# Patient Record
Sex: FEMALE | Race: OTHER | Employment: OTHER | URBAN - METROPOLITAN AREA
[De-identification: names, ages, dates, MRNs, and addresses within clinical notes are randomized per-mention and may not be internally consistent; named-entity substitution may affect disease eponyms.]

---

## 2017-01-23 ENCOUNTER — TELEPHONE (OUTPATIENT)
Dept: INTERNAL MEDICINE | Age: 77
End: 2017-01-23

## 2017-01-23 RX ORDER — AZITHROMYCIN 250 MG/1
TABLET, FILM COATED ORAL
Qty: 1 PACKET | Refills: 0 | Status: SHIPPED | OUTPATIENT
Start: 2017-01-23 | End: 2017-02-02

## 2017-02-03 ENCOUNTER — APPOINTMENT (OUTPATIENT)
Dept: GENERAL RADIOLOGY | Age: 77
End: 2017-02-03
Payer: MEDICARE

## 2017-02-03 ENCOUNTER — OFFICE VISIT (OUTPATIENT)
Dept: INTERNAL MEDICINE | Age: 77
End: 2017-02-03

## 2017-02-03 ENCOUNTER — HOSPITAL ENCOUNTER (EMERGENCY)
Age: 77
Discharge: HOME | End: 2017-02-03
Attending: EMERGENCY MEDICINE
Payer: MEDICARE

## 2017-02-03 VITALS
OXYGEN SATURATION: 94 % | SYSTOLIC BLOOD PRESSURE: 133 MMHG | RESPIRATION RATE: 22 BRPM | HEART RATE: 76 BPM | TEMPERATURE: 98.5 F | DIASTOLIC BLOOD PRESSURE: 90 MMHG | HEIGHT: 62 IN | WEIGHT: 140 LBS | BODY MASS INDEX: 25.76 KG/M2

## 2017-02-03 DIAGNOSIS — R07.9 CHEST PAIN, UNSPECIFIED TYPE: Primary | ICD-10-CM

## 2017-02-03 DIAGNOSIS — J20.9 ACUTE BRONCHITIS, UNSPECIFIED ORGANISM: Primary | ICD-10-CM

## 2017-02-03 LAB
ANION GAP SERPL CALCULATED.3IONS-SCNC: 10 MEQ/L (ref 7–13)
BUN BLDV-MCNC: 26 MG/DL (ref 8–23)
CALCIUM SERPL-MCNC: 9.7 MG/DL (ref 8.6–10.2)
CHLORIDE BLD-SCNC: 100 MEQ/L (ref 98–107)
CO2: 28 MEQ/L (ref 22–29)
CREAT SERPL-MCNC: 0.76 MG/DL (ref 0.5–0.9)
GFR AFRICAN AMERICAN: >60
GFR NON-AFRICAN AMERICAN: >60
GLUCOSE BLD-MCNC: 133 MG/DL (ref 74–109)
HCT VFR BLD CALC: 40.4 % (ref 37–47)
HEMOGLOBIN: 13.4 G/DL (ref 12–16)
INR BLD: 1
MAGNESIUM: 1.9 MG/DL (ref 1.7–2.3)
MCH RBC QN AUTO: 29.8 PG (ref 27–31.3)
MCHC RBC AUTO-ENTMCNC: 33.1 % (ref 33–37)
MCV RBC AUTO: 90 FL (ref 82–100)
PDW BLD-RTO: 13.6 % (ref 11.5–14.5)
PLATELET # BLD: 151 K/UL (ref 130–400)
POTASSIUM SERPL-SCNC: 3.8 MEQ/L (ref 3.5–5.1)
PRO-BNP: 67 PG/ML
PROTHROMBIN TIME: 10.7 SEC (ref 8.1–13.7)
RBC # BLD: 4.49 M/UL (ref 4.2–5.4)
SODIUM BLD-SCNC: 138 MEQ/L (ref 132–144)
TROPONIN: <0.01 NG/ML (ref 0–0.01)
WBC # BLD: 6.3 K/UL (ref 4.8–10.8)

## 2017-02-03 PROCEDURE — G8400 PT W/DXA NO RESULTS DOC: HCPCS | Performed by: INTERNAL MEDICINE

## 2017-02-03 PROCEDURE — G8484 FLU IMMUNIZE NO ADMIN: HCPCS | Performed by: INTERNAL MEDICINE

## 2017-02-03 PROCEDURE — 93005 ELECTROCARDIOGRAM TRACING: CPT

## 2017-02-03 PROCEDURE — 1036F TOBACCO NON-USER: CPT | Performed by: INTERNAL MEDICINE

## 2017-02-03 PROCEDURE — 4040F PNEUMOC VAC/ADMIN/RCVD: CPT | Performed by: INTERNAL MEDICINE

## 2017-02-03 PROCEDURE — 99284 EMERGENCY DEPT VISIT MOD MDM: CPT

## 2017-02-03 PROCEDURE — 36415 COLL VENOUS BLD VENIPUNCTURE: CPT

## 2017-02-03 PROCEDURE — 85610 PROTHROMBIN TIME: CPT

## 2017-02-03 PROCEDURE — 1123F ACP DISCUSS/DSCN MKR DOCD: CPT | Performed by: INTERNAL MEDICINE

## 2017-02-03 PROCEDURE — 83880 ASSAY OF NATRIURETIC PEPTIDE: CPT

## 2017-02-03 PROCEDURE — 71020 XR CHEST STANDARD TWO VW: CPT

## 2017-02-03 PROCEDURE — 6370000000 HC RX 637 (ALT 250 FOR IP): Performed by: EMERGENCY MEDICINE

## 2017-02-03 PROCEDURE — 80048 BASIC METABOLIC PNL TOTAL CA: CPT

## 2017-02-03 PROCEDURE — 84484 ASSAY OF TROPONIN QUANT: CPT

## 2017-02-03 PROCEDURE — G8428 CUR MEDS NOT DOCUMENT: HCPCS | Performed by: INTERNAL MEDICINE

## 2017-02-03 PROCEDURE — 83735 ASSAY OF MAGNESIUM: CPT

## 2017-02-03 PROCEDURE — 85027 COMPLETE CBC AUTOMATED: CPT

## 2017-02-03 PROCEDURE — 99212 OFFICE O/P EST SF 10 MIN: CPT | Performed by: INTERNAL MEDICINE

## 2017-02-03 PROCEDURE — 6360000002 HC RX W HCPCS: Performed by: EMERGENCY MEDICINE

## 2017-02-03 PROCEDURE — 1090F PRES/ABSN URINE INCON ASSESS: CPT | Performed by: INTERNAL MEDICINE

## 2017-02-03 PROCEDURE — G8420 CALC BMI NORM PARAMETERS: HCPCS | Performed by: INTERNAL MEDICINE

## 2017-02-03 RX ORDER — BENZONATATE 100 MG/1
200 CAPSULE ORAL ONCE
Status: COMPLETED | OUTPATIENT
Start: 2017-02-03 | End: 2017-02-03

## 2017-02-03 RX ORDER — DOXYCYCLINE HYCLATE 100 MG/1
100 CAPSULE ORAL 2 TIMES DAILY
COMMUNITY
End: 2017-04-21

## 2017-02-03 RX ORDER — PREDNISONE 50 MG/1
50 TABLET ORAL DAILY
Qty: 4 TABLET | Refills: 0 | Status: SHIPPED | OUTPATIENT
Start: 2017-02-03 | End: 2017-02-07

## 2017-02-03 RX ORDER — GUAIFENESIN 100 MG/5ML
10 SOLUTION ORAL ONCE
Status: COMPLETED | OUTPATIENT
Start: 2017-02-03 | End: 2017-02-03

## 2017-02-03 RX ORDER — DEXAMETHASONE SODIUM PHOSPHATE 10 MG/ML
6 INJECTION, SOLUTION INTRAMUSCULAR; INTRAVENOUS ONCE
Status: COMPLETED | OUTPATIENT
Start: 2017-02-03 | End: 2017-02-03

## 2017-02-03 RX ORDER — BENZONATATE 100 MG/1
200 CAPSULE ORAL 3 TIMES DAILY PRN
Qty: 18 CAPSULE | Refills: 0 | Status: SHIPPED | OUTPATIENT
Start: 2017-02-03 | End: 2017-03-02 | Stop reason: ALTCHOICE

## 2017-02-03 RX ORDER — ALBUTEROL SULFATE 90 UG/1
2 AEROSOL, METERED RESPIRATORY (INHALATION) EVERY 6 HOURS PRN
COMMUNITY
End: 2018-01-30 | Stop reason: ALTCHOICE

## 2017-02-03 RX ADMIN — GUAIFENESIN 10 ML: 100 SOLUTION ORAL at 11:53

## 2017-02-03 RX ADMIN — DEXAMETHASONE SODIUM PHOSPHATE 6 MG: 10 INJECTION INTRAMUSCULAR; INTRAVENOUS at 11:54

## 2017-02-03 RX ADMIN — BENZONATATE 200 MG: 100 CAPSULE ORAL at 11:54

## 2017-02-03 ASSESSMENT — ENCOUNTER SYMPTOMS
NAUSEA: 1
SHORTNESS OF BREATH: 0
VOMITING: 0
ABDOMINAL PAIN: 0
WHEEZING: 0
CHEST TIGHTNESS: 1
COUGH: 1

## 2017-02-03 ASSESSMENT — PAIN SCALES - GENERAL
PAINLEVEL_OUTOF10: 3
PAINLEVEL_OUTOF10: 0

## 2017-02-03 ASSESSMENT — PAIN DESCRIPTION - LOCATION: LOCATION: CHEST

## 2017-02-06 ENCOUNTER — OFFICE VISIT (OUTPATIENT)
Dept: INTERNAL MEDICINE | Age: 77
End: 2017-02-06

## 2017-02-06 VITALS
TEMPERATURE: 97.5 F | BODY MASS INDEX: 25.2 KG/M2 | HEART RATE: 61 BPM | SYSTOLIC BLOOD PRESSURE: 118 MMHG | OXYGEN SATURATION: 98 % | DIASTOLIC BLOOD PRESSURE: 74 MMHG | WEIGHT: 137.8 LBS

## 2017-02-06 DIAGNOSIS — R05.9 COUGH: ICD-10-CM

## 2017-02-06 DIAGNOSIS — R09.81 SINUS CONGESTION: ICD-10-CM

## 2017-02-06 DIAGNOSIS — J90 PLEURAL EFFUSION: ICD-10-CM

## 2017-02-06 DIAGNOSIS — R09.89 CHEST CONGESTION: Primary | ICD-10-CM

## 2017-02-06 PROCEDURE — 1123F ACP DISCUSS/DSCN MKR DOCD: CPT | Performed by: INTERNAL MEDICINE

## 2017-02-06 PROCEDURE — 99214 OFFICE O/P EST MOD 30 MIN: CPT | Performed by: INTERNAL MEDICINE

## 2017-02-06 PROCEDURE — G8400 PT W/DXA NO RESULTS DOC: HCPCS | Performed by: INTERNAL MEDICINE

## 2017-02-06 PROCEDURE — G8484 FLU IMMUNIZE NO ADMIN: HCPCS | Performed by: INTERNAL MEDICINE

## 2017-02-06 PROCEDURE — 4040F PNEUMOC VAC/ADMIN/RCVD: CPT | Performed by: INTERNAL MEDICINE

## 2017-02-06 PROCEDURE — G8420 CALC BMI NORM PARAMETERS: HCPCS | Performed by: INTERNAL MEDICINE

## 2017-02-06 PROCEDURE — 1090F PRES/ABSN URINE INCON ASSESS: CPT | Performed by: INTERNAL MEDICINE

## 2017-02-06 PROCEDURE — G8427 DOCREV CUR MEDS BY ELIG CLIN: HCPCS | Performed by: INTERNAL MEDICINE

## 2017-02-06 PROCEDURE — 1036F TOBACCO NON-USER: CPT | Performed by: INTERNAL MEDICINE

## 2017-02-06 PROCEDURE — 96372 THER/PROPH/DIAG INJ SC/IM: CPT | Performed by: INTERNAL MEDICINE

## 2017-02-06 RX ORDER — DOXYCYCLINE HYCLATE 100 MG
TABLET ORAL
COMMUNITY
Start: 2017-01-31 | End: 2017-02-06 | Stop reason: SDUPTHER

## 2017-02-06 RX ORDER — METHYLPREDNISOLONE ACETATE 80 MG/ML
80 INJECTION, SUSPENSION INTRA-ARTICULAR; INTRALESIONAL; INTRAMUSCULAR; SOFT TISSUE ONCE
Status: COMPLETED | OUTPATIENT
Start: 2017-02-06 | End: 2017-02-06

## 2017-02-06 RX ORDER — AZITHROMYCIN 250 MG/1
TABLET, FILM COATED ORAL
Qty: 1 PACKET | Refills: 0 | Status: SHIPPED | OUTPATIENT
Start: 2017-02-06 | End: 2017-02-16

## 2017-02-06 RX ORDER — FUROSEMIDE 20 MG/1
20 TABLET ORAL DAILY
Qty: 60 TABLET | Refills: 3 | Status: SHIPPED | OUTPATIENT
Start: 2017-02-06 | End: 2018-01-30 | Stop reason: ALTCHOICE

## 2017-02-06 RX ADMIN — METHYLPREDNISOLONE ACETATE 80 MG: 80 INJECTION, SUSPENSION INTRA-ARTICULAR; INTRALESIONAL; INTRAMUSCULAR; SOFT TISSUE at 14:01

## 2017-02-06 ASSESSMENT — ENCOUNTER SYMPTOMS
SINUS PRESSURE: 1
DIARRHEA: 0
TROUBLE SWALLOWING: 0
ANAL BLEEDING: 0
CONSTIPATION: 0
BACK PAIN: 0
SHORTNESS OF BREATH: 1
VOMITING: 0
ABDOMINAL PAIN: 0
SORE THROAT: 0
COUGH: 1
NAUSEA: 0

## 2017-02-06 ASSESSMENT — PATIENT HEALTH QUESTIONNAIRE - PHQ9
2. FEELING DOWN, DEPRESSED OR HOPELESS: 0
SUM OF ALL RESPONSES TO PHQ9 QUESTIONS 1 & 2: 0
1. LITTLE INTEREST OR PLEASURE IN DOING THINGS: 0
SUM OF ALL RESPONSES TO PHQ QUESTIONS 1-9: 0

## 2017-02-10 ENCOUNTER — HOSPITAL ENCOUNTER (OUTPATIENT)
Dept: GENERAL RADIOLOGY | Age: 77
Discharge: HOME | End: 2017-02-10
Payer: MEDICARE

## 2017-02-10 DIAGNOSIS — R05.9 COUGH: ICD-10-CM

## 2017-02-10 DIAGNOSIS — R09.89 CHEST CONGESTION: ICD-10-CM

## 2017-02-10 PROCEDURE — 71020 XR CHEST STANDARD TWO VW: CPT

## 2017-02-10 PROCEDURE — 93005 ELECTROCARDIOGRAM TRACING: CPT

## 2017-02-13 ENCOUNTER — OFFICE VISIT (OUTPATIENT)
Dept: INTERNAL MEDICINE | Age: 77
End: 2017-02-13

## 2017-02-13 VITALS
OXYGEN SATURATION: 96 % | BODY MASS INDEX: 24.87 KG/M2 | TEMPERATURE: 97.7 F | WEIGHT: 136 LBS | SYSTOLIC BLOOD PRESSURE: 130 MMHG | DIASTOLIC BLOOD PRESSURE: 66 MMHG | HEART RATE: 63 BPM

## 2017-02-13 DIAGNOSIS — R05.9 COUGH: Primary | ICD-10-CM

## 2017-02-13 LAB
EKG ATRIAL RATE: 77 BPM
EKG P AXIS: 35 DEGREES
EKG P-R INTERVAL: 158 MS
EKG Q-T INTERVAL: 358 MS
EKG QRS DURATION: 78 MS
EKG QTC CALCULATION (BAZETT): 405 MS
EKG R AXIS: -9 DEGREES
EKG T AXIS: -13 DEGREES
EKG VENTRICULAR RATE: 77 BPM

## 2017-02-13 PROCEDURE — G8484 FLU IMMUNIZE NO ADMIN: HCPCS | Performed by: INTERNAL MEDICINE

## 2017-02-13 PROCEDURE — 1123F ACP DISCUSS/DSCN MKR DOCD: CPT | Performed by: INTERNAL MEDICINE

## 2017-02-13 PROCEDURE — 1036F TOBACCO NON-USER: CPT | Performed by: INTERNAL MEDICINE

## 2017-02-13 PROCEDURE — G8420 CALC BMI NORM PARAMETERS: HCPCS | Performed by: INTERNAL MEDICINE

## 2017-02-13 PROCEDURE — G8400 PT W/DXA NO RESULTS DOC: HCPCS | Performed by: INTERNAL MEDICINE

## 2017-02-13 PROCEDURE — 4040F PNEUMOC VAC/ADMIN/RCVD: CPT | Performed by: INTERNAL MEDICINE

## 2017-02-13 PROCEDURE — G8427 DOCREV CUR MEDS BY ELIG CLIN: HCPCS | Performed by: INTERNAL MEDICINE

## 2017-02-13 PROCEDURE — 99212 OFFICE O/P EST SF 10 MIN: CPT | Performed by: INTERNAL MEDICINE

## 2017-02-13 PROCEDURE — 1090F PRES/ABSN URINE INCON ASSESS: CPT | Performed by: INTERNAL MEDICINE

## 2017-02-18 DIAGNOSIS — K21.9 GASTROESOPHAGEAL REFLUX DISEASE WITHOUT ESOPHAGITIS: ICD-10-CM

## 2017-02-18 DIAGNOSIS — E78.5 HYPERLIPIDEMIA, UNSPECIFIED HYPERLIPIDEMIA TYPE: ICD-10-CM

## 2017-02-18 LAB
ALBUMIN SERPL-MCNC: 4 G/DL (ref 3.9–4.9)
ALP BLD-CCNC: 120 U/L (ref 40–130)
ALT SERPL-CCNC: 45 U/L (ref 0–33)
ANION GAP SERPL CALCULATED.3IONS-SCNC: 13 MEQ/L (ref 7–13)
AST SERPL-CCNC: 32 U/L (ref 0–35)
BILIRUB SERPL-MCNC: 0.4 MG/DL (ref 0–1.2)
BILIRUBIN DIRECT: 0 MG/DL (ref 0–0.3)
BILIRUBIN, INDIRECT: 0.4 MG/DL (ref 0–0.6)
BUN BLDV-MCNC: 19 MG/DL (ref 8–23)
CALCIUM SERPL-MCNC: 9.3 MG/DL (ref 8.6–10.2)
CHLORIDE BLD-SCNC: 102 MEQ/L (ref 98–107)
CHOLESTEROL, TOTAL: 217 MG/DL (ref 0–199)
CO2: 26 MEQ/L (ref 22–29)
CREAT SERPL-MCNC: 0.66 MG/DL (ref 0.5–0.9)
GFR AFRICAN AMERICAN: >60
GFR NON-AFRICAN AMERICAN: >60
GLOBULIN: 2.4 G/DL (ref 2.3–3.5)
GLUCOSE BLD-MCNC: 97 MG/DL (ref 74–109)
HCT VFR BLD CALC: 40.9 % (ref 37–47)
HDLC SERPL-MCNC: 61 MG/DL (ref 40–59)
HEMOGLOBIN: 13.5 G/DL (ref 12–16)
LDL CHOLESTEROL CALCULATED: 118 MG/DL (ref 0–129)
MCH RBC QN AUTO: 30.3 PG (ref 27–31.3)
MCHC RBC AUTO-ENTMCNC: 33 % (ref 33–37)
MCV RBC AUTO: 91.7 FL (ref 82–100)
PDW BLD-RTO: 14.1 % (ref 11.5–14.5)
PLATELET # BLD: 118 K/UL (ref 130–400)
POTASSIUM SERPL-SCNC: 4.1 MEQ/L (ref 3.5–5.1)
RBC # BLD: 4.46 M/UL (ref 4.2–5.4)
SODIUM BLD-SCNC: 141 MEQ/L (ref 132–144)
TOTAL PROTEIN: 6.4 G/DL (ref 6.4–8.1)
TRIGL SERPL-MCNC: 192 MG/DL (ref 0–200)
WBC # BLD: 6.9 K/UL (ref 4.8–10.8)

## 2017-03-02 ENCOUNTER — OFFICE VISIT (OUTPATIENT)
Dept: INTERNAL MEDICINE | Age: 77
End: 2017-03-02

## 2017-03-02 VITALS
WEIGHT: 139.6 LBS | OXYGEN SATURATION: 96 % | SYSTOLIC BLOOD PRESSURE: 140 MMHG | HEART RATE: 69 BPM | TEMPERATURE: 98.3 F | BODY MASS INDEX: 25.53 KG/M2 | DIASTOLIC BLOOD PRESSURE: 82 MMHG

## 2017-03-02 DIAGNOSIS — I10 ESSENTIAL HYPERTENSION: Primary | ICD-10-CM

## 2017-03-02 DIAGNOSIS — R09.81 SINUS CONGESTION: ICD-10-CM

## 2017-03-02 DIAGNOSIS — E78.5 HYPERLIPIDEMIA, UNSPECIFIED HYPERLIPIDEMIA TYPE: ICD-10-CM

## 2017-03-02 PROCEDURE — 99214 OFFICE O/P EST MOD 30 MIN: CPT | Performed by: INTERNAL MEDICINE

## 2017-03-02 PROCEDURE — 1090F PRES/ABSN URINE INCON ASSESS: CPT | Performed by: INTERNAL MEDICINE

## 2017-03-02 PROCEDURE — 1036F TOBACCO NON-USER: CPT | Performed by: INTERNAL MEDICINE

## 2017-03-02 PROCEDURE — G8484 FLU IMMUNIZE NO ADMIN: HCPCS | Performed by: INTERNAL MEDICINE

## 2017-03-02 PROCEDURE — G8420 CALC BMI NORM PARAMETERS: HCPCS | Performed by: INTERNAL MEDICINE

## 2017-03-02 PROCEDURE — G8400 PT W/DXA NO RESULTS DOC: HCPCS | Performed by: INTERNAL MEDICINE

## 2017-03-02 PROCEDURE — 1123F ACP DISCUSS/DSCN MKR DOCD: CPT | Performed by: INTERNAL MEDICINE

## 2017-03-02 PROCEDURE — G8427 DOCREV CUR MEDS BY ELIG CLIN: HCPCS | Performed by: INTERNAL MEDICINE

## 2017-03-02 PROCEDURE — 4040F PNEUMOC VAC/ADMIN/RCVD: CPT | Performed by: INTERNAL MEDICINE

## 2017-03-02 RX ORDER — LOVASTATIN 10 MG/1
10 TABLET ORAL NIGHTLY
Qty: 30 TABLET | Refills: 3 | Status: SHIPPED | OUTPATIENT
Start: 2017-03-02 | End: 2017-07-19 | Stop reason: SDUPTHER

## 2017-03-02 RX ORDER — METHYLPREDNISOLONE ACETATE 80 MG/ML
40 INJECTION, SUSPENSION INTRA-ARTICULAR; INTRALESIONAL; INTRAMUSCULAR; SOFT TISSUE ONCE
Status: DISCONTINUED | OUTPATIENT
Start: 2017-03-02 | End: 2018-04-10

## 2017-03-02 ASSESSMENT — ENCOUNTER SYMPTOMS
COUGH: 0
ABDOMINAL PAIN: 0
WHEEZING: 1
TROUBLE SWALLOWING: 0
COLOR CHANGE: 0
SHORTNESS OF BREATH: 1
BACK PAIN: 0
SINUS PRESSURE: 1
DIARRHEA: 0
ANAL BLEEDING: 0
CONSTIPATION: 0
VOMITING: 0
NAUSEA: 0
SORE THROAT: 0

## 2017-04-21 ENCOUNTER — OFFICE VISIT (OUTPATIENT)
Dept: FAMILY MEDICINE CLINIC | Age: 77
End: 2017-04-21

## 2017-04-21 VITALS
RESPIRATION RATE: 20 BRPM | HEIGHT: 59 IN | SYSTOLIC BLOOD PRESSURE: 126 MMHG | DIASTOLIC BLOOD PRESSURE: 72 MMHG | HEART RATE: 70 BPM | TEMPERATURE: 97.4 F | WEIGHT: 139 LBS | OXYGEN SATURATION: 97 % | BODY MASS INDEX: 28.02 KG/M2

## 2017-04-21 DIAGNOSIS — I10 ESSENTIAL HYPERTENSION: Primary | ICD-10-CM

## 2017-04-21 DIAGNOSIS — K21.9 GASTROESOPHAGEAL REFLUX DISEASE WITHOUT ESOPHAGITIS: ICD-10-CM

## 2017-04-21 DIAGNOSIS — J44.9 CHRONIC OBSTRUCTIVE PULMONARY DISEASE, UNSPECIFIED COPD TYPE (HCC): ICD-10-CM

## 2017-04-21 DIAGNOSIS — J90 PLEURAL EFFUSION: ICD-10-CM

## 2017-04-21 PROCEDURE — G8926 SPIRO NO PERF OR DOC: HCPCS | Performed by: FAMILY MEDICINE

## 2017-04-21 PROCEDURE — 99214 OFFICE O/P EST MOD 30 MIN: CPT | Performed by: FAMILY MEDICINE

## 2017-04-21 PROCEDURE — 3023F SPIROM DOC REV: CPT | Performed by: FAMILY MEDICINE

## 2017-04-21 PROCEDURE — G8400 PT W/DXA NO RESULTS DOC: HCPCS | Performed by: FAMILY MEDICINE

## 2017-04-21 PROCEDURE — 4040F PNEUMOC VAC/ADMIN/RCVD: CPT | Performed by: FAMILY MEDICINE

## 2017-04-21 PROCEDURE — G8427 DOCREV CUR MEDS BY ELIG CLIN: HCPCS | Performed by: FAMILY MEDICINE

## 2017-04-21 PROCEDURE — 1123F ACP DISCUSS/DSCN MKR DOCD: CPT | Performed by: FAMILY MEDICINE

## 2017-04-21 PROCEDURE — G8420 CALC BMI NORM PARAMETERS: HCPCS | Performed by: FAMILY MEDICINE

## 2017-04-21 PROCEDURE — 1036F TOBACCO NON-USER: CPT | Performed by: FAMILY MEDICINE

## 2017-04-21 PROCEDURE — 1090F PRES/ABSN URINE INCON ASSESS: CPT | Performed by: FAMILY MEDICINE

## 2017-04-21 ASSESSMENT — ENCOUNTER SYMPTOMS
GASTROINTESTINAL NEGATIVE: 1
RHINORRHEA: 0
COUGH: 0
RESPIRATORY NEGATIVE: 1
EYES NEGATIVE: 1
CHEST TIGHTNESS: 0

## 2017-05-25 ENCOUNTER — OFFICE VISIT (OUTPATIENT)
Dept: FAMILY MEDICINE CLINIC | Age: 77
End: 2017-05-25

## 2017-05-25 VITALS
OXYGEN SATURATION: 97 % | TEMPERATURE: 97.8 F | WEIGHT: 140 LBS | BODY MASS INDEX: 28.22 KG/M2 | SYSTOLIC BLOOD PRESSURE: 124 MMHG | HEIGHT: 59 IN | DIASTOLIC BLOOD PRESSURE: 78 MMHG | HEART RATE: 74 BPM | RESPIRATION RATE: 18 BRPM

## 2017-05-25 DIAGNOSIS — E78.5 HYPERLIPIDEMIA, UNSPECIFIED HYPERLIPIDEMIA TYPE: ICD-10-CM

## 2017-05-25 DIAGNOSIS — J44.9 CHRONIC OBSTRUCTIVE PULMONARY DISEASE, UNSPECIFIED COPD TYPE (HCC): ICD-10-CM

## 2017-05-25 DIAGNOSIS — I10 ESSENTIAL HYPERTENSION: Primary | ICD-10-CM

## 2017-05-25 PROCEDURE — 4040F PNEUMOC VAC/ADMIN/RCVD: CPT | Performed by: FAMILY MEDICINE

## 2017-05-25 PROCEDURE — G8420 CALC BMI NORM PARAMETERS: HCPCS | Performed by: FAMILY MEDICINE

## 2017-05-25 PROCEDURE — 3023F SPIROM DOC REV: CPT | Performed by: FAMILY MEDICINE

## 2017-05-25 PROCEDURE — G8427 DOCREV CUR MEDS BY ELIG CLIN: HCPCS | Performed by: FAMILY MEDICINE

## 2017-05-25 PROCEDURE — G8926 SPIRO NO PERF OR DOC: HCPCS | Performed by: FAMILY MEDICINE

## 2017-05-25 PROCEDURE — 99213 OFFICE O/P EST LOW 20 MIN: CPT | Performed by: FAMILY MEDICINE

## 2017-05-25 PROCEDURE — 1090F PRES/ABSN URINE INCON ASSESS: CPT | Performed by: FAMILY MEDICINE

## 2017-05-25 PROCEDURE — G8400 PT W/DXA NO RESULTS DOC: HCPCS | Performed by: FAMILY MEDICINE

## 2017-05-25 PROCEDURE — 1123F ACP DISCUSS/DSCN MKR DOCD: CPT | Performed by: FAMILY MEDICINE

## 2017-05-25 PROCEDURE — 1036F TOBACCO NON-USER: CPT | Performed by: FAMILY MEDICINE

## 2017-05-25 ASSESSMENT — ENCOUNTER SYMPTOMS
RHINORRHEA: 0
GASTROINTESTINAL NEGATIVE: 1
EYES NEGATIVE: 1
COUGH: 0
CHEST TIGHTNESS: 0
RESPIRATORY NEGATIVE: 1

## 2017-07-19 DIAGNOSIS — I10 ESSENTIAL HYPERTENSION: ICD-10-CM

## 2017-07-19 DIAGNOSIS — E78.5 HYPERLIPIDEMIA, UNSPECIFIED HYPERLIPIDEMIA TYPE: ICD-10-CM

## 2017-07-19 RX ORDER — LOVASTATIN 10 MG/1
10 TABLET ORAL NIGHTLY
Qty: 30 TABLET | Refills: 3 | Status: SHIPPED | OUTPATIENT
Start: 2017-07-19 | End: 2017-11-27 | Stop reason: SDUPTHER

## 2017-09-06 ENCOUNTER — CARE COORDINATION (OUTPATIENT)
Dept: CARE COORDINATION | Age: 77
End: 2017-09-06

## 2017-11-27 ENCOUNTER — OFFICE VISIT (OUTPATIENT)
Dept: FAMILY MEDICINE CLINIC | Age: 77
End: 2017-11-27

## 2017-11-27 VITALS
BODY MASS INDEX: 26.61 KG/M2 | DIASTOLIC BLOOD PRESSURE: 78 MMHG | WEIGHT: 132 LBS | OXYGEN SATURATION: 97 % | SYSTOLIC BLOOD PRESSURE: 132 MMHG | HEIGHT: 59 IN | TEMPERATURE: 97.6 F | HEART RATE: 74 BPM | RESPIRATION RATE: 18 BRPM

## 2017-11-27 DIAGNOSIS — Z23 NEED FOR INFLUENZA VACCINATION: ICD-10-CM

## 2017-11-27 DIAGNOSIS — I10 ESSENTIAL HYPERTENSION: Primary | ICD-10-CM

## 2017-11-27 DIAGNOSIS — E78.5 HYPERLIPIDEMIA, UNSPECIFIED HYPERLIPIDEMIA TYPE: ICD-10-CM

## 2017-11-27 DIAGNOSIS — K21.9 GASTROESOPHAGEAL REFLUX DISEASE WITHOUT ESOPHAGITIS: ICD-10-CM

## 2017-11-27 DIAGNOSIS — J44.9 CHRONIC OBSTRUCTIVE PULMONARY DISEASE, UNSPECIFIED COPD TYPE (HCC): ICD-10-CM

## 2017-11-27 PROCEDURE — 3023F SPIROM DOC REV: CPT | Performed by: FAMILY MEDICINE

## 2017-11-27 PROCEDURE — 1123F ACP DISCUSS/DSCN MKR DOCD: CPT | Performed by: FAMILY MEDICINE

## 2017-11-27 PROCEDURE — G8926 SPIRO NO PERF OR DOC: HCPCS | Performed by: FAMILY MEDICINE

## 2017-11-27 PROCEDURE — 99214 OFFICE O/P EST MOD 30 MIN: CPT | Performed by: FAMILY MEDICINE

## 2017-11-27 PROCEDURE — 1036F TOBACCO NON-USER: CPT | Performed by: FAMILY MEDICINE

## 2017-11-27 PROCEDURE — G8419 CALC BMI OUT NRM PARAM NOF/U: HCPCS | Performed by: FAMILY MEDICINE

## 2017-11-27 PROCEDURE — 4040F PNEUMOC VAC/ADMIN/RCVD: CPT | Performed by: FAMILY MEDICINE

## 2017-11-27 PROCEDURE — 90662 IIV NO PRSV INCREASED AG IM: CPT | Performed by: FAMILY MEDICINE

## 2017-11-27 PROCEDURE — 1090F PRES/ABSN URINE INCON ASSESS: CPT | Performed by: FAMILY MEDICINE

## 2017-11-27 PROCEDURE — G8400 PT W/DXA NO RESULTS DOC: HCPCS | Performed by: FAMILY MEDICINE

## 2017-11-27 PROCEDURE — G8427 DOCREV CUR MEDS BY ELIG CLIN: HCPCS | Performed by: FAMILY MEDICINE

## 2017-11-27 PROCEDURE — G8484 FLU IMMUNIZE NO ADMIN: HCPCS | Performed by: FAMILY MEDICINE

## 2017-11-27 PROCEDURE — G0008 ADMIN INFLUENZA VIRUS VAC: HCPCS | Performed by: FAMILY MEDICINE

## 2017-11-27 RX ORDER — OMEPRAZOLE 40 MG/1
40 CAPSULE, DELAYED RELEASE ORAL DAILY
Qty: 90 CAPSULE | Refills: 3 | Status: SHIPPED | OUTPATIENT
Start: 2017-11-27 | End: 2018-12-12 | Stop reason: ALTCHOICE

## 2017-11-27 RX ORDER — LOVASTATIN 10 MG/1
10 TABLET ORAL NIGHTLY
Qty: 90 TABLET | Refills: 3 | Status: SHIPPED | OUTPATIENT
Start: 2017-11-27 | End: 2018-01-30 | Stop reason: ALTCHOICE

## 2017-11-27 RX ORDER — METOPROLOL SUCCINATE 50 MG/1
50 TABLET, EXTENDED RELEASE ORAL DAILY
Qty: 90 TABLET | Refills: 3 | Status: SHIPPED | OUTPATIENT
Start: 2017-11-27 | End: 2018-01-30 | Stop reason: ALTCHOICE

## 2017-11-27 ASSESSMENT — ENCOUNTER SYMPTOMS
COUGH: 0
GASTROINTESTINAL NEGATIVE: 1
EYES NEGATIVE: 1
CHEST TIGHTNESS: 0
RESPIRATORY NEGATIVE: 1
RHINORRHEA: 0

## 2017-11-27 NOTE — PROGRESS NOTES
Smoking status: Never Smoker    Smokeless tobacco: Never Used    Alcohol use No    Drug use: No    Sexual activity: Not Asked     Other Topics Concern    None     Social History Narrative    None     Current Outpatient Prescriptions   Medication Sig Dispense Refill    omeprazole (PRILOSEC) 40 MG delayed release capsule Take 1 capsule by mouth daily 90 capsule 3    metoprolol succinate (TOPROL XL) 50 MG extended release tablet Take 1 tablet by mouth daily 90 tablet 3    lovastatin (MEVACOR) 10 MG tablet Take 1 tablet by mouth nightly 90 tablet 3    Naproxen Sodium (ALEVE PO) Take by mouth as needed      POLYETHYL GLYCOL-PROPYL GLYCOL OP Use  in both eyes.  furosemide (LASIX) 20 MG tablet Take 1 tablet by mouth daily (Patient taking differently: Take 20 mg by mouth every other day ) 60 tablet 3    albuterol sulfate  (90 BASE) MCG/ACT inhaler Inhale 2 puffs into the lungs every 6 hours as needed for Wheezing      sertraline (ZOLOFT) 100 MG tablet Take 1 tablet by mouth daily 90 tablet 3    aspirin 81 MG EC tablet Take 81 mg by mouth daily.  cilostazol (PLETAL) 100 MG tablet Take 100 mg by mouth daily. Current Facility-Administered Medications   Medication Dose Route Frequency Provider Last Rate Last Dose    methylPREDNISolone acetate (DEPO-MEDROL) injection 40 mg  40 mg Intramuscular Once Leslie Rivera MD         Current Outpatient Prescriptions on File Prior to Visit   Medication Sig Dispense Refill    Naproxen Sodium (ALEVE PO) Take by mouth as needed      POLYETHYL GLYCOL-PROPYL GLYCOL OP Use  in both eyes.       furosemide (LASIX) 20 MG tablet Take 1 tablet by mouth daily (Patient taking differently: Take 20 mg by mouth every other day ) 60 tablet 3    albuterol sulfate  (90 BASE) MCG/ACT inhaler Inhale 2 puffs into the lungs every 6 hours as needed for Wheezing      sertraline (ZOLOFT) 100 MG tablet Take 1 tablet by mouth daily 90 tablet 3    aspirin 81 MG EC tablet Take 81 mg by mouth daily.  cilostazol (PLETAL) 100 MG tablet Take 100 mg by mouth daily. Current Facility-Administered Medications on File Prior to Visit   Medication Dose Route Frequency Provider Last Rate Last Dose    methylPREDNISolone acetate (DEPO-MEDROL) injection 40 mg  40 mg Intramuscular Once Eze Vega MD         Allergies   Allergen Reactions    Codeine Shortness Of Breath     tired     Health Maintenance   Topic Date Due    DTaP/Tdap/Td vaccine (1 - Tdap) 02/05/1959    Zostavax vaccine  02/05/1999    DEXA (modify frequency per FRAX score)  02/05/2004    Pneumococcal low/med risk (1 of 2 - PCV13) 02/05/2004    Flu vaccine (1) 09/01/2017    Breast cancer screen  12/08/2018    Colon cancer screen colonoscopy  05/04/2019    Lipid screen  02/18/2022       Review of Systems    Review of Systems   Constitutional: Negative for activity change, appetite change, fatigue and fever. HENT: Negative for congestion and rhinorrhea. Eyes: Negative. Respiratory: Negative. Negative for cough and chest tightness. Cardiovascular: Negative. Gastrointestinal: Negative. Endocrine: Negative. Genitourinary: Negative. Musculoskeletal: Negative. Skin: Negative. Neurological: Negative for dizziness, light-headedness and numbness. Hematological: Negative. Psychiatric/Behavioral: Negative. Physical Exam  Vitals:    11/27/17 1010   BP: 132/78   Pulse: 74   Resp: 18   Temp: 97.6 °F (36.4 °C)   TempSrc: Tympanic   SpO2: 97%   Weight: 132 lb (59.9 kg)   Height: 4' 11\" (1.499 m)       Physical Exam   Constitutional: She appears well-developed and well-nourished. HENT:   Right Ear: External ear normal.   Left Ear: External ear normal.   Eyes: Conjunctivae are normal. Pupils are equal, round, and reactive to light. Neck: Normal range of motion. Neck supple. No thyromegaly present. Cardiovascular: Normal rate, regular rhythm and normal heart sounds.     No murmur heard. Pulmonary/Chest: Effort normal and breath sounds normal.   Abdominal: Soft. Bowel sounds are normal. She exhibits no distension. There is no tenderness. Musculoskeletal: Normal range of motion. She exhibits no edema or tenderness. Lymphadenopathy:     She has no cervical adenopathy. Neurological: She is alert. No cranial nerve deficit. Coordination normal.       Assessment  1. Essential hypertension  lovastatin (MEVACOR) 10 MG tablet   2. Need for influenza vaccination  INFLUENZA, HIGH DOSE, 65 YRS +, IM, PF, PREFILL SYR, 0.5ML (FLUZONE HD)   3. Hyperlipidemia, unspecified hyperlipidemia type  lovastatin (MEVACOR) 10 MG tablet   4. Gastroesophageal reflux disease without esophagitis     5.  Chronic obstructive pulmonary disease, unspecified COPD type ([facility]       Problem List     Hyperlipidemia    Relevant Medications    aspirin 81 MG EC tablet    furosemide (LASIX) 20 MG tablet    metoprolol succinate (TOPROL XL) 50 MG extended release tablet    lovastatin (MEVACOR) 10 MG tablet    Essential hypertension - Primary    Relevant Medications    aspirin 81 MG EC tablet    furosemide (LASIX) 20 MG tablet    metoprolol succinate (TOPROL XL) 50 MG extended release tablet    lovastatin (MEVACOR) 10 MG tablet    Gastroesophageal reflux disease without esophagitis    Relevant Medications    omeprazole (PRILOSEC) 40 MG delayed release capsule    Chronic obstructive pulmonary disease (HCC)    Relevant Medications    methylPREDNISolone acetate (DEPO-MEDROL) injection 40 mg (Completed)    methylPREDNISolone acetate (DEPO-MEDROL) injection 40 mg (Completed)    methylPREDNISolone acetate (DEPO-MEDROL) injection 40 mg (Completed)    methylPREDNISolone acetate (DEPO-MEDROL) injection 40 mg (Completed)    methylPREDNISolone acetate (DEPO-MEDROL) injection 40 mg (Completed)    methylPREDNISolone acetate (DEPO-MEDROL) injection 40 mg (Completed)    methylPREDNISolone acetate (DEPO-MEDROL) injection 40 mg (Completed) methylPREDNISolone acetate (DEPO-MEDROL) injection 80 mg (Completed)    benzonatate (TESSALON) capsule 200 mg (Completed)    guaiFENesin (ROBITUSSIN) 100 MG/5ML oral solution 10 mL (Completed)    dexamethasone (PF) (DECADRON) injection 6 mg (Completed)    albuterol sulfate  (90 BASE) MCG/ACT inhaler    methylPREDNISolone acetate (DEPO-MEDROL) injection 80 mg (Completed)    methylPREDNISolone acetate (DEPO-MEDROL) injection 40 mg          Plan  Orders Placed This Encounter   Procedures    INFLUENZA, HIGH DOSE, 65 YRS +, IM, PF, PREFILL SYR, 0.5ML (FLUZONE HD)     Orders Placed This Encounter   Medications    omeprazole (PRILOSEC) 40 MG delayed release capsule     Sig: Take 1 capsule by mouth daily     Dispense:  90 capsule     Refill:  3    metoprolol succinate (TOPROL XL) 50 MG extended release tablet     Sig: Take 1 tablet by mouth daily     Dispense:  90 tablet     Refill:  3    lovastatin (MEVACOR) 10 MG tablet     Sig: Take 1 tablet by mouth nightly     Dispense:  90 tablet     Refill:  3     Return in about 6 months (around 5/27/2018).   Annalisa North MD

## 2018-01-30 ENCOUNTER — OFFICE VISIT (OUTPATIENT)
Dept: FAMILY MEDICINE CLINIC | Age: 78
End: 2018-01-30
Payer: MEDICARE

## 2018-01-30 VITALS
SYSTOLIC BLOOD PRESSURE: 130 MMHG | HEIGHT: 59 IN | HEART RATE: 67 BPM | BODY MASS INDEX: 28.22 KG/M2 | WEIGHT: 140 LBS | OXYGEN SATURATION: 97 % | DIASTOLIC BLOOD PRESSURE: 76 MMHG | TEMPERATURE: 98.4 F | RESPIRATION RATE: 16 BRPM

## 2018-01-30 DIAGNOSIS — I10 ESSENTIAL HYPERTENSION: ICD-10-CM

## 2018-01-30 DIAGNOSIS — L57.0 MULTIPLE ACTINIC KERATOSES: Chronic | ICD-10-CM

## 2018-01-30 DIAGNOSIS — F41.8 DEPRESSION WITH ANXIETY: Chronic | ICD-10-CM

## 2018-01-30 DIAGNOSIS — Z12.39 SCREENING FOR BREAST CANCER: Chronic | ICD-10-CM

## 2018-01-30 DIAGNOSIS — R53.83 FATIGUE, UNSPECIFIED TYPE: ICD-10-CM

## 2018-01-30 DIAGNOSIS — K29.50 CHRONIC GASTRITIS, PRESENCE OF BLEEDING UNSPECIFIED, UNSPECIFIED GASTRITIS TYPE: Chronic | ICD-10-CM

## 2018-01-30 DIAGNOSIS — K21.9 GASTROESOPHAGEAL REFLUX DISEASE WITHOUT ESOPHAGITIS: Primary | ICD-10-CM

## 2018-01-30 DIAGNOSIS — M81.0 AGE-RELATED OSTEOPOROSIS WITHOUT CURRENT PATHOLOGICAL FRACTURE: Chronic | ICD-10-CM

## 2018-01-30 PROBLEM — K29.70 GASTRITIS: Chronic | Status: ACTIVE | Noted: 2018-01-30

## 2018-01-30 PROCEDURE — 1123F ACP DISCUSS/DSCN MKR DOCD: CPT | Performed by: FAMILY MEDICINE

## 2018-01-30 PROCEDURE — G8400 PT W/DXA NO RESULTS DOC: HCPCS | Performed by: FAMILY MEDICINE

## 2018-01-30 PROCEDURE — 99215 OFFICE O/P EST HI 40 MIN: CPT | Performed by: FAMILY MEDICINE

## 2018-01-30 PROCEDURE — 1090F PRES/ABSN URINE INCON ASSESS: CPT | Performed by: FAMILY MEDICINE

## 2018-01-30 PROCEDURE — G8427 DOCREV CUR MEDS BY ELIG CLIN: HCPCS | Performed by: FAMILY MEDICINE

## 2018-01-30 PROCEDURE — 4040F PNEUMOC VAC/ADMIN/RCVD: CPT | Performed by: FAMILY MEDICINE

## 2018-01-30 PROCEDURE — 1036F TOBACCO NON-USER: CPT | Performed by: FAMILY MEDICINE

## 2018-01-30 PROCEDURE — G8484 FLU IMMUNIZE NO ADMIN: HCPCS | Performed by: FAMILY MEDICINE

## 2018-01-30 PROCEDURE — G8419 CALC BMI OUT NRM PARAM NOF/U: HCPCS | Performed by: FAMILY MEDICINE

## 2018-01-30 RX ORDER — LISINOPRIL 10 MG/1
10 TABLET ORAL DAILY
Qty: 30 TABLET | Refills: 3 | Status: SHIPPED | OUTPATIENT
Start: 2018-01-30 | End: 2018-03-06 | Stop reason: DRUGHIGH

## 2018-01-30 RX ORDER — MIRTAZAPINE 15 MG/1
15 TABLET, FILM COATED ORAL NIGHTLY
Qty: 30 TABLET | Refills: 5 | Status: SHIPPED | OUTPATIENT
Start: 2018-01-30 | End: 2018-02-05

## 2018-01-30 RX ORDER — DIPHENHYDRAMINE HCL 25 MG
25 TABLET ORAL EVERY 6 HOURS PRN
COMMUNITY
End: 2018-01-30 | Stop reason: ALTCHOICE

## 2018-01-30 RX ORDER — LANOLIN ALCOHOL/MO/W.PET/CERES
3 CREAM (GRAM) TOPICAL NIGHTLY PRN
COMMUNITY
End: 2018-03-06 | Stop reason: ALTCHOICE

## 2018-01-30 RX ORDER — ACETAMINOPHEN 500 MG
500 TABLET ORAL EVERY 6 HOURS PRN
COMMUNITY
End: 2020-12-26 | Stop reason: ALTCHOICE

## 2018-01-31 NOTE — PROGRESS NOTES
Subjective  Vahe Buckner, 65 y.o. female presents today with:  Chief Complaint   Patient presents with   Keven Marquez Doctor     Former patient of Dr. Lou President. Patient states she needs a general check up. This is a new patient to me. I have reviewed the past medical and surgical history, social history and family history provided. I have reviewed  medication, previous testing and working diagnoses. I have reviewed the allergies and health maintenance information and correlated it into my decision making for this patient for care, diagnostics, consultations and treatment for today's visit. 1. CV/CP - unclear reason for lasix and betablocker given no h/o CAD/MI. No CP/SOB/palps/edema. CXR shows mild cardiomegaly. Patient considers herself to have good exercise tolerance. She was placed on albuterol apparently when she had PNA. She is a never-smoker and does not have asthma, wheezing or coughing. She does not use the inhaler. 2. States that she has constant epigastric burning with and without food and her long-term PPI use doesn't help. No bloody or tarry stools. Has colonoscopy within last few years. Believes it was normal. No unexpected weight loss. 3. Depression - Is on Zoloft and without it she says she is even more tearful than she is now. She is moderately tearful now and is very worried about issues within the family. No SI. No hallucinations. Has trouble initiating and maintaining sleep. No ETOH. No drugs. Is very tired all of the time during the day but doesn't sleep at night. 4. Skin issues - has dry, scaling patches all over her arms and on her upper back that are very itchy. They've been there a long time and steroid creams are no help.          No other questions and or concerns for today's visit      Review of Systems as above      Past Medical History:   Diagnosis Date    Arthritis     Chronic bronchitis ([address]     Degenerative disc disease, cervical     Depression     Esophagitis     Fibromyositis     GERD (gastroesophageal reflux disease)     Headache(784.0)     Hyperlipidemia     Hypertension     Migraines     Pleural effusion     Right-sided chest wall pain      Past Surgical History:   Procedure Laterality Date    CHOLECYSTECTOMY  1988    COLONOSCOPY  05/04/2009    HYSTERECTOMY  1978    OTHER SURGICAL HISTORY Left 04/27/15     [facility]YE VITRECTOMY W MACULAR EPIRETINAL MEMBRANE    UPPER GASTROINTESTINAL ENDOSCOPY  04/16/2013    UPPER GASTROINTESTINAL ENDOSCOPY  10/29/15    Dillan Sims MD     Social History     Social History    Marital status:      Spouse name: N/A    Number of children: N/A    Years of education: N/A     Occupational History    Not on file. Social History Main Topics    Smoking status: Never Smoker    Smokeless tobacco: Never Used    Alcohol use No    Drug use: No    Sexual activity: Not on file     Other Topics Concern    Not on file     Social History Narrative    No narrative on file     Family History   Problem Relation Age of Onset    Other Mother      Neurological Disease    Cancer Sister     Diabetes Brother      Allergies   Allergen Reactions    Codeine Shortness Of Breath     tired     Current Outpatient Prescriptions   Medication Sig Dispense Refill    Multiple Vitamins-Minerals (PRESERVISION AREDS 2+MULTI VIT PO) Take by mouth      melatonin 3 MG TABS tablet Take 3 mg by mouth nightly as needed      acetaminophen (TYLENOL) 500 MG tablet Take 500 mg by mouth every 6 hours as needed for Pain      lisinopril (PRINIVIL;ZESTRIL) 10 MG tablet Take 1 tablet by mouth daily 30 tablet 3    mirtazapine (REMERON) 15 MG tablet Take 1 tablet by mouth nightly 30 tablet 5    omeprazole (PRILOSEC) 40 MG delayed release capsule Take 1 capsule by mouth daily 90 capsule 3    sertraline (ZOLOFT) 100 MG tablet Take 1 tablet by mouth daily 90 tablet 3    aspirin 81 MG EC tablet Take 81 mg by mouth daily.        Current

## 2018-02-05 DIAGNOSIS — F41.8 DEPRESSION WITH ANXIETY: Primary | Chronic | ICD-10-CM

## 2018-02-05 RX ORDER — TRAZODONE HYDROCHLORIDE 50 MG/1
25 TABLET ORAL NIGHTLY
Qty: 15 TABLET | Refills: 2 | Status: SHIPPED | OUTPATIENT
Start: 2018-02-05 | End: 2018-06-18 | Stop reason: SDUPTHER

## 2018-02-10 ENCOUNTER — HOSPITAL ENCOUNTER (OUTPATIENT)
Dept: WOMENS IMAGING | Age: 78
Discharge: HOME | End: 2018-02-12
Payer: MEDICARE

## 2018-02-10 DIAGNOSIS — Z12.39 SCREENING FOR BREAST CANCER: Chronic | ICD-10-CM

## 2018-02-10 DIAGNOSIS — M81.0 AGE-RELATED OSTEOPOROSIS WITHOUT CURRENT PATHOLOGICAL FRACTURE: Chronic | ICD-10-CM

## 2018-02-10 PROCEDURE — 77067 SCR MAMMO BI INCL CAD: CPT

## 2018-02-10 PROCEDURE — 77080 DXA BONE DENSITY AXIAL: CPT

## 2018-02-21 ENCOUNTER — HOSPITAL ENCOUNTER (OUTPATIENT)
Dept: LAB | Age: 78
Discharge: HOME | End: 2018-02-21
Payer: MEDICARE

## 2018-02-21 LAB
ALBUMIN SERPL-MCNC: 3.9 G/DL (ref 3.9–4.9)
ALP BLD-CCNC: 149 U/L (ref 40–130)
ALT SERPL-CCNC: 39 U/L (ref 0–33)
ANION GAP SERPL CALCULATED.3IONS-SCNC: 15 MEQ/L (ref 7–13)
AST SERPL-CCNC: 33 U/L (ref 0–35)
ATYPICAL LYMPHOCYTE RELATIVE PERCENT: 2 %
BASOPHILS ABSOLUTE: 0 K/UL (ref 0–0.2)
BASOPHILS RELATIVE PERCENT: 0.6 %
BILIRUB SERPL-MCNC: 0.4 MG/DL (ref 0–1.2)
BUN BLDV-MCNC: 17 MG/DL (ref 8–23)
CALCIUM SERPL-MCNC: 9.4 MG/DL (ref 8.6–10.2)
CHLORIDE BLD-SCNC: 100 MEQ/L (ref 98–107)
CO2: 26 MEQ/L (ref 22–29)
CREAT SERPL-MCNC: 0.67 MG/DL (ref 0.5–0.9)
EOSINOPHILS ABSOLUTE: 0.1 K/UL (ref 0–0.7)
EOSINOPHILS RELATIVE PERCENT: 2 %
GFR AFRICAN AMERICAN: >60
GFR NON-AFRICAN AMERICAN: >60
GLOBULIN: 2.8 G/DL (ref 2.3–3.5)
GLUCOSE BLD-MCNC: 94 MG/DL (ref 74–109)
HCT VFR BLD CALC: 42.1 % (ref 37–47)
HEMOGLOBIN: 13.6 G/DL (ref 12–16)
LYMPHOCYTES ABSOLUTE: 1.5 K/UL (ref 1–4.8)
LYMPHOCYTES RELATIVE PERCENT: 38 %
MCH RBC QN AUTO: 30.2 PG (ref 27–31.3)
MCHC RBC AUTO-ENTMCNC: 32.2 % (ref 33–37)
MCV RBC AUTO: 93.7 FL (ref 82–100)
MONOCYTES ABSOLUTE: 0.3 K/UL (ref 0.2–0.8)
MONOCYTES RELATIVE PERCENT: 8.3 %
NEUTROPHILS ABSOLUTE: 1.9 K/UL (ref 1.4–6.5)
NEUTROPHILS RELATIVE PERCENT: 50 %
PDW BLD-RTO: 14 % (ref 11.5–14.5)
PLATELET # BLD: 121 K/UL (ref 130–400)
PLATELET SLIDE REVIEW: ABNORMAL
POTASSIUM SERPL-SCNC: 4.6 MEQ/L (ref 3.5–5.1)
RBC # BLD: 4.49 M/UL (ref 4.2–5.4)
RBC # BLD: NORMAL 10*6/UL
SMUDGE CELLS: 12.5
SODIUM BLD-SCNC: 141 MEQ/L (ref 132–144)
TOTAL PROTEIN: 6.7 G/DL (ref 6.4–8.1)
TSH SERPL DL<=0.05 MIU/L-ACNC: 1.87 UIU/ML (ref 0.27–4.2)
VACUOLATED NEUTROPHILS: PRESENT
WBC # BLD: 3.8 K/UL (ref 4.8–10.8)

## 2018-02-21 PROCEDURE — 85025 COMPLETE CBC W/AUTO DIFF WBC: CPT

## 2018-02-21 PROCEDURE — 80053 COMPREHEN METABOLIC PANEL: CPT

## 2018-02-21 PROCEDURE — 84443 ASSAY THYROID STIM HORMONE: CPT

## 2018-03-06 ENCOUNTER — OFFICE VISIT (OUTPATIENT)
Dept: FAMILY MEDICINE CLINIC | Age: 78
End: 2018-03-06
Payer: MEDICARE

## 2018-03-06 VITALS
SYSTOLIC BLOOD PRESSURE: 146 MMHG | HEIGHT: 59 IN | TEMPERATURE: 99.2 F | HEART RATE: 88 BPM | WEIGHT: 138 LBS | RESPIRATION RATE: 20 BRPM | DIASTOLIC BLOOD PRESSURE: 78 MMHG | OXYGEN SATURATION: 96 % | BODY MASS INDEX: 27.82 KG/M2

## 2018-03-06 DIAGNOSIS — I10 ESSENTIAL HYPERTENSION: Primary | ICD-10-CM

## 2018-03-06 DIAGNOSIS — Z23 NEED FOR PNEUMOCOCCAL VACCINATION: ICD-10-CM

## 2018-03-06 DIAGNOSIS — D69.6 THROMBOCYTOPENIA (HCC): Chronic | ICD-10-CM

## 2018-03-06 DIAGNOSIS — F41.8 DEPRESSION WITH ANXIETY: Chronic | ICD-10-CM

## 2018-03-06 DIAGNOSIS — R73.09 ABNORMAL GLUCOSE: Chronic | ICD-10-CM

## 2018-03-06 DIAGNOSIS — D72.810 LYMPHOCYTOPENIA: Chronic | ICD-10-CM

## 2018-03-06 PROCEDURE — 90670 PCV13 VACCINE IM: CPT | Performed by: FAMILY MEDICINE

## 2018-03-06 PROCEDURE — 1036F TOBACCO NON-USER: CPT | Performed by: FAMILY MEDICINE

## 2018-03-06 PROCEDURE — G8399 PT W/DXA RESULTS DOCUMENT: HCPCS | Performed by: FAMILY MEDICINE

## 2018-03-06 PROCEDURE — G0009 ADMIN PNEUMOCOCCAL VACCINE: HCPCS | Performed by: FAMILY MEDICINE

## 2018-03-06 PROCEDURE — 1123F ACP DISCUSS/DSCN MKR DOCD: CPT | Performed by: FAMILY MEDICINE

## 2018-03-06 PROCEDURE — G8427 DOCREV CUR MEDS BY ELIG CLIN: HCPCS | Performed by: FAMILY MEDICINE

## 2018-03-06 PROCEDURE — 1090F PRES/ABSN URINE INCON ASSESS: CPT | Performed by: FAMILY MEDICINE

## 2018-03-06 PROCEDURE — G8419 CALC BMI OUT NRM PARAM NOF/U: HCPCS | Performed by: FAMILY MEDICINE

## 2018-03-06 PROCEDURE — 99214 OFFICE O/P EST MOD 30 MIN: CPT | Performed by: FAMILY MEDICINE

## 2018-03-06 PROCEDURE — G8484 FLU IMMUNIZE NO ADMIN: HCPCS | Performed by: FAMILY MEDICINE

## 2018-03-06 PROCEDURE — 4040F PNEUMOC VAC/ADMIN/RCVD: CPT | Performed by: FAMILY MEDICINE

## 2018-03-06 RX ORDER — MIRTAZAPINE 15 MG/1
TABLET, FILM COATED ORAL
COMMUNITY
Start: 2018-01-30 | End: 2018-03-06

## 2018-03-06 RX ORDER — LISINOPRIL 20 MG/1
20 TABLET ORAL DAILY
Qty: 30 TABLET | Refills: 3 | Status: SHIPPED | OUTPATIENT
Start: 2018-03-06 | End: 2018-07-10 | Stop reason: SDUPTHER

## 2018-03-06 RX ORDER — BLOOD PRESSURE TEST KIT
KIT MISCELLANEOUS
Qty: 1 KIT | Refills: 0 | Status: SHIPPED | OUTPATIENT
Start: 2018-03-06 | End: 2018-04-10 | Stop reason: ALTCHOICE

## 2018-03-06 RX ORDER — METOPROLOL SUCCINATE 50 MG/1
TABLET, EXTENDED RELEASE ORAL
COMMUNITY
Start: 2017-11-27 | End: 2018-03-06 | Stop reason: ALTCHOICE

## 2018-03-06 RX ORDER — LOVASTATIN 10 MG/1
TABLET ORAL
COMMUNITY
Start: 2017-11-27 | End: 2018-03-06 | Stop reason: ALTCHOICE

## 2018-03-06 ASSESSMENT — PATIENT HEALTH QUESTIONNAIRE - PHQ9
SUM OF ALL RESPONSES TO PHQ QUESTIONS 1-9: 0
2. FEELING DOWN, DEPRESSED OR HOPELESS: 0
1. LITTLE INTEREST OR PLEASURE IN DOING THINGS: 0
SUM OF ALL RESPONSES TO PHQ9 QUESTIONS 1 & 2: 0

## 2018-03-07 DIAGNOSIS — K29.50 CHRONIC GASTRITIS, PRESENCE OF BLEEDING UNSPECIFIED, UNSPECIFIED GASTRITIS TYPE: Chronic | ICD-10-CM

## 2018-03-07 ASSESSMENT — ENCOUNTER SYMPTOMS
VOMITING: 0
ABDOMINAL PAIN: 0
CHEST TIGHTNESS: 0
CONSTIPATION: 0
DIARRHEA: 0
NAUSEA: 0

## 2018-03-07 NOTE — PROGRESS NOTES
Subjective  Chu Nagy, 77 y.o. female presents today with:  Chief Complaint   Patient presents with    Follow-up     Patient is here for her follow up with HTN, Gerd and depression. States that since starting her new medications her BP and sugars are elevated. Hypertension  Patient is here for follow-up of elevated blood pressure. She is not exercising and is adherent to a low-salt diet. Blood pressure is not well controlled at home. Cardiac symptoms: none. Patient denies chest pain, dyspnea, irregular heart beat and lower extremity edema. Cardiovascular risk factors: advanced age (older than 54 for men, 72 for women) and sedentary lifestyle. Use of agents associated with hypertension: none. History of target organ damage: none. Patient is being treated for depression and has been compliant with meds which do not cause side effects. Mood is improved. No suicidal ideation. Sleep is improved with trazodone. She states that he sugar 2 hours postprandial has been running 120-160. Her fasting glucose was WNL. Gets shakey if she hasn't had an adequate lunch. Her lipids off statin are WNL. Her platelets are 224, WBC 3.5 and alk phos and LFTs chronically mildly elevated. No rashes. No abnormal bleeding. No ETOH. No other questions and or concerns for today's visit      Review of Systems   Constitutional: Negative for chills, diaphoresis, fatigue and fever. Respiratory: Negative for chest tightness. Cardiovascular: Negative for chest pain, palpitations and leg swelling. Gastrointestinal: Negative for abdominal pain, constipation, diarrhea, nausea and vomiting.          Past Medical History:   Diagnosis Date    Arthritis     Chronic bronchitis (HCC)     Degenerative disc disease, cervical     Depression     Esophagitis     Fibromyositis     GERD (gastroesophageal reflux disease)     Headache(784.0)     Hyperlipidemia     Hypertension     Migraines     Pleural Intramuscular Once Isabel Ernst MD         PMH, Surgical Hx, Family Hx, and Social Hx reviewed and updated. Health Maintenance reviewed. Objective    Vitals:    03/06/18 1856 03/06/18 1901   BP: (!) 158/80 (!) 146/78   Site: Left Arm Left Arm   Position: Sitting Sitting   Cuff Size: Medium Adult Medium Adult   Pulse: 88    Resp: 20    Temp: 99.2 °F (37.3 °C)    TempSrc: Temporal    SpO2: 96%    Weight: 138 lb (62.6 kg)    Height: 4' 11\" (1.499 m)        Physical Exam   Constitutional: She is oriented to person, place, and time. She appears well-developed and well-nourished. No distress. HENT:   Head: Normocephalic and atraumatic. Eyes: Conjunctivae are normal.   Cardiovascular: Normal rate, regular rhythm and normal heart sounds. Pulmonary/Chest: No respiratory distress. She has no wheezes. She has no rales. Neurological: She is alert and oriented to person, place, and time. Skin: Skin is warm and dry. Psychiatric: She has a normal mood and affect. Assessment & Plan   1. Essential hypertension  Blood Pressure KIT    lisinopril (PRINIVIL;ZESTRIL) 20 MG tablet   2. Gastroesophageal reflux disease without esophagitis     3. Need for pneumococcal vaccination  PREVNAR 13 IM (Pneumococcal conjugate vaccine 13-valent)   4. Depression with anxiety     5. Abnormal glucose  Hemoglobin A1C   6. Thrombocytopenia ([address]  Hepatic Function Panel   7. Lymphocytopenia       1. Hypertension - stable, fair control; increase lisinopril to 20 mg; follow labs; reviewed healthy diet, exercise, importance of maintaining healthy weight and med compliance. 2. Depression with anxiety - sleep is better on trazodone; mood and anxiety improved; no change to meds. 3. Abnormal glucose - check A1C.    4. Abnl Labs - Recheck in one month. COnsider liver US.     Reviewed with the patient: current clinical status, medications, activities and diet.      Side effects, adverse effects of the medication prescribed today, as well as treatment plan/ rationale and result expectations have been discussed with the patient who expresses understanding and desires to proceed.     Close follow up to evaluate treatment results and for coordination of care. I have reviewed the patient's medical history in detail and updated the computerized patient record. Orders Placed This Encounter   Procedures    PREVNAR 13 IM (Pneumococcal conjugate vaccine 13-valent)    Hepatic Function Panel     Standing Status:   Future     Standing Expiration Date:   3/6/2019    Hemoglobin A1C     Standing Status:   Future     Standing Expiration Date:   3/6/2019     Orders Placed This Encounter   Medications    Blood Pressure KIT     Sig: Use as directed to check blood pressure daily. Dispense:  1 kit     Refill:  0    lisinopril (PRINIVIL;ZESTRIL) 20 MG tablet     Sig: Take 1 tablet by mouth daily     Dispense:  30 tablet     Refill:  3     Medications Discontinued During This Encounter   Medication Reason    lovastatin (MEVACOR) 10 MG tablet Therapy completed    melatonin 3 MG TABS tablet Therapy completed    Multiple Vitamins-Minerals (PRESERVISION AREDS 2+MULTI VIT PO) Therapy completed    metoprolol succinate (TOPROL XL) 50 MG extended release tablet Therapy completed    lisinopril (PRINIVIL;ZESTRIL) 10 MG tablet Dose adjustment    mirtazapine (REMERON) 15 MG tablet Patient Choice     Return in about 4 weeks (around 4/3/2018) for HTN, mood, WBC, Plt.   Quality & Risk Score Accuracy - MEDICARE ADVANTAGE    Last edited 03/07/18 07:58 EST by Jersey Palencia MD           Controlled Substances Monitoring:                                Jersey Palencia MD

## 2018-03-08 LAB — H PYLORI BREATH TEST: NEGATIVE

## 2018-03-26 RX ORDER — SERTRALINE HYDROCHLORIDE 100 MG/1
100 TABLET, FILM COATED ORAL DAILY
Qty: 90 TABLET | Refills: 1 | Status: SHIPPED | OUTPATIENT
Start: 2018-03-26 | End: 2018-07-30 | Stop reason: SDUPTHER

## 2018-03-29 ENCOUNTER — HOSPITAL ENCOUNTER (OUTPATIENT)
Dept: LAB | Age: 78
Discharge: HOME | End: 2018-03-29
Payer: MEDICARE

## 2018-03-29 LAB
ALBUMIN SERPL-MCNC: 3.9 G/DL (ref 3.9–4.9)
ALP BLD-CCNC: 131 U/L (ref 40–130)
ALT SERPL-CCNC: 34 U/L (ref 0–33)
AST SERPL-CCNC: 26 U/L (ref 0–35)
BILIRUB SERPL-MCNC: 0.4 MG/DL (ref 0–1.2)
BILIRUBIN DIRECT: 0.2 MG/DL (ref 0–0.3)
BILIRUBIN, INDIRECT: 0.2 MG/DL (ref 0–0.6)
HBA1C MFR BLD: 6.7 % (ref 4.8–5.9)
TOTAL PROTEIN: 6.3 G/DL (ref 6.4–8.1)

## 2018-03-29 PROCEDURE — 80076 HEPATIC FUNCTION PANEL: CPT

## 2018-03-29 PROCEDURE — 36415 COLL VENOUS BLD VENIPUNCTURE: CPT

## 2018-03-29 PROCEDURE — 83036 HEMOGLOBIN GLYCOSYLATED A1C: CPT

## 2018-04-10 ENCOUNTER — OFFICE VISIT (OUTPATIENT)
Dept: FAMILY MEDICINE CLINIC | Age: 78
End: 2018-04-10
Payer: MEDICARE

## 2018-04-10 VITALS
BODY MASS INDEX: 27.62 KG/M2 | DIASTOLIC BLOOD PRESSURE: 68 MMHG | TEMPERATURE: 96.4 F | SYSTOLIC BLOOD PRESSURE: 120 MMHG | HEART RATE: 84 BPM | HEIGHT: 59 IN | WEIGHT: 137 LBS

## 2018-04-10 DIAGNOSIS — E11.9 TYPE 2 DIABETES MELLITUS WITHOUT COMPLICATION, WITHOUT LONG-TERM CURRENT USE OF INSULIN (HCC): Chronic | ICD-10-CM

## 2018-04-10 DIAGNOSIS — E11.9 ENCOUNTER FOR DIABETIC FOOT EXAM (HCC): Chronic | ICD-10-CM

## 2018-04-10 DIAGNOSIS — I10 ESSENTIAL HYPERTENSION: ICD-10-CM

## 2018-04-10 DIAGNOSIS — D69.6 THROMBOCYTOPENIA (HCC): Chronic | ICD-10-CM

## 2018-04-10 DIAGNOSIS — M81.0 AGE-RELATED OSTEOPOROSIS WITHOUT CURRENT PATHOLOGICAL FRACTURE: Primary | Chronic | ICD-10-CM

## 2018-04-10 DIAGNOSIS — D72.810 LYMPHOCYTOPENIA: Chronic | ICD-10-CM

## 2018-04-10 PROBLEM — K29.70 GASTRITIS: Chronic | Status: RESOLVED | Noted: 2018-01-30 | Resolved: 2018-04-10

## 2018-04-10 PROCEDURE — 99214 OFFICE O/P EST MOD 30 MIN: CPT | Performed by: FAMILY MEDICINE

## 2018-04-10 PROCEDURE — 1123F ACP DISCUSS/DSCN MKR DOCD: CPT | Performed by: FAMILY MEDICINE

## 2018-04-10 PROCEDURE — G8419 CALC BMI OUT NRM PARAM NOF/U: HCPCS | Performed by: FAMILY MEDICINE

## 2018-04-10 PROCEDURE — 4040F PNEUMOC VAC/ADMIN/RCVD: CPT | Performed by: FAMILY MEDICINE

## 2018-04-10 PROCEDURE — 1090F PRES/ABSN URINE INCON ASSESS: CPT | Performed by: FAMILY MEDICINE

## 2018-04-10 PROCEDURE — 1036F TOBACCO NON-USER: CPT | Performed by: FAMILY MEDICINE

## 2018-04-10 PROCEDURE — G8399 PT W/DXA RESULTS DOCUMENT: HCPCS | Performed by: FAMILY MEDICINE

## 2018-04-10 PROCEDURE — G8427 DOCREV CUR MEDS BY ELIG CLIN: HCPCS | Performed by: FAMILY MEDICINE

## 2018-04-10 RX ORDER — ANTACID TABLETS 648 MG/1
1 TABLET, CHEWABLE ORAL 2 TIMES DAILY
Qty: 60 TABLET | Refills: 11 | Status: SHIPPED | OUTPATIENT
Start: 2018-04-10 | End: 2019-04-10

## 2018-04-10 RX ORDER — MULTIVIT-MIN/IRON/FOLIC ACID/K 18-600-40
1 CAPSULE ORAL DAILY
Qty: 30 CAPSULE | Refills: 11 | Status: SHIPPED | OUTPATIENT
Start: 2018-04-10 | End: 2020-09-11 | Stop reason: SDUPTHER

## 2018-04-10 RX ORDER — ALENDRONATE SODIUM 70 MG/1
70 TABLET ORAL
Qty: 4 TABLET | Refills: 5 | Status: SHIPPED | OUTPATIENT
Start: 2018-04-10 | End: 2018-09-27 | Stop reason: SDUPTHER

## 2018-04-11 PROBLEM — Z12.39 SCREENING FOR BREAST CANCER: Chronic | Status: RESOLVED | Noted: 2018-01-30 | Resolved: 2018-04-11

## 2018-04-11 ASSESSMENT — ENCOUNTER SYMPTOMS
TROUBLE SWALLOWING: 0
CHEST TIGHTNESS: 0
BACK PAIN: 1
COUGH: 0
SHORTNESS OF BREATH: 0

## 2018-05-09 ENCOUNTER — TELEPHONE (OUTPATIENT)
Dept: FAMILY MEDICINE CLINIC | Age: 78
End: 2018-05-09

## 2018-05-11 ENCOUNTER — OFFICE VISIT (OUTPATIENT)
Dept: FAMILY MEDICINE CLINIC | Age: 78
End: 2018-05-11
Payer: MEDICARE

## 2018-05-11 VITALS
HEIGHT: 59 IN | TEMPERATURE: 97.3 F | WEIGHT: 133 LBS | RESPIRATION RATE: 18 BRPM | DIASTOLIC BLOOD PRESSURE: 78 MMHG | HEART RATE: 82 BPM | SYSTOLIC BLOOD PRESSURE: 136 MMHG | OXYGEN SATURATION: 96 % | BODY MASS INDEX: 26.81 KG/M2

## 2018-05-11 DIAGNOSIS — N30.00 ACUTE CYSTITIS WITHOUT HEMATURIA: Primary | ICD-10-CM

## 2018-05-11 DIAGNOSIS — R30.0 DYSURIA: ICD-10-CM

## 2018-05-11 DIAGNOSIS — K59.04 CHRONIC IDIOPATHIC CONSTIPATION: ICD-10-CM

## 2018-05-11 DIAGNOSIS — N30.00 ACUTE CYSTITIS WITHOUT HEMATURIA: ICD-10-CM

## 2018-05-11 PROCEDURE — 1036F TOBACCO NON-USER: CPT | Performed by: PHYSICIAN ASSISTANT

## 2018-05-11 PROCEDURE — G8417 CALC BMI ABV UP PARAM F/U: HCPCS | Performed by: PHYSICIAN ASSISTANT

## 2018-05-11 PROCEDURE — 81002 URINALYSIS NONAUTO W/O SCOPE: CPT | Performed by: PHYSICIAN ASSISTANT

## 2018-05-11 PROCEDURE — 4040F PNEUMOC VAC/ADMIN/RCVD: CPT | Performed by: PHYSICIAN ASSISTANT

## 2018-05-11 PROCEDURE — 99213 OFFICE O/P EST LOW 20 MIN: CPT | Performed by: PHYSICIAN ASSISTANT

## 2018-05-11 PROCEDURE — 1090F PRES/ABSN URINE INCON ASSESS: CPT | Performed by: PHYSICIAN ASSISTANT

## 2018-05-11 PROCEDURE — G8427 DOCREV CUR MEDS BY ELIG CLIN: HCPCS | Performed by: PHYSICIAN ASSISTANT

## 2018-05-11 PROCEDURE — G8399 PT W/DXA RESULTS DOCUMENT: HCPCS | Performed by: PHYSICIAN ASSISTANT

## 2018-05-11 PROCEDURE — 1123F ACP DISCUSS/DSCN MKR DOCD: CPT | Performed by: PHYSICIAN ASSISTANT

## 2018-05-11 RX ORDER — NITROFURANTOIN 25; 75 MG/1; MG/1
100 CAPSULE ORAL 2 TIMES DAILY
Qty: 20 CAPSULE | Refills: 0 | Status: SHIPPED | OUTPATIENT
Start: 2018-05-11 | End: 2018-05-21

## 2018-05-11 ASSESSMENT — ENCOUNTER SYMPTOMS
ANAL BLEEDING: 0
CONSTIPATION: 1
NAUSEA: 0
RECTAL PAIN: 0
SHORTNESS OF BREATH: 0
BLOOD IN STOOL: 0
ABDOMINAL PAIN: 0
CHEST TIGHTNESS: 0
ABDOMINAL DISTENTION: 0
DIARRHEA: 0

## 2018-05-13 LAB — URINE CULTURE, ROUTINE: NORMAL

## 2018-06-18 DIAGNOSIS — F41.8 DEPRESSION WITH ANXIETY: Chronic | ICD-10-CM

## 2018-06-18 RX ORDER — TRAZODONE HYDROCHLORIDE 50 MG/1
25 TABLET ORAL NIGHTLY
Qty: 15 TABLET | Refills: 2 | Status: SHIPPED | OUTPATIENT
Start: 2018-06-18 | End: 2018-10-22 | Stop reason: SDUPTHER

## 2018-06-28 ENCOUNTER — HOSPITAL ENCOUNTER (OUTPATIENT)
Dept: DIABETES SERVICES | Age: 78
Setting detail: THERAPIES SERIES
Discharge: HOME | End: 2018-06-28
Payer: MEDICARE

## 2018-06-28 ENCOUNTER — HOSPITAL ENCOUNTER (OUTPATIENT)
Dept: NUTRITION | Age: 78
Discharge: HOME | End: 2018-06-28
Payer: MEDICARE

## 2018-06-28 VITALS — HEIGHT: 63 IN | WEIGHT: 130 LBS | BODY MASS INDEX: 23.04 KG/M2

## 2018-06-28 DIAGNOSIS — D72.810 LYMPHOCYTOPENIA: Chronic | ICD-10-CM

## 2018-06-28 DIAGNOSIS — E11.9 TYPE 2 DIABETES MELLITUS WITHOUT COMPLICATION, WITHOUT LONG-TERM CURRENT USE OF INSULIN (HCC): Primary | Chronic | ICD-10-CM

## 2018-06-28 LAB
BASOPHILS ABSOLUTE: 0 K/UL (ref 0–0.2)
BASOPHILS RELATIVE PERCENT: 0.9 %
EOSINOPHILS ABSOLUTE: 0.2 K/UL (ref 0–0.7)
EOSINOPHILS RELATIVE PERCENT: 3.6 %
HCT VFR BLD CALC: 40.9 % (ref 37–47)
HEMOGLOBIN: 13.8 G/DL (ref 12–16)
LYMPHOCYTES ABSOLUTE: 1.9 K/UL (ref 1–4.8)
LYMPHOCYTES RELATIVE PERCENT: 40.8 %
MCH RBC QN AUTO: 31.2 PG (ref 27–31.3)
MCHC RBC AUTO-ENTMCNC: 33.6 % (ref 33–37)
MCV RBC AUTO: 92.7 FL (ref 82–100)
MONOCYTES ABSOLUTE: 0.5 K/UL (ref 0.2–0.8)
MONOCYTES RELATIVE PERCENT: 10.2 %
NEUTROPHILS ABSOLUTE: 2 K/UL (ref 1.4–6.5)
NEUTROPHILS RELATIVE PERCENT: 44.5 %
PDW BLD-RTO: 14 % (ref 11.5–14.5)
PLATELET # BLD: 122 K/UL (ref 130–400)
RBC # BLD: 4.41 M/UL (ref 4.2–5.4)
WBC # BLD: 4.5 K/UL (ref 4.8–10.8)

## 2018-06-28 PROCEDURE — 97802 MEDICAL NUTRITION INDIV IN: CPT

## 2018-06-28 PROCEDURE — G0108 DIAB MANAGE TRN  PER INDIV: HCPCS

## 2018-06-28 RX ORDER — LANCETS 30 GAUGE
1 EACH MISCELLANEOUS DAILY
Qty: 100 EACH | Refills: 5 | Status: SHIPPED | OUTPATIENT
Start: 2018-06-28 | End: 2018-07-02 | Stop reason: SDUPTHER

## 2018-06-28 RX ORDER — BLOOD PRESSURE TEST KIT
KIT MISCELLANEOUS
Qty: 100 EACH | Refills: 5 | Status: SHIPPED | OUTPATIENT
Start: 2018-06-28

## 2018-06-28 RX ORDER — GLUCOSAMINE HCL/CHONDROITIN SU 500-400 MG
CAPSULE ORAL
Qty: 100 STRIP | Refills: 5 | Status: SHIPPED | OUTPATIENT
Start: 2018-06-28 | End: 2018-07-02 | Stop reason: SDUPTHER

## 2018-06-28 ASSESSMENT — PATIENT HEALTH QUESTIONNAIRE - PHQ9
SUM OF ALL RESPONSES TO PHQ QUESTIONS 1-9: 1
2. FEELING DOWN, DEPRESSED OR HOPELESS: 1
1. LITTLE INTEREST OR PLEASURE IN DOING THINGS: 0
SUM OF ALL RESPONSES TO PHQ9 QUESTIONS 1 & 2: 1

## 2018-07-02 ENCOUNTER — TELEPHONE (OUTPATIENT)
Dept: FAMILY MEDICINE CLINIC | Age: 78
End: 2018-07-02

## 2018-07-02 DIAGNOSIS — E11.9 TYPE 2 DIABETES MELLITUS WITHOUT COMPLICATION, WITHOUT LONG-TERM CURRENT USE OF INSULIN (HCC): Chronic | ICD-10-CM

## 2018-07-02 RX ORDER — GLUCOSAMINE HCL/CHONDROITIN SU 500-400 MG
CAPSULE ORAL
Qty: 100 STRIP | Refills: 5 | Status: SHIPPED | OUTPATIENT
Start: 2018-07-02 | End: 2019-01-13 | Stop reason: SDUPTHER

## 2018-07-02 RX ORDER — LANCETS 30 GAUGE
1 EACH MISCELLANEOUS DAILY
Qty: 100 EACH | Refills: 5 | Status: SHIPPED | OUTPATIENT
Start: 2018-07-02 | End: 2021-06-17 | Stop reason: SDUPTHER

## 2018-07-10 ENCOUNTER — OFFICE VISIT (OUTPATIENT)
Dept: FAMILY MEDICINE CLINIC | Age: 78
End: 2018-07-10
Payer: MEDICARE

## 2018-07-10 VITALS
DIASTOLIC BLOOD PRESSURE: 70 MMHG | SYSTOLIC BLOOD PRESSURE: 132 MMHG | HEIGHT: 59 IN | WEIGHT: 127 LBS | TEMPERATURE: 97.4 F | OXYGEN SATURATION: 97 % | HEART RATE: 74 BPM | RESPIRATION RATE: 16 BRPM | BODY MASS INDEX: 25.6 KG/M2

## 2018-07-10 DIAGNOSIS — K21.9 GASTROESOPHAGEAL REFLUX DISEASE WITHOUT ESOPHAGITIS: ICD-10-CM

## 2018-07-10 DIAGNOSIS — M25.511 CHRONIC RIGHT SHOULDER PAIN: Chronic | ICD-10-CM

## 2018-07-10 DIAGNOSIS — F41.8 DEPRESSION WITH ANXIETY: Chronic | ICD-10-CM

## 2018-07-10 DIAGNOSIS — E11.9 TYPE 2 DIABETES MELLITUS WITHOUT COMPLICATION, WITHOUT LONG-TERM CURRENT USE OF INSULIN (HCC): Primary | Chronic | ICD-10-CM

## 2018-07-10 DIAGNOSIS — M81.0 AGE-RELATED OSTEOPOROSIS WITHOUT CURRENT PATHOLOGICAL FRACTURE: Chronic | ICD-10-CM

## 2018-07-10 DIAGNOSIS — I10 ESSENTIAL HYPERTENSION: ICD-10-CM

## 2018-07-10 DIAGNOSIS — J02.9 SORE THROAT: ICD-10-CM

## 2018-07-10 DIAGNOSIS — G89.29 CHRONIC RIGHT SHOULDER PAIN: Chronic | ICD-10-CM

## 2018-07-10 PROCEDURE — G8417 CALC BMI ABV UP PARAM F/U: HCPCS | Performed by: FAMILY MEDICINE

## 2018-07-10 PROCEDURE — 1036F TOBACCO NON-USER: CPT | Performed by: FAMILY MEDICINE

## 2018-07-10 PROCEDURE — G8399 PT W/DXA RESULTS DOCUMENT: HCPCS | Performed by: FAMILY MEDICINE

## 2018-07-10 PROCEDURE — 99214 OFFICE O/P EST MOD 30 MIN: CPT | Performed by: FAMILY MEDICINE

## 2018-07-10 PROCEDURE — 4040F PNEUMOC VAC/ADMIN/RCVD: CPT | Performed by: FAMILY MEDICINE

## 2018-07-10 PROCEDURE — G8427 DOCREV CUR MEDS BY ELIG CLIN: HCPCS | Performed by: FAMILY MEDICINE

## 2018-07-10 PROCEDURE — 1123F ACP DISCUSS/DSCN MKR DOCD: CPT | Performed by: FAMILY MEDICINE

## 2018-07-10 PROCEDURE — 1090F PRES/ABSN URINE INCON ASSESS: CPT | Performed by: FAMILY MEDICINE

## 2018-07-10 RX ORDER — LISINOPRIL 20 MG/1
20 TABLET ORAL DAILY
Qty: 30 TABLET | Refills: 11 | Status: SHIPPED | OUTPATIENT
Start: 2018-07-10 | End: 2019-05-25 | Stop reason: SDUPTHER

## 2018-07-10 NOTE — PROGRESS NOTES
Patient is here for multiple chronic care diagnoses. Treatment Adherence:   Medication compliance:  compliant most of the time  Diet compliance:  yes  Weight trend: decreasing  Current exercise: no regular exercise    Diabetes Mellitus Type 2: Current symptoms/problems include none. Home blood sugar records:  patient does not test  Any episodes of hypoglycemia? no  Eye exam current (within one year): yes  Tobacco history: She  reports that she has never smoked. She has never used smokeless tobacco.   Daily Aspirin? Yes  Known diabetic complications: none    Hypertension:  Home blood pressure monitoring: No.  She is adherent to a low sodium diet. Patient denies chest pain, shortness of breath, lightheadedness, peripheral edema, palpitations and fatigue. Antihypertensive medication side effects: no medication side effects noted. Use of agents associated with hypertension: none. Mood disorder with insomnia: Pharmacist was concerned that she was on trazodone and Zoloft. Zoloft 100 mg by mouth daily controls her anxiety and depression. She has no suicidality or homicidality. No hallucinations. She has hope for the future. Her anxiety is well controlled. Insomnia is well controlled with trazodone 50 mg by mouth daily at bedtime. She has no side effects from either medicine. HReview of Systems no nausea, vomiting, diarrhea, constipation, blood in stools. Complains of chronic right shoulder pain. Injured her shoulder in a fall several years ago and participated in physical therapy. Now shoulder hurts at all times and with all movement and is diffusely tender. She does not take over-the-counter medication for it.     Past Medical History:   Diagnosis Date    Arthritis     Chronic bronchitis (HCC)     Degenerative disc disease, cervical     Depression     Esophagitis     Fibromyositis     GERD (gastroesophageal reflux disease)     Headache(784.0)     Hyperlipidemia     Hypertension     Migraines     Pleural effusion     Right-sided chest wall pain     Type 2 diabetes mellitus without complication ([facility] 7/36/3833       Past Surgical History:   Procedure Laterality Date    CHOLECYSTECTOMY  1988    COLONOSCOPY  05/04/2009    HYSTERECTOMY  1978    OTHER SURGICAL HISTORY Left 04/27/15     [facility] EYE VITRECTOMY W MACULAR EPIRETINAL MEMBRANE    UPPER GASTROINTESTINAL ENDOSCOPY  04/16/2013    UPPER GASTROINTESTINAL ENDOSCOPY  10/29/15    Adriane Poole MD       Family History   Problem Relation Age of Onset    Other Mother         Neurological Disease    Cancer Sister     Diabetes Brother     Cancer Brother         leukemia       Current Outpatient Prescriptions   Medication Sig Dispense Refill    lisinopril (PRINIVIL;ZESTRIL) 20 MG tablet Take 1 tablet by mouth daily 30 tablet 11    Lancets MISC 1 each by Does not apply route daily Type 2 diabetes mellitus without complication, without long-term current use of insulin ([facility]) (E11.9) 100 each 5    Blood Glucose Monitoring Suppl LONG Use as directed up to TID Type 2 diabetes mellitus without complication, without long-term current use of insulin ([facility]) (E11.9) 1 Device 0    Glucose Blood (BLOOD GLUCOSE TEST STRIPS) STRP Test up to TID     Type 2 diabetes mellitus without complication, without long-term current use of insulin ([facility]) (E11.9) 100 strip 5    Alcohol Swabs PADS Use as directed 100 each 5    traZODone (DESYREL) 50 MG tablet Take 0.5 tablets by mouth nightly 15 tablet 2    calcium carbonate 648 MG TABS Take 1 tablet by mouth 2 times daily 60 tablet 11    Cholecalciferol (VITAMIN D) 2000 units CAPS capsule Take 1 capsule by mouth daily 30 capsule 11    alendronate (FOSAMAX) 70 MG tablet Take 1 tablet by mouth every 7 days 4 tablet 5    sertraline (ZOLOFT) 100 MG tablet Take 1 tablet by mouth daily 90 tablet 1    fluocinonide (LIDEX) 0.05 % cream       acetaminophen (TYLENOL) 500 MG tablet Take 500 mg by mouth every 6 hours as without complication, without long-term current use of insulin ([facility]    -  Primary    Table, well controlled on no meds at this time. She did not have her A1c drawn. If A1C increased from previous Will start metformin. No need for CBG. Hemoglobin A1C [30398 Custom]   - Future Order         Essential hypertension        Stable well controlled on current meds. Follow clinically and with labs. lisinopril (PRINIVIL;ZESTRIL) 20 MG tablet [4580]           Gastroesophageal reflux disease without esophagitis        Stable well controlled on current meds follow clinically         Age-related osteoporosis without current pathological fracture        Tolerating Fosamax and taking correctly. Also taking vitamin D and calcium correctly         Depression with anxiety        Stable and Well-controlled on Zoloft and trazodone. Chronic right shoulder pain        Given vague complaints and diffuse pain with normal range of motion as well as diabetes and history of physical therapy will refer to orthopedic. External Referral - Norm Muniz MD - Joint Replacement, Arthroscopic Surgery, Sports Med [SRX507 Custom]           Sore throat        Complained of sore throat as I was leaving the room. Throat normal no lymphadenopathy ears normal as well. OTC Tylenol recommended               Reviewed with the patient: all disease processes, current clinical status, medications, activities and diet.      Side effects, adverse effects of the medication prescribed today, as well as treatment plan/ rationale and result expectations have been discussed with the patient who expresses understanding and desires to proceed.     Close follow up to evaluate treatment results and for coordination of care. I have reviewed the patient's medical history in detail and updated the computerized patient record. More than 50% of the appointment was spent in face-to-face counseling, education and care coordination.     Follow-up in

## 2018-07-11 ENCOUNTER — TELEPHONE (OUTPATIENT)
Dept: FAMILY MEDICINE CLINIC | Age: 78
End: 2018-07-11

## 2018-07-27 DIAGNOSIS — E11.9 TYPE 2 DIABETES MELLITUS WITHOUT COMPLICATION, WITHOUT LONG-TERM CURRENT USE OF INSULIN (HCC): Chronic | ICD-10-CM

## 2018-07-27 LAB — HBA1C MFR BLD: 6.1 % (ref 4.8–5.9)

## 2018-07-30 RX ORDER — SERTRALINE HYDROCHLORIDE 100 MG/1
100 TABLET, FILM COATED ORAL DAILY
Qty: 90 TABLET | Refills: 1 | Status: SHIPPED | OUTPATIENT
Start: 2018-07-30 | End: 2019-03-25 | Stop reason: SDUPTHER

## 2018-08-20 ENCOUNTER — OFFICE VISIT (OUTPATIENT)
Dept: FAMILY MEDICINE CLINIC | Age: 78
End: 2018-08-20
Payer: MEDICARE

## 2018-08-20 VITALS
WEIGHT: 122 LBS | DIASTOLIC BLOOD PRESSURE: 68 MMHG | BODY MASS INDEX: 21.62 KG/M2 | TEMPERATURE: 97.5 F | OXYGEN SATURATION: 98 % | HEIGHT: 63 IN | HEART RATE: 80 BPM | SYSTOLIC BLOOD PRESSURE: 122 MMHG | RESPIRATION RATE: 16 BRPM

## 2018-08-20 DIAGNOSIS — I88.9 LYMPHADENITIS: Chronic | ICD-10-CM

## 2018-08-20 LAB
BASOPHILS ABSOLUTE: 0 K/UL (ref 0–0.2)
BASOPHILS RELATIVE PERCENT: 0.5 %
EOSINOPHILS ABSOLUTE: 0.1 K/UL (ref 0–0.7)
EOSINOPHILS RELATIVE PERCENT: 2.1 %
HCT VFR BLD CALC: 41.5 % (ref 37–47)
HEMOGLOBIN: 13.8 G/DL (ref 12–16)
LYMPHOCYTES ABSOLUTE: 1.2 K/UL (ref 1–4.8)
LYMPHOCYTES RELATIVE PERCENT: 25.1 %
MCH RBC QN AUTO: 30.9 PG (ref 27–31.3)
MCHC RBC AUTO-ENTMCNC: 33.3 % (ref 33–37)
MCV RBC AUTO: 92.8 FL (ref 82–100)
MONOCYTES ABSOLUTE: 0.5 K/UL (ref 0.2–0.8)
MONOCYTES RELATIVE PERCENT: 10.8 %
NEUTROPHILS ABSOLUTE: 2.9 K/UL (ref 1.4–6.5)
NEUTROPHILS RELATIVE PERCENT: 61.5 %
PDW BLD-RTO: 13.3 % (ref 11.5–14.5)
PLATELET # BLD: 122 K/UL (ref 130–400)
RBC # BLD: 4.48 M/UL (ref 4.2–5.4)
WBC # BLD: 4.7 K/UL (ref 4.8–10.8)

## 2018-08-20 PROCEDURE — 1090F PRES/ABSN URINE INCON ASSESS: CPT | Performed by: FAMILY MEDICINE

## 2018-08-20 PROCEDURE — G8420 CALC BMI NORM PARAMETERS: HCPCS | Performed by: FAMILY MEDICINE

## 2018-08-20 PROCEDURE — 99213 OFFICE O/P EST LOW 20 MIN: CPT | Performed by: FAMILY MEDICINE

## 2018-08-20 PROCEDURE — G8399 PT W/DXA RESULTS DOCUMENT: HCPCS | Performed by: FAMILY MEDICINE

## 2018-08-20 PROCEDURE — 1101F PT FALLS ASSESS-DOCD LE1/YR: CPT | Performed by: FAMILY MEDICINE

## 2018-08-20 PROCEDURE — G8427 DOCREV CUR MEDS BY ELIG CLIN: HCPCS | Performed by: FAMILY MEDICINE

## 2018-08-20 PROCEDURE — 1036F TOBACCO NON-USER: CPT | Performed by: FAMILY MEDICINE

## 2018-08-20 PROCEDURE — 1123F ACP DISCUSS/DSCN MKR DOCD: CPT | Performed by: FAMILY MEDICINE

## 2018-08-20 PROCEDURE — 4040F PNEUMOC VAC/ADMIN/RCVD: CPT | Performed by: FAMILY MEDICINE

## 2018-08-20 RX ORDER — AMOXICILLIN AND CLAVULANATE POTASSIUM 875; 125 MG/1; MG/1
1 TABLET, FILM COATED ORAL 2 TIMES DAILY
Qty: 20 TABLET | Refills: 0 | Status: SHIPPED | OUTPATIENT
Start: 2018-08-20 | End: 2018-08-30

## 2018-08-20 RX ORDER — PENICILLIN V POTASSIUM 500 MG/1
TABLET ORAL
COMMUNITY
Start: 2018-08-18 | End: 2018-08-20 | Stop reason: ALTCHOICE

## 2018-08-20 RX ORDER — PREDNISONE 20 MG/1
40 TABLET ORAL DAILY
Qty: 10 TABLET | Refills: 0 | Status: SHIPPED | OUTPATIENT
Start: 2018-08-20 | End: 2018-08-25

## 2018-08-20 NOTE — PROGRESS NOTES
days 4 tablet 5    fluocinonide (LIDEX) 0.05 % cream       omeprazole (PRILOSEC) 40 MG delayed release capsule Take 1 capsule by mouth daily 90 capsule 3    aspirin 81 MG EC tablet Take 81 mg by mouth daily.  acetaminophen (TYLENOL) 500 MG tablet Take 500 mg by mouth every 6 hours as needed for Pain       No current facility-administered medications for this visit. PMH, Surgical Hx, Family Hx, and Social Hx reviewed and updated. Health Maintenance reviewed. Objective    Vitals:    08/20/18 1243   BP: 122/68   Site: Left Arm   Position: Sitting   Cuff Size: Medium Adult   Pulse: 80   Resp: 16   Temp: 97.5 °F (36.4 °C)   TempSrc: Temporal   SpO2: 98%   Weight: 122 lb (55.3 kg)   Height: 5' 3\" (1.6 m)       Physical Exam   Constitutional: She is oriented to person, place, and time. She appears well-developed and well-nourished. HENT:   Head: Normocephalic and atraumatic. Right Ear: Tympanic membrane, external ear and ear canal normal.   Left Ear: Tympanic membrane, external ear and ear canal normal.   Nose: Nose normal. Right sinus exhibits no maxillary sinus tenderness and no frontal sinus tenderness. Left sinus exhibits no maxillary sinus tenderness and no frontal sinus tenderness. Mouth/Throat: Uvula is midline, oropharynx is clear and moist and mucous membranes are normal. No oral lesions. No trismus in the jaw. No dental abscesses or uvula swelling. Eyes: Conjunctivae are normal. No scleral icterus. Neck: Normal range of motion. Neck supple. No thyromegaly present. Cardiovascular: Normal rate, regular rhythm and normal heart sounds. Pulmonary/Chest: Effort normal and breath sounds normal.   Lymphadenopathy:        Head (right side): No submental and no tonsillar adenopathy present. Head (left side): Submandibular and preauricular adenopathy present. No posterior auricular and no occipital adenopathy present. She has no cervical adenopathy.    Significant tenderness in the preauricular and most superior anterior cervical chain lymph nodes versus posterior submandibular nodes with mild, nonfluctuant enlargement. Nodes are matted no skin changes. Neurological: She is alert and oriented to person, place, and time. Skin: Skin is warm and dry. Psychiatric: She has a normal mood and affect. Lab Results   Component Value Date    LABA1C 6.1 (H) 07/27/2018    LABA1C 6.7 (H) 03/29/2018     Lab Results   Component Value Date    CREATININE 0.67 02/21/2018     Lab Results   Component Value Date    ALT 34 (H) 03/29/2018    AST 26 03/29/2018     Lab Results   Component Value Date    CHOL 217 (H) 02/18/2017    TRIG 192 02/18/2017    HDL 61 (H) 02/18/2017    LDLCALC 118 02/18/2017        Assessment & Plan   Visit Diagnoses and Associated Orders     Lymphadenitis        amoxicillin-clavulanate (AUGMENTIN) 875-125 MG per tablet [62008]      CBC With Auto Differential [81948 Custom]   - Future Order    predniSONE (DELTASONE) 20 MG tablet [[id number]]                 Change to Augmentin and add prednisone. Follow CBC. Reevaluate in one week. To ER if symptoms worsen. Patient understands plan of care and all questions were answered. Orders Placed This Encounter   Procedures    CBC With Auto Differential     Standing Status:   Future     Standing Expiration Date:   8/20/2019     Orders Placed This Encounter   Medications    amoxicillin-clavulanate (AUGMENTIN) 875-125 MG per tablet     Sig: Take 1 tablet by mouth 2 times daily for 10 days     Dispense:  20 tablet     Refill:  0    predniSONE (DELTASONE) 20 MG tablet     Sig: Take 2 tablets by mouth daily for 5 days     Dispense:  10 tablet     Refill:  0     Medications Discontinued During This Encounter   Medication Reason    penicillin v potassium (VEETID) 500 MG tablet Therapy completed     Return for has appt in Sept.        Controlled Substances Monitoring:     No flowsheet data found.     Zuleika Lundberg MD

## 2018-09-11 ENCOUNTER — OFFICE VISIT (OUTPATIENT)
Dept: FAMILY MEDICINE CLINIC | Age: 78
End: 2018-09-11
Payer: MEDICARE

## 2018-09-11 VITALS
BODY MASS INDEX: 21.58 KG/M2 | HEIGHT: 63 IN | TEMPERATURE: 98.7 F | RESPIRATION RATE: 16 BRPM | WEIGHT: 121.8 LBS | SYSTOLIC BLOOD PRESSURE: 138 MMHG | HEART RATE: 69 BPM | DIASTOLIC BLOOD PRESSURE: 68 MMHG | OXYGEN SATURATION: 96 %

## 2018-09-11 DIAGNOSIS — I10 ESSENTIAL HYPERTENSION: Primary | ICD-10-CM

## 2018-09-11 DIAGNOSIS — Z23 NEED FOR INFLUENZA VACCINATION: ICD-10-CM

## 2018-09-11 DIAGNOSIS — I88.9 LYMPHADENITIS: Chronic | ICD-10-CM

## 2018-09-11 DIAGNOSIS — F41.8 DEPRESSION WITH ANXIETY: Chronic | ICD-10-CM

## 2018-09-11 DIAGNOSIS — K21.9 GASTROESOPHAGEAL REFLUX DISEASE WITHOUT ESOPHAGITIS: ICD-10-CM

## 2018-09-11 PROCEDURE — G8420 CALC BMI NORM PARAMETERS: HCPCS | Performed by: FAMILY MEDICINE

## 2018-09-11 PROCEDURE — 1101F PT FALLS ASSESS-DOCD LE1/YR: CPT | Performed by: FAMILY MEDICINE

## 2018-09-11 PROCEDURE — 1123F ACP DISCUSS/DSCN MKR DOCD: CPT | Performed by: FAMILY MEDICINE

## 2018-09-11 PROCEDURE — G0008 ADMIN INFLUENZA VIRUS VAC: HCPCS | Performed by: FAMILY MEDICINE

## 2018-09-11 PROCEDURE — 90662 IIV NO PRSV INCREASED AG IM: CPT | Performed by: FAMILY MEDICINE

## 2018-09-11 PROCEDURE — G8427 DOCREV CUR MEDS BY ELIG CLIN: HCPCS | Performed by: FAMILY MEDICINE

## 2018-09-11 PROCEDURE — 99214 OFFICE O/P EST MOD 30 MIN: CPT | Performed by: FAMILY MEDICINE

## 2018-09-11 PROCEDURE — 1036F TOBACCO NON-USER: CPT | Performed by: FAMILY MEDICINE

## 2018-09-11 PROCEDURE — 1090F PRES/ABSN URINE INCON ASSESS: CPT | Performed by: FAMILY MEDICINE

## 2018-09-11 PROCEDURE — G8399 PT W/DXA RESULTS DOCUMENT: HCPCS | Performed by: FAMILY MEDICINE

## 2018-09-11 PROCEDURE — 4040F PNEUMOC VAC/ADMIN/RCVD: CPT | Performed by: FAMILY MEDICINE

## 2018-09-11 RX ORDER — PREDNISONE 20 MG/1
TABLET ORAL
COMMUNITY
Start: 2018-08-20 | End: 2018-09-11 | Stop reason: ALTCHOICE

## 2018-09-11 NOTE — PROGRESS NOTES
Hypertension     Migraines     Pleural effusion     Right-sided chest wall pain     Type 2 diabetes mellitus without complication ([facility] [address] 0/05/0780     Past Surgical History:   Procedure Laterality Date    CHOLECYSTECTOMY  1988    COLONOSCOPY  05/04/2009    HYSTERECTOMY  1978    OTHER SURGICAL HISTORY Left 04/27/15     [facility]YE VITRECTOMY W MACULAR EPIRETINAL MEMBRANE    UPPER GASTROINTESTINAL ENDOSCOPY  04/16/2013    UPPER GASTROINTESTINAL ENDOSCOPY  10/29/15    Ciaran Gomes MD     Social History     Social History    Marital status:      Spouse name: N/A    Number of children: N/A    Years of education: N/A     Occupational History    Not on file.      Social History Main Topics    Smoking status: Never Smoker    Smokeless tobacco: Never Used    Alcohol use No    Drug use: No    Sexual activity: Not on file     Other Topics Concern    Not on file     Social History Narrative    No narrative on file     Family History   Problem Relation Age of Onset    Other Mother         Neurological Disease    Cancer Sister     Diabetes Brother     Cancer Brother         leukemia     Allergies   Allergen Reactions    Codeine Shortness Of Breath     tired     Current Outpatient Prescriptions   Medication Sig Dispense Refill    sertraline (ZOLOFT) 100 MG tablet Take 1 tablet by mouth daily 90 tablet 1    lisinopril (PRINIVIL;ZESTRIL) 20 MG tablet Take 1 tablet by mouth daily 30 tablet 11    Lancets MISC 1 each by Does not apply route daily Type 2 diabetes mellitus without complication, without long-term current use of insulin (HCC) (E11.9) 100 each 5    Blood Glucose Monitoring Suppl LONG Use as directed up to TID Type 2 diabetes mellitus without complication, without long-term current use of insulin (HCC) (E11.9) 1 Device 0    Glucose Blood (BLOOD GLUCOSE TEST STRIPS) STRP Test up to TID     Type 2 diabetes mellitus without complication, without long-term current use of insulin (HCC) (E11.9) Results   Component Value Date    CREATININE 0.67 02/21/2018     Lab Results   Component Value Date    ALT 34 (H) 03/29/2018    AST 26 03/29/2018     Lab Results   Component Value Date    CHOL 217 (H) 02/18/2017    TRIG 192 02/18/2017    HDL 61 (H) 02/18/2017    LDLCALC 118 02/18/2017        Assessment & Plan   Visit Diagnoses and Associated Orders     Essential hypertension    -  Primary    Stable, well controlled on current meds. Follow clinically. Gastroesophageal reflux disease without esophagitis        Stable, well controlled on current meds. Follow clinically. Depression with anxiety        Stable, well controlled on current meds. Follow clinically. Lymphadenitis        Resolved         Need for influenza vaccination        INFLUENZA, HIGH DOSE, 65 YRS +, IM, PF, PREFILL SYR, 0.5ML (FLUZONE HD) [BEH393 Custom]                 Reviewed with the patient: all disease processes, current clinical status, medications, activities and diet.      Side effects, adverse effects of the medication prescribed today, as well as treatment plan/ rationale and result expectations have been discussed with the patient who expresses understanding and desires to proceed.     Close follow up to evaluate treatment results and for coordination of care. I have reviewed the patient's medical history in detail and updated the computerized patient record. More than 50% of the appointment was spent in face-to-face counseling, education and care coordination. Please note this report has been partially produced using speech recognition software and may contain mistakes related to that system including errors in grammar, punctuation and spelling as well as words and phrases that may seem inappropriate. If there are questions or concerns, please feel free to contact me to clarify.     Orders Placed This Encounter   Procedures    INFLUENZA, HIGH DOSE, 65 YRS +, IM, PF, PREFILL SYR, 0.5ML (FLUZONE HD)     No

## 2018-09-20 ENCOUNTER — HOSPITAL ENCOUNTER (OUTPATIENT)
Dept: ORTHOPEDIC SURGERY | Age: 78
Discharge: HOME | End: 2018-09-22
Payer: MEDICARE

## 2018-09-20 DIAGNOSIS — M25.511 PAIN IN JOINT OF RIGHT SHOULDER: ICD-10-CM

## 2018-09-20 PROCEDURE — 73030 X-RAY EXAM OF SHOULDER: CPT

## 2018-09-27 DIAGNOSIS — M81.0 AGE-RELATED OSTEOPOROSIS WITHOUT CURRENT PATHOLOGICAL FRACTURE: Chronic | ICD-10-CM

## 2018-09-27 RX ORDER — ALENDRONATE SODIUM 70 MG/1
70 TABLET ORAL
Qty: 4 TABLET | Refills: 5 | Status: SHIPPED | OUTPATIENT
Start: 2018-09-27 | End: 2019-03-13 | Stop reason: SDUPTHER

## 2018-09-27 NOTE — TELEPHONE ENCOUNTER
Rx request   Requested Prescriptions     Pending Prescriptions Disp Refills    alendronate (FOSAMAX) 70 MG tablet 4 tablet 5     Sig: Take 1 tablet by mouth every 7 days     LOV 9/11/2018  Next Visit Date:  Future Appointments  Date Time Provider Marleny Lan   12/12/2018 11:15 AM Kaitlin Carrasquillo MD [address] [place] [address]

## 2018-10-22 DIAGNOSIS — F41.8 DEPRESSION WITH ANXIETY: Chronic | ICD-10-CM

## 2018-10-22 RX ORDER — TRAZODONE HYDROCHLORIDE 50 MG/1
25 TABLET ORAL NIGHTLY
Qty: 15 TABLET | Refills: 2 | Status: SHIPPED | OUTPATIENT
Start: 2018-10-22 | End: 2018-12-12 | Stop reason: SINTOL

## 2018-11-27 ENCOUNTER — CARE COORDINATION (OUTPATIENT)
Dept: CARE COORDINATION | Age: 78
End: 2018-11-27

## 2018-12-06 ENCOUNTER — CARE COORDINATION (OUTPATIENT)
Dept: CARE COORDINATION | Age: 78
End: 2018-12-06

## 2018-12-12 ENCOUNTER — CARE COORDINATION (OUTPATIENT)
Dept: CARE COORDINATION | Age: 78
End: 2018-12-12

## 2018-12-12 ENCOUNTER — OFFICE VISIT (OUTPATIENT)
Dept: FAMILY MEDICINE CLINIC | Age: 78
End: 2018-12-12
Payer: MEDICARE

## 2018-12-12 VITALS
HEIGHT: 59 IN | OXYGEN SATURATION: 99 % | DIASTOLIC BLOOD PRESSURE: 70 MMHG | SYSTOLIC BLOOD PRESSURE: 120 MMHG | TEMPERATURE: 97.6 F | BODY MASS INDEX: 23.18 KG/M2 | RESPIRATION RATE: 16 BRPM | WEIGHT: 115 LBS | HEART RATE: 75 BPM

## 2018-12-12 DIAGNOSIS — R74.8 ALKALINE PHOSPHATASE ELEVATION: ICD-10-CM

## 2018-12-12 DIAGNOSIS — D69.6 THROMBOCYTOPENIA (HCC): Chronic | ICD-10-CM

## 2018-12-12 DIAGNOSIS — R41.0 CONFUSION: Chronic | ICD-10-CM

## 2018-12-12 DIAGNOSIS — I10 ESSENTIAL HYPERTENSION: ICD-10-CM

## 2018-12-12 DIAGNOSIS — R63.4 WEIGHT LOSS: Chronic | ICD-10-CM

## 2018-12-12 DIAGNOSIS — I10 ESSENTIAL HYPERTENSION: Primary | ICD-10-CM

## 2018-12-12 DIAGNOSIS — D72.810 LYMPHOCYTOPENIA: Chronic | ICD-10-CM

## 2018-12-12 DIAGNOSIS — R74.01 ELEVATED ALT MEASUREMENT: ICD-10-CM

## 2018-12-12 DIAGNOSIS — F41.8 DEPRESSION WITH ANXIETY: Chronic | ICD-10-CM

## 2018-12-12 DIAGNOSIS — R41.3 MEMORY LOSS: Chronic | ICD-10-CM

## 2018-12-12 DIAGNOSIS — E11.9 TYPE 2 DIABETES MELLITUS WITHOUT COMPLICATION, WITHOUT LONG-TERM CURRENT USE OF INSULIN (HCC): Chronic | ICD-10-CM

## 2018-12-12 PROBLEM — I88.9 LYMPHADENITIS: Chronic | Status: RESOLVED | Noted: 2018-08-20 | Resolved: 2018-12-12

## 2018-12-12 LAB
ALBUMIN SERPL-MCNC: 4.2 G/DL (ref 3.9–4.9)
ALP BLD-CCNC: 72 U/L (ref 40–130)
ALT SERPL-CCNC: 19 U/L (ref 0–33)
ANION GAP SERPL CALCULATED.3IONS-SCNC: 9 MEQ/L (ref 7–13)
AST SERPL-CCNC: 18 U/L (ref 0–35)
BASOPHILS ABSOLUTE: 0 K/UL (ref 0–0.2)
BASOPHILS RELATIVE PERCENT: 0.7 %
BILIRUB SERPL-MCNC: 0.4 MG/DL (ref 0–1.2)
BUN BLDV-MCNC: 18 MG/DL (ref 8–23)
CALCIUM SERPL-MCNC: 9.9 MG/DL (ref 8.6–10.2)
CHLORIDE BLD-SCNC: 102 MEQ/L (ref 98–107)
CO2: 28 MEQ/L (ref 22–29)
CREAT SERPL-MCNC: 0.66 MG/DL (ref 0.5–0.9)
CREATININE URINE: 213.3 MG/DL
EOSINOPHILS ABSOLUTE: 0.1 K/UL (ref 0–0.7)
EOSINOPHILS RELATIVE PERCENT: 1.8 %
GFR AFRICAN AMERICAN: >60
GFR NON-AFRICAN AMERICAN: >60
GLOBULIN: 3.1 G/DL (ref 2.3–3.5)
GLUCOSE BLD-MCNC: 98 MG/DL (ref 74–109)
HBA1C MFR BLD: 5.8 % (ref 4.8–5.9)
HCT VFR BLD CALC: 41.8 % (ref 37–47)
HEMOGLOBIN: 14 G/DL (ref 12–16)
LYMPHOCYTES ABSOLUTE: 1.4 K/UL (ref 1–4.8)
LYMPHOCYTES RELATIVE PERCENT: 31.4 %
MCH RBC QN AUTO: 31 PG (ref 27–31.3)
MCHC RBC AUTO-ENTMCNC: 33.6 % (ref 33–37)
MCV RBC AUTO: 92.3 FL (ref 82–100)
MICROALBUMIN UR-MCNC: <1.2 MG/DL
MICROALBUMIN/CREAT UR-RTO: NORMAL MG/G (ref 0–30)
MONOCYTES ABSOLUTE: 0.4 K/UL (ref 0.2–0.8)
MONOCYTES RELATIVE PERCENT: 8.8 %
NEUTROPHILS ABSOLUTE: 2.6 K/UL (ref 1.4–6.5)
NEUTROPHILS RELATIVE PERCENT: 57.3 %
PDW BLD-RTO: 13.5 % (ref 11.5–14.5)
PLATELET # BLD: 116 K/UL (ref 130–400)
POTASSIUM SERPL-SCNC: 4.2 MEQ/L (ref 3.5–5.1)
PREALBUMIN: 20.7 MG/DL (ref 20–40)
RBC # BLD: 4.53 M/UL (ref 4.2–5.4)
SODIUM BLD-SCNC: 139 MEQ/L (ref 132–144)
TOTAL PROTEIN: 7.3 G/DL (ref 6.4–8.1)
TSH SERPL DL<=0.05 MIU/L-ACNC: 1.1 UIU/ML (ref 0.27–4.2)
WBC # BLD: 4.4 K/UL (ref 4.8–10.8)

## 2018-12-12 PROCEDURE — 1036F TOBACCO NON-USER: CPT | Performed by: FAMILY MEDICINE

## 2018-12-12 PROCEDURE — 1123F ACP DISCUSS/DSCN MKR DOCD: CPT | Performed by: FAMILY MEDICINE

## 2018-12-12 PROCEDURE — G8399 PT W/DXA RESULTS DOCUMENT: HCPCS | Performed by: FAMILY MEDICINE

## 2018-12-12 PROCEDURE — 1101F PT FALLS ASSESS-DOCD LE1/YR: CPT | Performed by: FAMILY MEDICINE

## 2018-12-12 PROCEDURE — 99215 OFFICE O/P EST HI 40 MIN: CPT | Performed by: FAMILY MEDICINE

## 2018-12-12 PROCEDURE — G8427 DOCREV CUR MEDS BY ELIG CLIN: HCPCS | Performed by: FAMILY MEDICINE

## 2018-12-12 PROCEDURE — 1090F PRES/ABSN URINE INCON ASSESS: CPT | Performed by: FAMILY MEDICINE

## 2018-12-12 PROCEDURE — G8482 FLU IMMUNIZE ORDER/ADMIN: HCPCS | Performed by: FAMILY MEDICINE

## 2018-12-12 PROCEDURE — 4040F PNEUMOC VAC/ADMIN/RCVD: CPT | Performed by: FAMILY MEDICINE

## 2018-12-12 PROCEDURE — G8420 CALC BMI NORM PARAMETERS: HCPCS | Performed by: FAMILY MEDICINE

## 2018-12-12 RX ORDER — TRAZODONE HYDROCHLORIDE 50 MG/1
50 TABLET ORAL
COMMUNITY
End: 2018-12-12

## 2018-12-12 RX ORDER — VIT A/VIT C/VIT E/ZINC/COPPER 4296-226
CAPSULE ORAL
COMMUNITY

## 2018-12-12 RX ORDER — LISINOPRIL 20 MG/1
20 TABLET ORAL
COMMUNITY
End: 2018-12-12 | Stop reason: SDUPTHER

## 2018-12-12 RX ORDER — ALENDRONATE SODIUM 70 MG/1
70 TABLET ORAL
COMMUNITY
Start: 2018-09-27 | End: 2018-12-12 | Stop reason: SDUPTHER

## 2018-12-12 NOTE — CARE COORDINATION
Telephone call to Bhargavi/daughter. Left voicemail message explaining referral for [employer] Program and to anticipate a call from [employer] [facility]. Provided contact information to return phone call.

## 2018-12-12 NOTE — CARE COORDINATION
medication adherence:  None  Are you able to afford your medications?:  Yes  How often do you have trouble taking your medications the way you have been told to take them?:  I always take them as prescribed. Do you have Home O2 Therapy?:  No      Ability to seek help/take action for Emergent Urgent situations i.e. fire, crime, inclement weather or health crisis.:  Needs Assistance  Ability to ambulate to restroom:  Independent  Ability handle personal hygeine needs (bathing/dressing/grooming):  Needs Assistance  Ability to manage Medications: Independent  Ability to prepare Food Preparation:  Needs Assistance  Ability to maintain home (clean home, laundry): Independent  Ability to drive and/or has transportation:  Dependent  Ability to do shopping:  Needs Assistance  Ability to manage finances:  Needs Assistance  Is patient able to live independently?:  Yes     Current Housing:  Apartment                 Thinking about your patient's physical health needs, are there any symptoms or problems (risk indicators) you are unsure about that require further investigation?:  No identified areas of uncertainly or problems already being investigated   Are the patients physical health problems impacting on their mental well-being?:  No identified areas of concern   Are there any problems with your patients lifestyle behaviors (alcohol, drugs, diet, exercise) that are impacting on physical or mental well-being?:  No identified areas of concern   Do you have any other concerns about your patients mental well-being?  How would you rate their severity and impact on the patient?:  Mild problems - don't interfere with function   How would you rate their home environment in terms of safety and stability (including domestic violence, insecure housing, neighbor harassment)?:  Safe, stable, but with some inconsistency   How would you rate their social network (family, work, friends)?:  Adequate participation with social networks

## 2018-12-14 LAB — RPR: NORMAL

## 2018-12-19 ENCOUNTER — CARE COORDINATION (OUTPATIENT)
Dept: CARE COORDINATION | Age: 78
End: 2018-12-19

## 2018-12-19 ENCOUNTER — HOSPITAL ENCOUNTER (OUTPATIENT)
Dept: MRI IMAGING | Age: 78
Discharge: HOME | End: 2018-12-21
Payer: MEDICARE

## 2018-12-19 DIAGNOSIS — R41.3 MEMORY LOSS: Chronic | ICD-10-CM

## 2018-12-19 DIAGNOSIS — R63.4 WEIGHT LOSS: Chronic | ICD-10-CM

## 2018-12-19 DIAGNOSIS — R41.0 CONFUSION: Chronic | ICD-10-CM

## 2018-12-19 PROCEDURE — 70551 MRI BRAIN STEM W/O DYE: CPT

## 2019-01-09 ENCOUNTER — CARE COORDINATION (OUTPATIENT)
Dept: CARE COORDINATION | Age: 79
End: 2019-01-09

## 2019-01-11 ENCOUNTER — OFFICE VISIT (OUTPATIENT)
Dept: FAMILY MEDICINE CLINIC | Age: 79
End: 2019-01-11
Payer: MEDICARE

## 2019-01-11 ENCOUNTER — CARE COORDINATION (OUTPATIENT)
Dept: CARE COORDINATION | Age: 79
End: 2019-01-11

## 2019-01-11 VITALS
DIASTOLIC BLOOD PRESSURE: 70 MMHG | HEART RATE: 76 BPM | SYSTOLIC BLOOD PRESSURE: 140 MMHG | HEIGHT: 63 IN | TEMPERATURE: 97.9 F | RESPIRATION RATE: 16 BRPM | OXYGEN SATURATION: 98 % | BODY MASS INDEX: 20.91 KG/M2 | WEIGHT: 118 LBS

## 2019-01-11 DIAGNOSIS — R74.8 ALKALINE PHOSPHATASE ELEVATION: ICD-10-CM

## 2019-01-11 DIAGNOSIS — R63.4 WEIGHT LOSS: Chronic | ICD-10-CM

## 2019-01-11 DIAGNOSIS — R74.01 ELEVATED ALT MEASUREMENT: ICD-10-CM

## 2019-01-11 DIAGNOSIS — E11.9 TYPE 2 DIABETES MELLITUS WITHOUT COMPLICATION, WITHOUT LONG-TERM CURRENT USE OF INSULIN (HCC): Chronic | ICD-10-CM

## 2019-01-11 DIAGNOSIS — R41.0 CONFUSION: Primary | Chronic | ICD-10-CM

## 2019-01-11 DIAGNOSIS — D69.6 THROMBOCYTOPENIA (HCC): Chronic | ICD-10-CM

## 2019-01-11 DIAGNOSIS — I10 ESSENTIAL HYPERTENSION: ICD-10-CM

## 2019-01-11 PROCEDURE — G8482 FLU IMMUNIZE ORDER/ADMIN: HCPCS | Performed by: FAMILY MEDICINE

## 2019-01-11 PROCEDURE — 1036F TOBACCO NON-USER: CPT | Performed by: FAMILY MEDICINE

## 2019-01-11 PROCEDURE — G8420 CALC BMI NORM PARAMETERS: HCPCS | Performed by: FAMILY MEDICINE

## 2019-01-11 PROCEDURE — 1101F PT FALLS ASSESS-DOCD LE1/YR: CPT | Performed by: FAMILY MEDICINE

## 2019-01-11 PROCEDURE — 4040F PNEUMOC VAC/ADMIN/RCVD: CPT | Performed by: FAMILY MEDICINE

## 2019-01-11 PROCEDURE — 1090F PRES/ABSN URINE INCON ASSESS: CPT | Performed by: FAMILY MEDICINE

## 2019-01-11 PROCEDURE — 1123F ACP DISCUSS/DSCN MKR DOCD: CPT | Performed by: FAMILY MEDICINE

## 2019-01-11 PROCEDURE — G8427 DOCREV CUR MEDS BY ELIG CLIN: HCPCS | Performed by: FAMILY MEDICINE

## 2019-01-11 PROCEDURE — G8399 PT W/DXA RESULTS DOCUMENT: HCPCS | Performed by: FAMILY MEDICINE

## 2019-01-11 PROCEDURE — 99214 OFFICE O/P EST MOD 30 MIN: CPT | Performed by: FAMILY MEDICINE

## 2019-01-13 DIAGNOSIS — E11.9 TYPE 2 DIABETES MELLITUS WITHOUT COMPLICATION, WITHOUT LONG-TERM CURRENT USE OF INSULIN (HCC): Chronic | ICD-10-CM

## 2019-01-14 RX ORDER — GLUCOSAMINE HCL/CHONDROITIN SU 500-400 MG
CAPSULE ORAL
Qty: 100 STRIP | Refills: 5 | Status: SHIPPED | OUTPATIENT
Start: 2019-01-14 | End: 2019-08-31 | Stop reason: SDUPTHER

## 2019-02-22 ENCOUNTER — CARE COORDINATION (OUTPATIENT)
Dept: CARE COORDINATION | Age: 79
End: 2019-02-22

## 2019-03-13 DIAGNOSIS — M81.0 AGE-RELATED OSTEOPOROSIS WITHOUT CURRENT PATHOLOGICAL FRACTURE: Chronic | ICD-10-CM

## 2019-03-13 RX ORDER — ALENDRONATE SODIUM 70 MG/1
70 TABLET ORAL
Qty: 4 TABLET | Refills: 5 | Status: SHIPPED | OUTPATIENT
Start: 2019-03-13 | End: 2019-09-10 | Stop reason: SDUPTHER

## 2019-03-22 ENCOUNTER — CLINICAL DOCUMENTATION (OUTPATIENT)
Dept: CARE COORDINATION | Age: 79
End: 2019-03-22

## 2019-03-22 ENCOUNTER — CARE COORDINATION (OUTPATIENT)
Dept: CARE COORDINATION | Age: 79
End: 2019-03-22

## 2019-03-25 RX ORDER — SERTRALINE HYDROCHLORIDE 100 MG/1
100 TABLET, FILM COATED ORAL DAILY
Qty: 90 TABLET | Refills: 1 | Status: SHIPPED | OUTPATIENT
Start: 2019-03-25 | End: 2019-09-10 | Stop reason: SDUPTHER

## 2019-04-05 ENCOUNTER — CARE COORDINATION (OUTPATIENT)
Dept: CARE COORDINATION | Age: 79
End: 2019-04-05

## 2019-04-05 NOTE — CARE COORDINATION
CHW called patient and left message. Request a return phone call. Called to follow up and assess if patient needed any assistance.

## 2019-04-24 ENCOUNTER — CARE COORDINATION (OUTPATIENT)
Dept: CARE COORDINATION | Age: 79
End: 2019-04-24

## 2019-04-29 ENCOUNTER — CARE COORDINATION (OUTPATIENT)
Dept: CARE COORDINATION | Age: 79
End: 2019-04-29

## 2019-05-07 ENCOUNTER — HOSPITAL ENCOUNTER (OUTPATIENT)
Dept: LAB | Age: 79
Discharge: HOME | End: 2019-05-07
Payer: MEDICARE

## 2019-05-07 LAB
ALBUMIN SERPL-MCNC: 4.2 G/DL (ref 3.5–4.6)
ALP BLD-CCNC: 81 U/L (ref 40–130)
ALT SERPL-CCNC: 16 U/L (ref 0–33)
ANION GAP SERPL CALCULATED.3IONS-SCNC: 15 MEQ/L (ref 9–15)
AST SERPL-CCNC: 19 U/L (ref 0–35)
BILIRUB SERPL-MCNC: 0.4 MG/DL (ref 0.2–0.7)
BUN BLDV-MCNC: 20 MG/DL (ref 8–23)
CALCIUM SERPL-MCNC: 9.3 MG/DL (ref 8.5–9.9)
CHLORIDE BLD-SCNC: 102 MEQ/L (ref 95–107)
CHOLESTEROL, TOTAL: 240 MG/DL (ref 0–199)
CO2: 25 MEQ/L (ref 20–31)
CREAT SERPL-MCNC: 0.62 MG/DL (ref 0.5–0.9)
GFR AFRICAN AMERICAN: >60
GFR NON-AFRICAN AMERICAN: >60
GLOBULIN: 2.8 G/DL (ref 2.3–3.5)
GLUCOSE BLD-MCNC: 59 MG/DL (ref 70–99)
HBA1C MFR BLD: 5.8 % (ref 4.8–5.9)
HDLC SERPL-MCNC: 58 MG/DL (ref 40–59)
LDL CHOLESTEROL CALCULATED: 149 MG/DL (ref 0–129)
POTASSIUM SERPL-SCNC: 4.4 MEQ/L (ref 3.4–4.9)
SODIUM BLD-SCNC: 142 MEQ/L (ref 135–144)
TOTAL PROTEIN: 7 G/DL (ref 6.3–8)
TRIGL SERPL-MCNC: 163 MG/DL (ref 0–150)

## 2019-05-07 PROCEDURE — 80061 LIPID PANEL: CPT

## 2019-05-07 PROCEDURE — 80053 COMPREHEN METABOLIC PANEL: CPT

## 2019-05-07 PROCEDURE — 83036 HEMOGLOBIN GLYCOSYLATED A1C: CPT

## 2019-05-25 ENCOUNTER — OFFICE VISIT (OUTPATIENT)
Dept: FAMILY MEDICINE CLINIC | Age: 79
End: 2019-05-25
Payer: MEDICARE

## 2019-05-25 VITALS
TEMPERATURE: 98 F | BODY MASS INDEX: 24.6 KG/M2 | SYSTOLIC BLOOD PRESSURE: 130 MMHG | HEIGHT: 59 IN | OXYGEN SATURATION: 98 % | HEART RATE: 68 BPM | WEIGHT: 122 LBS | RESPIRATION RATE: 16 BRPM | DIASTOLIC BLOOD PRESSURE: 70 MMHG

## 2019-05-25 DIAGNOSIS — G89.29 CHRONIC RIGHT SHOULDER PAIN: Chronic | ICD-10-CM

## 2019-05-25 DIAGNOSIS — M25.511 CHRONIC RIGHT SHOULDER PAIN: Chronic | ICD-10-CM

## 2019-05-25 DIAGNOSIS — F41.8 DEPRESSION WITH ANXIETY: Chronic | ICD-10-CM

## 2019-05-25 DIAGNOSIS — I10 ESSENTIAL HYPERTENSION: Primary | ICD-10-CM

## 2019-05-25 DIAGNOSIS — E11.9 TYPE 2 DIABETES MELLITUS WITHOUT COMPLICATION, WITHOUT LONG-TERM CURRENT USE OF INSULIN (HCC): Chronic | ICD-10-CM

## 2019-05-25 DIAGNOSIS — Z23 NEED FOR PNEUMOCOCCAL VACCINATION: ICD-10-CM

## 2019-05-25 DIAGNOSIS — R09.82 POSTNASAL DRIP: Chronic | ICD-10-CM

## 2019-05-25 PROBLEM — R53.83 FATIGUE: Status: RESOLVED | Noted: 2018-01-30 | Resolved: 2019-05-25

## 2019-05-25 PROBLEM — R41.0 CONFUSION: Chronic | Status: RESOLVED | Noted: 2018-12-12 | Resolved: 2019-05-25

## 2019-05-25 PROBLEM — R63.4 WEIGHT LOSS: Chronic | Status: RESOLVED | Noted: 2018-12-12 | Resolved: 2019-05-25

## 2019-05-25 PROBLEM — D69.6 THROMBOCYTOPENIA (HCC): Chronic | Status: RESOLVED | Noted: 2018-03-06 | Resolved: 2019-05-25

## 2019-05-25 PROBLEM — R41.3 MEMORY LOSS: Chronic | Status: RESOLVED | Noted: 2018-12-12 | Resolved: 2019-05-25

## 2019-05-25 PROCEDURE — 1090F PRES/ABSN URINE INCON ASSESS: CPT | Performed by: FAMILY MEDICINE

## 2019-05-25 PROCEDURE — G8427 DOCREV CUR MEDS BY ELIG CLIN: HCPCS | Performed by: FAMILY MEDICINE

## 2019-05-25 PROCEDURE — 4040F PNEUMOC VAC/ADMIN/RCVD: CPT | Performed by: FAMILY MEDICINE

## 2019-05-25 PROCEDURE — 1036F TOBACCO NON-USER: CPT | Performed by: FAMILY MEDICINE

## 2019-05-25 PROCEDURE — G0009 ADMIN PNEUMOCOCCAL VACCINE: HCPCS | Performed by: FAMILY MEDICINE

## 2019-05-25 PROCEDURE — G8510 SCR DEP NEG, NO PLAN REQD: HCPCS | Performed by: FAMILY MEDICINE

## 2019-05-25 PROCEDURE — G8420 CALC BMI NORM PARAMETERS: HCPCS | Performed by: FAMILY MEDICINE

## 2019-05-25 PROCEDURE — 3288F FALL RISK ASSESSMENT DOCD: CPT | Performed by: FAMILY MEDICINE

## 2019-05-25 PROCEDURE — 99214 OFFICE O/P EST MOD 30 MIN: CPT | Performed by: FAMILY MEDICINE

## 2019-05-25 PROCEDURE — 1123F ACP DISCUSS/DSCN MKR DOCD: CPT | Performed by: FAMILY MEDICINE

## 2019-05-25 PROCEDURE — 90670 PCV13 VACCINE IM: CPT | Performed by: FAMILY MEDICINE

## 2019-05-25 PROCEDURE — G8399 PT W/DXA RESULTS DOCUMENT: HCPCS | Performed by: FAMILY MEDICINE

## 2019-05-25 RX ORDER — GUAIFENESIN 600 MG/1
600 TABLET, EXTENDED RELEASE ORAL 2 TIMES DAILY
Qty: 60 TABLET | Refills: 11 | Status: SHIPPED | OUTPATIENT
Start: 2019-05-25 | End: 2019-10-01 | Stop reason: ALTCHOICE

## 2019-05-25 RX ORDER — LISINOPRIL 20 MG/1
20 TABLET ORAL DAILY
Qty: 90 TABLET | Refills: 3 | Status: SHIPPED | OUTPATIENT
Start: 2019-05-25 | End: 2020-06-08

## 2019-05-25 ASSESSMENT — PATIENT HEALTH QUESTIONNAIRE - PHQ9
SUM OF ALL RESPONSES TO PHQ QUESTIONS 1-9: 0
1. LITTLE INTEREST OR PLEASURE IN DOING THINGS: 0
SUM OF ALL RESPONSES TO PHQ9 QUESTIONS 1 & 2: 0
2. FEELING DOWN, DEPRESSED OR HOPELESS: 0
SUM OF ALL RESPONSES TO PHQ QUESTIONS 1-9: 0

## 2019-05-28 ENCOUNTER — TELEPHONE (OUTPATIENT)
Dept: FAMILY MEDICINE CLINIC | Age: 79
End: 2019-05-28

## 2019-05-28 NOTE — TELEPHONE ENCOUNTER
It is not a typical reaction. I would advise stopping the Mucinex. If excessive sleep persists, needs to be seen.

## 2019-05-31 NOTE — TELEPHONE ENCOUNTER
Patient called to ask the same question for the Mucinex and I explained to her the instructions given by Dr. Teo Marinelli. She verbalized understanding. When I offered her an appointment she then told me that she feels better and she will and scheduled appointment if needed in the future.

## 2019-08-31 DIAGNOSIS — E11.9 TYPE 2 DIABETES MELLITUS WITHOUT COMPLICATION, WITHOUT LONG-TERM CURRENT USE OF INSULIN (HCC): Chronic | ICD-10-CM

## 2019-08-31 RX ORDER — LANCETS 30 GAUGE
1 EACH MISCELLANEOUS DAILY
Qty: 100 EACH | Refills: 5 | Status: CANCELLED | OUTPATIENT
Start: 2019-08-31

## 2019-09-03 RX ORDER — GLUCOSAMINE HCL/CHONDROITIN SU 500-400 MG
CAPSULE ORAL
Qty: 100 STRIP | Refills: 5 | Status: SHIPPED | OUTPATIENT
Start: 2019-09-03 | End: 2019-11-10 | Stop reason: SDUPTHER

## 2019-09-03 RX ORDER — LANCETS 33 GAUGE
EACH MISCELLANEOUS
Qty: 100 EACH | Refills: 5 | Status: SHIPPED | OUTPATIENT
Start: 2019-09-03 | End: 2020-08-10

## 2019-09-09 DIAGNOSIS — M81.0 AGE-RELATED OSTEOPOROSIS WITHOUT CURRENT PATHOLOGICAL FRACTURE: Chronic | ICD-10-CM

## 2019-09-10 RX ORDER — ALENDRONATE SODIUM 70 MG/1
70 TABLET ORAL
Qty: 15 TABLET | Refills: 3 | Status: SHIPPED | OUTPATIENT
Start: 2019-09-10 | End: 2020-03-23 | Stop reason: SDUPTHER

## 2019-09-10 RX ORDER — SERTRALINE HYDROCHLORIDE 100 MG/1
100 TABLET, FILM COATED ORAL DAILY
Qty: 90 TABLET | Refills: 1 | Status: SHIPPED | OUTPATIENT
Start: 2019-09-10 | End: 2020-03-16

## 2019-09-10 RX ORDER — ALENDRONATE SODIUM 70 MG/1
70 TABLET ORAL
Qty: 4 TABLET | Refills: 5 | Status: SHIPPED | OUTPATIENT
Start: 2019-09-10 | End: 2019-09-10 | Stop reason: SDUPTHER

## 2019-10-01 ENCOUNTER — OFFICE VISIT (OUTPATIENT)
Dept: FAMILY MEDICINE CLINIC | Age: 79
End: 2019-10-01
Payer: MEDICARE

## 2019-10-01 VITALS
OXYGEN SATURATION: 98 % | WEIGHT: 121.8 LBS | HEIGHT: 59 IN | BODY MASS INDEX: 24.56 KG/M2 | HEART RATE: 80 BPM | RESPIRATION RATE: 16 BRPM | DIASTOLIC BLOOD PRESSURE: 62 MMHG | SYSTOLIC BLOOD PRESSURE: 124 MMHG | TEMPERATURE: 98.3 F

## 2019-10-01 DIAGNOSIS — I10 ESSENTIAL HYPERTENSION: Primary | ICD-10-CM

## 2019-10-01 DIAGNOSIS — E11.9 TYPE 2 DIABETES MELLITUS WITHOUT COMPLICATION, WITHOUT LONG-TERM CURRENT USE OF INSULIN (HCC): Chronic | ICD-10-CM

## 2019-10-01 DIAGNOSIS — F41.8 DEPRESSION WITH ANXIETY: Chronic | ICD-10-CM

## 2019-10-01 DIAGNOSIS — D72.810 LYMPHOCYTOPENIA: Chronic | ICD-10-CM

## 2019-10-01 DIAGNOSIS — Z12.11 SCREEN FOR COLON CANCER: ICD-10-CM

## 2019-10-01 DIAGNOSIS — Z23 NEED FOR VACCINATION: ICD-10-CM

## 2019-10-01 DIAGNOSIS — K21.9 GASTROESOPHAGEAL REFLUX DISEASE WITHOUT ESOPHAGITIS: ICD-10-CM

## 2019-10-01 DIAGNOSIS — L98.9 SKIN LESION: ICD-10-CM

## 2019-10-01 PROCEDURE — G8482 FLU IMMUNIZE ORDER/ADMIN: HCPCS | Performed by: FAMILY MEDICINE

## 2019-10-01 PROCEDURE — 1036F TOBACCO NON-USER: CPT | Performed by: FAMILY MEDICINE

## 2019-10-01 PROCEDURE — G8427 DOCREV CUR MEDS BY ELIG CLIN: HCPCS | Performed by: FAMILY MEDICINE

## 2019-10-01 PROCEDURE — 90653 IIV ADJUVANT VACCINE IM: CPT | Performed by: FAMILY MEDICINE

## 2019-10-01 PROCEDURE — 1090F PRES/ABSN URINE INCON ASSESS: CPT | Performed by: FAMILY MEDICINE

## 2019-10-01 PROCEDURE — 99215 OFFICE O/P EST HI 40 MIN: CPT | Performed by: FAMILY MEDICINE

## 2019-10-01 PROCEDURE — G0008 ADMIN INFLUENZA VIRUS VAC: HCPCS | Performed by: FAMILY MEDICINE

## 2019-10-01 PROCEDURE — 1123F ACP DISCUSS/DSCN MKR DOCD: CPT | Performed by: FAMILY MEDICINE

## 2019-10-01 PROCEDURE — G8399 PT W/DXA RESULTS DOCUMENT: HCPCS | Performed by: FAMILY MEDICINE

## 2019-10-01 PROCEDURE — G8420 CALC BMI NORM PARAMETERS: HCPCS | Performed by: FAMILY MEDICINE

## 2019-10-01 PROCEDURE — 4040F PNEUMOC VAC/ADMIN/RCVD: CPT | Performed by: FAMILY MEDICINE

## 2019-10-01 RX ORDER — PANTOPRAZOLE SODIUM 40 MG/1
40 TABLET, DELAYED RELEASE ORAL DAILY
Qty: 180 TABLET | Refills: 4 | Status: SHIPPED | OUTPATIENT
Start: 2019-10-01 | End: 2020-09-11 | Stop reason: SDUPTHER

## 2019-10-15 ENCOUNTER — TELEPHONE (OUTPATIENT)
Dept: ENDOSCOPY | Age: 79
End: 2019-10-15

## 2019-10-24 ENCOUNTER — ANESTHESIA (OUTPATIENT)
Dept: ENDOSCOPY | Age: 79
End: 2019-10-24
Payer: MEDICARE

## 2019-10-24 ENCOUNTER — HOSPITAL ENCOUNTER (OUTPATIENT)
Age: 79
Setting detail: OUTPATIENT SURGERY
Discharge: HOME | End: 2019-10-24
Attending: SPECIALIST | Admitting: SPECIALIST
Payer: MEDICARE

## 2019-10-24 ENCOUNTER — ANESTHESIA EVENT (OUTPATIENT)
Dept: ENDOSCOPY | Age: 79
End: 2019-10-24
Payer: MEDICARE

## 2019-10-24 VITALS
BODY MASS INDEX: 24.39 KG/M2 | RESPIRATION RATE: 22 BRPM | SYSTOLIC BLOOD PRESSURE: 163 MMHG | DIASTOLIC BLOOD PRESSURE: 92 MMHG | OXYGEN SATURATION: 99 % | TEMPERATURE: 98.3 F | HEART RATE: 67 BPM | HEIGHT: 59 IN | WEIGHT: 121 LBS

## 2019-10-24 VITALS
DIASTOLIC BLOOD PRESSURE: 69 MMHG | OXYGEN SATURATION: 100 % | RESPIRATION RATE: 11 BRPM | SYSTOLIC BLOOD PRESSURE: 147 MMHG

## 2019-10-24 DIAGNOSIS — E11.9 TYPE 2 DIABETES MELLITUS WITHOUT COMPLICATION, WITHOUT LONG-TERM CURRENT USE OF INSULIN (HCC): Chronic | ICD-10-CM

## 2019-10-24 DIAGNOSIS — D72.810 LYMPHOCYTOPENIA: Chronic | ICD-10-CM

## 2019-10-24 LAB
BASOPHILS ABSOLUTE: 0 K/UL (ref 0–0.2)
BASOPHILS RELATIVE PERCENT: 0.6 %
EOSINOPHILS ABSOLUTE: 0.1 K/UL (ref 0–0.7)
EOSINOPHILS RELATIVE PERCENT: 1.6 %
HBA1C MFR BLD: 6 % (ref 4.8–5.9)
HCT VFR BLD CALC: 39.3 % (ref 37–47)
HEMOGLOBIN: 12.9 G/DL (ref 12–16)
LYMPHOCYTES ABSOLUTE: 1.2 K/UL (ref 1–4.8)
LYMPHOCYTES RELATIVE PERCENT: 27.9 %
MCH RBC QN AUTO: 30.2 PG (ref 27–31.3)
MCHC RBC AUTO-ENTMCNC: 32.8 % (ref 33–37)
MCV RBC AUTO: 92.3 FL (ref 82–100)
MONOCYTES ABSOLUTE: 0.4 K/UL (ref 0.2–0.8)
MONOCYTES RELATIVE PERCENT: 10.2 %
NEUTROPHILS ABSOLUTE: 2.5 K/UL (ref 1.4–6.5)
NEUTROPHILS RELATIVE PERCENT: 59.7 %
PDW BLD-RTO: 13.6 % (ref 11.5–14.5)
PLATELET # BLD: 121 K/UL (ref 130–400)
RBC # BLD: 4.26 M/UL (ref 4.2–5.4)
WBC # BLD: 4.2 K/UL (ref 4.8–10.8)

## 2019-10-24 PROCEDURE — 45380 COLONOSCOPY AND BIOPSY: CPT | Performed by: SPECIALIST

## 2019-10-24 PROCEDURE — 3700000001 HC ADD 15 MINUTES (ANESTHESIA): Performed by: SPECIALIST

## 2019-10-24 PROCEDURE — 6360000002 HC RX W HCPCS: Performed by: NURSE ANESTHETIST, CERTIFIED REGISTERED

## 2019-10-24 PROCEDURE — 2500000003 HC RX 250 WO HCPCS: Performed by: NURSE ANESTHETIST, CERTIFIED REGISTERED

## 2019-10-24 PROCEDURE — 3609027000 HC COLONOSCOPY: Performed by: SPECIALIST

## 2019-10-24 PROCEDURE — 7100000011 HC PHASE II RECOVERY - ADDTL 15 MIN: Performed by: SPECIALIST

## 2019-10-24 PROCEDURE — 7100000010 HC PHASE II RECOVERY - FIRST 15 MIN: Performed by: SPECIALIST

## 2019-10-24 PROCEDURE — 88305 TISSUE EXAM BY PATHOLOGIST: CPT

## 2019-10-24 PROCEDURE — 2580000003 HC RX 258: Performed by: SPECIALIST

## 2019-10-24 PROCEDURE — 3700000000 HC ANESTHESIA ATTENDED CARE: Performed by: SPECIALIST

## 2019-10-24 RX ORDER — SODIUM CHLORIDE 9 MG/ML
INJECTION, SOLUTION INTRAVENOUS CONTINUOUS
Status: DISCONTINUED | OUTPATIENT
Start: 2019-10-24 | End: 2019-10-24 | Stop reason: HOSPADM

## 2019-10-24 RX ORDER — SODIUM CHLORIDE 0.9 % (FLUSH) 0.9 %
10 SYRINGE (ML) INJECTION EVERY 12 HOURS SCHEDULED
Status: DISCONTINUED | OUTPATIENT
Start: 2019-10-24 | End: 2019-10-24 | Stop reason: HOSPADM

## 2019-10-24 RX ORDER — ASPIRIN 81 MG/1
81 TABLET, CHEWABLE ORAL DAILY
COMMUNITY
End: 2021-12-08 | Stop reason: ALTCHOICE

## 2019-10-24 RX ORDER — LIDOCAINE HYDROCHLORIDE 20 MG/ML
INJECTION, SOLUTION INFILTRATION; PERINEURAL PRN
Status: DISCONTINUED | OUTPATIENT
Start: 2019-10-24 | End: 2019-10-24 | Stop reason: SDUPTHER

## 2019-10-24 RX ORDER — ONDANSETRON 2 MG/ML
4 INJECTION INTRAMUSCULAR; INTRAVENOUS
Status: DISCONTINUED | OUTPATIENT
Start: 2019-10-24 | End: 2019-10-24 | Stop reason: HOSPADM

## 2019-10-24 RX ORDER — SODIUM CHLORIDE 0.9 % (FLUSH) 0.9 %
10 SYRINGE (ML) INJECTION PRN
Status: DISCONTINUED | OUTPATIENT
Start: 2019-10-24 | End: 2019-10-24 | Stop reason: HOSPADM

## 2019-10-24 RX ORDER — LIDOCAINE HYDROCHLORIDE 10 MG/ML
1 INJECTION, SOLUTION EPIDURAL; INFILTRATION; INTRACAUDAL; PERINEURAL
Status: DISCONTINUED | OUTPATIENT
Start: 2019-10-24 | End: 2019-10-24 | Stop reason: HOSPADM

## 2019-10-24 RX ORDER — PROPOFOL 10 MG/ML
INJECTION, EMULSION INTRAVENOUS CONTINUOUS PRN
Status: DISCONTINUED | OUTPATIENT
Start: 2019-10-24 | End: 2019-10-24 | Stop reason: SDUPTHER

## 2019-10-24 RX ADMIN — LIDOCAINE HYDROCHLORIDE 40 MG: 20 INJECTION, SOLUTION INFILTRATION; PERINEURAL at 08:41

## 2019-10-24 RX ADMIN — PROPOFOL 100 MCG/KG/MIN: 10 INJECTION, EMULSION INTRAVENOUS at 08:41

## 2019-10-24 RX ADMIN — SODIUM CHLORIDE: 9 INJECTION, SOLUTION INTRAVENOUS at 07:47

## 2019-10-24 ASSESSMENT — PAIN SCALES - GENERAL
PAINLEVEL_OUTOF10: 3
PAINLEVEL_OUTOF10: 0
PAINLEVEL_OUTOF10: 0

## 2019-10-24 ASSESSMENT — PAIN - FUNCTIONAL ASSESSMENT: PAIN_FUNCTIONAL_ASSESSMENT: 0-10

## 2019-11-09 DIAGNOSIS — E11.9 TYPE 2 DIABETES MELLITUS WITHOUT COMPLICATION, WITHOUT LONG-TERM CURRENT USE OF INSULIN (HCC): Chronic | ICD-10-CM

## 2019-11-10 RX ORDER — GLUCOSAMINE HCL/CHONDROITIN SU 500-400 MG
CAPSULE ORAL
Qty: 100 STRIP | Refills: 5 | Status: SHIPPED | OUTPATIENT
Start: 2019-11-10 | End: 2020-09-11 | Stop reason: SDUPTHER

## 2019-12-04 ENCOUNTER — APPOINTMENT (OUTPATIENT)
Dept: GENERAL RADIOLOGY | Age: 79
End: 2019-12-04
Payer: MEDICARE

## 2019-12-04 ENCOUNTER — OFFICE VISIT (OUTPATIENT)
Dept: FAMILY MEDICINE CLINIC | Age: 79
End: 2019-12-04
Payer: MEDICARE

## 2019-12-04 ENCOUNTER — HOSPITAL ENCOUNTER (EMERGENCY)
Age: 79
Discharge: HOME | End: 2019-12-04
Payer: MEDICARE

## 2019-12-04 VITALS
RESPIRATION RATE: 14 BRPM | OXYGEN SATURATION: 99 % | SYSTOLIC BLOOD PRESSURE: 112 MMHG | BODY MASS INDEX: 24.5 KG/M2 | DIASTOLIC BLOOD PRESSURE: 64 MMHG | HEIGHT: 60 IN | WEIGHT: 124.8 LBS | TEMPERATURE: 99.3 F | HEART RATE: 90 BPM

## 2019-12-04 VITALS
SYSTOLIC BLOOD PRESSURE: 117 MMHG | HEIGHT: 61 IN | WEIGHT: 121 LBS | BODY MASS INDEX: 22.84 KG/M2 | OXYGEN SATURATION: 97 % | DIASTOLIC BLOOD PRESSURE: 73 MMHG | TEMPERATURE: 99.2 F | HEART RATE: 79 BPM | RESPIRATION RATE: 16 BRPM

## 2019-12-04 DIAGNOSIS — R07.89 CHEST WALL PAIN: ICD-10-CM

## 2019-12-04 DIAGNOSIS — J06.9 ACUTE UPPER RESPIRATORY INFECTION: Primary | ICD-10-CM

## 2019-12-04 DIAGNOSIS — J02.9 ACUTE PHARYNGITIS, UNSPECIFIED ETIOLOGY: ICD-10-CM

## 2019-12-04 DIAGNOSIS — B34.9 ACUTE VIRAL SYNDROME: Primary | ICD-10-CM

## 2019-12-04 DIAGNOSIS — E11.9 TYPE 2 DIABETES MELLITUS WITHOUT COMPLICATION, WITHOUT LONG-TERM CURRENT USE OF INSULIN (HCC): Chronic | ICD-10-CM

## 2019-12-04 LAB
ALBUMIN SERPL-MCNC: 4 G/DL (ref 3.5–4.6)
ALP BLD-CCNC: 81 U/L (ref 40–130)
ALT SERPL-CCNC: 16 U/L (ref 0–33)
ANION GAP SERPL CALCULATED.3IONS-SCNC: 10 MEQ/L (ref 9–15)
AST SERPL-CCNC: 16 U/L (ref 0–35)
BASOPHILS ABSOLUTE: 0 K/UL (ref 0–0.2)
BASOPHILS RELATIVE PERCENT: 0.4 %
BILIRUB SERPL-MCNC: 0.3 MG/DL (ref 0.2–0.7)
BUN BLDV-MCNC: 19 MG/DL (ref 8–23)
CALCIUM SERPL-MCNC: 9.4 MG/DL (ref 8.5–9.9)
CHLORIDE BLD-SCNC: 103 MEQ/L (ref 95–107)
CO2: 25 MEQ/L (ref 20–31)
CREAT SERPL-MCNC: 0.72 MG/DL (ref 0.5–0.9)
EKG ATRIAL RATE: 68 BPM
EKG P AXIS: 33 DEGREES
EKG P-R INTERVAL: 148 MS
EKG Q-T INTERVAL: 372 MS
EKG QRS DURATION: 78 MS
EKG QTC CALCULATION (BAZETT): 395 MS
EKG R AXIS: -8 DEGREES
EKG T AXIS: 7 DEGREES
EKG VENTRICULAR RATE: 68 BPM
EOSINOPHILS ABSOLUTE: 0.1 K/UL (ref 0–0.7)
EOSINOPHILS RELATIVE PERCENT: 1.9 %
GFR AFRICAN AMERICAN: >60
GFR NON-AFRICAN AMERICAN: >60
GLOBULIN: 2.8 G/DL (ref 2.3–3.5)
GLUCOSE BLD-MCNC: 90 MG/DL (ref 70–99)
HCT VFR BLD CALC: 37.5 % (ref 37–47)
HEMOGLOBIN: 12.7 G/DL (ref 12–16)
INFLUENZA A ANTIBODY: NEGATIVE
INFLUENZA B ANTIBODY: NEGATIVE
LYMPHOCYTES ABSOLUTE: 1.3 K/UL (ref 1–4.8)
LYMPHOCYTES RELATIVE PERCENT: 16.6 %
MCH RBC QN AUTO: 30.6 PG (ref 27–31.3)
MCHC RBC AUTO-ENTMCNC: 33.9 % (ref 33–37)
MCV RBC AUTO: 90.2 FL (ref 82–100)
MONOCYTES ABSOLUTE: 0.7 K/UL (ref 0.2–0.8)
MONOCYTES RELATIVE PERCENT: 9.4 %
NEUTROPHILS ABSOLUTE: 5.6 K/UL (ref 1.4–6.5)
NEUTROPHILS RELATIVE PERCENT: 71.7 %
PDW BLD-RTO: 13.7 % (ref 11.5–14.5)
PLATELET # BLD: 109 K/UL (ref 130–400)
POTASSIUM SERPL-SCNC: 4.5 MEQ/L (ref 3.4–4.9)
RBC # BLD: 4.16 M/UL (ref 4.2–5.4)
S PYO AG THROAT QL: NORMAL
SODIUM BLD-SCNC: 138 MEQ/L (ref 135–144)
TOTAL PROTEIN: 6.8 G/DL (ref 6.3–8)
TROPONIN: <0.01 NG/ML (ref 0–0.01)
WBC # BLD: 7.8 K/UL (ref 4.8–10.8)

## 2019-12-04 PROCEDURE — 80053 COMPREHEN METABOLIC PANEL: CPT

## 2019-12-04 PROCEDURE — G8482 FLU IMMUNIZE ORDER/ADMIN: HCPCS | Performed by: FAMILY MEDICINE

## 2019-12-04 PROCEDURE — 1036F TOBACCO NON-USER: CPT | Performed by: FAMILY MEDICINE

## 2019-12-04 PROCEDURE — 84484 ASSAY OF TROPONIN QUANT: CPT

## 2019-12-04 PROCEDURE — 99214 OFFICE O/P EST MOD 30 MIN: CPT | Performed by: FAMILY MEDICINE

## 2019-12-04 PROCEDURE — 1123F ACP DISCUSS/DSCN MKR DOCD: CPT | Performed by: FAMILY MEDICINE

## 2019-12-04 PROCEDURE — 87804 INFLUENZA ASSAY W/OPTIC: CPT | Performed by: FAMILY MEDICINE

## 2019-12-04 PROCEDURE — G8399 PT W/DXA RESULTS DOCUMENT: HCPCS | Performed by: FAMILY MEDICINE

## 2019-12-04 PROCEDURE — 6360000002 HC RX W HCPCS: Performed by: PHYSICIAN ASSISTANT

## 2019-12-04 PROCEDURE — G8427 DOCREV CUR MEDS BY ELIG CLIN: HCPCS | Performed by: FAMILY MEDICINE

## 2019-12-04 PROCEDURE — 36415 COLL VENOUS BLD VENIPUNCTURE: CPT

## 2019-12-04 PROCEDURE — 1090F PRES/ABSN URINE INCON ASSESS: CPT | Performed by: FAMILY MEDICINE

## 2019-12-04 PROCEDURE — 4040F PNEUMOC VAC/ADMIN/RCVD: CPT | Performed by: FAMILY MEDICINE

## 2019-12-04 PROCEDURE — 93005 ELECTROCARDIOGRAM TRACING: CPT | Performed by: PHYSICIAN ASSISTANT

## 2019-12-04 PROCEDURE — 85025 COMPLETE CBC W/AUTO DIFF WBC: CPT

## 2019-12-04 PROCEDURE — 99284 EMERGENCY DEPT VISIT MOD MDM: CPT

## 2019-12-04 PROCEDURE — 96374 THER/PROPH/DIAG INJ IV PUSH: CPT

## 2019-12-04 PROCEDURE — 71046 X-RAY EXAM CHEST 2 VIEWS: CPT

## 2019-12-04 PROCEDURE — 87880 STREP A ASSAY W/OPTIC: CPT | Performed by: FAMILY MEDICINE

## 2019-12-04 PROCEDURE — G8420 CALC BMI NORM PARAMETERS: HCPCS | Performed by: FAMILY MEDICINE

## 2019-12-04 RX ORDER — MELOXICAM 7.5 MG/1
7.5 TABLET ORAL DAILY
Qty: 7 TABLET | Refills: 0 | Status: SHIPPED | OUTPATIENT
Start: 2019-12-04 | End: 2022-03-18

## 2019-12-04 RX ORDER — KETOROLAC TROMETHAMINE 15 MG/ML
15 INJECTION, SOLUTION INTRAMUSCULAR; INTRAVENOUS ONCE
Status: COMPLETED | OUTPATIENT
Start: 2019-12-04 | End: 2019-12-04

## 2019-12-04 RX ADMIN — KETOROLAC TROMETHAMINE 15 MG: 15 INJECTION, SOLUTION INTRAMUSCULAR; INTRAVENOUS at 15:27

## 2019-12-04 ASSESSMENT — ENCOUNTER SYMPTOMS
ANAL BLEEDING: 0
NAUSEA: 0
VOMITING: 0
ABDOMINAL DISTENTION: 0
PHOTOPHOBIA: 0
EYE PAIN: 0
VOICE CHANGE: 0
WHEEZING: 1
COLOR CHANGE: 0
EYE DISCHARGE: 0
ABDOMINAL PAIN: 0
SORE THROAT: 1
APNEA: 0
COUGH: 1

## 2019-12-04 ASSESSMENT — PAIN DESCRIPTION - FREQUENCY: FREQUENCY: CONTINUOUS

## 2019-12-04 ASSESSMENT — PAIN SCALES - GENERAL
PAINLEVEL_OUTOF10: 5
PAINLEVEL_OUTOF10: 5

## 2019-12-04 ASSESSMENT — PAIN DESCRIPTION - PAIN TYPE: TYPE: ACUTE PAIN

## 2019-12-04 ASSESSMENT — PAIN DESCRIPTION - LOCATION: LOCATION: THROAT

## 2019-12-05 PROCEDURE — 93010 ELECTROCARDIOGRAM REPORT: CPT | Performed by: INTERNAL MEDICINE

## 2019-12-09 ENCOUNTER — OFFICE VISIT (OUTPATIENT)
Dept: FAMILY MEDICINE CLINIC | Age: 79
End: 2019-12-09
Payer: MEDICARE

## 2019-12-09 VITALS
TEMPERATURE: 99.3 F | RESPIRATION RATE: 26 BRPM | DIASTOLIC BLOOD PRESSURE: 70 MMHG | OXYGEN SATURATION: 98 % | SYSTOLIC BLOOD PRESSURE: 114 MMHG | HEART RATE: 77 BPM

## 2019-12-09 DIAGNOSIS — J06.9 VIRAL URI: Primary | ICD-10-CM

## 2019-12-09 DIAGNOSIS — J02.9 ACUTE PHARYNGITIS, UNSPECIFIED ETIOLOGY: ICD-10-CM

## 2019-12-09 PROCEDURE — 99214 OFFICE O/P EST MOD 30 MIN: CPT | Performed by: FAMILY MEDICINE

## 2019-12-09 PROCEDURE — 96372 THER/PROPH/DIAG INJ SC/IM: CPT | Performed by: FAMILY MEDICINE

## 2019-12-09 PROCEDURE — G8399 PT W/DXA RESULTS DOCUMENT: HCPCS | Performed by: FAMILY MEDICINE

## 2019-12-09 PROCEDURE — G8428 CUR MEDS NOT DOCUMENT: HCPCS | Performed by: FAMILY MEDICINE

## 2019-12-09 PROCEDURE — 1090F PRES/ABSN URINE INCON ASSESS: CPT | Performed by: FAMILY MEDICINE

## 2019-12-09 PROCEDURE — 1123F ACP DISCUSS/DSCN MKR DOCD: CPT | Performed by: FAMILY MEDICINE

## 2019-12-09 PROCEDURE — 4040F PNEUMOC VAC/ADMIN/RCVD: CPT | Performed by: FAMILY MEDICINE

## 2019-12-09 PROCEDURE — G8482 FLU IMMUNIZE ORDER/ADMIN: HCPCS | Performed by: FAMILY MEDICINE

## 2019-12-09 PROCEDURE — 1036F TOBACCO NON-USER: CPT | Performed by: FAMILY MEDICINE

## 2019-12-09 PROCEDURE — G8420 CALC BMI NORM PARAMETERS: HCPCS | Performed by: FAMILY MEDICINE

## 2019-12-09 RX ORDER — KETOROLAC TROMETHAMINE 30 MG/ML
30 INJECTION, SOLUTION INTRAMUSCULAR; INTRAVENOUS ONCE
Status: COMPLETED | OUTPATIENT
Start: 2019-12-09 | End: 2019-12-09

## 2019-12-09 RX ORDER — AMOXICILLIN AND CLAVULANATE POTASSIUM 875; 125 MG/1; MG/1
1 TABLET, FILM COATED ORAL 2 TIMES DAILY
Qty: 20 TABLET | Refills: 0 | Status: SHIPPED | OUTPATIENT
Start: 2019-12-09 | End: 2019-12-19

## 2019-12-09 RX ORDER — GREEN TEA/HOODIA GORDONII 315-12.5MG
1 CAPSULE ORAL DAILY
Qty: 30 TABLET | Refills: 0 | Status: SHIPPED | OUTPATIENT
Start: 2019-12-09 | End: 2020-01-08

## 2019-12-09 RX ORDER — BENZONATATE 100 MG/1
100 CAPSULE ORAL 3 TIMES DAILY PRN
Qty: 30 CAPSULE | Refills: 0 | Status: SHIPPED | OUTPATIENT
Start: 2019-12-09 | End: 2019-12-16

## 2019-12-09 RX ADMIN — KETOROLAC TROMETHAMINE 30 MG: 30 INJECTION, SOLUTION INTRAMUSCULAR; INTRAVENOUS at 14:44

## 2019-12-12 ENCOUNTER — OFFICE VISIT (OUTPATIENT)
Dept: FAMILY MEDICINE CLINIC | Age: 79
End: 2019-12-12
Payer: MEDICARE

## 2019-12-12 VITALS
RESPIRATION RATE: 16 BRPM | HEIGHT: 61 IN | TEMPERATURE: 97.5 F | SYSTOLIC BLOOD PRESSURE: 112 MMHG | BODY MASS INDEX: 23.79 KG/M2 | WEIGHT: 126 LBS | OXYGEN SATURATION: 98 % | DIASTOLIC BLOOD PRESSURE: 64 MMHG | HEART RATE: 71 BPM

## 2019-12-12 DIAGNOSIS — J00 ACUTE NASOPHARYNGITIS: Primary | ICD-10-CM

## 2019-12-12 PROCEDURE — 1036F TOBACCO NON-USER: CPT | Performed by: FAMILY MEDICINE

## 2019-12-12 PROCEDURE — 4040F PNEUMOC VAC/ADMIN/RCVD: CPT | Performed by: FAMILY MEDICINE

## 2019-12-12 PROCEDURE — G8420 CALC BMI NORM PARAMETERS: HCPCS | Performed by: FAMILY MEDICINE

## 2019-12-12 PROCEDURE — 99213 OFFICE O/P EST LOW 20 MIN: CPT | Performed by: FAMILY MEDICINE

## 2019-12-12 PROCEDURE — 1090F PRES/ABSN URINE INCON ASSESS: CPT | Performed by: FAMILY MEDICINE

## 2019-12-12 PROCEDURE — G8427 DOCREV CUR MEDS BY ELIG CLIN: HCPCS | Performed by: FAMILY MEDICINE

## 2019-12-12 PROCEDURE — 1123F ACP DISCUSS/DSCN MKR DOCD: CPT | Performed by: FAMILY MEDICINE

## 2019-12-12 PROCEDURE — G8399 PT W/DXA RESULTS DOCUMENT: HCPCS | Performed by: FAMILY MEDICINE

## 2019-12-12 PROCEDURE — G8482 FLU IMMUNIZE ORDER/ADMIN: HCPCS | Performed by: FAMILY MEDICINE

## 2019-12-31 ENCOUNTER — CARE COORDINATION (OUTPATIENT)
Dept: CARE COORDINATION | Age: 79
End: 2019-12-31

## 2020-01-01 ENCOUNTER — HOSPITAL ENCOUNTER (EMERGENCY)
Age: 80
Discharge: HOME | End: 2020-01-01
Payer: MEDICARE

## 2020-01-01 ENCOUNTER — APPOINTMENT (OUTPATIENT)
Dept: CT IMAGING | Age: 80
End: 2020-01-01
Payer: MEDICARE

## 2020-01-01 VITALS
WEIGHT: 125 LBS | SYSTOLIC BLOOD PRESSURE: 142 MMHG | TEMPERATURE: 98.6 F | OXYGEN SATURATION: 98 % | BODY MASS INDEX: 23.6 KG/M2 | HEART RATE: 79 BPM | RESPIRATION RATE: 18 BRPM | HEIGHT: 61 IN | DIASTOLIC BLOOD PRESSURE: 68 MMHG

## 2020-01-01 LAB
ALBUMIN SERPL-MCNC: 3.8 G/DL (ref 3.5–4.6)
ALP BLD-CCNC: 62 U/L (ref 40–130)
ALT SERPL-CCNC: 20 U/L (ref 0–33)
ANION GAP SERPL CALCULATED.3IONS-SCNC: 12 MEQ/L (ref 9–15)
AST SERPL-CCNC: 32 U/L (ref 0–35)
BACTERIA: NEGATIVE /HPF
BASOPHILS ABSOLUTE: 0.1 K/UL (ref 0–0.2)
BASOPHILS RELATIVE PERCENT: 1.6 %
BILIRUB SERPL-MCNC: 0.5 MG/DL (ref 0.2–0.7)
BILIRUBIN URINE: NEGATIVE
BLOOD, URINE: NEGATIVE
BUN BLDV-MCNC: 19 MG/DL (ref 8–23)
CALCIUM SERPL-MCNC: 8.8 MG/DL (ref 8.5–9.9)
CASTS: ABNORMAL /LPF
CHLORIDE BLD-SCNC: 102 MEQ/L (ref 95–107)
CLARITY: CLEAR
CO2: 23 MEQ/L (ref 20–31)
COLOR: ABNORMAL
CREAT SERPL-MCNC: 0.68 MG/DL (ref 0.5–0.9)
EOSINOPHILS ABSOLUTE: 0.1 K/UL (ref 0–0.7)
EOSINOPHILS RELATIVE PERCENT: 1.9 %
EPITHELIAL CELLS, UA: ABNORMAL /HPF (ref 0–5)
GFR AFRICAN AMERICAN: >60
GFR NON-AFRICAN AMERICAN: >60
GLOBULIN: 2.7 G/DL (ref 2.3–3.5)
GLUCOSE BLD-MCNC: 167 MG/DL (ref 70–99)
GLUCOSE URINE: NEGATIVE MG/DL
HCT VFR BLD CALC: 38.9 % (ref 37–47)
HEMOGLOBIN: 12.8 G/DL (ref 12–16)
INFLUENZA A BY PCR: NEGATIVE
INFLUENZA B BY PCR: NEGATIVE
KETONES, URINE: ABNORMAL MG/DL
LACTIC ACID: 1.2 MMOL/L (ref 0.5–2.2)
LEUKOCYTE ESTERASE, URINE: ABNORMAL
LIPASE: 43 U/L (ref 12–95)
LYMPHOCYTES ABSOLUTE: 0.6 K/UL (ref 1–4.8)
LYMPHOCYTES RELATIVE PERCENT: 10.5 %
MCH RBC QN AUTO: 29.9 PG (ref 27–31.3)
MCHC RBC AUTO-ENTMCNC: 33 % (ref 33–37)
MCV RBC AUTO: 90.7 FL (ref 82–100)
MONOCYTES ABSOLUTE: 0.7 K/UL (ref 0.2–0.8)
MONOCYTES RELATIVE PERCENT: 11.3 %
NEUTROPHILS ABSOLUTE: 4.6 K/UL (ref 1.4–6.5)
NEUTROPHILS RELATIVE PERCENT: 74.7 %
NITRITE, URINE: NEGATIVE
PDW BLD-RTO: 13.9 % (ref 11.5–14.5)
PH UA: 5 (ref 5–9)
PLATELET # BLD: 110 K/UL (ref 130–400)
POTASSIUM SERPL-SCNC: 3.6 MEQ/L (ref 3.4–4.9)
PROTEIN UA: NEGATIVE MG/DL
RBC # BLD: 4.28 M/UL (ref 4.2–5.4)
RBC UA: ABNORMAL /HPF (ref 0–5)
RENAL EPITHELIAL, UA: ABNORMAL /HPF
SODIUM BLD-SCNC: 137 MEQ/L (ref 135–144)
SPECIFIC GRAVITY UA: 1.03 (ref 1–1.03)
TOTAL PROTEIN: 6.5 G/DL (ref 6.3–8)
URINE REFLEX TO CULTURE: YES
UROBILINOGEN, URINE: 1 E.U./DL
WBC # BLD: 6.2 K/UL (ref 4.8–10.8)
WBC UA: ABNORMAL /HPF (ref 0–5)

## 2020-01-01 PROCEDURE — 83605 ASSAY OF LACTIC ACID: CPT

## 2020-01-01 PROCEDURE — 87502 INFLUENZA DNA AMP PROBE: CPT

## 2020-01-01 PROCEDURE — 36415 COLL VENOUS BLD VENIPUNCTURE: CPT

## 2020-01-01 PROCEDURE — 6370000000 HC RX 637 (ALT 250 FOR IP): Performed by: PHYSICIAN ASSISTANT

## 2020-01-01 PROCEDURE — 85025 COMPLETE CBC W/AUTO DIFF WBC: CPT

## 2020-01-01 PROCEDURE — 87086 URINE CULTURE/COLONY COUNT: CPT

## 2020-01-01 PROCEDURE — 83690 ASSAY OF LIPASE: CPT

## 2020-01-01 PROCEDURE — 2580000003 HC RX 258: Performed by: PHYSICIAN ASSISTANT

## 2020-01-01 PROCEDURE — 81003 URINALYSIS AUTO W/O SCOPE: CPT

## 2020-01-01 PROCEDURE — 96374 THER/PROPH/DIAG INJ IV PUSH: CPT

## 2020-01-01 PROCEDURE — 99284 EMERGENCY DEPT VISIT MOD MDM: CPT

## 2020-01-01 PROCEDURE — 6360000002 HC RX W HCPCS: Performed by: PHYSICIAN ASSISTANT

## 2020-01-01 PROCEDURE — 80053 COMPREHEN METABOLIC PANEL: CPT

## 2020-01-01 PROCEDURE — 6360000004 HC RX CONTRAST MEDICATION: Performed by: PHYSICIAN ASSISTANT

## 2020-01-01 PROCEDURE — 74177 CT ABD & PELVIS W/CONTRAST: CPT

## 2020-01-01 PROCEDURE — 96372 THER/PROPH/DIAG INJ SC/IM: CPT

## 2020-01-01 RX ORDER — CIPROFLOXACIN 500 MG/1
500 TABLET, FILM COATED ORAL ONCE
Status: COMPLETED | OUTPATIENT
Start: 2020-01-01 | End: 2020-01-01

## 2020-01-01 RX ORDER — 0.9 % SODIUM CHLORIDE 0.9 %
1000 INTRAVENOUS SOLUTION INTRAVENOUS ONCE
Status: COMPLETED | OUTPATIENT
Start: 2020-01-01 | End: 2020-01-01

## 2020-01-01 RX ORDER — DICYCLOMINE HYDROCHLORIDE 10 MG/ML
20 INJECTION INTRAMUSCULAR ONCE
Status: COMPLETED | OUTPATIENT
Start: 2020-01-01 | End: 2020-01-01

## 2020-01-01 RX ORDER — DICYCLOMINE HYDROCHLORIDE 10 MG/1
10 CAPSULE ORAL EVERY 6 HOURS PRN
Qty: 20 CAPSULE | Refills: 0 | Status: SHIPPED | OUTPATIENT
Start: 2020-01-01 | End: 2020-03-11

## 2020-01-01 RX ORDER — ONDANSETRON 4 MG/1
4 TABLET, ORALLY DISINTEGRATING ORAL EVERY 8 HOURS PRN
Qty: 20 TABLET | Refills: 0 | Status: SHIPPED | OUTPATIENT
Start: 2020-01-01 | End: 2020-03-11 | Stop reason: ALTCHOICE

## 2020-01-01 RX ORDER — CIPROFLOXACIN 500 MG/1
500 TABLET, FILM COATED ORAL 2 TIMES DAILY
Qty: 20 TABLET | Refills: 0 | Status: SHIPPED | OUTPATIENT
Start: 2020-01-01 | End: 2020-01-11

## 2020-01-01 RX ORDER — ONDANSETRON 2 MG/ML
4 INJECTION INTRAMUSCULAR; INTRAVENOUS ONCE
Status: COMPLETED | OUTPATIENT
Start: 2020-01-01 | End: 2020-01-01

## 2020-01-01 RX ADMIN — CIPROFLOXACIN 500 MG: 500 TABLET, FILM COATED ORAL at 21:02

## 2020-01-01 RX ADMIN — IOPAMIDOL 100 ML: 755 INJECTION, SOLUTION INTRAVENOUS at 19:36

## 2020-01-01 RX ADMIN — DICYCLOMINE HYDROCHLORIDE 20 MG: 20 INJECTION INTRAMUSCULAR at 18:15

## 2020-01-01 RX ADMIN — SODIUM CHLORIDE 1000 ML: 9 INJECTION, SOLUTION INTRAVENOUS at 18:05

## 2020-01-01 RX ADMIN — ONDANSETRON 4 MG: 2 INJECTION INTRAMUSCULAR; INTRAVENOUS at 18:05

## 2020-01-01 ASSESSMENT — ENCOUNTER SYMPTOMS
APNEA: 0
ALLERGIC/IMMUNOLOGIC NEGATIVE: 1
NAUSEA: 1
DIARRHEA: 1
VOMITING: 1
TROUBLE SWALLOWING: 0
SHORTNESS OF BREATH: 0
EYE PAIN: 0
ABDOMINAL PAIN: 1
COLOR CHANGE: 0
COUGH: 1

## 2020-01-01 ASSESSMENT — PAIN DESCRIPTION - LOCATION: LOCATION: ABDOMEN

## 2020-01-01 ASSESSMENT — PAIN DESCRIPTION - ORIENTATION: ORIENTATION: LEFT;UPPER

## 2020-01-01 ASSESSMENT — PAIN SCALES - GENERAL: PAINLEVEL_OUTOF10: 6

## 2020-01-01 NOTE — ED PROVIDER NOTES
[facility] ED  eMERGENCY dEPARTMENTeNCOUnter      Pt Name: Shahzad Pinto  MRN: [id number]  [place] 1940  Date ofevaluation: 1/1/2020  Provider: Emil De Leon PA-C    CHIEF COMPLAINT       Chief Complaint   Patient presents with    Emesis     x2 days    Diarrhea     x2 days    Abdominal Pain     LUQ         HISTORY OF PRESENT ILLNESS   (Location/Symptom, Timing/Onset,Context/Setting, Quality, Duration, Modifying Factors, Severity)  Note limiting factors. Shahzad Pinto is a [de-identified] y.o. female who presents to the emergency department with a 2-day history of left upper quadrant abdominal pain, nausea, vomiting and diarrhea. Patient states that all of her symptoms are \"severe\" in nature and she has been unable to tolerate p.o. due to the nausea and vomiting. Patient denies any known fevers but does complain of chills and myalgias. Patient has had a dry, nonproductive cough but denies any shortness of breath. Patient states that she has a history of influenza in the past with the same symptoms. HPI    NursingNotes were reviewed. REVIEW OF SYSTEMS    (2-9 systems for level 4, 10 or more for level 5)     Review of Systems   Constitutional: Positive for chills and fatigue. Negative for diaphoresis and fever. HENT: Negative for hearing loss and trouble swallowing. Eyes: Negative for pain. Respiratory: Positive for cough. Negative for apnea and shortness of breath. Cardiovascular: Negative for chest pain. Gastrointestinal: Positive for abdominal pain, diarrhea, nausea and vomiting. Endocrine: Negative. Genitourinary: Negative for hematuria. Musculoskeletal: Positive for myalgias. Negative for neck pain and neck stiffness. Skin: Negative for color change. Allergic/Immunologic: Negative. Neurological: Negative for dizziness and numbness. Hematological: Negative. Psychiatric/Behavioral: Negative. All other systems reviewed and are negative.       Except as noted above the remainder of the review of systems was reviewed and negative.        PAST MEDICAL HISTORY     Past Medical History:   Diagnosis Date    Arthritis     Chronic bronchitis ([address]     Degenerative disc disease, cervical     Depression     Depression with anxiety     Esophagitis     Fibromyositis     GERD (gastroesophageal reflux disease)     Headache(784.0)     Hyperlipidemia     Hypertension     Migraines     Pleural effusion     Right-sided chest wall pain     Type 2 diabetes mellitus without complication ([address] 3/85/9670    no medication per patient         SURGICALHISTORY       Past Surgical History:   Procedure Laterality Date    CHOLECYSTECTOMY  1988    COLONOSCOPY  05/04/2009    COLONOSCOPY N/A 10/24/2019    COLORECTAL CANCER SCREENING, NOT HIGH RISK performed by Julio César Stark MD at [address] Left 04/27/15     [facility] EYE VITRECTOMY W MACULAR EPIRETINAL MEMBRANE    UPPER GASTROINTESTINAL ENDOSCOPY  04/16/2013    UPPER GASTROINTESTINAL ENDOSCOPY  10/29/15    Verna Diaz MD         CURRENT MEDICATIONS       Previous Medications    ACETAMINOPHEN (TYLENOL) 500 MG TABLET    Take 500 mg by mouth every 6 hours as needed for Pain    ALCOHOL SWABS PADS    Use as directed    ALENDRONATE (FOSAMAX) 70 MG TABLET    Take 1 tablet by mouth every 7 days    ASPIRIN 81 MG CHEWABLE TABLET    Take 81 mg by mouth daily    BLOOD GLUCOSE MONITOR STRIPS    Test up to TID     Type 2 diabetes mellitus without complication, without long-term current use of insulin (HCC) (E11.9)    BLOOD GLUCOSE MONITORING SUPPL LONG    Use as directed up to TID Type 2 diabetes mellitus without complication, without long-term current use of insulin (HCC) (E11.9)    CHOLECALCIFEROL (VITAMIN D) 2000 UNITS CAPS CAPSULE    Take 1 capsule by mouth daily    LACTOBACILLUS (PROBIOTIC ACIDOPHILUS) TABS    Take 1 tablet by mouth daily    LANCETS MISC    1 each by Does not apply route daily activity: None   Other Topics Concern    None   Social History Narrative    None       SCREENINGS              PHYSICAL EXAM    (up to 7 for level 4, 8 or more for level 5)     ED Triage Vitals [01/01/20 1725]   BP Temp Temp Source Pulse Resp SpO2 Height Weight   130/78 98.6 °F (37 °C) Oral 88 18 98 % 5' 1\" (1.549 m) 125 lb (56.7 kg)       Physical Exam  Vitals signs and nursing note reviewed. Constitutional:       General: She is not in acute distress. Appearance: She is well-developed. She is not diaphoretic. HENT:      Head: Normocephalic and atraumatic. Mouth/Throat:      Pharynx: No oropharyngeal exudate. Eyes:      General: No scleral icterus. Conjunctiva/sclera: Conjunctivae normal.      Pupils: Pupils are equal, round, and reactive to light. Neck:      Musculoskeletal: Normal range of motion and neck supple. Trachea: No tracheal deviation. Cardiovascular:      Rate and Rhythm: Normal rate. Heart sounds: Normal heart sounds. Pulmonary:      Effort: Pulmonary effort is normal. No respiratory distress. Breath sounds: Normal breath sounds. Abdominal:      General: Bowel sounds are normal. There is no distension. Palpations: Abdomen is soft. Tenderness: There is tenderness in the epigastric area and left upper quadrant. Musculoskeletal: Normal range of motion. Skin:     General: Skin is warm and dry. Findings: No erythema or rash. Neurological:      Mental Status: She is alert and oriented to person, place, and time. Cranial Nerves: No cranial nerve deficit. Motor: No abnormal muscle tone. Psychiatric:         Behavior: Behavior normal.         Thought Content:  Thought content normal.         Judgment: Judgment normal.         DIAGNOSTIC RESULTS     EKG: All EKG's are interpreted by the Emergency Department Physician who either signs or Co-signsthis chart in the absence of a cardiologist.    RADIOLOGY:   Non-plain filmimages such as CT, Ultrasound and MRI are read by the radiologist. Plain radiographic images are visualized and preliminarily interpreted by the emergency physician with the below findings:    Enteritis    Interpretation per the Radiologist below, if available at the time ofthis note:    CT ABDOMEN PELVIS W IV CONTRAST Additional Contrast? None    (Results Pending)         ED BEDSIDE ULTRASOUND:   Performed by ED Physician - none    LABS:  Labs Reviewed   COMPREHENSIVE METABOLIC PANEL - Abnormal; Notable for the following components:       Result Value    Glucose 167 (*)     All other components within normal limits   CBC WITH AUTO DIFFERENTIAL - Abnormal; Notable for the following components:    Platelets 109 (*)     Lymphocytes Absolute 0.6 (*)     All other components within normal limits   URINE RT REFLEX TO CULTURE - Abnormal; Notable for the following components:    Color, UA DARK YELLOW (*)     Ketones, Urine TRACE (*)     Leukocyte Esterase, Urine SMALL (*)     All other components within normal limits   MICROSCOPIC URINALYSIS - Abnormal; Notable for the following components:    Renal Epithelial, Urine 0-2 (*)     WBC, UA 20-50 (*)     RBC, UA 3-5 (*)     All other components within normal limits   RAPID INFLUENZA A/B ANTIGENS   URINE CULTURE   LACTIC ACID, PLASMA   LIPASE       All other labs were within normal range or not returned as of this dictation. EMERGENCY DEPARTMENT COURSE and DIFFERENTIAL DIAGNOSIS/MDM:   Vitals:    Vitals:    01/01/20 1725 01/01/20 1945   BP: 130/78 (!) 142/68   Pulse: 88 79   Resp: 18 18   Temp: 98.6 °F (37 °C)    TempSrc: Oral    SpO2: 98% 98%   Weight: 125 lb (56.7 kg)    Height: 5' 1\" (1.549 m)          MDM      REASSESSMENT      Patient presented the emergency department with abdominal pain, nausea, vomiting and diarrhea, ongoing for the past 2 days. Patient had resolution of symptoms after receiving IV meds and fluids.   Patient will be discharged home with supportive therapy, antibiotics and PCP follow-up    CONSULTS:  None    PROCEDURES:  Unless otherwise noted below, none     Procedures    FINAL IMPRESSION      1. Acute cystitis without hematuria    2. Nausea vomiting and diarrhea          DISPOSITION/PLAN   DISPOSITION Decision To Discharge 01/01/2020 08:58:06 PM      PATIENT REFERREDTO:  Ina Willett MD  [address] [facility], [address]  [phone]    In 2 days        DISCHARGEMEDICATIONS:  New Prescriptions    CIPROFLOXACIN (CIPRO) 500 MG TABLET    Take 1 tablet by mouth 2 times daily for 10 days    DICYCLOMINE (BENTYL) 10 MG CAPSULE    Take 1 capsule by mouth every 6 hours as needed (cramps)    ONDANSETRON (ZOFRAN ODT) 4 MG DISINTEGRATING TABLET    Take 1 tablet by mouth every 8 hours as needed for Nausea     Controlled Substances Monitoring:     No flowsheet data found.     (Please note that portions of this note were completed with a voice recognition program.  Efforts were made to edit the dictations but occasionally words are mis-transcribed.)    Thelma Stephens PA-C (electronically signed)  Attending Emergency Physician          Thelma Stephens PA-C  01/01/20 0939

## 2020-01-03 LAB — URINE CULTURE, ROUTINE: NORMAL

## 2020-02-12 ENCOUNTER — CARE COORDINATION (OUTPATIENT)
Dept: CARE COORDINATION | Age: 80
End: 2020-02-12

## 2020-02-13 NOTE — CARE COORDINATION
Call to emilia to discuss care coordination. Left message to return call.  Will send follow up letter

## 2020-02-20 ENCOUNTER — CARE COORDINATION (OUTPATIENT)
Dept: CARE COORDINATION | Age: 80
End: 2020-02-20

## 2020-02-21 NOTE — CARE COORDINATION
Call to emilia to discuss care coordination. Left message to return call.  This was third outreach, will no longer attempt to contact

## 2020-03-11 ENCOUNTER — OFFICE VISIT (OUTPATIENT)
Dept: FAMILY MEDICINE CLINIC | Age: 80
End: 2020-03-11
Payer: MEDICARE

## 2020-03-11 VITALS
TEMPERATURE: 97.1 F | OXYGEN SATURATION: 98 % | RESPIRATION RATE: 16 BRPM | DIASTOLIC BLOOD PRESSURE: 62 MMHG | SYSTOLIC BLOOD PRESSURE: 110 MMHG | WEIGHT: 122 LBS | BODY MASS INDEX: 23.03 KG/M2 | HEART RATE: 74 BPM | HEIGHT: 61 IN

## 2020-03-11 PROCEDURE — 1090F PRES/ABSN URINE INCON ASSESS: CPT | Performed by: FAMILY MEDICINE

## 2020-03-11 PROCEDURE — G8399 PT W/DXA RESULTS DOCUMENT: HCPCS | Performed by: FAMILY MEDICINE

## 2020-03-11 PROCEDURE — G8482 FLU IMMUNIZE ORDER/ADMIN: HCPCS | Performed by: FAMILY MEDICINE

## 2020-03-11 PROCEDURE — 1036F TOBACCO NON-USER: CPT | Performed by: FAMILY MEDICINE

## 2020-03-11 PROCEDURE — 99214 OFFICE O/P EST MOD 30 MIN: CPT | Performed by: FAMILY MEDICINE

## 2020-03-11 PROCEDURE — G8427 DOCREV CUR MEDS BY ELIG CLIN: HCPCS | Performed by: FAMILY MEDICINE

## 2020-03-11 PROCEDURE — 4040F PNEUMOC VAC/ADMIN/RCVD: CPT | Performed by: FAMILY MEDICINE

## 2020-03-11 PROCEDURE — 1123F ACP DISCUSS/DSCN MKR DOCD: CPT | Performed by: FAMILY MEDICINE

## 2020-03-11 PROCEDURE — G8420 CALC BMI NORM PARAMETERS: HCPCS | Performed by: FAMILY MEDICINE

## 2020-03-11 NOTE — PROGRESS NOTES
Sister     Diabetes Brother     Cancer Brother         leukemia     Allergies   Allergen Reactions    Codeine Shortness Of Breath     tired     Current Outpatient Medications   Medication Sig Dispense Refill    ondansetron (ZOFRAN ODT) 4 MG disintegrating tablet Take 1 tablet by mouth every 8 hours as needed for Nausea 20 tablet 0    dicyclomine (BENTYL) 10 MG capsule Take 1 capsule by mouth every 6 hours as needed (cramps) 20 capsule 0    meloxicam (MOBIC) 7.5 MG tablet Take 1 tablet by mouth daily 7 tablet 0    blood glucose monitor strips Test up to TID     Type 2 diabetes mellitus without complication, without long-term current use of insulin (HCC) (E11.9) 100 strip 5    aspirin 81 MG chewable tablet Take 81 mg by mouth daily      Naphazoline-Polyethyl Glycol (EYE DROPS) 0.012-0.2 % SOLN Use  in both eyes.       pantoprazole (PROTONIX) 40 MG tablet Take 1 tablet by mouth daily 180 tablet 4    sertraline (ZOLOFT) 100 MG tablet Take 1 tablet by mouth daily 90 tablet 1    alendronate (FOSAMAX) 70 MG tablet Take 1 tablet by mouth every 7 days 15 tablet 3    ONETOUCH DELICA LANCETS 14M MISC Use as directed up to three times a day 100 each 5    lisinopril (PRINIVIL;ZESTRIL) 20 MG tablet Take 1 tablet by mouth daily 90 tablet 3    Multiple Vitamins-Minerals (PRESERVISION AREDS) CAPS Take by mouth      Lancets MISC 1 each by Does not apply route daily Type 2 diabetes mellitus without complication, without long-term current use of insulin (HCC) (E11.9) 100 each 5    Blood Glucose Monitoring Suppl LONG Use as directed up to TID Type 2 diabetes mellitus without complication, without long-term current use of insulin (HCC) (E11.9) 1 Device 0    Alcohol Swabs PADS Use as directed 100 each 5    Cholecalciferol (VITAMIN D) 2000 units CAPS capsule Take 1 capsule by mouth daily 30 capsule 11    acetaminophen (TYLENOL) 500 MG tablet Take 500 mg by mouth every 6 hours as needed for Pain       No current clinical status, medications, activities and diet.      Side effects, adverse effects of the medication prescribed today, as well as treatment plan/ rationale and result expectations have been discussed with the patient who expresses understanding and desires to proceed.     Close follow up to evaluate treatment results and for coordination of care. I have reviewed the patient's medical history in detail and updated the computerized patient record. More than 50% of the appointment was spent in face-to-face counseling, education and care coordination. Please note this report has been partially produced using speech recognition software and may contain mistakes related to that system including errors in grammar, punctuation and spelling as well as words and phrases that may seem inappropriate. If there are questions or concerns, please feel free to contact me to clarify. No orders of the defined types were placed in this encounter. No orders of the defined types were placed in this encounter. There are no discontinued medications. No follow-ups on file. Controlled Substance Monitoring:    Acute and Chronic Pain Monitoring:   No flowsheet data found.         Otilia Velásquez MD

## 2020-03-16 RX ORDER — SERTRALINE HYDROCHLORIDE 100 MG/1
TABLET, FILM COATED ORAL
Qty: 90 TABLET | Refills: 1 | Status: SHIPPED | OUTPATIENT
Start: 2020-03-16 | End: 2020-09-11 | Stop reason: SDUPTHER

## 2020-03-16 NOTE — TELEPHONE ENCOUNTER
Pharmacy requesting medication refill.  Please approve or deny this request.    Rx requested:  Requested Prescriptions     Pending Prescriptions Disp Refills    sertraline (ZOLOFT) 100 MG tablet [Pharmacy Med Name: SERTRALINE HCL 100MG TABS] 90 tablet 1     Sig: TAKE ONE TABLET BY MOUTH EVERY DAY         Last Office Visit:   3/11/2020      Next Visit Date:  Future Appointments   Date Time Provider Marleny Lan   9/11/2020  1:30 PM Agapito Jean MD [address] [place] [address]

## 2020-03-23 RX ORDER — ALENDRONATE SODIUM 70 MG/1
70 TABLET ORAL
Qty: 15 TABLET | Refills: 3 | Status: SHIPPED | OUTPATIENT
Start: 2020-03-23 | End: 2020-09-11 | Stop reason: SDUPTHER

## 2020-03-23 NOTE — TELEPHONE ENCOUNTER
Rx request   Requested Prescriptions     Pending Prescriptions Disp Refills    alendronate (FOSAMAX) 70 MG tablet 15 tablet 3     Sig: Take 1 tablet by mouth every 7 days     LOV 3/11/2020  Next Visit Date:  Future Appointments   Date Time Provider Marleny Zendejasi   9/11/2020  1:30 PM Anselmo Tristan MD [address] [place] [address]

## 2020-06-08 RX ORDER — LISINOPRIL 20 MG/1
TABLET ORAL
Qty: 90 TABLET | Refills: 3 | Status: SHIPPED | OUTPATIENT
Start: 2020-06-08 | End: 2020-09-11 | Stop reason: SDUPTHER

## 2020-06-08 NOTE — TELEPHONE ENCOUNTER
Pharmacy requesting medication refill.  Please approve or deny this request.    Rx requested:  Requested Prescriptions     Pending Prescriptions Disp Refills    lisinopril (PRINIVIL;ZESTRIL) 20 MG tablet [Pharmacy Med Name: LISINOPRIL 20MG TABS] 90 tablet 3     Sig: TAKE ONE TABLET BY MOUTH EVERY DAY         Last Office Visit:   3/11/2020      Next Visit Date:  Future Appointments   Date Time Provider Marleny Lan   9/11/2020  1:30 PM Saadia Gandhi MD [address] [place] [address]

## 2020-09-11 ENCOUNTER — VIRTUAL VISIT (OUTPATIENT)
Dept: FAMILY MEDICINE CLINIC | Age: 80
End: 2020-09-11
Payer: MEDICARE

## 2020-09-11 PROCEDURE — G8399 PT W/DXA RESULTS DOCUMENT: HCPCS | Performed by: FAMILY MEDICINE

## 2020-09-11 PROCEDURE — 1123F ACP DISCUSS/DSCN MKR DOCD: CPT | Performed by: FAMILY MEDICINE

## 2020-09-11 PROCEDURE — 99214 OFFICE O/P EST MOD 30 MIN: CPT | Performed by: FAMILY MEDICINE

## 2020-09-11 PROCEDURE — 1090F PRES/ABSN URINE INCON ASSESS: CPT | Performed by: FAMILY MEDICINE

## 2020-09-11 PROCEDURE — 4040F PNEUMOC VAC/ADMIN/RCVD: CPT | Performed by: FAMILY MEDICINE

## 2020-09-11 PROCEDURE — G8427 DOCREV CUR MEDS BY ELIG CLIN: HCPCS | Performed by: FAMILY MEDICINE

## 2020-09-11 RX ORDER — GLUCOSAMINE HCL/CHONDROITIN SU 500-400 MG
CAPSULE ORAL
Qty: 100 STRIP | Refills: 5 | Status: SHIPPED | OUTPATIENT
Start: 2020-09-11 | End: 2021-06-17 | Stop reason: SDUPTHER

## 2020-09-11 RX ORDER — SERTRALINE HYDROCHLORIDE 100 MG/1
TABLET, FILM COATED ORAL
Qty: 90 TABLET | Refills: 1 | Status: SHIPPED | OUTPATIENT
Start: 2020-09-11 | End: 2021-03-22

## 2020-09-11 RX ORDER — ALENDRONATE SODIUM 70 MG/1
70 TABLET ORAL
Qty: 15 TABLET | Refills: 4 | Status: SHIPPED | OUTPATIENT
Start: 2020-09-11 | End: 2021-12-09 | Stop reason: SDUPTHER

## 2020-09-11 RX ORDER — MULTIVIT-MIN/IRON/FOLIC ACID/K 18-600-40
1 CAPSULE ORAL DAILY
Qty: 90 CAPSULE | Refills: 4 | Status: SHIPPED | OUTPATIENT
Start: 2020-09-11 | End: 2021-07-02 | Stop reason: SDUPTHER

## 2020-09-11 RX ORDER — LISINOPRIL 20 MG/1
TABLET ORAL
Qty: 90 TABLET | Refills: 4 | Status: SHIPPED | OUTPATIENT
Start: 2020-09-11 | End: 2021-09-14

## 2020-09-11 RX ORDER — PANTOPRAZOLE SODIUM 40 MG/1
40 TABLET, DELAYED RELEASE ORAL DAILY
Qty: 180 TABLET | Refills: 4 | Status: SHIPPED | OUTPATIENT
Start: 2020-09-11 | End: 2021-09-14

## 2020-09-11 ASSESSMENT — PATIENT HEALTH QUESTIONNAIRE - PHQ9
2. FEELING DOWN, DEPRESSED OR HOPELESS: 0
SUM OF ALL RESPONSES TO PHQ QUESTIONS 1-9: 0
SUM OF ALL RESPONSES TO PHQ9 QUESTIONS 1 & 2: 0
1. LITTLE INTEREST OR PLEASURE IN DOING THINGS: 0
SUM OF ALL RESPONSES TO PHQ QUESTIONS 1-9: 0

## 2020-09-11 NOTE — PROGRESS NOTES
Baljit Roa is a 79 y.o. female evaluated via telephone on 2020. Consent:  She and/or health care decision maker is aware that that she may receive a bill for this telephone service, depending on her insurance coverage, and has provided verbal consent to proceed: Yes      Documentation:  I communicated with the patient and/or health care decision maker about see below. Details of this discussion including any medical advice provided: see below      I affirm this is a Patient Initiated Episode with an Established Patient who has not had a related appointment within my department in the past 7 days or scheduled within the next 24 hours. Total Time: minutes: 21-30 minutes    Note: not billable if this call serves to triage the patient into an appointment for the relevant concern      Binu POLANCOPEEWEE     2020    TELEHEALTH EVALUATION -- Audio (During [id number]-08 public health emergency)    HPI:    Baljit Roa (:  1940) has requested an audio evaluation for the following concern(s):    Hypertension (6 month follow up for HTN and DM)    Patient is here for DM f/u. Sugars have been moderately well-controlled and patient has not been experiencing hyper- or hypoglycemic symptoms. Compliance with meds is good and there are no side effects. Patient exercises occasionally and tries to eat healthy. Is up to date on foot and eye exams and immunizations. Patient is here for f/u HTN. Is compliant with meds and has no side effects from them. Avoids added salt. Tries to eat healthy. Exercises occasionally. Has no chest pain, shortness of breath, palpitations or edema. Review of Systems    Prior to Visit Medications    Medication Sig Taking?  Authorizing Provider   blood glucose monitor strips Test up to daily    Type 2 diabetes mellitus without complication, without long-term current use of insulin (HCC) (E11.9) Yes Maliha Sumner MD   lisinopril (PRINIVIL;ZESTRIL) 20 MG tablet TAKE ONE TABLET BY MOUTH EVERY DAY Yes Meredith Eugene MD   sertraline (ZOLOFT) 100 MG tablet TAKE ONE TABLET BY MOUTH EVERY DAY Yes Meredith Eugene MD   Cholecalciferol (VITAMIN D) 50 MCG (2000 UT) CAPS capsule Take 1 capsule by mouth daily Yes Meredith Eugene MD   pantoprazole (PROTONIX) 40 MG tablet Take 1 tablet by mouth daily Yes Meredith Eugene MD   alendronate (FOSAMAX) 70 MG tablet Take 1 tablet by mouth every 7 days Yes Meredith Eugene MD   Lancets ([address]) MISC TEST BLOOD SUGAR UP TO THREE TIMES A DAY (NIDDM E11.9) Yes Meredith Eugene MD   meloxicam (MOBIC) 7.5 MG tablet Take 1 tablet by mouth daily Yes Corie Salinas PA-C   aspirin 81 MG chewable tablet Take 81 mg by mouth daily Yes Historical Provider, MD   Multiple Vitamins-Minerals (PRESERVISION AREDS) CAPS Take by mouth Yes Historical Provider, MD   Lancets MISC 1 each by Does not apply route daily Type 2 diabetes mellitus without complication, without long-term current use of insulin (HCC) (E11.9) Yes Meredith Eugene MD   Blood Glucose Monitoring Suppl LONG Use as directed up to TID Type 2 diabetes mellitus without complication, without long-term current use of insulin (HCC) (E11.9) Yes Meredith Eugene MD   Alcohol Swabs PADS Use as directed Yes Meredith Eugene MD   acetaminophen (TYLENOL) 500 MG tablet Take 500 mg by mouth every 6 hours as needed for Pain Yes Historical Provider, MD       Social History     Tobacco Use    Smoking status: Never Smoker    Smokeless tobacco: Never Used   Substance Use Topics    Alcohol use: No    Drug use: No        Allergies   Allergen Reactions    Codeine Shortness Of Breath     tired   ,   Past Medical History:   Diagnosis Date    Arthritis     Chronic bronchitis ([facility] [address]     Degenerative disc disease, cervical     Depression     Depression with anxiety     Esophagitis     Fibromyositis     GERD (gastroesophageal reflux disease)     Headache(784.0)     Hyperlipidemia     Hypertension     Migraines     Pleural effusion     Right-sided chest wall pain     Type 2 diabetes mellitus without complication ([facility] [address] 7/43/1139    no medication per patient   ,   Past Surgical History:   Procedure Laterality Date    CHOLECYSTECTOMY  1988    COLONOSCOPY  05/04/2009    COLONOSCOPY N/A 10/24/2019    COLORECTAL CANCER SCREENING, NOT HIGH RISK performed by Kay Rojo MD at [address] Left 04/27/15     [facility] VITRECTOM[facility] W MACULAR EPIRETINAL MEMBRANE    UPPER GASTROINTESTINAL ENDOSCOPY  04/16/2013    UPPER GASTROINTESTINAL ENDOSCOPY  10/29/15    Momo Dodge MD   ,   Social History     Tobacco Use    Smoking status: Never Smoker    Smokeless tobacco: Never Used   Substance Use Topics    Alcohol use: No    Drug use: No   ,   Family History   Problem Relation Age of Onset    Other Mother         Neurological Disease    Cancer Sister     Diabetes Brother     Cancer Brother         leukemia       PHYSICAL EXAMINATION:  [ INSTRUCTIONS:  \"[x]\" Indicates a positive item  \"[]\" Indicates a negative item  -- DELETE ALL ITEMS NOT EXAMINED]  Vital Signs: unavailable    Patient appears to be alert and oriented to person, place, time, situation and is in no acute distress. Respiratory effort appears normal. Mood appears stable and speech and thought are grossly normal.    ASSESSMENT/PLAN:  Deonna Manzanares was seen today for hypertension. Diagnoses and all orders for this visit:    Other screening mammogram    Type 2 diabetes mellitus without complication, without long-term current use of insulin ([facility] [address]  Comments:  Stable and well-controlled on current meds. Orders:  -     blood glucose monitor strips;  Test up to daily    Type 2 diabetes mellitus without complication, without long-term current use of insulin ([facility]) (E11.9)  - Hemoglobin A1C; Future  -     Comprehensive Metabolic Panel; Future    Essential hypertension  Comments:  Stable well controlled on current meds. Follow clinically and with labs. Orders:  -     lisinopril (PRINIVIL;ZESTRIL) 20 MG tablet; TAKE ONE TABLET BY MOUTH EVERY DAY  -     Comprehensive Metabolic Panel; Future    Age-related osteoporosis without current pathological fracture  Comments:  new dx, fair to poor control; added Fosamax and Calcium + Vitamin D; repeat DEXA in 2 years  Orders:  -     Cholecalciferol (VITAMIN D) 50 MCG (2000 UT) CAPS capsule; Take 1 capsule by mouth daily  -     alendronate (FOSAMAX) 70 MG tablet; Take 1 tablet by mouth every 7 days    Gastroesophageal reflux disease without esophagitis  Comments: Add Protonix. Advised to stop flaxseed and increase dietary, soluble fiber and water intake  Orders:  -     pantoprazole (PROTONIX) 40 MG tablet; Take 1 tablet by mouth daily    Screen for colon cancer  -     Cologuard (For External Results Only); Future    Depression with anxiety  -     sertraline (ZOLOFT) 100 MG tablet; TAKE ONE TABLET BY MOUTH EVERY DAY          Return in about 3 months (around 12/11/2020) for DM, HTN, Mood Disorder - vvAnnalisa Gaspar is a 79 y.o. female being evaluated by a Virtual Visit (telephonic visit) encounter to address concerns as mentioned above. A caregiver was present when appropriate. Due to this being a TeleHealth encounter (During NSJSY-46 public health emergency), evaluation of the following organ systems was limited: Vitals/Constitutional/EENT/Resp/CV/GI//MS/Neuro/Skin/Heme-Lymph-Imm.   Pursuant to the emergency declaration under the [facility], [address] [place] [address] authority and the [employer] Act, this Virtual Visit was conducted with patient's (and/or legal guardian's) consent, to reduce the patient's risk of exposure to COVID-19 and provide necessary medical care.  The patient (and/or legal guardian) has also been advised to contact this office for worsening conditions or problems, and seek emergency medical treatment and/or call 911 if deemed necessary. Services were provided through a telephonic synchronous discussion virtually to substitute for in-person clinic visit. Patient and provider were located at their individual homes. --Gadiel Mendoza MD on 9/11/2020 at 2:10 PM    An electronic signature was used to authenticate this note.

## 2020-09-22 ENCOUNTER — HOSPITAL ENCOUNTER (OUTPATIENT)
Dept: LAB | Age: 80
Discharge: HOME | End: 2020-09-22
Payer: MEDICARE

## 2020-09-22 LAB
ALBUMIN SERPL-MCNC: 4.3 G/DL (ref 3.5–4.6)
ALP BLD-CCNC: 91 U/L (ref 40–130)
ALT SERPL-CCNC: 15 U/L (ref 0–33)
ANION GAP SERPL CALCULATED.3IONS-SCNC: 11 MEQ/L (ref 9–15)
AST SERPL-CCNC: 16 U/L (ref 0–35)
BILIRUB SERPL-MCNC: 0.3 MG/DL (ref 0.2–0.7)
BUN BLDV-MCNC: 18 MG/DL (ref 8–23)
CALCIUM SERPL-MCNC: 9.9 MG/DL (ref 8.5–9.9)
CHLORIDE BLD-SCNC: 104 MEQ/L (ref 95–107)
CO2: 29 MEQ/L (ref 20–31)
CREAT SERPL-MCNC: 0.66 MG/DL (ref 0.5–0.9)
GFR AFRICAN AMERICAN: >60
GFR NON-AFRICAN AMERICAN: >60
GLOBULIN: 3 G/DL (ref 2.3–3.5)
GLUCOSE BLD-MCNC: 68 MG/DL (ref 70–99)
HBA1C MFR BLD: 6.2 % (ref 4.8–5.9)
POTASSIUM SERPL-SCNC: 4.4 MEQ/L (ref 3.4–4.9)
SODIUM BLD-SCNC: 144 MEQ/L (ref 135–144)
TOTAL PROTEIN: 7.3 G/DL (ref 6.3–8)

## 2020-09-22 PROCEDURE — 83036 HEMOGLOBIN GLYCOSYLATED A1C: CPT

## 2020-09-22 PROCEDURE — 80053 COMPREHEN METABOLIC PANEL: CPT

## 2020-12-11 ENCOUNTER — VIRTUAL VISIT (OUTPATIENT)
Dept: FAMILY MEDICINE CLINIC | Age: 80
End: 2020-12-11
Payer: MEDICARE

## 2020-12-11 PROCEDURE — 99214 OFFICE O/P EST MOD 30 MIN: CPT | Performed by: FAMILY MEDICINE

## 2020-12-11 PROCEDURE — G8427 DOCREV CUR MEDS BY ELIG CLIN: HCPCS | Performed by: FAMILY MEDICINE

## 2020-12-11 PROCEDURE — 1090F PRES/ABSN URINE INCON ASSESS: CPT | Performed by: FAMILY MEDICINE

## 2020-12-11 PROCEDURE — 4040F PNEUMOC VAC/ADMIN/RCVD: CPT | Performed by: FAMILY MEDICINE

## 2020-12-11 PROCEDURE — G8399 PT W/DXA RESULTS DOCUMENT: HCPCS | Performed by: FAMILY MEDICINE

## 2020-12-11 PROCEDURE — 1123F ACP DISCUSS/DSCN MKR DOCD: CPT | Performed by: FAMILY MEDICINE

## 2020-12-11 RX ORDER — DOCUSATE SODIUM 100 MG/1
100 CAPSULE, LIQUID FILLED ORAL DAILY PRN
Qty: 90 CAPSULE | Refills: 4 | Status: SHIPPED | OUTPATIENT
Start: 2020-12-11

## 2020-12-11 NOTE — PROGRESS NOTES
Demario Grossman is a 79 y.o. female evaluated via telephone on 2020. Consent:  She and/or health care decision maker is aware that that she may receive a bill for this telephone service, depending on her insurance coverage, and has provided verbal consent to proceed: Yes      Documentation:  I communicated with the patient and/or health care decision maker about see below. Details of this discussion including any medical advice provided: see below      I affirm this is a Patient Initiated Episode with an Established Patient who has not had a related appointment within my department in the past 7 days or scheduled within the next 24 hours. Total Time: minutes: 11-20 minutes    Note: not billable if this call serves to triage the patient into an appointment for the relevant concern      Mario Alberto GRIMM     2020    TELEHEALTH EVALUATION -- Audio (During [id number] public health emergency)    HPI:    Demario Grossman (:  1940) has requested an audio evaluation for the following concern(s):    3 Month Follow-Up (DM,HTN,mood disorder); Health Maintenance (pt will stop into office for pneumo. Mammogram pending); and Constipation (x 5 months.)  . Patient is here for f/u HTN. Is compliant with meds and has no side effects from them. Avoids added salt. Tries to eat healthy. Exercises occasionally. Has no chest pain, shortness of breath, palpitations or edema. Patient is here for DM f/u. Sugars have been moderately well-controlled and patient has not been experiencing hyper- or hypoglycemic symptoms. Compliance with meds is good and there are no side effects. Patient exercises occasionally and tries to eat healthy. Is up to date on foot and eye exams and immunizations. GERD. Well-controlled with PPI, caffeine restriction, diet restriction. No weight loss. No abdominal pain. No bloody or black tarry stools. Patient is being treated for depression and has been compliant with meds which do not cause side effects. Mood is improved. No suicidal ideation. Sleep is normal.    Fatigue and nasal congestion. Having more bone pain. Has generalized OA, and it is not clear this is new pain. No fever. No nausea, diarrhea or vomiting. Did have what appeared to be food poisoning abut 2 months ago after eating soup a friend brought her. Complains of constipation x 6 months. Eats lots of vegetables. Drinks lots of water. Stools are small and hard and are Q2-3 days. No stool  Softener or psyllium. Review of Systems No fevers, chills, sweats. No unintended weight loss. No abdominal pain, nausea, vomiting, diarrhea, constipation, bloody stools, black tarry stools. No rashes. No swollen glands. Prior to Visit Medications    Medication Sig Taking?  Authorizing Provider   blood glucose monitor strips Test up to daily    Type 2 diabetes mellitus without complication, without long-term current use of insulin (HCC) (E11.9) Yes Oliverio Mcnulty MD   lisinopril (PRINIVIL;ZESTRIL) 20 MG tablet TAKE ONE TABLET BY MOUTH EVERY DAY Yes Oliverio Mcnulty MD   sertraline (ZOLOFT) 100 MG tablet TAKE ONE TABLET BY MOUTH EVERY DAY Yes Oliverio Mcnulty MD   Cholecalciferol (VITAMIN D) 50 MCG (2000 UT) CAPS capsule Take 1 capsule by mouth daily Yes Oliverio Mcnulty MD   pantoprazole (PROTONIX) 40 MG tablet Take 1 tablet by mouth daily Yes Oliverio Mcnulty MD   alendronate (FOSAMAX) 70 MG tablet Take 1 tablet by mouth every 7 days Yes Oliverio Mcnulty MD   Lancets (ONETOUCH DELICA PLUS [id number]) [facility] TEST BLOOD SUGAR UP TO THREE TIMES A DAY (NIDDM E11.9) Yes Oliverio Mcnulty MD   meloxicam (MOBIC) 7.5 MG tablet Take 1 tablet by mouth daily Yes Yoly Ferrer PA-C   aspirin 81 MG chewable tablet Take 81 mg by mouth daily Yes Historical Provider, MD Multiple Vitamins-Minerals (PRESERVISION AREDS) CAPS Take by mouth Yes Historical Provider, MD   Lancets MISC 1 each by Does not apply route daily Type 2 diabetes mellitus without complication, without long-term current use of insulin (HCC) (E11.9) Yes Brandon Santiago MD   Blood Glucose Monitoring Suppl LONG Use as directed up to TID Type 2 diabetes mellitus without complication, without long-term current use of insulin (HCC) (E11.9) Yes Brandon Santiago MD   Alcohol Swabs PADS Use as directed Yes Brandon Santiago MD   acetaminophen (TYLENOL) 500 MG tablet Take 500 mg by mouth every 6 hours as needed for Pain Yes Historical Provider, MD       Social History     Tobacco Use    Smoking status: Never Smoker    Smokeless tobacco: Never Used   Substance Use Topics    Alcohol use: No    Drug use: No        Allergies   Allergen Reactions    Codeine Shortness Of Breath     tired   ,   Past Medical History:   Diagnosis Date    Arthritis     Chronic bronchitis ([address]     Degenerative disc disease, cervical     Depression     Depression with anxiety     Esophagitis     Fibromyositis     GERD (gastroesophageal reflux disease)     Headache(784.0)     Hyperlipidemia     Hypertension     Migraines     Pleural effusion     Right-sided chest wall pain     Type 2 diabetes mellitus without complication ([address] 2/19/4442    no medication per patient   ,   Past Surgical History:   Procedure Laterality Date    CHOLECYSTECTOMY  1988    COLONOSCOPY  05/04/2009    COLONOSCOPY N/A 10/24/2019    COLORECTAL CANCER SCREENING, NOT HIGH RISK performed by Christiano Jimenez MD at [address] Left 04/27/15     [facility] EYE VITRECTOMY W MACULAR EPIRETINAL MEMBRANE    UPPER GASTROINTESTINAL ENDOSCOPY  04/16/2013    UPPER GASTROINTESTINAL ENDOSCOPY  10/29/15    Gonzales Fuller MD   ,   Social History     Tobacco Use --Jhonny Frias MD on 12/11/2020 at 1:54 PM    An electronic signature was used to authenticate this note.

## 2020-12-14 ENCOUNTER — NURSE ONLY (OUTPATIENT)
Dept: FAMILY MEDICINE CLINIC | Age: 80
End: 2020-12-14
Payer: MEDICARE

## 2020-12-14 PROCEDURE — 90732 PPSV23 VACC 2 YRS+ SUBQ/IM: CPT | Performed by: FAMILY MEDICINE

## 2020-12-14 PROCEDURE — G0009 ADMIN PNEUMOCOCCAL VACCINE: HCPCS | Performed by: FAMILY MEDICINE

## 2020-12-26 ENCOUNTER — APPOINTMENT (OUTPATIENT)
Dept: GENERAL RADIOLOGY | Age: 80
End: 2020-12-26
Payer: MEDICARE

## 2020-12-26 ENCOUNTER — APPOINTMENT (OUTPATIENT)
Dept: CT IMAGING | Age: 80
End: 2020-12-26
Payer: MEDICARE

## 2020-12-26 ENCOUNTER — HOSPITAL ENCOUNTER (EMERGENCY)
Age: 80
Discharge: HOME | End: 2020-12-26
Attending: STUDENT IN AN ORGANIZED HEALTH CARE EDUCATION/TRAINING PROGRAM
Payer: MEDICARE

## 2020-12-26 VITALS
RESPIRATION RATE: 15 BRPM | WEIGHT: 125 LBS | TEMPERATURE: 98.8 F | OXYGEN SATURATION: 98 % | HEART RATE: 63 BPM | SYSTOLIC BLOOD PRESSURE: 158 MMHG | DIASTOLIC BLOOD PRESSURE: 75 MMHG | BODY MASS INDEX: 23.62 KG/M2

## 2020-12-26 LAB
ALBUMIN SERPL-MCNC: 4 G/DL (ref 3.5–4.6)
ALP BLD-CCNC: 101 U/L (ref 40–130)
ALT SERPL-CCNC: 18 U/L (ref 0–33)
ANION GAP SERPL CALCULATED.3IONS-SCNC: 9 MEQ/L (ref 9–15)
APTT: 32.4 SEC (ref 24.4–36.8)
AST SERPL-CCNC: 20 U/L (ref 0–35)
BASOPHILS ABSOLUTE: 0 K/UL (ref 0–0.2)
BASOPHILS RELATIVE PERCENT: 0.4 %
BILIRUB SERPL-MCNC: <0.2 MG/DL (ref 0.2–0.7)
BILIRUBIN URINE: NEGATIVE
BLOOD, URINE: NEGATIVE
BUN BLDV-MCNC: 17 MG/DL (ref 8–23)
C-REACTIVE PROTEIN: 2.4 MG/L (ref 0–5)
CALCIUM SERPL-MCNC: 9.2 MG/DL (ref 8.5–9.9)
CHLORIDE BLD-SCNC: 103 MEQ/L (ref 95–107)
CLARITY: CLEAR
CO2: 28 MEQ/L (ref 20–31)
COLOR: YELLOW
CREAT SERPL-MCNC: 0.87 MG/DL (ref 0.5–0.9)
D DIMER: 0.84 MG/L FEU (ref 0–0.5)
EKG ATRIAL RATE: 72 BPM
EKG P AXIS: 21 DEGREES
EKG P-R INTERVAL: 138 MS
EKG Q-T INTERVAL: 366 MS
EKG QRS DURATION: 84 MS
EKG QTC CALCULATION (BAZETT): 400 MS
EKG R AXIS: -11 DEGREES
EKG T AXIS: -5 DEGREES
EKG VENTRICULAR RATE: 72 BPM
EOSINOPHILS ABSOLUTE: 0.1 K/UL (ref 0–0.7)
EOSINOPHILS RELATIVE PERCENT: 2.1 %
GFR AFRICAN AMERICAN: >60
GFR AFRICAN AMERICAN: >60
GFR NON-AFRICAN AMERICAN: 60
GFR NON-AFRICAN AMERICAN: >60
GLOBULIN: 2.9 G/DL (ref 2.3–3.5)
GLUCOSE BLD-MCNC: 122 MG/DL (ref 70–99)
GLUCOSE URINE: NEGATIVE MG/DL
HCT VFR BLD CALC: 40.3 % (ref 37–47)
HEMOGLOBIN: 13.4 G/DL (ref 12–16)
INFLUENZA A BY PCR: NEGATIVE
INFLUENZA B BY PCR: NEGATIVE
INR BLD: 0.9
KETONES, URINE: NEGATIVE MG/DL
LACTIC ACID: 1.2 MMOL/L (ref 0.5–2.2)
LEUKOCYTE ESTERASE, URINE: NEGATIVE
LYMPHOCYTES ABSOLUTE: 1.1 K/UL (ref 1–4.8)
LYMPHOCYTES RELATIVE PERCENT: 34.5 %
MAGNESIUM: 1.8 MG/DL (ref 1.7–2.4)
MCH RBC QN AUTO: 29.9 PG (ref 27–31.3)
MCHC RBC AUTO-ENTMCNC: 33.3 % (ref 33–37)
MCV RBC AUTO: 89.8 FL (ref 82–100)
MONO TEST: NEGATIVE
MONOCYTES ABSOLUTE: 0.4 K/UL (ref 0.2–0.8)
MONOCYTES RELATIVE PERCENT: 11.9 %
NEUTROPHILS ABSOLUTE: 1.6 K/UL (ref 1.4–6.5)
NEUTROPHILS RELATIVE PERCENT: 51.1 %
NITRITE, URINE: NEGATIVE
PDW BLD-RTO: 13.5 % (ref 11.5–14.5)
PERFORMED ON: ABNORMAL
PH UA: 6 (ref 5–9)
PLATELET # BLD: 82 K/UL (ref 130–400)
POC CREATININE: 0.9 MG/DL (ref 0.6–1.2)
POC SAMPLE TYPE: ABNORMAL
POTASSIUM SERPL-SCNC: 4.2 MEQ/L (ref 3.4–4.9)
PRO-BNP: 190 PG/ML
PROCALCITONIN: 0.08 NG/ML (ref 0–0.15)
PROTEIN UA: NEGATIVE MG/DL
PROTHROMBIN TIME: 12.4 SEC (ref 12.3–14.9)
RBC # BLD: 4.49 M/UL (ref 4.2–5.4)
SARS-COV-2, PCR: DETECTED
SODIUM BLD-SCNC: 140 MEQ/L (ref 135–144)
SPECIFIC GRAVITY UA: 1.02 (ref 1–1.03)
STREP GRP A PCR: NEGATIVE
TOTAL CK: 61 U/L (ref 0–170)
TOTAL PROTEIN: 6.9 G/DL (ref 6.3–8)
TROPONIN: <0.01 NG/ML (ref 0–0.01)
TSH SERPL DL<=0.05 MIU/L-ACNC: 2.42 UIU/ML (ref 0.44–3.86)
URINE REFLEX TO CULTURE: NORMAL
UROBILINOGEN, URINE: 0.2 E.U./DL
WBC # BLD: 3.1 K/UL (ref 4.8–10.8)

## 2020-12-26 PROCEDURE — 87651 STREP A DNA AMP PROBE: CPT

## 2020-12-26 PROCEDURE — 36415 COLL VENOUS BLD VENIPUNCTURE: CPT

## 2020-12-26 PROCEDURE — 85610 PROTHROMBIN TIME: CPT

## 2020-12-26 PROCEDURE — U0002 COVID-19 LAB TEST NON-CDC: HCPCS

## 2020-12-26 PROCEDURE — 80053 COMPREHEN METABOLIC PANEL: CPT

## 2020-12-26 PROCEDURE — 93005 ELECTROCARDIOGRAM TRACING: CPT | Performed by: STUDENT IN AN ORGANIZED HEALTH CARE EDUCATION/TRAINING PROGRAM

## 2020-12-26 PROCEDURE — 71275 CT ANGIOGRAPHY CHEST: CPT

## 2020-12-26 PROCEDURE — 2500000003 HC RX 250 WO HCPCS: Performed by: STUDENT IN AN ORGANIZED HEALTH CARE EDUCATION/TRAINING PROGRAM

## 2020-12-26 PROCEDURE — 84145 PROCALCITONIN (PCT): CPT

## 2020-12-26 PROCEDURE — 71045 X-RAY EXAM CHEST 1 VIEW: CPT

## 2020-12-26 PROCEDURE — 85379 FIBRIN DEGRADATION QUANT: CPT

## 2020-12-26 PROCEDURE — 96374 THER/PROPH/DIAG INJ IV PUSH: CPT

## 2020-12-26 PROCEDURE — 99285 EMERGENCY DEPT VISIT HI MDM: CPT

## 2020-12-26 PROCEDURE — 87040 BLOOD CULTURE FOR BACTERIA: CPT

## 2020-12-26 PROCEDURE — 83880 ASSAY OF NATRIURETIC PEPTIDE: CPT

## 2020-12-26 PROCEDURE — 85025 COMPLETE CBC W/AUTO DIFF WBC: CPT

## 2020-12-26 PROCEDURE — 84443 ASSAY THYROID STIM HORMONE: CPT

## 2020-12-26 PROCEDURE — 83605 ASSAY OF LACTIC ACID: CPT

## 2020-12-26 PROCEDURE — 86308 HETEROPHILE ANTIBODY SCREEN: CPT

## 2020-12-26 PROCEDURE — 87502 INFLUENZA DNA AMP PROBE: CPT

## 2020-12-26 PROCEDURE — 84484 ASSAY OF TROPONIN QUANT: CPT

## 2020-12-26 PROCEDURE — 6360000004 HC RX CONTRAST MEDICATION: Performed by: STUDENT IN AN ORGANIZED HEALTH CARE EDUCATION/TRAINING PROGRAM

## 2020-12-26 PROCEDURE — 81003 URINALYSIS AUTO W/O SCOPE: CPT

## 2020-12-26 PROCEDURE — 86140 C-REACTIVE PROTEIN: CPT

## 2020-12-26 PROCEDURE — 83735 ASSAY OF MAGNESIUM: CPT

## 2020-12-26 PROCEDURE — 85730 THROMBOPLASTIN TIME PARTIAL: CPT

## 2020-12-26 PROCEDURE — 82550 ASSAY OF CK (CPK): CPT

## 2020-12-26 RX ORDER — ECHINACEA PURPUREA EXTRACT 125 MG
2 TABLET ORAL
Qty: 1 BOTTLE | Refills: 0 | Status: SHIPPED | OUTPATIENT
Start: 2020-12-26

## 2020-12-26 RX ORDER — AZITHROMYCIN 250 MG/1
TABLET, FILM COATED ORAL
Qty: 1 PACKET | Refills: 0 | Status: SHIPPED | OUTPATIENT
Start: 2020-12-26 | End: 2021-01-05

## 2020-12-26 RX ORDER — ENALAPRILAT 2.5 MG/2ML
1.25 INJECTION INTRAVENOUS ONCE
Status: COMPLETED | OUTPATIENT
Start: 2020-12-26 | End: 2020-12-26

## 2020-12-26 RX ORDER — ACETAMINOPHEN 500 MG
1000 TABLET ORAL EVERY 6 HOURS PRN
Qty: 60 TABLET | Refills: 0 | Status: SHIPPED | OUTPATIENT
Start: 2020-12-26

## 2020-12-26 RX ADMIN — ENALAPRILAT 1.25 MG: 1.25 INJECTION INTRAVENOUS at 10:46

## 2020-12-26 RX ADMIN — IOPAMIDOL 100 ML: 612 INJECTION, SOLUTION INTRAVENOUS at 11:54

## 2020-12-26 ASSESSMENT — ENCOUNTER SYMPTOMS
RHINORRHEA: 1
SINUS PRESSURE: 0
CHEST TIGHTNESS: 0
SHORTNESS OF BREATH: 0
VOMITING: 1
ABDOMINAL PAIN: 0
COUGH: 1
DIARRHEA: 1
SORE THROAT: 1
BACK PAIN: 0
NAUSEA: 1
TROUBLE SWALLOWING: 0

## 2020-12-26 ASSESSMENT — PAIN SCALES - GENERAL: PAINLEVEL_OUTOF10: 5

## 2020-12-26 ASSESSMENT — PAIN DESCRIPTION - LOCATION: LOCATION: GENERALIZED

## 2020-12-26 ASSESSMENT — PAIN DESCRIPTION - DESCRIPTORS: DESCRIPTORS: ACHING

## 2020-12-26 ASSESSMENT — PAIN DESCRIPTION - PAIN TYPE: TYPE: ACUTE PAIN

## 2020-12-26 NOTE — ED NOTES
This RN at bedside to assess pt. Upon assessment, pt is A/Ox4, skin appropriate for ethnicity/w/d, resp even and unlabored, msp's intact.       Mary Subramanian RN  12/26/20 [id number]

## 2020-12-26 NOTE — ED PROVIDER NOTES
[address] [facility] ED  eMERGENCY dEPARTMENT eNCOUnter      Pt Name: Fawad Bradford  MRN: [id number]  [place] 1940  Date of evaluation: 12/26/2020  Provider: Laurie Saez, [address] [place] [address]       Chief Complaint   Patient presents with    Concern For COVID-19     Sore throat, body pain and cough         HISTORY OF PRESENT ILLNESS   (Location/Symptom, Timing/Onset,Context/Setting, Quality, Duration, Modifying Factors, Severity)  Note limiting factors. Fawad Bradford is a 79 y.o. female who presents to the emergency department with c/o sore throat, body aches, cough, loss of smell, loss of taste and she adds last week she had vomiting and diarrhea but not this week. Patient states overall she feels weak and she is concerned that she could have Covid and pneumonia. Patient states that she gets pneumonia every year. Patient was hypertensive but did not take her home medications today. Patient denied any chest pain. Patient denies having a fever. Patient does complain of frequent nasal congestion that has been present for 5 to 10 days. Patient has had sick contacts who have tested positive for COVID-19. Patient states that she is fearful that she is going to die of COVID-19 pneumonia. Patient complains that her body aches are achy in quality. Nothing has made them any better or worse. Symptoms of started 7 days ago. The history is provided by the patient. NursingNotes were reviewed. REVIEW OF SYSTEMS    (2-9 systems for level 4, 10 or more for level 5)     Review of Systems   Constitutional: Positive for activity change, appetite change and fatigue. Negative for chills, fever and unexpected weight change. HENT: Positive for congestion, postnasal drip, rhinorrhea and sore throat. Negative for drooling, ear pain, nosebleeds, sinus pressure and trouble swallowing. Respiratory: Positive for cough. Negative for chest tightness and shortness of breath. Cardiovascular: Negative for chest pain and leg swelling. Gastrointestinal: Positive for diarrhea ( Last week), nausea ( Last week) and vomiting ( Last week). Negative for abdominal pain. Endocrine: Negative for polydipsia and polyphagia. Genitourinary: Negative for dysuria, flank pain and frequency. Musculoskeletal: Positive for arthralgias and myalgias. Negative for back pain. Skin: Negative for pallor and rash. Neurological: Negative for syncope, weakness and headaches. Hematological: Does not bruise/bleed easily. All other systems reviewed and are negative. Except as noted above the remainder of the review of systems was reviewed and negative.        PAST MEDICAL HISTORY     Past Medical History:   Diagnosis Date    Arthritis     Chronic bronchitis ([address]     Degenerative disc disease, cervical     Depression     Depression with anxiety     Esophagitis     Fibromyositis     GERD (gastroesophageal reflux disease)     Headache(784.0)     Hyperlipidemia     Hypertension     Migraines     Pleural effusion     Right-sided chest wall pain     Type 2 diabetes mellitus without complication ([address] 0/85/6794    no medication per patient         SURGICALHISTORY       Past Surgical History:   Procedure Laterality Date    CHOLECYSTECTOMY  1988    COLONOSCOPY  05/04/2009    COLONOSCOPY N/A 10/24/2019    COLORECTAL CANCER SCREENING, NOT HIGH RISK performed by Jose Alejandro Martini MD at [address] Left 04/27/15     [facility] EYE VITRECTOMY W MACULAR EPIRETINAL MEMBRANE    UPPER GASTROINTESTINAL ENDOSCOPY  04/16/2013    UPPER GASTROINTESTINAL ENDOSCOPY  10/29/15    Genoveva Tobin MD         CURRENT MEDICATIONS       Previous Medications    ALCOHOL SWABS PADS    Use as directed    ALENDRONATE (FOSAMAX) 70 MG TABLET    Take 1 tablet by mouth every 7 days    ASPIRIN 81 MG CHEWABLE TABLET    Take 81 mg by mouth daily BLOOD GLUCOSE MONITOR STRIPS    Test up to daily    Type 2 diabetes mellitus without complication, without long-term current use of insulin (HCC) (E11.9)    BLOOD GLUCOSE MONITORING SUPPL LONG    Use as directed up to TID Type 2 diabetes mellitus without complication, without long-term current use of insulin (HCC) (E11.9)    CHOLECALCIFEROL (VITAMIN D) 50 MCG (2000 UT) CAPS CAPSULE    Take 1 capsule by mouth daily    DOCUSATE SODIUM (COLACE) 100 MG CAPSULE    Take 1 capsule by mouth daily as needed for Constipation    LANCETS (ONETOUCH DELICA PLUS [id number]) MISC    TEST BLOOD SUGAR UP TO THREE TIMES A DAY (NIDDM E11.9)    LANCETS MISC    1 each by Does not apply route daily Type 2 diabetes mellitus without complication, without long-term current use of insulin (HCC) (E11.9)    LISINOPRIL (PRINIVIL;ZESTRIL) 20 MG TABLET    TAKE ONE TABLET BY MOUTH EVERY DAY    MELOXICAM (MOBIC) 7.5 MG TABLET    Take 1 tablet by mouth daily    MULTIPLE VITAMINS-MINERALS (PRESERVISION AREDS) CAPS    Take by mouth    PANTOPRAZOLE (PROTONIX) 40 MG TABLET    Take 1 tablet by mouth daily    PSYLLIUM (METAMUCIL SMOOTH TEXTURE) 28 % PACKET    Take 1 packet by mouth 2 times daily Take with full glass of h20 or juice    SERTRALINE (ZOLOFT) 100 MG TABLET    TAKE ONE TABLET BY MOUTH EVERY DAY       ALLERGIES     Codeine    FAMILY HISTORY       Family History   Problem Relation Age of Onset    Other Mother         Neurological Disease    Cancer Sister     Diabetes Brother     Cancer Brother         leukemia          SOCIAL HISTORY       Social History     Socioeconomic History    Marital status:       Spouse name: None    Number of children: None    Years of education: None    Highest education level: None   Occupational History    None   Social Needs    Financial resource strain: None    Food insecurity     Worry: None     Inability: None    Transportation needs     Medical: None     Non-medical: None   Tobacco Use  Smoking status: Never Smoker    Smokeless tobacco: Never Used   Substance and Sexual Activity    Alcohol use: No    Drug use: No    Sexual activity: None   Lifestyle    Physical activity     Days per week: None     Minutes per session: None    Stress: None   Relationships    Social connections     Talks on phone: None     Gets together: None     Attends Religion service: None     Active member of club or organization: None     Attends meetings of clubs or organizations: None     Relationship status: None    Intimate partner violence     Fear of current or ex partner: None     Emotionally abused: None     Physically abused: None     Forced sexual activity: None   Other Topics Concern    None   Social History Narrative    None       SCREENINGS    [place] Coma Scale  Eye Opening: Spontaneous  Best Verbal Response: Oriented  Best Motor Response: Obeys commands  [place] Coma Scale Score: 15 @FLOW([id number])@      PHYSICAL EXAM    (up to 7 for level 4, 8 or more for level 5)     ED Triage Vitals [12/26/20 1000]   BP Temp Temp Source Pulse Resp SpO2 Height Weight   (!) 185/114 98.8 °F (37.1 °C) Oral 91 18 100 % -- 125 lb (56.7 kg)       Physical Exam  Vitals signs and nursing note reviewed. Constitutional:       General: She is awake. She is in acute distress. Appearance: Normal appearance. She is well-developed and normal weight. She is not ill-appearing, toxic-appearing or diaphoretic. Comments: No photophobia. No phonophobia. HENT:      Head: Normocephalic and atraumatic. No Briseno's sign. Right Ear: External ear normal.      Left Ear: External ear normal.      Nose: Rhinorrhea present. No congestion. Mouth/Throat:      Mouth: Mucous membranes are moist.      Pharynx: Oropharynx is clear. No oropharyngeal exudate or posterior oropharyngeal erythema. Eyes:      General: No scleral icterus. Right eye: No foreign body or discharge. Left eye: No discharge. Extraocular Movements: Extraocular movements intact. Conjunctiva/sclera: Conjunctivae normal.      Left eye: No exudate. Pupils: Pupils are equal, round, and reactive to light. Neck:      Musculoskeletal: Normal range of motion and neck supple. No neck rigidity. Vascular: No JVD. Trachea: No tracheal deviation. Comments: No meningismus. Cardiovascular:      Rate and Rhythm: Normal rate and regular rhythm. Pulses: Normal pulses. Heart sounds: Normal heart sounds. Heart sounds not distant. No murmur. No friction rub. No gallop. Pulmonary:      Effort: Pulmonary effort is normal. No tachypnea, accessory muscle usage, prolonged expiration or respiratory distress. Breath sounds: No stridor. Examination of the right-lower field reveals decreased breath sounds. Examination of the left-lower field reveals decreased breath sounds. Decreased breath sounds present. No wheezing, rhonchi or rales. Chest:      Chest wall: No tenderness. Abdominal:      General: Abdomen is flat. Bowel sounds are normal. There is no distension. Palpations: Abdomen is soft. There is no mass. Tenderness: There is no abdominal tenderness. There is no right CVA tenderness, left CVA tenderness, guarding or rebound. Hernia: No hernia is present. Musculoskeletal: Normal range of motion. General: No swelling, tenderness, deformity or signs of injury. Lymphadenopathy:      Head:      Right side of head: No submental adenopathy. Left side of head: No submental adenopathy. Skin:     General: Skin is warm and dry. Capillary Refill: Capillary refill takes less than 2 seconds. Coloration: Skin is not jaundiced or pale. Findings: No bruising, erythema, lesion or rash. Neurological:      General: No focal deficit present. Mental Status: She is alert and oriented to person, place, and time. Mental status is at baseline. Nodules <6 mm do not require routine follow-up, but certain patients at high risk with suspicious nodule morphology, upper lobe location, or both may warrant 12-month follow-up (recommendation 1A). High risk**                   <6 mm     Optional CT at 12 months                  6-8 mm     CT at 6-12 months, then CT at 18-24 months                    >8 mm     Consider CT at 3 months, PET/CT, or tissue sampling   Nodules <6 mm do not require routine follow-up, but certain patients at high risk with suspicious nodule morphology, upper lobe location, or both may warrant 12-month follow-up (recommendation 1A). Multiple                              Low risk**                   <6 mm     No routine follow-up                  6-8 mm     CT at 3-6 months, then consider CT at 18-24 months                   >8 mm     CT at 3-6 months, then  consider CT at 18-24 months   Use most suspicious nodule as guide to management. Follow-up intervals may vary according to size and risk (recommendation 2A). High risk**                   <6 mm     Optional CT at 12 months                  6-8 mm     CT at 3-6 months, then at 18-24 months                  >8 mm     CT at 3-6 months, then at 18-24 months   Use most suspicious nodule as guide to management. Follow-up intervals may vary according to size and risk (recommendation 2A). B: Subsolid Nodules*           Single                        Ground glass                   <6 mm     No routine follow-up                 >=6 mm     CT at 6-12 months to confirm persistence, then CT  every 2 years until 5 years   In certain suspicious nodules <6 mm, consider follow-up at 2 and 4 years. If solid component(s) or growth develops, consider resection. (Recommendations 3A and 4A).                 Part solid                   <6 mm     No routine follow-up >=6 mm     CT at 3-6 months to confirm persistence. If unchanged and solid component remains <6 mm, annual CT  should be performed for 5 years. In practice, part-solid nodules cannot be defined as such until >=6 mm, and nodules <6 mm do not usually require follow-up. Persistent part-solid nodules with solid components >=6 mm should be considered highly suspicious (recommendations 4A-4C)           Multiple                   <6 mm     CT at 3-6 months. If stable, consider CT at 2 and 4 years. >=6 mm     CT at 3-6 months. Subsequent management based  on the most suspicious nodule(s). Multiple <6 mm pure ground-glass nodules are usually benign, but consider follow-up in selected patients at high risk at 2 and 4 years (recommendation 5A). Note. --These recommendations do not apply to lung cancer screening, patients with immunosuppression, or patients with known primary cancer. * Dimensions are average of long and short axes, rounded to the nearest millimeter. ** Consider all relevant risk factors (see Risk Factors). ____________________________________________________________. All CT scans at this facility use dose modulation, iterative reconstruction, and/or weight based dosing when appropriate to reduce radiation dose to as low as reasonably achievable. XR CHEST PORTABLE   Final Result   RIGHT LOWER LUNG SUBSEGMENTAL ATELECTASIS/PNEUMONIA VERSUS EDEMA.             ED BEDSIDE ULTRASOUND:   Performed by ED Physician - none    LABS:  Labs Reviewed   COMPREHENSIVE METABOLIC PANEL - Abnormal; Notable for the following components:       Result Value    Glucose 122 (*)     All other components within normal limits   CBC WITH AUTO DIFFERENTIAL - Abnormal; Notable for the following components:    WBC 3.1 (*)     Platelets 82 (*)     All other components within normal limits   D-DIMER, QUANTITATIVE - Abnormal; Notable for the following components: D-Dimer, Quant 0.84 (*)     All other components within normal limits    Narrative:     Annika Austin  [facility] tel. [phone],  Dimer results called to and read back by Dr Dion Klein, 12/26/2020 11:08, by Litzy Maddox   RAPID INFLUENZA A/B ANTIGENS   RAPID STREP SCREEN   CULTURE, BLOOD 2   CULTURE, BLOOD 1   MONONUCLEOSIS SCREEN   CK   BRAIN NATRIURETIC PEPTIDE   APTT    Narrative:     CALL  [place]  [facility] tel. [phone],  Dimer results called to and read back by Dr Dion Klein, 12/26/2020 11:08, by [address] ACID, PLASMA   MAGNESIUM   PROTIME-INR    Narrative:     [phone],  Dimer results called to and read back by Dr Dion Klein, 12/26/2020 11:08, by Litzy Maddox   TROPONIN   TSH WITHOUT REFLEX   URINE RT REFLEX TO CULTURE   PROCALCITONIN   C-REACTIVE PROTEIN   COVID-19       All other labs were within normal range or not returned as of this dictation. EMERGENCY DEPARTMENT COURSE and DIFFERENTIAL DIAGNOSIS/MDM:   Vitals:    Vitals:    12/26/20 1000 12/26/20 1042 12/26/20 1117 12/26/20 1201   BP: (!) 185/114 (!) 162/76 (!) 173/77 (!) 158/75   Pulse: 91 64 66 67   Resp: 18 16 21 22   Temp: 98.8 °F (37.1 °C)      TempSrc: Oral      SpO2: 100% 97% 97% 99%   Weight: 125 lb (56.7 kg)              MDM  Patient is hypertensive she had forgotten to take her medicines this morning. Patient given IV Vasotec. Patient's breath sounds are diminished at the bases. There is no wheezing. Patient is not tachycardic. She has nasal congestion. Patient has a constellation of symptoms that are concerning for COVID-19. Initially the patient's symptoms were described as having occurred around [place] however she states last week she had diarrhea, nausea and vomiting and then when that got better she lost the sense of smell and taste. EKG shows LVH voltage but no acute injury pattern. Mono, flu and strep test are negative. COVID-19 testing will be done later today it is done in a batch. This was stated to the patient also in her discharge instructions. There is no urinary tract infection. The patient was hypertensive however she was given IV Vasotec because she forgot to take her blood pressure medicine today. The patient is nontoxic. The findings were discussed with the patient. A D-dimer was positive a CAT scan of the chest shows a small pulmonary nodule however there is no pneumonia. I suspect the patient have COVID-19 and he should provide isolation instructions. She is also written a prescription for Zithromax since has been shown to help clear the virus. The patient verbalized understanding of the care and has no further questions. CONSULTS:  None    PROCEDURES:  Unless otherwise noted below, none     Procedures    FINAL IMPRESSION      1. Suspected COVID-19 virus infection    2. Chronic hypertension    3. Lung nodule < 6cm on CT          DISPOSITION/PLAN   DISPOSITION Decision To Discharge 12/26/2020 12:42:58 PM      PATIENT REFERRED TO:  Kaya Mahoney MD  [address] [facility], [facility], [address]  [address] [facility]  [phone]    Call in 2 days        DISCHARGE MEDICATIONS:  New Prescriptions    ACETAMINOPHEN (APAP EXTRA STRENGTH) 500 MG TABLET    Take 2 tablets by mouth every 6 hours as needed for Pain or Fever    AZITHROMYCIN (ZITHROMAX) 250 MG TABLET    Take 2 tablets (500 mg) on Day 1, followed by 1 tablet (250 mg) once daily on Days 2 through 5.     SODIUM CHLORIDE (OCEAN) 0.65 % NASAL SPRAY    2 sprays by Nasal route every 3 hours          (Please note that portions of this note were completed with a voice recognition program.  Efforts were made to edit the dictations but occasionally words are mis-transcribed.)    Alan Cervantes DO (electronically signed)  Attending Emergency Physician         Alan Cervantes DO  12/26/20 1407

## 2020-12-26 NOTE — ED NOTES
Bloods collected and sent to lab w/pt labels. Lab called to draw second set of blood cultures.       Tricia Aschoff, RN  12/26/20 1018

## 2020-12-26 NOTE — ED NOTES
Pt resting quietly in ED cot w/no s/s of distress noted. Resp even and unlabored. Will continue to monitor pt.       Colt Torre RN  12/26/20 1673

## 2020-12-26 NOTE — ED NOTES
Pt resting quietly in ED cot w/no s/s of distress noted. Resp even and unlabored. Will continue to monitor pt.       Wade Mcconnell RN  12/26/20 3725

## 2020-12-26 NOTE — ED NOTES
Pt assisted back to ED cot at this time. Clear, yellow urine collected and sent to lab w/pt labels. Dr. Faina Edge at bedside to assess pt.       Wade Mcconnell RN  12/26/20 8082

## 2020-12-28 ENCOUNTER — CARE COORDINATION (OUTPATIENT)
Dept: CARE COORDINATION | Age: 80
End: 2020-12-28

## 2020-12-28 PROCEDURE — 93010 ELECTROCARDIOGRAM REPORT: CPT | Performed by: INTERNAL MEDICINE

## 2020-12-28 NOTE — CARE COORDINATION
Patient contacted regarding DPTBK-17 diagnosis\". Discussed COVID-19 related testing which was available at this time. Test results were positive. Patient informed of results, if available? Yes    Care Transition Nurse/ Ambulatory Care Manager contacted the patient by telephone to perform post discharge assessment. Call within 2 business days of discharge: Yes. Verified name and  with patient as identifiers. Provided introduction to self, and explanation of the CTN/ACM role, and reason for call due to risk factors for infection and/or exposure to COVID-19. Symptoms reviewed with patient who verbalized the following symptoms: fatigue, no new symptoms and no worsening symptoms. Due to no new or worsening symptoms encounter was not routed to provider for escalation. Discussed follow-up appointments. If no appointment was previously scheduled, appointment scheduling offered: Yes SENT OFFICE MESSAGE TO CALL PATIENT FOR [facility] follow up appointment(s):   Future Appointments   Date Time Provider Marleny Lan   2021 11:30 AM Hermelinda Chandler MD [place] [address] [facility]     [id number] Tabitha Boo follow up appointment(s):     Non-face-to-face services provided:  Obtained and reviewed discharge summary and/or continuity of care documents     Advance Care Planning:   Does patient have an Advance Directive:  reviewed and current. Patient has following risk factors of: diabetes. CTN/ACM reviewed discharge instructions, medical action plan and red flags such as increased shortness of breath, increasing fever and signs of decompensation with patient who verbalized understanding. Discussed exposure protocols and quarantine with CDC Guidelines What to do if you are sick with coronavirus disease .  Patient was given an opportunity for questions and concerns.  The patient agrees to contact the [facility] exposure line [phone], [facility] department [place] Department of [facility]: ([phone]) and PCP office for questions related to their healthcare. CTN/ACM provided contact information for future needs. Reviewed and educated patient on any new and changed medications related to discharge diagnosis     Patient/family/caregiver given information for [facility] Loop and agrees to enroll no  Patient's preferred e-mail:    Patient's preferred phone number:   Based on Loop alert triggers, patient will be contacted by nurse care manager for worsening symptoms. Plan for follow-up call in 3-5 days based on severity of symptoms and risk factors. ACM CALLED PATIENT WITH LANGUAGE SERVICES   ACM REVIEWED ED VISIT AND AVS  OBTAINED MEDS  FEELING A LITTLE BETTER  SEE S/S LISTED ABOVE   Steps to help prevent the spread of COVID-19 if you are sick  SOURCE - https://[employer] [place]info/. html     Stay home except to get medical care   ; Stay home: People who are mildly ill with COVID-19 are able to isolate at home during their illness. You should restrict activities outside your home, except for getting medical care.   ; Avoid public areas: Do not go to work, school, or public areas.   ; Avoid public transportation: Avoid using public transportation, ride-sharing, or taxis.  ; Separate yourself from other people and animals in your home   ; Stay away from others: As much as possible, you should stay in a specific room and away from other people in your home. Also, you should use a separate bathroom, if available.   ; Limit contact with pets & animals: You should restrict contact with pets and other animals while you are sick with COVID-19, just like you would around other people. Although there have not been reports of pets or other animals becoming sick with COVID-19, it is still recommended that people sick with COVID-19 limit contact with animals until more information is known about the virus.    ; When possible, have another member of your household care for your animals while you are wash your hands with soap and water for at least 20 seconds or, if soap and water are not available, clean your hands with an alcohol-based hand  that contains at least 60% alcohol. Clean your hands often   ; Wash hands: Wash your hands often with soap and water for at least 20 seconds, especially after blowing your nose, coughing, or sneezing; going to the bathroom; and before eating or preparing food.   ; Hand : If soap and water are not readily available, use an alcohol-based hand  with at least 60% alcohol, covering all surfaces of your hands and rubbing them together until they feel dry.   ; Soap and water: Soap and water are the best option if hands are visibly dirty.   ; Avoid touching: Avoid touching your eyes, nose, and mouth with unwashed hands. Handwashing Tips   ; Wet your hands with clean, running water (warm or cold), turn off the tap, and apply soap.  ; Lather your hands by rubbing them together with the soap. Lather the backs of your hands, between your fingers, and under your nails. ; Scrub your hands for at least 20 seconds. Need a timer? Hum the [place] from beginning to end twice.  ; Rinse your hands well under clean, running water.  ; Dry your hands using a clean towel or air dry them. Avoid sharing personal household items   ; Do not share: You should not share dishes, drinking glasses, cups, eating utensils, towels, or bedding with other people or pets in your home.   ; Wash thoroughly after use: After using these items, they should be washed thoroughly with soap and water. Clean all high-touch surfaces everyday   ; Clean and disinfect: Practice routine cleaning of high touch surfaces.  ; High touch surfaces include counters, tabletops, doorknobs, bathroom fixtures, toilets, phones, keyboards, tablets, and bedside tables.  ; Disinfect areas with bodily fluids: Also, clean any surfaces that may have blood, stool, or body fluids on them. ; Household : Use a household cleaning spray or wipe, according to the label instructions. Labels contain instructions for safe and effective use of the cleaning product including precautions you should take when applying the product, such as wearing gloves and making sure you have good ventilation during use of the product. Monitor your symptoms   Seek medical attention: Seek prompt medical attention if your illness is worsening     (e.g., difficulty breathing).   ; Call your doctor: Before seeking care, call your healthcare provider and tell them that you have, or are being evaluated for, COVID-19.   ; Wear a facemask when sick: Put on a facemask before you enter the facility. These steps will help the healthcare provider's office to keep other people in the office or waiting room from getting infected or exposed. ; Alert health department: Ask your healthcare provider to call the local or state health department. Persons who are placed under active monitoring or facilitated self-monitoring should follow instructions provided by their local health department or occupational health professionals, as appropriate.  ; Call 911 if you have a medical emergency: If you have a medical emergency and need to call 911, notify the dispatch personnel that you have, or are being evaluated for COVID-19. If possible, put on a facemask before emergency medical services arrive.

## 2020-12-31 LAB
BLOOD CULTURE, ROUTINE: NORMAL
CULTURE, BLOOD 2: NORMAL

## 2021-01-04 ENCOUNTER — CARE COORDINATION (OUTPATIENT)
Dept: CARE COORDINATION | Age: 81
End: 2021-01-04

## 2021-01-04 ENCOUNTER — VIRTUAL VISIT (OUTPATIENT)
Dept: FAMILY MEDICINE CLINIC | Age: 81
End: 2021-01-04
Payer: MEDICARE

## 2021-01-04 DIAGNOSIS — U07.1 COVID-19 VIRUS INFECTION: Chronic | ICD-10-CM

## 2021-01-04 PROCEDURE — 1090F PRES/ABSN URINE INCON ASSESS: CPT | Performed by: FAMILY MEDICINE

## 2021-01-04 PROCEDURE — G8399 PT W/DXA RESULTS DOCUMENT: HCPCS | Performed by: FAMILY MEDICINE

## 2021-01-04 PROCEDURE — 99215 OFFICE O/P EST HI 40 MIN: CPT | Performed by: FAMILY MEDICINE

## 2021-01-04 PROCEDURE — G8427 DOCREV CUR MEDS BY ELIG CLIN: HCPCS | Performed by: FAMILY MEDICINE

## 2021-01-04 PROCEDURE — 4040F PNEUMOC VAC/ADMIN/RCVD: CPT | Performed by: FAMILY MEDICINE

## 2021-01-04 PROCEDURE — 1123F ACP DISCUSS/DSCN MKR DOCD: CPT | Performed by: FAMILY MEDICINE

## 2021-01-04 RX ORDER — BENZONATATE 100 MG/1
100 CAPSULE ORAL 3 TIMES DAILY PRN
Qty: 30 CAPSULE | Refills: 0 | Status: SHIPPED | OUTPATIENT
Start: 2021-01-04 | End: 2021-01-11

## 2021-01-04 NOTE — CARE COORDINATION
SERVICES. PATIENT STATES FEELING SOMEWHAT BETTER BUT NOW HAS BAD SORE THROAT. ACM CALLED OFFICE TO SET UP VV AND ADVISE OF PATIENTS NEED FOR SOMETHING FOR HER THROAT. ACM ADVISED PATIENT OF OTC SORE THROAT MEDS /LOZENGERS, WARM FLUIDS.  APPT TODAY  PM  PATIENT AWARE THIS IS TELEPHONE CALL ENCOUNTER

## 2021-01-04 NOTE — PROGRESS NOTES
Saint March is a 79 y.o. female evaluated via telephone on 2021. Consent:  She and/or health care decision maker is aware that that she may receive a bill for this telephone service, depending on her insurance coverage, and has provided verbal consent to proceed: Yes      Documentation:  I communicated with the patient and/or health care decision maker about see below. Details of this discussion including any medical advice provided: see below      I affirm this is a Patient Initiated Episode with an Established Patient who has not had a related appointment within my department in the past 7 days or scheduled within the next 24 hours. Total Time: 45 minutes    Note: not billable if this call serves to triage the patient into an appointment for the relevant concern      Benjy GRIMM     2021    TELEHEALTH EVALUATION -- Audio (During HXOFL-17 public health emergency)    HPI:    Saint March (:  1940) has requested an audio evaluation for the following concern(s):    Pharyngitis (lots phlegm with cough. COVID positive on 20) and Health Maintenance (Pt aware AWV do)       Presented  to the emergency department on 2020 with c/o sore throat, body aches, cough, loss of smell, loss of taste and she added last week she had vomiting and diarrhea. She was prescribed Zithromax from the emergency department. Is coughing still especially at night and her throat keeps hurting. She also states that she has mild shortness of breath with exertion. She is not on any antiplatelet or anticoagulant therapy at this time. Prior to Visit Medications    Medication Sig Taking?  Authorizing Provider   benzonatate (TESSALON) 100 MG capsule Take 1 capsule by mouth 3 times daily as needed for Cough Yes Sosa Cruz MD   sodium chloride (OCEAN) 0.65 % nasal spray 2 sprays by Nasal route every 3 hours Yes Akash Tim DO acetaminophen (APAP EXTRA STRENGTH) 500 MG tablet Take 2 tablets by mouth every 6 hours as needed for Pain or Fever Yes William Tim,    psyllium (METAMUCIL SMOOTH TEXTURE) 28 % packet Take 1 packet by mouth 2 times daily Take with full glass of h20 or juice Yes Corinne Alexanders, MD   docusate sodium (COLACE) 100 MG capsule Take 1 capsule by mouth daily as needed for Constipation Yes Corinne Alexanders, MD   blood glucose monitor strips Test up to daily    Type 2 diabetes mellitus without complication, without long-term current use of insulin (HCC) (E11.9) Yes Corinne Alexanders, MD   lisinopril (PRINIVIL;ZESTRIL) 20 MG tablet TAKE ONE TABLET BY MOUTH EVERY DAY Yes Corinne Alexanders, MD   sertraline (ZOLOFT) 100 MG tablet TAKE ONE TABLET BY MOUTH EVERY DAY Yes Corinne Alexanders, MD   Cholecalciferol (VITAMIN D) 50 MCG (2000 UT) CAPS capsule Take 1 capsule by mouth daily Yes Corinne Alexanders, MD   pantoprazole (PROTONIX) 40 MG tablet Take 1 tablet by mouth daily Yes Corinne Alexanders, MD   alendronate (FOSAMAX) 70 MG tablet Take 1 tablet by mouth every 7 days Yes Corinne Alexanders, MD   Lancets (ONETOUCH DELICA PLUS [id number]) MISC TEST BLOOD SUGAR UP TO THREE TIMES A DAY (NIDDM E11.9) Yes Corinne Alexanders, MD   meloxicam (MOBIC) 7.5 MG tablet Take 1 tablet by mouth daily Yes Chrissy Lamb PA-C   aspirin 81 MG chewable tablet Take 81 mg by mouth daily Yes Historical Provider, MD   Multiple Vitamins-Minerals (PRESERVISION AREDS) CAPS Take by mouth Yes Historical Provider, MD   Lancets MISC 1 each by Does not apply route daily Type 2 diabetes mellitus without complication, without long-term current use of insulin (HCC) (E11.9) Yes Corinne Alexanders, MD Blood Glucose Monitoring Suppl LONG Use as directed up to TID Type 2 diabetes mellitus without complication, without long-term current use of insulin (HCC) (E11.9) Yes Ella Guevara MD   Alcohol Swabs PADS Use as directed Yes Ella Guevara MD   azithromycin (ZITHROMAX) 250 MG tablet Take 2 tablets (500 mg) on Day 1, followed by 1 tablet (250 mg) once daily on Days 2 through 5. Patient not taking: Reported on 1/4/2021  Britney Tim,        Social History     Tobacco Use    Smoking status: Never Smoker    Smokeless tobacco: Never Used   Substance Use Topics    Alcohol use: No    Drug use: No            PHYSICAL EXAMINATION:  [ INSTRUCTIONS:  \"[x]\" Indicates a positive item  \"[]\" Indicates a negative item  -- DELETE ALL ITEMS NOT EXAMINED]  Vital Signs: unavailable    Patient appears to be alert and oriented to person, place, time, situation and is in no acute distress. Respiratory effort appears normal. Mood appears stable and speech and thought are grossly normal.    ASSESSMENT/PLAN:  Cordell Morelos was seen today for pharyngitis and health maintenance. Diagnoses and all orders for this visit:    COVID-19 virus infection  -     benzonatate (TESSALON) 100 MG capsule; Take 1 capsule by mouth 3 times daily as needed for Cough      Given age, history of hypertension and diabetes patient qualifies for and warrants treatment with Regeneron immunoglobulin therapy. Reviewed case with pharmacy which agrees to order the infusion. Have contacted scheduling. She is scheduled for tomorrow at 2:00. She understands instructions and if she needs to change the time of the infusion she will call the scheduling department. Return in about 8 days (around 1/12/2021) for COVID f/u - VV. Ingrid Cruz is a 79 y.o. female being evaluated by a Virtual Visit (telephonic visit) encounter to address concerns as mentioned above. A caregiver was present when appropriate. Due to this being a TeleHealth encounter (During JRFTB-46 public health emergency), evaluation of the following organ systems was limited: Vitals/Constitutional/EENT/Resp/CV/GI//MS/Neuro/Skin/Heme-Lymph-Imm. Pursuant to the emergency declaration under the [address] [place] [address] and the [employer] and [employer] Act, this Virtual Visit was conducted with patient's (and/or legal guardian's) consent, to reduce the patient's risk of exposure to COVID-19 and provide necessary medical care. The patient (and/or legal guardian) has also been advised to contact this office for worsening conditions or problems, and seek emergency medical treatment and/or call 911 if deemed necessary. Services were provided through a telephonic synchronous discussion virtually to substitute for in-person clinic visit. Patient and provider were located at their individual homes. --Dyan Spears MD on 1/4/2021 at 4:54 PM    An electronic signature was used to authenticate this note.

## 2021-01-05 ENCOUNTER — TELEPHONE (OUTPATIENT)
Dept: FAMILY MEDICINE CLINIC | Age: 81
End: 2021-01-05

## 2021-01-05 NOTE — TELEPHONE ENCOUNTER
I called the patient's daughter Fer Bond and explained to her the importance of her mother Mrs. Marcela Kim getting the infusion tomorrow. They don't have transportation and also the patient is in quarantine because she is positive for Covid since 12/26/2020.

## 2021-01-06 NOTE — TELEPHONE ENCOUNTER
Silvio Austin called back from the infusion clinic & said that pt can come tomorrow for the infusion. The pharmacy said that since it is day 12 of onsite symptoms it is to late unless you give the pharmacy and order to ok it.  Pt. Uses our in house pharmacy and the phone number is [phone]     Thank you

## 2021-01-07 ENCOUNTER — HOSPITAL ENCOUNTER (OUTPATIENT)
Dept: INFUSION THERAPY | Age: 81
Setting detail: INFUSION SERIES
Discharge: HOME | End: 2021-01-07
Payer: MEDICARE

## 2021-01-07 VITALS
RESPIRATION RATE: 18 BRPM | SYSTOLIC BLOOD PRESSURE: 152 MMHG | HEART RATE: 73 BPM | DIASTOLIC BLOOD PRESSURE: 75 MMHG | TEMPERATURE: 98.7 F

## 2021-01-07 PROCEDURE — 96413 CHEMO IV INFUSION 1 HR: CPT

## 2021-01-07 PROCEDURE — M0243 CASIRIVI AND IMDEVI INFUSION: HCPCS

## 2021-01-07 PROCEDURE — 2500000003 HC RX 250 WO HCPCS: Performed by: FAMILY MEDICINE

## 2021-01-07 PROCEDURE — 6360000002 HC RX W HCPCS: Performed by: FAMILY MEDICINE

## 2021-01-07 PROCEDURE — 2580000003 HC RX 258: Performed by: FAMILY MEDICINE

## 2021-01-07 PROCEDURE — 2580000003 HC RX 258

## 2021-01-07 RX ORDER — SODIUM CHLORIDE 9 MG/ML
INJECTION, SOLUTION INTRAVENOUS
Status: COMPLETED
Start: 2021-01-07 | End: 2021-01-07

## 2021-01-07 RX ADMIN — SODIUM CHLORIDE 250 ML: 9 INJECTION, SOLUTION INTRAVENOUS at 15:34

## 2021-01-07 RX ADMIN — IMDEVIMAB: 300 INJECTION, SOLUTION, CONCENTRATE INTRAVENOUS at 15:48

## 2021-01-07 NOTE — FLOWSHEET NOTE
Spoke to Dr. Onesimo Nunes and she does want this patient to have the Regeneron infusion. Notified her daughter Raúl Montesinos with the process and notified pharmacy she will be here.

## 2021-01-07 NOTE — FLOWSHEET NOTE
Infusion process and medication information reviewed with patient via phone trans later provided by JAIDEN MUSA. Patient acknowledged understanding and agreement to infusion.

## 2021-01-07 NOTE — FLOWSHEET NOTE
Observation complete, patient tolerated well. Left unit via wheelchair with staff assistance. Patient and daughter provided with medication education handout in [address] and [address] All equipment cleaned after discharge. housekeeping made aware of the isolation status of room.

## 2021-01-07 NOTE — FLOWSHEET NOTE
Patient to the floor via wheelchair for her Regeneron infusion after all other patients discharged from the unit. Placed in negative airflow room with door closed and droplet precautions in use. Vital signs taken. Denies any discomfort. Call light within reach.

## 2021-01-11 ENCOUNTER — CARE COORDINATION (OUTPATIENT)
Dept: CARE COORDINATION | Age: 81
End: 2021-01-11

## 2021-01-11 NOTE — CARE COORDINATION
You Patient resolved from the Care Transitions episode on 1/11/2021  Discussed COVID-19 related testing which was available at this time. Test results were positive. Patient informed of results, if available? Yes    Patient/family has been provided the following resources and education related to COVID-19:                         Signs, symptoms and red flags related to COVID-19            CDC exposure and quarantine guidelines            [facility] exposure contact - [phone]            Contact for their local Department of Health                 Patient currently reports that the following symptoms have improved:  no new/worsening symptoms     No further outreach scheduled with this CTN/ACM. Episode of Care resolved. Patient has this CTN/ACM contact information if future needs arise. PER PATIENT FEELING BETTER. HAD REGENERON INFUSION .  SHE STATES FEELS TIRED BUT BETTER PATIENT REMINDED OF UP COMING FU WITH PCP

## 2021-01-12 NOTE — TELEPHONE ENCOUNTER
Spoke with pt that tested positive COVID. Pt more concerned with positive  that also has pneumo COVID.

## 2021-01-15 ENCOUNTER — VIRTUAL VISIT (OUTPATIENT)
Dept: FAMILY MEDICINE CLINIC | Age: 81
End: 2021-01-15
Payer: MEDICARE

## 2021-01-15 DIAGNOSIS — U07.1 COVID-19 VIRUS INFECTION: Primary | Chronic | ICD-10-CM

## 2021-01-15 DIAGNOSIS — L29.9 CHRONIC PRURITUS: Chronic | ICD-10-CM

## 2021-01-15 DIAGNOSIS — I10 ESSENTIAL HYPERTENSION: ICD-10-CM

## 2021-01-15 DIAGNOSIS — Z12.31 ENCOUNTER FOR SCREENING MAMMOGRAM FOR BREAST CANCER: ICD-10-CM

## 2021-01-15 DIAGNOSIS — E11.9 TYPE 2 DIABETES MELLITUS WITHOUT COMPLICATION, WITHOUT LONG-TERM CURRENT USE OF INSULIN (HCC): Chronic | ICD-10-CM

## 2021-01-15 PROCEDURE — G8399 PT W/DXA RESULTS DOCUMENT: HCPCS | Performed by: FAMILY MEDICINE

## 2021-01-15 PROCEDURE — 1090F PRES/ABSN URINE INCON ASSESS: CPT | Performed by: FAMILY MEDICINE

## 2021-01-15 PROCEDURE — 99214 OFFICE O/P EST MOD 30 MIN: CPT | Performed by: FAMILY MEDICINE

## 2021-01-15 PROCEDURE — 4040F PNEUMOC VAC/ADMIN/RCVD: CPT | Performed by: FAMILY MEDICINE

## 2021-01-15 PROCEDURE — 1123F ACP DISCUSS/DSCN MKR DOCD: CPT | Performed by: FAMILY MEDICINE

## 2021-01-15 PROCEDURE — G8427 DOCREV CUR MEDS BY ELIG CLIN: HCPCS | Performed by: FAMILY MEDICINE

## 2021-01-15 NOTE — PROGRESS NOTES
Valencia Muñoz is a 79 y.o. female evaluated via telephone on 1/15/2021. Consent:  She and/or health care decision maker is aware that that she may receive a bill for this telephone service, depending on her insurance coverage, and has provided verbal consent to proceed: Yes      Documentation:  I communicated with the patient and/or health care decision maker about see below. Details of this discussion including any medical advice provided: see below      I affirm this is a Patient Initiated Episode with an Established Patient who has not had a related appointment within my department in the past 7 days or scheduled within the next 24 hours. Total Time:     Note: not billable if this call serves to triage the patient into an appointment for the relevant concern      Neha GRIMM     1/15/2021    TELEHEALTH EVALUATION -- Audio (During XHAXI-05 public health emergency)    HPI:    Valencia Muñoz (:  1940) has requested an audio evaluation for the following concern(s):    Positive For Covid-19 (8 days f/u feels much better)    Feels much better. No SEs from Regeneron Immunoglobulin infusion. No concerning sx thought feels tired at times throughout the day. Patient is here for DM f/u. Sugars have been moderately well-controlled and patient has not been experiencing hyper- or hypoglycemic symptoms. Compliance with meds is good and there are no side effects. Patient exercises occasionally and tries to eat healthy. Is up to date on foot and eye exams and immunizations. 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 USes Benadryl prn with good results. Patient is here for f/u HTN. Is compliant with meds and has no side effects from them. Avoids added salt. Tries to eat healthy. BP has been NL at home. Review of Systems    Prior to Visit Medications    Medication Sig Taking?  Authorizing Provider sodium chloride (OCEAN) 0.65 % nasal spray 2 sprays by Nasal route every 3 hours Yes William Tim, DO   acetaminophen (APAP EXTRA STRENGTH) 500 MG tablet Take 2 tablets by mouth every 6 hours as needed for Pain or Fever Yes William Tim, DO   psyllium (METAMUCIL SMOOTH TEXTURE) 28 % packet Take 1 packet by mouth 2 times daily Take with full glass of h20 or juice Yes Elda Grimes MD   docusate sodium (COLACE) 100 MG capsule Take 1 capsule by mouth daily as needed for Constipation Yes Elda Grimes MD   blood glucose monitor strips Test up to daily    Type 2 diabetes mellitus without complication, without long-term current use of insulin (HCC) (E11.9) Yes Elda Grimes MD   lisinopril (PRINIVIL;ZESTRIL) 20 MG tablet TAKE ONE TABLET BY MOUTH EVERY DAY Yes Elda Grimes MD   sertraline (ZOLOFT) 100 MG tablet TAKE ONE TABLET BY MOUTH EVERY DAY Yes Elda Grimes MD   Cholecalciferol (VITAMIN D) 50 MCG (2000 UT) CAPS capsule Take 1 capsule by mouth daily Yes Elda Grimes MD   pantoprazole (PROTONIX) 40 MG tablet Take 1 tablet by mouth daily Yes Elda Grimes MD   alendronate (FOSAMAX) 70 MG tablet Take 1 tablet by mouth every 7 days Yes Elda Grimes MD   Lancets (ONETOUCH DELICA PLUS [id number]) MISC TEST BLOOD SUGAR UP TO THREE TIMES A DAY (NIDDM E11.9) Yes Elda Grimes MD   meloxicam (MOBIC) 7.5 MG tablet Take 1 tablet by mouth daily Yes Charlean Fleischer, PA-C   aspirin 81 MG chewable tablet Take 81 mg by mouth daily Yes Historical Provider, MD   Multiple Vitamins-Minerals (PRESERVISION AREDS) CAPS Take by mouth Yes Historical Provider, MD   Lancets MISC 1 each by Does not apply route daily Type 2 diabetes mellitus without complication, without long-term current use of insulin (HCC) (E11.9) Yes Elda Grimes MD Alma Hickman is a 79 y.o. female being evaluated by a Virtual Visit (telephonic visit) encounter to address concerns as mentioned above. A caregiver was present when appropriate. Due to this being a TeleHealth encounter (During ZQPBX-02 public health emergency), evaluation of the following organ systems was limited: Vitals/Constitutional/EENT/Resp/CV/GI//MS/Neuro/Skin/Heme-Lymph-Imm. Pursuant to the emergency declaration under the [address] [place] [address] and the [employer] and [employer] Act, this Virtual Visit was conducted with patient's (and/or legal guardian's) consent, to reduce the patient's risk of exposure to COVID-19 and provide necessary medical care. The patient (and/or legal guardian) has also been advised to contact this office for worsening conditions or problems, and seek emergency medical treatment and/or call 911 if deemed necessary. Services were provided through a telephonic synchronous discussion virtually to substitute for in-person clinic visit. Patient and provider were located at their individual homes. --Adelina Sy MD on 1/15/2021 at 3:08 PM    An electronic signature was used to authenticate this note.

## 2021-04-05 ENCOUNTER — HOSPITAL ENCOUNTER (OUTPATIENT)
Dept: WOMENS IMAGING | Age: 81
Discharge: HOME | End: 2021-04-07
Payer: MEDICARE

## 2021-04-05 VITALS — HEIGHT: 63 IN | BODY MASS INDEX: 22.14 KG/M2

## 2021-04-05 DIAGNOSIS — Z12.31 ENCOUNTER FOR SCREENING MAMMOGRAM FOR BREAST CANCER: ICD-10-CM

## 2021-04-05 PROCEDURE — 77067 SCR MAMMO BI INCL CAD: CPT

## 2021-06-17 ENCOUNTER — OFFICE VISIT (OUTPATIENT)
Dept: FAMILY MEDICINE CLINIC | Age: 81
End: 2021-06-17
Payer: MEDICARE

## 2021-06-17 VITALS
SYSTOLIC BLOOD PRESSURE: 122 MMHG | DIASTOLIC BLOOD PRESSURE: 78 MMHG | WEIGHT: 114 LBS | HEIGHT: 63 IN | BODY MASS INDEX: 20.2 KG/M2 | HEART RATE: 70 BPM | OXYGEN SATURATION: 97 % | TEMPERATURE: 97.1 F

## 2021-06-17 VITALS
WEIGHT: 114 LBS | TEMPERATURE: 97.1 F | SYSTOLIC BLOOD PRESSURE: 122 MMHG | BODY MASS INDEX: 20.2 KG/M2 | OXYGEN SATURATION: 97 % | DIASTOLIC BLOOD PRESSURE: 78 MMHG | HEART RATE: 70 BPM | HEIGHT: 63 IN

## 2021-06-17 DIAGNOSIS — E11.9 TYPE 2 DIABETES MELLITUS WITHOUT COMPLICATION, WITHOUT LONG-TERM CURRENT USE OF INSULIN (HCC): Chronic | ICD-10-CM

## 2021-06-17 DIAGNOSIS — D72.810 LYMPHOCYTOPENIA: Chronic | ICD-10-CM

## 2021-06-17 DIAGNOSIS — K92.1 BLACK STOOLS: Chronic | ICD-10-CM

## 2021-06-17 DIAGNOSIS — R53.83 FATIGUE, UNSPECIFIED TYPE: ICD-10-CM

## 2021-06-17 DIAGNOSIS — G89.29 CHRONIC RIGHT SHOULDER PAIN: ICD-10-CM

## 2021-06-17 DIAGNOSIS — R63.4 UNINTENDED WEIGHT LOSS: ICD-10-CM

## 2021-06-17 DIAGNOSIS — R29.6 FREQUENT FALLS: ICD-10-CM

## 2021-06-17 DIAGNOSIS — R30.0 DYSURIA: ICD-10-CM

## 2021-06-17 DIAGNOSIS — I10 ESSENTIAL HYPERTENSION: ICD-10-CM

## 2021-06-17 DIAGNOSIS — Z00.00 ROUTINE GENERAL MEDICAL EXAMINATION AT A HEALTH CARE FACILITY: ICD-10-CM

## 2021-06-17 DIAGNOSIS — M25.511 CHRONIC RIGHT SHOULDER PAIN: Chronic | ICD-10-CM

## 2021-06-17 DIAGNOSIS — I10 ESSENTIAL HYPERTENSION: Primary | ICD-10-CM

## 2021-06-17 DIAGNOSIS — G89.29 CHRONIC RIGHT SHOULDER PAIN: Chronic | ICD-10-CM

## 2021-06-17 DIAGNOSIS — R26.89 BALANCE PROBLEM: ICD-10-CM

## 2021-06-17 DIAGNOSIS — K59.04 CHRONIC IDIOPATHIC CONSTIPATION: ICD-10-CM

## 2021-06-17 DIAGNOSIS — M25.511 CHRONIC RIGHT SHOULDER PAIN: ICD-10-CM

## 2021-06-17 DIAGNOSIS — F41.8 DEPRESSION WITH ANXIETY: ICD-10-CM

## 2021-06-17 DIAGNOSIS — E11.9 TYPE 2 DIABETES MELLITUS WITHOUT COMPLICATION, WITHOUT LONG-TERM CURRENT USE OF INSULIN (HCC): ICD-10-CM

## 2021-06-17 DIAGNOSIS — F41.8 DEPRESSION WITH ANXIETY: Chronic | ICD-10-CM

## 2021-06-17 DIAGNOSIS — Z71.89 COUNSELING REGARDING ADVANCE CARE PLANNING AND GOALS OF CARE: ICD-10-CM

## 2021-06-17 PROBLEM — R23.3 EASY BRUISING: Chronic | Status: ACTIVE | Noted: 2021-06-17

## 2021-06-17 PROBLEM — U07.1 COVID-19 VIRUS INFECTION: Chronic | Status: RESOLVED | Noted: 2021-01-04 | Resolved: 2021-06-17

## 2021-06-17 LAB
ALBUMIN SERPL-MCNC: 4.3 G/DL (ref 3.5–4.6)
ALP BLD-CCNC: 95 U/L (ref 40–130)
ALT SERPL-CCNC: 16 U/L (ref 0–33)
ANION GAP SERPL CALCULATED.3IONS-SCNC: 9 MEQ/L (ref 9–15)
AST SERPL-CCNC: 17 U/L (ref 0–35)
BASOPHILS ABSOLUTE: 0 K/UL (ref 0–0.2)
BASOPHILS RELATIVE PERCENT: 0.3 %
BILIRUB SERPL-MCNC: <0.2 MG/DL (ref 0.2–0.7)
BILIRUBIN, POC: NORMAL
BLOOD URINE, POC: NORMAL
BUN BLDV-MCNC: 17 MG/DL (ref 8–23)
CALCIUM SERPL-MCNC: 10.1 MG/DL (ref 8.5–9.9)
CHLORIDE BLD-SCNC: 104 MEQ/L (ref 95–107)
CLARITY, POC: CLEAR
CO2: 27 MEQ/L (ref 20–31)
COLOR, POC: YELLOW
CREAT SERPL-MCNC: 0.67 MG/DL (ref 0.5–0.9)
EOSINOPHILS ABSOLUTE: 0.1 K/UL (ref 0–0.7)
EOSINOPHILS RELATIVE PERCENT: 1.4 %
GFR AFRICAN AMERICAN: >60
GFR NON-AFRICAN AMERICAN: >60
GLOBULIN: 3.1 G/DL (ref 2.3–3.5)
GLUCOSE BLD-MCNC: 82 MG/DL (ref 70–99)
GLUCOSE URINE, POC: NORMAL
HBA1C MFR BLD: 6.1 % (ref 4.8–5.9)
HCT VFR BLD CALC: 39.5 % (ref 37–47)
HEMOGLOBIN: 13.1 G/DL (ref 12–16)
KETONES, POC: NORMAL
LEUKOCYTE EST, POC: NORMAL
LYMPHOCYTES ABSOLUTE: 1.6 K/UL (ref 1–4.8)
LYMPHOCYTES RELATIVE PERCENT: 32.9 %
MCH RBC QN AUTO: 30.4 PG (ref 27–31.3)
MCHC RBC AUTO-ENTMCNC: 33.3 % (ref 33–37)
MCV RBC AUTO: 91.5 FL (ref 82–100)
MONOCYTES ABSOLUTE: 0.4 K/UL (ref 0.2–0.8)
MONOCYTES RELATIVE PERCENT: 8.7 %
NEUTROPHILS ABSOLUTE: 2.7 K/UL (ref 1.4–6.5)
NEUTROPHILS RELATIVE PERCENT: 56.7 %
NITRITE, POC: NORMAL
PDW BLD-RTO: 14 % (ref 11.5–14.5)
PH, POC: 6
PLATELET # BLD: 134 K/UL (ref 130–400)
POTASSIUM SERPL-SCNC: 4.8 MEQ/L (ref 3.4–4.9)
PROTEIN, POC: NORMAL
RBC # BLD: 4.32 M/UL (ref 4.2–5.4)
SODIUM BLD-SCNC: 140 MEQ/L (ref 135–144)
SPECIFIC GRAVITY, POC: 1.03
TOTAL PROTEIN: 7.4 G/DL (ref 6.3–8)
TSH SERPL DL<=0.05 MIU/L-ACNC: 1.35 UIU/ML (ref 0.44–3.86)
UROBILINOGEN, POC: NORMAL
WBC # BLD: 4.9 K/UL (ref 4.8–10.8)

## 2021-06-17 PROCEDURE — G8427 DOCREV CUR MEDS BY ELIG CLIN: HCPCS | Performed by: FAMILY MEDICINE

## 2021-06-17 PROCEDURE — 1090F PRES/ABSN URINE INCON ASSESS: CPT | Performed by: FAMILY MEDICINE

## 2021-06-17 PROCEDURE — 1123F ACP DISCUSS/DSCN MKR DOCD: CPT | Performed by: FAMILY MEDICINE

## 2021-06-17 PROCEDURE — 99214 OFFICE O/P EST MOD 30 MIN: CPT | Performed by: FAMILY MEDICINE

## 2021-06-17 PROCEDURE — 4040F PNEUMOC VAC/ADMIN/RCVD: CPT | Performed by: FAMILY MEDICINE

## 2021-06-17 PROCEDURE — G0438 PPPS, INITIAL VISIT: HCPCS | Performed by: FAMILY MEDICINE

## 2021-06-17 PROCEDURE — G8420 CALC BMI NORM PARAMETERS: HCPCS | Performed by: FAMILY MEDICINE

## 2021-06-17 PROCEDURE — 81002 URINALYSIS NONAUTO W/O SCOPE: CPT | Performed by: FAMILY MEDICINE

## 2021-06-17 PROCEDURE — 1036F TOBACCO NON-USER: CPT | Performed by: FAMILY MEDICINE

## 2021-06-17 PROCEDURE — G8399 PT W/DXA RESULTS DOCUMENT: HCPCS | Performed by: FAMILY MEDICINE

## 2021-06-17 RX ORDER — LANCETS 30 GAUGE
1 EACH MISCELLANEOUS DAILY
Qty: 100 EACH | Refills: 5 | Status: SHIPPED | OUTPATIENT
Start: 2021-06-17

## 2021-06-17 RX ORDER — GLUCOSAMINE HCL/CHONDROITIN SU 500-400 MG
CAPSULE ORAL
Qty: 100 STRIP | Refills: 12 | Status: SHIPPED | OUTPATIENT
Start: 2021-06-17 | End: 2022-01-19 | Stop reason: SDUPTHER

## 2021-06-17 RX ORDER — LANCETS 33 GAUGE
EACH MISCELLANEOUS
Qty: 100 EACH | Refills: 12 | Status: SHIPPED | OUTPATIENT
Start: 2021-06-17

## 2021-06-17 SDOH — ECONOMIC STABILITY: FOOD INSECURITY: WITHIN THE PAST 12 MONTHS, YOU WORRIED THAT YOUR FOOD WOULD RUN OUT BEFORE YOU GOT MONEY TO BUY MORE.: NEVER TRUE

## 2021-06-17 SDOH — ECONOMIC STABILITY: FOOD INSECURITY: WITHIN THE PAST 12 MONTHS, THE FOOD YOU BOUGHT JUST DIDN'T LAST AND YOU DIDN'T HAVE MONEY TO GET MORE.: NEVER TRUE

## 2021-06-17 ASSESSMENT — PATIENT HEALTH QUESTIONNAIRE - PHQ9
1. LITTLE INTEREST OR PLEASURE IN DOING THINGS: 1
SUM OF ALL RESPONSES TO PHQ QUESTIONS 1-9: 1
SUM OF ALL RESPONSES TO PHQ QUESTIONS 1-9: 1
SUM OF ALL RESPONSES TO PHQ9 QUESTIONS 1 & 2: 2
2. FEELING DOWN, DEPRESSED OR HOPELESS: 1
SUM OF ALL RESPONSES TO PHQ QUESTIONS 1-9: 1
SUM OF ALL RESPONSES TO PHQ QUESTIONS 1-9: 2
SUM OF ALL RESPONSES TO PHQ QUESTIONS 1-9: 2
SUM OF ALL RESPONSES TO PHQ9 QUESTIONS 1 & 2: 1
SUM OF ALL RESPONSES TO PHQ QUESTIONS 1-9: 2
2. FEELING DOWN, DEPRESSED OR HOPELESS: 1
1. LITTLE INTEREST OR PLEASURE IN DOING THINGS: 0

## 2021-06-17 ASSESSMENT — LIFESTYLE VARIABLES
AUDIT-C TOTAL SCORE: INCOMPLETE
AUDIT TOTAL SCORE: INCOMPLETE
HOW OFTEN DO YOU HAVE A DRINK CONTAINING ALCOHOL: NEVER
HOW OFTEN DO YOU HAVE A DRINK CONTAINING ALCOHOL: 0

## 2021-06-17 ASSESSMENT — SOCIAL DETERMINANTS OF HEALTH (SDOH): HOW HARD IS IT FOR YOU TO PAY FOR THE VERY BASICS LIKE FOOD, HOUSING, MEDICAL CARE, AND HEATING?: NOT HARD AT ALL

## 2021-06-17 NOTE — PATIENT INSTRUCTIONS
Patient Education        Planificación anticipada de la atención médica: Instrucciones de cuidado  Advance Care Planning: Care Instructions  Instrucciones de cuidado    Puede ser difícil vivir con roman enfermedad incurable. Cyn si nathan shyam está empeorando, sería conveniente tuyet decisiones acerca de los cuidados al final de nathan helen. Planear el final de nathan helen no significa que se esté dando por vencido. Es roman forma de asegurarse de que se cumplan shalini deseos. Expresar claramente shalini deseos puede hacer todo más fácil para shalini seres queridos. Hacer planes mientras aún sea capaz también podría tranquilizarle y hacer que shalini últimos días tengan sentido y ok menos estresantes. La atención de seguimiento es roman parte clave de nathan tratamiento y seguridad. Asegúrese de hacer y acudir a todas las citas, y llame a nathan médico si está teniendo problemas. También es roman buena idea saber los resultados de shalini exámenes y mantener roman lista de los medicamentos que jazzy. ¿Qué puede hacer para planear el final de nathan helen? · Usted puede sacar estos temas con nathan médico. No tiene que esperar a que nathan médico empiece la conversación. Podría comenzar diciendo \"No quisiera vivir con. .. \". Completar ruthy frase ayudará a nathan médico a entender shalini deseos. · Hable abierta y honestamente con nathan médico. Esta es la mejor manera de entender las decisiones que deberá tuyet a medida que cambie nathan shyam. Sepa que siempre puede cambiar de opinión. · Pregúntele a nathan médico acerca de los tratamientos de soporte vital (mantenimiento artificial de la helen) más utilizados. Estos incluyen alimentación por sondas, respiradores y líquidos administrados a través de roman vena (por vía intravenosa o IV). Entender estos tratamientos le ayudará a decidir si desea recibirlos. · Podría elegir recibir Vandana Stake tratamiento de soporte vital alba un tiempo limitado. [place] le permitirá un período de prueba para fidel si [employer] ayudan de [place].  Es posible que decida también que nathan médico tome solo ciertas medidas para mantenerle con helen. Es importante que especifique estas condiciones para que nathan médico y nathan aranza puedan entenderlas. · Hable con nathan médico sobre el tiempo estimado que le [facility]. Es posible que pueda darle roman idea de lo que sucede normalmente con nathan enfermedad específica. · Piense en preparar papeles que certifiquen shalini deseos. De esta manera, no habrá ninguna confusión respecto a shalini deseos. Puede cambiar shalini instrucciones en cualquier momento. ¿Qué documentos debería preparar? Las instrucciones médicas por anticipado son documentos legales que les indican a los médicos cómo quiere ser atendido al final de nathan helen. No necesita un abogado para redactar estas declaraciones. Pídale información a nathan médico o a nathan departamento de shyam estatal sobre cómo redactar shalini instrucciones médicas por anticipado. Podrían darle formularios para cada nae de estos tipos de certificación. Asegúrese de que nathan médico tenga roman copia de esos documentos en nathan expediente médico y leonor otra copia a un familiar o Greg Mainland. · Considere roman orden de no reanimación (DNR, por shalini siglas en inglés). Esta orden pide que no se empleen tratamientos adicionales si nathan corazón carmen de latir o usted carmen de respirar. Los tratamientos adicionales pueden incluir reanimación cardiopulmonar (RCP), choques eléctricos para reactivarle el corazón o roman máquina que respira por usted. Si decide tener roman orden de no reanimación, pídale a nathan médico que se la explique y la escriba. Coloque la orden en nathan hogar en un lugar que todos puedan verla fácilmente. · Considere un testamento vital. Un testamento vital explica shalini deseos relacionados con el soporte vital y otros tratamientos al final de la helen si pierde la capacidad de hablar por sí mismo. Los testamentos vitales les informan a los médicos si deben utilizar o no tratamientos para mantenerle con helen.  2005 [facility] testigos o un notario presentes cuando firme nury formulario. En el estado donde vive, un testamento vital podría llamarse de [employer] [employer]. · Considere un poder notarial para asuntos médicos. Nury documento le permite nombrar a roman persona para que tome decisiones sobre nathan cuidado si usted no puede hacerlo. Ira Macarena de las personas designan a un amigo cercano o un familiar. Hable con esta persona sobre los tipos de tratamientos que desea y los que no desea. Asegúrese de que esta persona entienda shalini deseos. Un poder notarial para asuntos médicos podría llamarse de otra manera en [employer]. Estos documentos legales con sencillos de cambiar. Dígale a nathan médico lo que desea [place] y pídale que escriba roman nota en nathan expediente médico. Koffi a nathan aranza copias actualizadas de estos documentos. ¿Dónde puede encontrar más información en inglés? Lina Salazar a [email or web address] e ingrese a nathan cuenta de Migdalia Christensen P184 en el [facility] \"Search Health Information\" para más información (en inglés) sobre \"Planificación anticipada de la atención médica: Instrucciones de cuidado. \"     Si no tiene roman cuenta, meri jaison en el enlace \"Sign Up Now\". Revisado: 9 diciembre, 2019               Versión del contenido: 12.5  © 6538-4198 Healthwise, Incorporated. Las instrucciones de cuidado fueron adaptadas bajo licencia por [facility] ([facility]). Si usted tiene [place] afección médica o sobre estas instrucciones, siempre pregunte a nathan profesional de shyam. Healthwise, Incorporated niega toda garantía o responsabilidad por nathan uso de esta información.          Patient Education        Decisión sobre la administración de líquidos intravenosos (IV) o alimentación por sonda cuando tiene roman enfermedad terminal  Deciding About IV Fluids or Tube Feedings When You Have a Terminal Illness  ¿Cómo puede tuyet roman decisión acerca de la administración de líquidos por vía intravenosa o la alimentación por sonda cuando tiene roman enfermedad terminal?  Instrucciones de cuidado  Los líquidos que se administran en roman vena (por vía intravenosa o IV) y la alimentación por sonda se pueden utilizar cuando usted ya no pueda comer ni beber por la boca. Los líquidos intravenosos se administran a través de roman aguja que se coloca dentro de roman vena. Se pueden administrar alimentos líquidos a través de roman sonda que va por la nariz hasta el [place]. También se pueden administrar a través de roman sonda que se coloca en el abdomen mediante cirugía. Usted tiene la opción de decidir si desea recibir líquidos intravenosos o alimentación por sonda si ya no puede comer ni beber por sí solo. [place] no curará nathan enfermedad y puede resultarle incómodo. Cyn también podría hacerle sentir mejor o vivir por [employer]. Sin los líquidos intravenosos y la alimentación por sonda, nathan cuerpo decaerá en forma natural. Lo más probable es que no sienta hambre. Además, nathan médico tomará medidas para mantenerlo cómodo hasta que fallezca. La decisión de recibir líquidos intravenosos y alimentación por sonda es personal. Podría decidir que prefiere nae cyn no el otro. Asegúrese de hablar con nathan médico y shalini seres queridos sobre esta decisión. La atención de seguimiento es roman parte clave de nathan tratamiento y seguridad. Asegúrese de hacer y acudir a todas las citas, y llame a nathan médico si está teniendo problemas. También es roman buena idea saber los resultados de shalini exámenes y mantener roman lista de los medicamentos que jazzy. ¿Por qué podría querer recibir líquidos intravenosos o alimentación por sonda? · Es posible que le ayude a recuperarse de roman breve enfermedad o lesión. · Podría ayudarle a mejorar la calidad del tiempo que le [place]. · Hoda que se debería tuyet toda medida posible para preservar la helen, independientemente de nathan calidad. · Hay fran de que existe o pronto existirá roman jonathan para nathan afección.   ¿Por qué podría no querer recibir líquidos intravenosos o alimentación por sonda? · No puede curar roman enfermedad terminal.  · Podría prolongarle la helen, lila juma vez no lo meri sentir más cómodo ni aumente la calidad de la helen que le [place]. · Hay más riesgos que beneficios. Los riesgos [employer], neumonía y [address], tales alber diarrea. · No desea que lo mantengan vivo en forma artificial.  ¿Cuándo debe pedir ayuda? Asegúrese de comunicarse con nathan médico si:  · Desea más información sobre los líquidos intravenosos y la alimentación por sonda. · Desea cambiar nathan decisión sobre recibir líquidos intravenosos y alimentación por sonda. ¿Dónde puede encontrar más información en inglés? Irwin Jacobo a [email or web address] e ingrese a nathan cuenta de MyCharstephan Dalton Zirconia G525 en el [facility] \"Search Health Information\" para más información (en inglés) sobre \"Decisión sobre la administración de líquidos intravenosos (IV) o alimentación por sonda cuando tiene roman enfermedad terminal.\"     Si no tiene roman cuenta, meri jaison en el enlace \"Sign Up Now\". Revisado: 9 diciembre, 2019               Versión del contenido: 12.5  © 0761-3947 Healthwise, Incorporated. Las instrucciones de cuidado fueron adaptadas bajo licencia por [facility] ([facility]). Si usted tiene [place] afección médica o sobre estas instrucciones, siempre pregunte a nathan profesional de shyam. Healthwise, Incorporated niega toda garantía o responsabilidad por nathan uso de esta información. Patient Education        Decisión sobre el tratamiento para prolongar la helen  Deciding About Life-Prolonging Treatment  ¿Cómo puede decidir sobre el tratamiento para prolongar la helen? ¿Qué es el tratamiento para prolongar la helen? Hay muchos tipos de tratamiento que pueden ayudarle a vivir [employer]. Estos pueden ser [employer] solo alba un corto tiempo hasta que nathan enfermedad mejore. O podría usarlos a antelmo plazo para ayudar a mantenerle vivo.   Algunos tratamientos incluyen el uso de:  · Medicamentos para retrasar el avance de ciertas enfermedades, alber enfermedad cardíaca, diabetes, cáncer, SIDA o la enfermedad de Alzheimer. · Antibióticos para tratar infecciones graves, alber la neumonía. · Diálisis para purificar la yane si shalini riñones brandon de funcionar. · Un respirador para ayudarle a respirar, si no lo puede hacer por sí mismo. Esta máquina bombea aire a los pulmones a través de un tubo que se coloca en nathan garganta. · Roman sonda de alimentación o roman línea intravenosa (IV) para darle alimentos y líquidos si no puede comer o beber. · Reanimación cardiopulmonar (RCP) para tratar de reactivar nathan corazón. La decisión de recibir tratamientos que puedan ayudarle a vivir más tiempo es personal. Es posible que desee que nathan médico meri todo lo posible para mantenerle vivo, aun cuando nathan probabilidad de recuperación sea pequeña. O podría elegir solamente recibir cuidados para controlar el dolor y otros síntomas. ¿Cuáles son los puntos clave de esta decisión? · Si existe rmoan buena probabilidad de que nathan enfermedad se pueda curar o controlar, nathan médico podría aconsejarle que pruebe rafiq los tratamientos disponibles. Si estos no funcionan, entonces podría pensar en suspender el tratamiento. · Si interrumpe el tratamiento, usted seguirá recibiendo cuidados que se centran en el alivio del dolor y la comodidad. · La decisión de interrumpir el tratamiento que le mantiene vivo no tiene que ser Rajendra. Siempre puede cambiar de opinión si nathan shyam comienza a mejorar. · A pesar de que el tratamiento se centra en ayudarle a vivir más tiempo, podría causar efectos secundarios que pueden afectar seriamente nathan calidad de helen. Y puede afectar la [employer] en que usted pasa el tiempo con nathan aranza y [place]. · Si usted todavía tiene metas personales que desea perseguir, juma vez le convenga el tratamiento que le mantiene vivo el tiempo suficiente para alcanzarlas.   ¿Por qué podría optar por un tratamiento para prolongar la helen? · Existe roman buena probabilidad de que ozuna enfermedad pueda curarse o controlarse. · Usted piensa que puede [place] posibles efectos secundarios del [place]. · Usted no piensa que el tratamiento pueda obstaculizar ozuna calidad de helen. · Tiene metas personales que Jennifer Olson perseguir y alcanzar. ¿Por qué podría optar por suspender un tratamiento para prolongar la helen? · Ozuna probabilidad de sobrevivir a la enfermedad es muy baja. · Ha intentado todos los tratamientos posibles para ozuna enfermedad, lila no le rice [place]. · Ya no puede sobrellevar los efectos secundarios del [place]. · Ya ha [address] que se propuso alcanzar en ozuna helen. Ozuna decisión  Pensar en los hechos y en shalini sentimientos puede ayudarle a tuyet roman decisión que sea correcta para usted. Asegúrese de [employer] beneficios y los riesgos de shalini opciones, y piense en qué más necesita hacer antes de tuyet la decisión. ¿Dónde puede encontrar más información en inglés? Freeda Ez a [email or web address] e ingrese a ozuna cuenta de MyChart. Gretchen Lopez H089 en el [facility] \"Search Health Information\" para más información (en inglés) sobre \"Decisión sobre el tratamiento para prolongar la helen. \"     Si no tiene roman cuenta, meri jaison en el enlace \"Sign Up Now\". Revisado: 9 diciembre, 2019               Versión del contenido: 12.5  © 6564-8897 Healthwise, Incorporated. Las instrucciones de cuidado fueron adaptadas bajo licencia por [facility] ([facility]). Si usted tiene [place] afección médica o sobre estas instrucciones, siempre pregunte a ozuna profesional de shyam. [facility] niega toda garantía o responsabilidad por ozuna uso de esta información.          Patient Education        Aprenda sobre los testamentos vitales  Learning About Living [place]  ¿Qué es un testamento vital?    Un testamento vital, también llamado declaración, es un documento legal. Informa a ozuna aranza y a nathan médico sobre shalini deseos cuando no puede hablar por sí mismo. Es usado por los profesionales de shyam que lo tratarán cuando usted está cerca del final de nathan helen o si se accidenta o enferma gravemente. Si usted pone por escrito shalini deseos, shalini seres queridos y los demás sabrán qué tipo de atención desea. No tendrán que adivinar. [place] puede darle carr interior y ser útil a los demás. Y usted puede cambiar o anular nathan testamento vital en cualquier momento. Un testamento vital no es lo mismo que un testamento de patrimonio o propiedad. Un testamento de patrimonio explica lo que usted quiere que suceda con nathan dinero y shalini bienes después de nathan muerte. ¿Cómo se [place]? Un testamento vital se [place] para describir las clases de tratamiento o de soporte vital que desea al acercarse al final de la helen o si se accidenta o enferma gravemente. Tenga en cuenta lo siguiente sobre los testamentos vitales. · Nathan testamento vital solo se utiliza si no puede hablar ni tuyet decisiones por sí mismo. La mayoría de las veces, nae o más médicos deben certificar que usted no puede hablar ni decidir por sí mismo antes de que nathan testamento vital entre en vigor. · Si usted mejora y puede hablar por sí mismo nuevamente, puede aceptar o negarse a cualquier tratamiento. No importa lo que usted haya dicho en nathan testamento vital.  · Algunos estados pueden restringir nathan derecho a negarse a tratamiento en casos determinados. Por ejemplo, es posible que usted tenga que indicar claramente en nathan testamento vital que no desea hidratación ni nutrición artificial, alber alimentación por sonda. ¿Es un testamento vital un documento legal?  Un testamento vital es un documento legal. Cada estado tiene shalini propias leyes sobre los testamentos vitales. Y un testamento vital podría llamarse de otra manera en nathan estado. A continuación hay algunas cosas que debe saber sobre los testamentos vitales.   · Usted no necesita un abogado para completar un testamento vital. Cyn puede serle útil la asesoría legal si las leyes del estado no son claras. También puede ayudar si shalini antecedentes de shyam son complicados o si nathan aranza no se pone de acuerdo en lo que debe incluirse en nathan testamento vital.  · Usted puede cambiar nathan testamento vital en cualquier momento. Algunas personas encuentran que shalini deseos de atención al final de la helen [place] a medida que nathan shyam cambia. Si hace grandes cambios en nathan testamento vital, complete un nuevo documento. · Si se muda a [employer], asegúrese de que nathan testamento vital sea legal en el estado donde vive ahora. En la [employer], los médicos respetarán shalini deseos aun si tiene un documento de [employer]. · Usted podría usar un formulario universal que haya sido aprobado en muchos estados. Nury tipo de documento a veces puede completarse y guardarse en línea. Nathan copia digital, por lo tanto, estará disponible en cualquier lugar donde tenga conexión a Internet. Randall Gibbs y el personal de enfermería que necesiten tratarlo pueden encontrar el documento de inmediato. · Nathan estado podría ofrecer un registro en línea. Nury es otro lugar donde puede guardar nathan testamento vital en línea. · Es [place] buena idea notarizar nathan testamento vital. [place] consiste en hacer que roman persona llamada notario público esté presente [employer] firmen, o ok testigos de, [place] testamento vital.  ¿Qué debe saber cuando prepara un testamento vital?  Aquí hay algunas preguntas para hacerse cuando prepara nathan testamento vital:  · ¿Conoce lo suficiente sobre los métodos de soporte vital que podrían utilizarse? Si no, hable con nathan médico de [employer] sepa qué se podría hacer si no puede respirar por sí mismo, nathan corazón se detiene o no es capaz de tragar. · ¿Qué cosas desearía todavía poder hacer después de recibir métodos de soporte vital? ¿Le gustaría ser [place] de caminar? ¿Hablar? ¿[place] por nathan cuenta?  ¿Vivir sin la ayuda de máquinas? · ¿Desea que se realicen ciertos ritos religiosos si se enferma muy gravemente? · Si puede elegir, ¿dónde quiere ser atendido? ¿En nathan casa? ¿En un hospital o en un [facility]? · Si tiene roman opción al final de nathan helen, ¿dónde preferiría morir? ¿En casa? ¿En un hospital u [facility]? Marlin Gerardo alamo? · ¿Preferiría ser Krissy Reasoner o cremado? · ¿Quiere que shalini órganos se donen después de nathan muerte? ¿Qué debe hacer con nathan testamento vital?  · Asegúrese de que shalini familiares y nathan representante de atención médica tengan copias de nathan testamento vital (también llamado declaración). · Koffi a nathan médico roman copia de nathan testamento vital. Pídale que lo incluya alber parte de nathan expediente médico. Si tiene más de un médico, asegúrese de que cada nae tenga roman copia. · Coloque roman copia de nathan testamento vital donde pueda encontrarse fácilmente. Por ejemplo, algunas personas podrían poner roman copia en la jean-claude de nathan refrigerador. Si [place] roman copia digital, asegúrese de que nathan médico, familiares y representante de atención médica sepan cómo encontrarlo y acceder al mismo. ¿Dónde puede encontrar más información en inglés? John Hubbard a [email or web address] e ingrese a nathan cuenta de Migdalia Rod J700 en el Daniel Mylar \"Search Health Information\" para más información (en inglés) sobre \"Aprenda sobre los testamentos vitales. \"     Si no tiene roman cuenta, meri jaison en el enlace \"Sign Up Now\". Revisado: 9 gisselmbre, 2019               Versión del contenido: 12.5  © 1138-7216 Healthwise, Incorporated. Las instrucciones de cuidado fueron adaptadas bajo licencia por [facility] ([facility]). Si usted tiene [place] afección médica o sobre estas instrucciones, siempre pregunte a nathan profesional de shyam. Healthwise, Incorporated niega toda garantía o responsabilidad por nathan uso de esta información.          Patient Education        Galen Love poder notarial médico  Learning About Medical Power of   ¿Qué es un poder notarial médico?    Un poder notarial médico, también llamado poder notarial permanente para asuntos médicos, es un tipo de los documentos legales llamados instrucciones médicas por anticipado. Le permite designar a la persona que usted desea para que tome decisiones sobre tratamientos en nathan nombre si usted no puede hablar ni decidir por sí mismo. La persona que elija se llama representante de [employer]. Esta persona también se llama apoderado en asuntos de shyam o agente de [employer]. En [employer], un poder notarial médico podría recibir un [facility]. ¿Cómo elige un representante de atención médica? Elija nathan representante de atención médica con cuidado. Esta persona puede ser o no ser un familiar. Hable con la persona antes de tuyet nathan decisión final. Asegúrese de que se sienta a gusto asumiendo esta responsabilidad. Es Arina Colander buena idea considerar elegir a alguien que:  · Tenga al menos 25 años de edad. · Lo conozca kathy y entienda lo que tiene sentido en la helen para usted. · Entienda shalini valores religiosos y [place]. · Vaya a hacer lo que usted quiere, no lo que él o [place]. · Sea capaz de tuyet decisiones difíciles en momentos estresantes. · Sea capaz de rechazar o interrumpir un tratamiento, si eso es lo que usted desearía, incluso si usted pudiera morir. · Sea firme y seguro de sí mismo frente a los profesionales de la shyam si es necesario. · Felicia preguntas para obtener la información necesaria. · Viva cerca de usted o esté dispuesto a viajar si fuera necesario. Nathan aranza puede ayudarle a tuyet decisiones médicas mientras usted todavía puede ser parte del [employer]. Cyn es importante elegir a roman persona para que sea nathan representante de atención médica en domenic de que usted no pueda tuyet decisiones por sí mismo.   Si usted no completa el documento legal y [place] un representante de atención médica, las decisiones que nathan aranza puede tuyet pueden ser [facility]. En [facility], un representante de [address] podría recibir [employer]. ¿Quién tomará decisiones por usted si no tiene un representante de atención médica? Si usted no tiene un representante de atención médica ni un testamento vital, juma vez no reciba la atención que desea. Familiares que no están de acuerdo con nathan atención médica podrían ser [facility]. O quien jazzy las decisiones podría ser un profesional médico que no lo conoce kathy a usted. En algunos casos, es un [place]es jazzy las decisiones. Cuando usted [facility] [place] a un representante de atención médica, es muy lisseth quién tiene la potestad de tuyet decisiones de shyam por usted. ¿Cómo se nombra a un representante de atención médica? Usted nombra a nathan representante de atención médica en un documento legal. Nury documento suele llamarse poder notarial médico. Pregunte en nathan hospital, colegio de abogados de nathan estado u oficina de la tercera edad acerca de dónde encontrar estos documentos. Debe firmar el documento para que sea legal. Yvon Yarbrough estados requieren que un notario certifique el documento. [place] significa que roman persona llamada notario público lo ve firmar el documento y luego firma él mismo. Algunos estados además exigen que al [place] testigos firmen el documento. Asegúrese de decirles a shalini familiares y Davida Orf es nathan representante de [address]. ¿Dónde puede encontrar más información en inglés? Camille Schulz a [email or web address] e ingrese a nathan cuenta de MyChart. Joion Augusta P737 en el Uchedina Hlaey \"Search Health Information\" para más información (en inglés) sobre \"Aprenda sobre el poder notarial médico.\"     Si no tiene roman cuenta, meri clic en el enlace \"Sign Up Now\". Revisado: 9 diciembre, 2019               Versión del contenido: 12.5  © 4859-7806 Healthwise, Incorporated. Las instrucciones de cuidado fueron adaptadas bajo licencia por [facility] ([facility]).  Si usted tiene [place] afección médica o sobre estas instrucciones, siempre pregunte a nathan profesional de shyam. [facility] Incorporated niega toda garantía o responsabilidad por nathan uso de esta información. Patient Education        Instrucciones médicas por anticipado: Instrucciones de cuidado  Advance Directives: Care Instructions  Generalidades  Las instrucciones médicas por anticipado son Gale Lloyd legal de afirmar shalini deseos al final de nathan helen. Informan a nathan aranza y nathan médico acerca de lo que deben hacer si usted no puede expresar lo que desea. [employer] Hotels tipos principales de instrucciones médicas por anticipado. Usted puede modificarlas en cualquier momento que shalini deseos cambien. Testamento vital.   Nury documento les comunica a nathan aranza y a nathan médico shalini deseos sobre soporte vital y demás tratamientos. El documento también se llama declaración. Poder notarial médico.   Nury documento le permite designar a roman persona para que tome decisiones sobre el tratamiento en nathan nombre cuando usted no puede hablar por sí mismo. Esta persona se llama representante de atención médica (apoderado para asuntos médicos). El documento [place] se llama poder notarial permanente para asuntos médicos. Si no tiene instrucciones médicas por anticipado, las decisiones relacionadas con shalini cuidados médicos podrían ser [employer] por un familiar, o por un médico o un juez que no lo conoce. Puede ser útil pensar en las instrucciones médicas por anticipado alber un regalo a las personas que lo Rhiannon Gerry. Si tiene instrucciones médicas por anticipado, shalini seres queridos no tendrán que tuyet decisiones difíciles por sí solos. La atención de seguimiento es roman parte clave de nathan tratamiento y seguridad. Asegúrese de hacer y acudir a todas las citas, y llame a nathan médico si está teniendo problemas. También es roman buena idea saber los resultados de shalini exámenes y mantener roman lista de los medicamentos que jazzy.   ¿Qué debe incluir en shalini instrucciones médicas por anticipado? Muchos estados tienen un documento único de instrucciones médicas por anticipado. (Podría requerir que aborde cuestiones específicas). O podría usar un documento universal que haya sido aprobado por muchos estados. Si nathan documento no indica lo que debe tratar, puede ser difícil saber lo que debe incluir en shalini instrucciones por anticipado. Utilice las preguntas a continuación alber ayuda para empezar. · ¿Quién quiere que tome las decisiones sobre nathan atención médica si usted no puede hacerlo? · ¿Qué medidas de soporte vital desea si tiene roman enfermedad grave que empeora con el tiempo o que no puede curarse? · ¿Qué es lo que más teme que podría pasar? (Podría tener miedo al Teresita Gould, a perder la independencia o a que aparatos lo mantengan con helen). · ¿Dónde preferiría morir? (¿En nathan casa? ¿Un hospital? ¿Un hogar para ancianos o clínica?)  · ¿Le gustaría donar shalini órganos al morir? · ¿Le gustaría hacer alguna ceremonia religiosa antes de morir? ¿Cuándo debe pedir ayuda? Asegúrese de comunicarse con nathan médico si tiene preguntas. ¿Dónde puede encontrar más información en inglés? Narendra Gavel a [email or web address] e ingrese a nathan cuenta de MyChart. Johnny Lovett R171 en el Barbarae Reyes \"Search Health Information\" para más información (en inglés) sobre \"Instrucciones médicas por anticipado: Instrucciones de cuidado. \"     Si no tiene roman cuenta, meri jaison en el enlace \"Sign Up Now\". Revisado: 9 gisselmbre, 2019               Versión del contenido: 12.5  © 1208-4103 Healthwise, Incorporated. Las instrucciones de cuidado fueron adaptadas bajo licencia por [facility] ([facility]). Si usted tiene [place] afección médica o sobre estas instrucciones, siempre pregunte a nathan profesional de shyam. Healthwise, Incorporated niega toda garantía o responsabilidad por nathan uso de esta información.          Patient Education        Sorensen Dago estar conectado a un respirador cuando tiene roman enfermedad terminal  Deciding About Being on a Ventilator When You Have a Terminal Illness  ¿Cómo puede tuyet roman decisión acerca de estar conectado a un respirador cuando tiene roman enfermedad terminal?  Instrucciones de cuidado  Un respirador es roman máquina de soporte vital que le ayuda a respirar si ya no puede hacerlo por sí solo. Esta máquina lleva oxígeno a los pulmones a través de un tubo. El tubo entra por la boca y pasa por la garganta hasta los pulmones. La mayoría de las personas conectadas a un respirador deben alimentarse mediante otro tubo que llega al General Arechiga. Es posible que piense que estar conectado a un respirador puede prevenir roman muerte \"natural\" o mantenerle con helen por más tiempo de lo necesario. O juma vez piense que estar conectado a un respirador prolongaría nathan helen para poder hacer determinadas cosas, alber despedirse de shalini seres queridos. La decisión de estar conectado a un respirador es personal. Asegúrese de hablar con nathan médico y shalini seres queridos sobre esta decisión. La atención de seguimiento es roman parte clave de nathan tratamiento y seguridad. Asegúrese de hacer y acudir a todas las citas, y llame a nathan médico si está teniendo problemas. También es roman buena idea saber los resultados de shalini exámenes y mantener roman lista de los medicamentos que jazzy. ¿Por qué podría querer estar conectado a un respirador? · Piensa que podría retomar shalini actividades habituales. · Necesita ayuda para respirar debido a roman enfermedad a corto plazo o un problema que no está relacionado con nathan enfermedad terminal.  · Le gustaría tener más tiempo para despedirse. · Piensa que hay más beneficios que riesgos. ¿Por qué podría no querer estar conectado a un respirador? · Tiene otros problemas crónicos de [place]. · Quizás no pueda retomar shalini actividades habituales. · Desea roman muerte tranquila y en carr. No quiere pasar el len de nathan helen conectado a un respirador.   · Piensa que hay más riesgos que beneficios. ¿Cuándo debe pedir ayuda? Asegúrese de comunicarse con nathan médico si:  · Desea aprender más acerca de estar conectado a un respirador. · Cambia de opinión acerca de estar conectado a un respirador. ¿Dónde puede encontrar más información en inglés? Lenaa Ez a [email or web address] e ingrese a nathan cuenta de MyChart. Gretchen Lopez C675 en el [facility] \"Search Health Information\" para más información (en inglés) sobre \"Decisión sobre estar conectado a un respirador cuando tiene nica enfermedad terminal.\"     Si no tiene nica cuenta, meri jaison en el enlace \"Sign Up Now\". Revisado: 9 diciembre, 2019               Versión del contenido: 12.5  © 5917-4225 Healthwise, Incorporated. Las instrucciones de cuidado fueron adaptadas bajo licencia por [facility] ([facility]). Si usted tiene [place] afección médica o sobre estas instrucciones, siempre pregunte a nathan profesional de shyam. Healthwise, Incorporated niega toda garantía o responsabilidad por nathan uso de esta información. Patient Education        Nutrición para las personas mayores: Instrucciones de cuidado  Nutrition for Older Adults: Care Instructions  Instrucciones de cuidado    Nica buena nutrición es importante a cualquier edad. Cyn es especialmente importante para las personas mayores. Alimentarse en forma saludable ayuda a nathan cuerpo a mantenerse german. Y puede ayudar a reducir nathan riesgo de enfermedades. Con el paso de [place]rea [place], shalini necesidades de Esteban. Nathan organismo necesita más de ciertos nutrientes. Estos incluyen vitamina B12, calcio y vitamina D. Cyn puede ser más difícil para usted obtener estos y otros nutrientes importantes. [place] podría ser por muchos motivos. Es posible que no tenga tanta hambre alber [employer]. O podría tener problemas con los dientes o la boca que pueden darle dificultades para masticar. O juma vez no disfrute planear y preparar comidas, especialmente si vive solo.   Ahora que necesita obtener todos shalini nutrientes de menos cantidad de comida, es importante que planifique lo que come. Ardyth Harrow a continuación pueden ayudarle a conseguir la nutrición que necesita. Si todavía necesita ayuda, hable con nathan médico. Paul vez le recomiende que colabore con un dietista. Un dietista puede ayudarle a planificar las comidas. La atención de seguimiento es roman parte clave de nathan tratamiento y seguridad. Asegúrese de hacer y acudir a todas las citas, y llame a nathan médico si está teniendo problemas. También es roman buena idea saber los resultados de shalini exámenes y mantener roman lista de los medicamentos que jazzy. ¿Cómo puede cuidarse en el hogar? Para mantenerse saludable  · Coma alimentos variados. Mientras mayor sea la variedad de alimentos que coma, más vitaminas, minerales y otros nutrientes obtiene. · [place] un multivitamínico todos los michelle. Elija nae con aproximadamente el 100% del valor diario (VD) de vitaminas y minerales. No tome más del 100% del valor diario de ninguna vitamina o mineral, a menos que nathan médico se lo indique. Hable con nathan médico si no está seguro de qué multivitamínico es el adecuado para usted. · Coma muchas frutas y verduras. Las verduras y las frutas frescas, congeladas o enlatadas, sin agregado de sal y con shalini propios jugos o almíbar con poca azúcar, son Tai Copier opciones. · Incluya en nathan dieta alimentos que ok ricos en vitamina B12. Un desayuno que incluya cereales enriquecidos, [place] y otros productos lácteos sin Arlyne Vandiver o bajos en grasa, carne, aves, pescado y huevos son Tai Copier opciones. · Obtenga suficiente calcio y vitamina D. Erving Jyothi y yogur descremados y semidescremados. Otras buenas opciones son tofu, jugo de naranja con calcio agregado, y algunas verduras de hoja, alber la col berza y la col rizada.  Si no consume productos lácteos, hable con nathan médico acerca de suplementos de calcio y de vitamina D.  · Coma alimentos con proteínas todos los Danne Harden buenas opciones incluyen radha [place], pescado, carne de [place], hulouie y Durga-barre. Otras buenas opciones son frijoles (habichuelas) cocidos, mantequilla de cacahuate (maní) y jake secos y semillas. · Asegúrese de que la mitad de los granos que consume ok granos integrales. Busque que el pan sea 100% alfredo integral, y [facility] [employer], el arroz y los otros granos también ok integrales. Si tiene estreñimiento  · Coma alimentos ricos en iCeutica. Estos incluyen frutas, verduras, frijoles secos cocidos y granos integrales. · Chrystal líquidos en abundancia, lo suficiente alber para que nathan orina sea de color amarillo lisseth o transparente alber el agua. Si tiene enfermedad renal, cardíaca o hepática y tiene que restringir los líquidos, hable con nathan médico antes de aumentar la cantidad de líquidos que nas. · Pregúntele a nathan médico si los ablandadores de heces pueden ayudarle a regularizar el intestino. Si tiene problemas en la boca que le dificultan la masticación  · Elija frutas y verduras enlatadas o cocidas. Estas suelen ser más blandas. · Quin la carne de res, de ave y el pescado en trozos o tiras. Agréguele a la carne salsa o jugo de la propia carne para que no se seque. · Escoja otros alimentos con proteínas que ok blandos. Estos [employer], la [place] Jason, los frijoles cocidos, el queso requalexandra y [place]. Si tiene dificultades para hacer las compras  · Pida en la kasey que le envíen a nathan casa los productos que compre. · Comuníquese con un centro de voluntarios y [place]. · Pídale a un familiar o a un vecino que le ayude. Si tiene dificultades para cocinar  · Si puede, tome clases de cocina. · Use un horno de microondas para cocinar comidas preparadas en bandeja y otros alimentos congelados o preparados. · Participe en un programa grupal de comidas. Usted puede encontrar estos por medio de programas para la tercera edad.   · Hágase enviar las comidas a nathan domicilio. Nathan comunidad puede ofrecer programas que entreguen comidas a domicilio, alber \"Meals on [employer]". Si no tiene mucho apetito  · Trate de comer pequeñas cantidades de alimentos con más frecuencia. Por ejemplo, coma 4 o 5 comidas ligeras al día en lugar de 1 o 2 comidas grandes. · Coma con amigos y familiares. O participe de programas grupales de comidas ofrecidos por medio de programas voluntarios. [place] con otros puede ayudar con nathan apetito. Y le ayuda a tener más helen social.  · Pregúntele a nathan médico si karlene medicamentos podrían estar causándole problemas de apetito o con el sabor de los alimentos. Si es así, pregúntele si puede [employer]. · Girish Kleine y hierbas para darles más sabor a las comidas. · Si piensa que está deprimido, pídale ayuda a nathan médico. La depresión puede afectarle el apetito. Y puede darle dificultades para hacer actividades diarias alber hacer las compras y cocinar. El tratamiento puede ayudar. ¿Cuándo debe pedir ayuda? Llame [id number] en cualquier momento que considere que necesita atención de emergencia. Por ejemplo, llame si:  · Tiene dolor de estómago intenso. · Se desmayó (perdió el conocimiento). Llame a nathan médico ahora mismo o busque atención médica inmediata si:  · Tiene señales de necesitar más líquidos. Tiene los ojos hundidos y la boca seca, y orina sólo poca cantidad de color oscuro. · Tiene dolor e hinchazón en la juan anal, o pus que drena de la juan anal.  · Tiene fiebre o escalofríos. · Siente el estómago inflado. Vigile de cerca los cambios en nathan shyam y asegúrese de comunicarse con nathan médico si:  · Karlene síntomas Canary Ayon. · Tiene diarrea por más de 2 semanas. · Tiene pérdida de peso inexplicada. ¿Dónde puede encontrar más información en inglés? Concha [place] a [email or web address] e ingrese a nathan cuenta de MyChart.  Gretchne Lopez D037 en el [facility] \"Search Health Information\" para más información (en inglés) sobre \"Nutrición para las personas mayores: Instrucciones de cuidado. \"     Si no tiene roman cuenta, meri clic en el enlace \"Sign Up Now\". Revisado: 22 agosto, 2019               Versión del contenido: 12.5  © 1540-1066 [employer], [place]uan. Las instrucciones de cuidado fueron adaptadas bajo licencia por [employer]. Si usted tiene [place] afección médica o sobre estas instrucciones, siempre pregunte a nathan profesional de shyam. [employer], Incorporated niega toda garantía o responsabilidad por nathan uso de esta información. Patient Education        Aprenda sobre hacer actividad cuando es un adulto mayor  Learning About Being Active as an Older Adult  ¿Por qué es importante estar activo a medida que envejece? Estar activo es roman de las mejores cosas que puede hacer por nathan shyam. Y nunca es demasiado tarde para comenzar. Mantenerse activo, o comenzar a hacer actividad si aún no la hace, tiene reny beneficios. Puede:  · Darle más energía. · Mantener la [employer]. · Mejorar el equilibrio para reducir el riesgo de caídas. · Ayudar a controlar las enfermedades crónicas con menos medicamentos. Sin importar la edad que tenga, nathan estado físico o los problemas de shyam que tenga, hay un tipo de actividad que le funcionará a usted. Y cuanta más actividad física pueda hacer, mejor será nathan shyam general.  ¿Cómo puede incorporar la actividad en nathan helen? Ser más [employer] facilitará las actividades diarias. La actividad física incluye el ejercicio planificado y cosas que usted hace en nathan helen diaria. Hay cuatro tipos de [place]:  Aeróbica. Hacer actividad aeróbica fortalece el corazón y los pulmones. Incluye caminar, bailar y practicar la jardinería. Intente hacer al menos 2½ horas de [place] a lo antelmo de la semana. La Altria Group más energía y podría ayudarle a dormir mejor. Fortalecimiento muscular. Nury tipo de actividad puede ayudarle a mantener la musculatura y Coca [employer]a.   Incluye subir escaleras, usar bandas elásticas y levantar o transportar cargas pesadas. Trate de Celanese eTipping tipo de actividad al [place] veces por semana. Puede ayudar a proteger Wendell Rodrigo y otras articulaciones. Estiramientos. Los estiramientos le brindan roman mayor amplitud de movimiento en las articulaciones y los músculos. Incluyen estiramientos de la parte superior del brazo, estiramientos de la pantorrilla y yoga suave. Trate de hacer estiramientos al [email or web address] por semana, preferentemente después de que los músculos se hayan calentado carmen a [place]. Pueden ayudarle a desempeñarse mejor en la helen diaria. Equilibrio. Frisbee le ayuda a mantener la coordinación y Jami Laos. Incluye la marcha en tándem, el angel luis chi y ciertos tipos de yoga. Trate de hacer esto al menos magdy días a la semana. Puede reducir [employer] de caídas. Incluso si tiene dificultad para cumplir con las recomendaciones, es mejor estar más activo que [place]. Vijay [place]found[place] actividad que meri en cada categoría cuenta para el total semanal. Le sorprendería saber cómo se [employer] cotidianas, tales alber cargar bolsas de alimentos, jugar con shalini nietos y subir las escaleras. ¿Qué le impide estar activo? Si ha tenido dificultad para hacer más actividad, usted no está solo. Quizás recuerde que antes podía Arnie's. O juma vez nunca se haya considerado roman persona activa. Es frustrante no poder [employer] que desea. Estar más activo puede ayudar. ¿Qué lo detiene a usted? Cómo empezar. Tenga roman meta, lila divídala en tareas fáciles. Los pequeños pasos se convierten en grandes logros. Cómo mantenerse motivado. Si siente que no tiene ganas de Hollie, recuerde nathan meta. Juma vez desea poder moverse mejor y mantenerse independiente. Vijay [place] actividad que meri le servirá para acercarse a ruthy meta. Alvira Stephenson si no se siente perfectamente.    Comience con 5 minutos de [place] y en el interior de cada dormitorio. ? En el centro del techo o en roman pared a entre 6 y 15 pulgadas (entre 15 y 27 cm) del techo. Es aquí donde el humo se desplaza rafiq. Evite los lugares cerca de las delio, las ventanas o los conductos de ventilación. · Instale un detector de monóxido de carbono en el pasillo afuera de las habitaciones en cada juan del hogar en que haya dormitorios. El detector debería instalarse en la parte más bernardo de la pared. Asegúrese de que los muebles o las edie no tapen el detector. · Asegúrese de que shalini alarmas de seguridad funcionen en todo momento. Puede probarlas cada mes presionando el botón de prueba. · Si roman alarma emite un chirrido, reemplace la pila de inmediato. · Reemplace dos veces al año las pilas que no ok de litio. Ponga esto en nathan calendario anticipadamente. Algunas personas [employer]on pilas de las alarmas cuando hacen el cambio de horario en shalini relojes en la primavera y en el otoño. · Reemplace los detectores de humo cada 10 años. · Planifique y practique rutas de escape de incendios. Asegúrese de tener por lo Tawanda 115 para cada área de nathan casa. [place] incluye los pisos superiores y el sótano. ¿Cuándo debe pedir ayuda? Llame [id number] en cualquier momento que considere que necesita atención de [place]. Por ejemplo, llame si:  · Suena el detector de humo o de monóxido de carbono. Dígales a todos que salgan del edificio. Permanezcan afuera hasta que lleguen los bomberos. Preste especial atención a los cambios en nathan shyam y asegúrese de comunicarse con nathan médico si tiene preguntas sobre cómo utilizar roman alarma de seguridad en el hogar. ¿Dónde puede encontrar más información en inglés? Clotilde July a [email or web address] e ingrese a nathan cuenta de MyChart. Annika Leventhal Q604 en el Katheryn Maldonado \"Search Health Information\" para más información (en inglés) sobre \"Alarmas de seguridad en el hogar: Instrucciones de cuidado. \"     Si no tiene roman Aldair Nigh jaison en el enlace \"Sign Up Now\". Revisado: 22 agosto, 2019               Versión del contenido: 12.5  © 3308-4029 [employer]iggzo, Nanameue. Las instrucciones de cuidado fueron adaptadas bajo licencia por [facility] ([facility]). Si usted tiene [place] afección médica o sobre estas instrucciones, siempre pregunte a nathan profesional de shyam. HealthParsimotion, [employer]One niega toda garantía o responsabilidad por nathan uso de esta información. Patient Education        Daniela Joseph a entrar y salir de la bañera en forma can  Learning About Getting In and Out of a Bathtub Safely  Introducción  Muchas caídas ocurren al bañarse. Renetta Richmond ruthy agua hace que el cuarto de baño sea un lugar resbaladizo. · Es posible que ya no pueda superar el borde de la bañera con comodidad y seguridad. · Usted podría apoyarse en cosas que no están destinadas a soportar nathan peso, alber el [address] [place] [address] eBuddy o la edis de Marlyn Republic. Hay varios tipos de dispositivos de apoyo que le ayudarán a [facility] Inc[facility]. Puede comprarlos en ferreterías, en tiendas de mejoras para el hogar o en Internet. ¿Qué dispositivos puede utilizar para entrar y salir de la bañera? Melony Gunn y lawrence de sujeción para la bañera   Hay muchos tipos de barras y barandillas que puede instalar en las leo junto a la bañera o en el borde de la bañera. Camryn Beams a mantenerse seguro al entrar o salir de la bañera. Es importante instalarlas permanentemente, en lugar de hacerlo por medio de succión. Banco de transferencia   Hay varios tipos de bancos o asientos que se pueden usar en la bañera. Pieter de ellos se coloca con dos patas dentro la bañera y [facility] fuera de [facility]. Usted se sienta en el banco y mete rafiq roman pierna y luego la otra en la bañera, deslizando al mismo tiempo nathan cuerpo hacia el interior de la bañera. Otro banco común para bañera se asienta dentro de esta. Para usar mariano tipo, usted debe ser capaz de entrar en forma can en la bañera antes de sentarse. Alfombrillas antideslizantes   El agua hace que el fondo de la bañera y el piso del baño estén resbaladizos. Puede comprar tiras Rolon's que se adhieren al fondo de la bañera. O puede usar roman alfombrilla antideslizante para la bañera. Fuera de la bañera, asegúrese de usar roman alfombrilla que sea antideslizante por debajo. No use roman toalla alber alfombrilla. Ducha manual   Con roman ducha (regadera) manual, usted puede lavarse sin tener que sentarse en la bañera. Use roman edis de ducha para evitar que el agua se derrame en el piso. La atención de seguimiento es roman parte clave de ozuna tratamiento y seguridad. Asegúrese de hacer y acudir a todas las citas, y llame a ozuna médico si está teniendo problemas. También es roman buena idea saber los resultados de shalini exámenes y mantener roman lista de los medicamentos que jazzy. ¿Dónde puede encontrar más información en inglés? Denver Denis a [email or web address] e ingrese a ozuna cuenta de MyChart. Ash Yost U508 en el cuadro \"Search Health Information\" para más información (en inglés) sobre \"Aprenda a entrar y salir de la bañera en forma can. \"     Si no tiene roman cuenta, felicia jaison en el enlace \"Sign Up Now\". Revisado: 2 marzo, 2020               Versión del contenido: 12.5  © 2270-4122 Healthwise, [employer] Las instrucciones de cuidado fueron adaptadas bajo licencia por [facility] ([facility]). Si usted tiene [place] afección médica o sobre estas instrucciones, siempre pregunte a ozuna profesional de shyam. [employer] niega toda garantía o responsabilidad por ozuna uso de esta información. Patient Education         Cómo prevenir caídas: Felicia que ozuna casa sea un lugar seguro (subtitulado) (01:08)  Preventing Falls: Make Your Home Safe  Ozuna profesional de la shyam recomienda que manjula mariano breve video de shyam en [place]. Aprenda a corregir algunos peligros comunes en ozuna casa que podrían causar tropiezos.      [address] [facility] código QR   o visite el AdexLinkio web    [email or web address]   Revisado: 7 agosto, 2019               Versión del contenido: 12.5  © 2006-2020 [employer]. Las instrucciones de cuidado fueron adaptadas bajo licencia por [facility] ([facility]). Si usted tiene [place] afección médica o sobre estas instrucciones, siempre pregunte a nathan profesional de shyam. [employer] niega toda garantía o responsabilidad por nathan uso de esta información. Patient Education         Prevención de caídas: Seguridad de los medicamentos (subtitulado) (01:41)  Preventing Falls: Medicine Safety  Nathan profesional de la shyam recomienda que manjula mariano breve video de shyam en [place]. Aprenda por qué algunos medicamentos pueden causarle mareos o somnolencia y cómo puede mantenerse seguro. Cómo mirar el video    Escanee el código QR   o visite el sitio web    [email or web address]   Revisado: 7 agosto, 2019               Versión del contenido: 12.5  © 2006-2020 [employer]. Las instrucciones de cuidado fueron adaptadas bajo licencia por [facility] ([facility]). Si usted tiene [place] afección médica o sobre estas instrucciones, siempre pregunte a nathan profesional de shyam. [employer] niega toda garantía o responsabilidad por nathan uso de esta información. Patient Education         Prevención de caídas: Uso de roman lista de verificación de la seguridad en el hogar (subtitulado) (01:07)  Preventing Falls: Use a Home Safety Checklist  Nathan profesional de la shyam recomienda que manjula mariano breve video de shyam en [place]. Aprenda a detectar los peligros que hay en nathan casa usando roman lista de verificación de la seguridad en el hogar. Cómo mirar el video    Escanee el código QR   o visite el sitio web    [email or web address]   Revisado: 7 agosto, 2019               Versión del contenido: 12.5  © 6488-3251 [facility], [facility].    [address]

## 2021-06-17 NOTE — PROGRESS NOTES
Medicare Annual Wellness Visit  Name: Donovan Schwab Date: 2021   MRN: [id number] Sex: Female   Age: 79 y.o. Ethnicity: /   : 1940 Race: Other      Jorge A San is here for Medicare AWV    Screenings for behavioral, psychosocial and functional/safety risks, and cognitive dysfunction are all negative except as indicated below. These results, as well as other patient data from the [address] form, are documented in Flowsheets linked to this Encounter. Allergies   Allergen Reactions    Codeine Shortness Of Breath     tired       Prior to Visit Medications    Medication Sig Taking?  Authorizing Provider   blood glucose monitor strips Test up to daily    Type 2 diabetes mellitus without complication, without long-term current use of insulin (HCC) (E11.9)  Ronald Riddle MD   Lancets (ONETOUCH DELICA PLUS [id number]) MISC TEST BLOOD SUGAR daily and PRN illness (NIDDM E11.9)  Ronald Riddle MD   Lancets MISC 1 each by Does not apply route daily Type 2 diabetes mellitus without complication, without long-term current use of insulin (HCC) (E11.9)  Ronald Riddle MD   sertraline (ZOLOFT) 100 MG tablet TAKE ONE TABLET BY MOUTH EVERY DAY  Ronald Riddle MD   sodium chloride (OCEAN) 0.65 % nasal spray 2 sprays by Nasal route every 3 hours  Pearl Ramos Tim DO   acetaminophen (APAP EXTRA STRENGTH) 500 MG tablet Take 2 tablets by mouth every 6 hours as needed for Pain or Fever  William Tim DO   psyllium (METAMUCIL SMOOTH TEXTURE) 28 % packet Take 1 packet by mouth 2 times daily Take with full glass of h20 or juice  Ronald Riddle MD   docusate sodium (COLACE) 100 MG capsule Take 1 capsule by mouth daily as needed for Constipation  Ronald Riddle MD   lisinopril (PRINIVIL;ZESTRIL) 20 MG tablet TAKE ONE TABLET BY MOUTH EVERY DAY  Ronald Riddle MD   Cholecalciferol (VITAMIN D) 50 MCG (2000) CAPS capsule Take 1 capsule by mouth daily  Esther Otero MD   pantoprazole (PROTONIX) 40 MG tablet Take 1 tablet by mouth daily  Esther Otero MD   alendronate (FOSAMAX) 70 MG tablet Take 1 tablet by mouth every 7 days  Esther Otero MD   meloxicam (MOBIC) 7.5 MG tablet Take 1 tablet by mouth daily  Nishi Carranza PA-C   aspirin 81 MG chewable tablet Take 81 mg by mouth daily  Historical Provider, MD   Multiple Vitamins-Minerals (PRESERVISION AREDS) CAPS Take by mouth  Historical Provider, MD   Blood Glucose Monitoring Suppl LONG Use as directed up to TID Type 2 diabetes mellitus without complication, without long-term current use of insulin (HCC) (E11.9)  Esther Otero MD   Alcohol Swabs PADS Use as directed  Esther Otero MD       Past Medical History:   Diagnosis Date    Arthritis     Chronic bronchitis ([address]     Chronic pruritus 1/15/2021    COVID-19 virus infection 1/4/2021    Degenerative disc disease, cervical     Depression     Depression with anxiety     Easy bruising 6/17/2021    Esophagitis     Fibromyositis     GERD (gastroesophageal reflux disease)     Headache(784.0)     Hyperlipidemia     Hypertension     Migraines     Pleural effusion     Right-sided chest wall pain     Type 2 diabetes mellitus without complication ([address] 9/03/4581    no medication per patient       Past Surgical History:   Procedure Laterality Date    CHOLECYSTECTOMY  1988    COLONOSCOPY  05/04/2009    COLONOSCOPY N/A 10/24/2019    COLORECTAL CANCER SCREENING, NOT HIGH RISK performed by Nick Garcia MD at [address] Left 04/27/15     [facility] EYE VITRECTOMY W MACULAR EPIRETINAL MEMBRANE    OVARY REMOVAL      UPPER GASTROINTESTINAL ENDOSCOPY  04/16/2013    UPPER GASTROINTESTINAL ENDOSCOPY  10/29/15    Kennedy Boone MD       Family History   Problem Relation Age of Onset    Other Mother         Neurological Disease    Cancer Sister     Diabetes Brother     Cancer Brother         leukemia       CareTeam (Including outside providers/suppliers regularly involved in providing care):   Patient Care Team:  Emory Wilcox MD as PCP - General (Family Medicine)  Emory Wilcox MD as PCP - [facility][1] Empaneled Provider    Wt Readings from Last 3 Encounters:   06/17/21 114 lb (51.7 kg)   06/17/21 114 lb (51.7 kg)   12/26/20 125 lb (56.7 kg)     Vitals:    06/17/21 1150   BP: 122/78   Pulse: 70   Temp: 97.1 °F (36.2 °C)   TempSrc: Temporal   SpO2: 97%   Weight: 114 lb (51.7 kg)   Height: 5' 3\" (1.6 m)     Body mass index is 20.19 kg/m². Based upon direct observation of the patient, evaluation of cognition reveals global memory impairment noted. Patient's complete Health Risk Assessment and screening values have been reviewed and are found in Flowsheets. The following problems were reviewed today and where indicated follow up appointments were made and/or referrals ordered. Positive Risk Factor Screenings with Interventions:     Fall Risk:  2 or more falls in past year?: (!) yes  Fall with injury in past year?: no  Fall Risk Interventions:    · Home safety tips provided  · Patient declines any further evaluation/treatment for this issue    Cognitive: Words recalled: 2 Words Recalled  Clock Drawing Test (CDT) Score: (!) Abnormal  Total Score Interpretation: Positive Mini-Cog  Cognitive Impairment Interventions:  · Patient declines any further evaluation/treatment for cognitive impairment       General Health and ACP:  General  In general, how would you say your health is?: Fair  In the past 7 days, have you experienced any of the following?  New or Increased Pain, New or Increased Fatigue, Loneliness, Social Isolation, Stress or Anger?: (!) New or Increased Fatigue  Do you get the social and emotional support that you need?: (!) No  Do you have a Living Will?: (!) No  Advance Directives     Power of  Living Will ACP-Advance Directive ACP-Power of     Not on File Filed on 10/25/19 Filed Not on File      General Health Risk Interventions:  · Fatigue: labs ordered- under evaluation  · No Living Will: Advance Care Planning addressed with patient today and Patient declines ACP discussion/assistance    Health Habits/Nutrition:  Health Habits/Nutrition  Do you exercise for at least 20 minutes 2-3 times per week?: Yes  Have you lost any weight without trying in the past 3 months?: (!) Yes  Do you eat only one meal per day?: (!) Yes  Have you seen the dentist within the past year?: Yes  Body mass index: 20.19  Health Habits/Nutrition Interventions:  · Nutritional issues:  educational materials for healthy, well-balanced diet provided    Hearing/Vision:  No exam data present  Hearing/Vision  Do you or your family notice any trouble with your hearing that hasn't been managed with hearing aids?: (!) Yes  Do you have difficulty driving, watching TV, or doing any of your daily activities because of your eyesight?: (!) Yes  Have you had an eye exam within the past year?: Yes  Hearing/Vision Interventions:  · Hearing concerns:  patient declines any further evaluation/treatment for hearing issues  · Vision concerns:  patient encouraged to make appointment with his/her eye specialist     ADL:  ADLs  In the past 7 days, did you need help from others to perform any of the following everyday activities? Eating, dressing, grooming, bathing, toileting, or walking/balance?: None  In the past 7 days, did you need help from others to take care of any of the following?  Laundry, housekeeping, banking/finances, shopping, telephone use, food preparation, transportation, or taking medications?: (!) Banking/Finances, Taking Medications  ADL Interventions:  · Patient declines any further evaluation/treatment for this issue    Personalized Preventive Plan   Current Health Maintenance

## 2021-06-17 NOTE — PROGRESS NOTES
Major Taylor Swapnil, 79 y.o. female presents today with:  Chief Complaint   Patient presents with    6 Month Follow-Up    Medication Refill           HPI    Patient is here for f/u HTN. Is compliant with meds and has no side effects from them. Avoids added salt. Tries to eat healthy. Exercises occasionally. Has no chest pain, shortness of breath, palpitations or edema. Patient is here for DM f/u. Sugars have been moderately well-controlled and patient has not been experiencing hyper- or hypoglycemic symptoms. Compliance with meds is good and there are no side effects. Patient exercises occasionally and tries to eat healthy. Is up to date on foot and eye exams and immunizations. Unintentional weight loss - eating less because  has been worried about  her sugars. No other questions and or concerns for today's visit      Review of Systems See below.        Past Medical History:   Diagnosis Date    Arthritis     Chronic bronchitis ([address]     Chronic pruritus 1/15/2021    COVID-19 virus infection 1/4/2021    Degenerative disc disease, cervical     Depression     Depression with anxiety     Easy bruising 6/17/2021    Esophagitis     Fibromyositis     GERD (gastroesophageal reflux disease)     Headache(784.0)     Hyperlipidemia     Hypertension     Migraines     Pleural effusion     Right-sided chest wall pain     Type 2 diabetes mellitus without complication ([address] 7/25/8363    no medication per patient     Past Surgical History:   Procedure Laterality Date    CHOLECYSTECTOMY  1988    COLONOSCOPY  05/04/2009    COLONOSCOPY N/A 10/24/2019    COLORECTAL CANCER SCREENING, NOT HIGH RISK performed by Wagner Valencia MD at [address] [place] [address] HISTORY Left 04/27/15     [facility] EYE VITRECTOMY W MACULAR EPIRETINAL MEMBRANE    OVARY REMOVAL      UPPER GASTROINTESTINAL ENDOSCOPY  04/16/2013    UPPER GASTROINTESTINAL ENDOSCOPY  10/29/15    Sylvie Ladd MD Social History     Socioeconomic History    Marital status:      Spouse name: Not on file    Number of children: Not on file    Years of education: Not on file    Highest education level: Not on file   Occupational History    Not on file   Tobacco Use    Smoking status: Never Smoker    Smokeless tobacco: Never Used   Vaping Use    Vaping Use: Never used   Substance and Sexual Activity    Alcohol use: No    Drug use: No    Sexual activity: Not on file   Other Topics Concern    Not on file   Social History Narrative    Not on file     Social Determinants of Health     Financial Resource Strain: Low Risk     Difficulty of Paying Living Expenses: Not hard at all   Food Insecurity: No Food Insecurity    Worried About [address] in the Last Year: Never true    [address] in the Last Year: Never true   Transportation Needs:     Lack of Transportation (Medical):      Lack of Transportation (Non-Medical):    Physical Activity:     Days of Exercise per Week:     Minutes of Exercise per Session:    Stress:     Feeling of Stress :    Social Connections:     Frequency of Communication with Friends and Family:     Frequency of Social Gatherings with Friends and Family:     Attends Buddhism Services:     Active Member of Clubs or Organizations:     Attends Club or Organization Meetings:     Marital Status:    Intimate Partner Violence:     Fear of Current or Ex-Partner:     Emotionally Abused:     Physically Abused:     Sexually Abused:      Family History   Problem Relation Age of Onset    Other Mother         Neurological Disease    Cancer Sister     Diabetes Brother     Cancer Brother         leukemia     Allergies   Allergen Reactions    Codeine Shortness Of Breath     tired     Current Outpatient Medications   Medication Sig Dispense Refill    blood glucose monitor strips Test up to daily    Type 2 diabetes mellitus without complication, without long-term current use of insulin (HCC) (E11.9) 100 strip 12    Lancets (ONETOUCH DELICA PLUS [id number]) MISC TEST BLOOD SUGAR daily and PRN illness (NIDDM E11.9) 100 each 12    Lancets MISC 1 each by Does not apply route daily Type 2 diabetes mellitus without complication, without long-term current use of insulin (HCC) (E11.9) 100 each 5    sertraline (ZOLOFT) 100 MG tablet TAKE ONE TABLET BY MOUTH EVERY DAY 30 tablet 0    sodium chloride (OCEAN) 0.65 % nasal spray 2 sprays by Nasal route every 3 hours 1 Bottle 0    acetaminophen (APAP EXTRA STRENGTH) 500 MG tablet Take 2 tablets by mouth every 6 hours as needed for Pain or Fever 60 tablet 0    psyllium (METAMUCIL SMOOTH TEXTURE) 28 % packet Take 1 packet by mouth 2 times daily Take with full glass of h20 or juice 60 packet 12    docusate sodium (COLACE) 100 MG capsule Take 1 capsule by mouth daily as needed for Constipation 90 capsule 4    lisinopril (PRINIVIL;ZESTRIL) 20 MG tablet TAKE ONE TABLET BY MOUTH EVERY DAY 90 tablet 4    Cholecalciferol (VITAMIN D) 50 MCG (2000 UT) CAPS capsule Take 1 capsule by mouth daily 90 capsule 4    pantoprazole (PROTONIX) 40 MG tablet Take 1 tablet by mouth daily 180 tablet 4    alendronate (FOSAMAX) 70 MG tablet Take 1 tablet by mouth every 7 days 15 tablet 4    meloxicam (MOBIC) 7.5 MG tablet Take 1 tablet by mouth daily 7 tablet 0    aspirin 81 MG chewable tablet Take 81 mg by mouth daily      Multiple Vitamins-Minerals (PRESERVISION AREDS) CAPS Take by mouth      Blood Glucose Monitoring Suppl LONG Use as directed up to TID Type 2 diabetes mellitus without complication, without long-term current use of insulin (HCC) (E11.9) 1 Device 0    Alcohol Swabs PADS Use as directed 100 each 5     No current facility-administered medications for this visit. PMH, Surgical Hx, Family Hx, and Social Hxreviewed and updated. Health Maintenance reviewed.     Objective    Vitals:    06/17/21 1130   BP: 122/78   Pulse: 70   Temp: 97.1 °F (36.2 Angelina Hernández MD, Gastroenterology, [facility] [[id number] Custom]           Type 2 diabetes mellitus without complication, without long-term current use of insulin ([address]        Stable and well-controlled with diet    CBC With Auto Differential [[id number] Custom]   - Future Order    Comprehensive Metabolic Panel [[id number] Custom]   - Future Order    Hemoglobin A1C [[id number] Custom]   - Future Order    blood glucose monitor strips [[id number]]      Lancets ([address]) [address] [facility] [[id number]]      Lancets MISC [4345]           Depression with anxiety        Stable and well-controlled on sertraline         Type 2 diabetes mellitus without complication, without long-term current use of insulin (HCC)        Stable and well-controlled on current meds. CBC With Auto Differential [id number] Custom]   - Future Order    Comprehensive Metabolic Panel [[id number] Custom]   - Future Order    Hemoglobin A1C [[id number] Custom]   - Future Order    blood glucose monitor strips [[id number]]      Lancets (ONETOUCH DELICA PLUS [id number]) MISC [[id number]]      Lancets MISC [4345]           Dysuria        POCT Urinalysis no Micro [[id number] Custom]           Black stools        Referred for GI evaluation    Lisa Craig MD, Gastroenterology, [facility] Custom]           Unintended weight loss        Referred for GI evaluation. Labs ordered. Angelina Hernández MD, Gastroenterology, [facility] Custom]           Chronic right shoulder pain        Chronic. Defers further evaluation at this time         Lymphocytopenia        Follow labs         Fatigue, unspecified type        Labs ordered. Consider depression.   Consider changing sertraline to alternative SSRI or NSRI    TSH Without Reflex [[id number] Custom]   - Future Order                 Reviewed with the patient: all disease processes, current clinical status, medications, activities and diet.      Side effects, adverse effects of the medication prescribed today, as well as treatment plan/ rationale and result expectations have been discussed with the patient who expresses understanding and desires to proceed.     Close follow up to evaluate treatment results and for coordination of care. I have reviewed the patient's medical history in detail and updated the computerized patient record. More than 50% of the appointment was spent in face-to-face counseling, education and care coordination. Please note this report has been partially produced using speech recognition software and may contain mistakes related to that system including errors in grammar, punctuation and spelling as well as words and phrases that may seem inappropriate. If there are questions or concerns, please feel free to contact me to clarify.     Orders Placed This Encounter   Procedures    CBC With Auto Differential     Standing Status:   Future     Number of Occurrences:   1     Standing Expiration Date:   6/17/2022    Comprehensive Metabolic Panel     Standing Status:   Future     Number of Occurrences:   1     Standing Expiration Date:   9/17/2021    Hemoglobin A1C     Standing Status:   Future     Number of Occurrences:   1     Standing Expiration Date:   8/17/2021    TSH Without Reflex     Standing Status:   Future     Number of Occurrences:   1     Standing Expiration Date:   6/17/2022   Mireya Tamez MD, Gastroenterology, [place]     Referral Priority:   Routine     Referral Type:   Eval and Treat     Referral Reason:   Specialty Services Required     Referred to Provider:   Gaurav Gonsalez MD     Requested Specialty:   Gastroenterology     Number of Visits Requested:   1    POCT Urinalysis no Micro     Orders Placed This Encounter   Medications    blood glucose monitor strips     Sig: Test up to daily    Type 2 diabetes mellitus without complication, without long-term current use of insulin (HCC) (E11.9)     Dispense:  100 strip     Refill:  12    Lancets (ONETOUCH DELICA PLUS [id number]) MISC     Sig: TEST BLOOD SUGAR daily and PRN illness (NIDDM E11.9)     Dispense:  100 each     Refill:  12    Lancets MISC     Si each by Does not apply route daily Type 2 diabetes mellitus without complication, without long-term current use of insulin (HCC) (E11.9)     Dispense:  100 each     Refill:  5     Medications Discontinued During This Encounter   Medication Reason    Lancets MISC REORDER    Lancets ([address]) [facility] [facility] blood glucose monitor strips REORDER     Return in about 3 months (around 2021) for DM, HTN, GERD - OV and for same in 6 mos VV. Controlled Substance Monitoring:    Acute and Chronic Pain Monitoring:   No flowsheet data found.         Mikala Garcia MD

## 2021-07-02 ENCOUNTER — PROCEDURE VISIT (OUTPATIENT)
Dept: FAMILY MEDICINE CLINIC | Age: 81
End: 2021-07-02
Payer: MEDICARE

## 2021-07-02 VITALS
HEIGHT: 63 IN | WEIGHT: 118 LBS | TEMPERATURE: 96.9 F | HEART RATE: 65 BPM | RESPIRATION RATE: 18 BRPM | OXYGEN SATURATION: 99 % | BODY MASS INDEX: 20.91 KG/M2 | SYSTOLIC BLOOD PRESSURE: 102 MMHG | DIASTOLIC BLOOD PRESSURE: 62 MMHG

## 2021-07-02 DIAGNOSIS — G89.29 CHRONIC RIGHT SHOULDER PAIN: Primary | ICD-10-CM

## 2021-07-02 DIAGNOSIS — F41.8 DEPRESSION WITH ANXIETY: Chronic | ICD-10-CM

## 2021-07-02 DIAGNOSIS — M81.0 AGE-RELATED OSTEOPOROSIS WITHOUT CURRENT PATHOLOGICAL FRACTURE: Chronic | ICD-10-CM

## 2021-07-02 DIAGNOSIS — M25.511 CHRONIC RIGHT SHOULDER PAIN: Primary | ICD-10-CM

## 2021-07-02 PROCEDURE — 20605 DRAIN/INJ JOINT/BURSA W/O US: CPT | Performed by: FAMILY MEDICINE

## 2021-07-02 RX ORDER — MULTIVIT-MIN/IRON/FOLIC ACID/K 18-600-40
1 CAPSULE ORAL DAILY
Qty: 90 CAPSULE | Refills: 4 | Status: SHIPPED | OUTPATIENT
Start: 2021-07-02 | End: 2022-08-08

## 2021-07-02 RX ORDER — SERTRALINE HYDROCHLORIDE 100 MG/1
TABLET, FILM COATED ORAL
Qty: 90 TABLET | Refills: 1 | Status: SHIPPED | OUTPATIENT
Start: 2021-07-02 | End: 2022-01-05

## 2021-07-02 NOTE — TELEPHONE ENCOUNTER
Patient requesting medication refill.  Please approve or deny this request.    Rx requested:  Requested Prescriptions     Pending Prescriptions Disp Refills    sertraline (ZOLOFT) 100 MG tablet 30 tablet 0    Cholecalciferol (VITAMIN D) 50 MCG (2000 UT) CAPS capsule 90 capsule 4     Sig: Take 1 capsule by mouth daily         Last Office Visit:   6/17/2021      Next Visit Date:  Future Appointments   Date Time Provider Marleny Lan   9/17/2021  9:30 AM Jennifer Cabral MD [facility]   12/17/2021 10:30 AM Jennifer Cabral MD [address] [place] [address]

## 2021-09-14 ENCOUNTER — CLINICAL DOCUMENTATION (OUTPATIENT)
Dept: SPIRITUAL SERVICES | Age: 81
End: 2021-09-14

## 2021-09-14 DIAGNOSIS — I10 ESSENTIAL HYPERTENSION: ICD-10-CM

## 2021-09-14 DIAGNOSIS — K21.9 GASTROESOPHAGEAL REFLUX DISEASE WITHOUT ESOPHAGITIS: ICD-10-CM

## 2021-09-14 RX ORDER — PANTOPRAZOLE SODIUM 40 MG/1
TABLET, DELAYED RELEASE ORAL
Qty: 180 TABLET | Refills: 4 | Status: SHIPPED | OUTPATIENT
Start: 2021-09-14

## 2021-09-14 RX ORDER — LISINOPRIL 20 MG/1
TABLET ORAL
Qty: 90 TABLET | Refills: 4 | Status: SHIPPED | OUTPATIENT
Start: 2021-09-14

## 2021-09-14 NOTE — PROGRESS NOTES
Advance Care Planning   Ambulatory ACP Specialist Patient Outreach    Date:  9/14/2021  ACP Specialist:  Jamari Miguel    Outreach call to patient in follow-up to ACP Specialist referral from: Александр Doyle MD    [x] PCP  [] Provider   [] Ambulatory Care Management [] Other for Reason:    [x] Advance Directive Assistance  [] Code Status Discussion  [] Complete Portable DNR Order  [] Discuss Goals of Care  [] Complete POST/MOST  [] Early ACP Decision-Making  [] Other    Date Referral Received:9/8/21    Today's Outreach:  [x] First   [] Second  [] Third                               Third outreach made by [x]  phone  [] email [x]   [facility]     Intervention:  [] Spoke with Patient  [x] Left VM requesting return call      Outcome: Unable to leave  for patient. [employer] labs. Patient does not have Email on file. Next Step:   [] ACP scheduled conversation  [x] Outreach again in two week               [] Email / Mail ACP Info Sheets  [] Email / Mail Advance Directive            [] Close Referral. Routing closure to referring provider/staff and to ACP Specialist .      Thank you for this referral.

## 2021-09-14 NOTE — TELEPHONE ENCOUNTER
Pharamcy requesting medication refill.  Please approve or deny this request.    Rx requested:  Requested Prescriptions     Pending Prescriptions Disp Refills    pantoprazole (PROTONIX) 40 MG tablet [Pharmacy Med Name: PANTOPRAZOLE SODIUM 40MG TBEC] 180 tablet 4     Sig: TAKE ONE TABLET BY MOUTH EVERY DAY    lisinopril (PRINIVIL;ZESTRIL) 20 MG tablet [Pharmacy Med Name: LISINOPRIL 20MG TABS] 90 tablet 4     Sig: TAKE ONE TABLET BY MOUTH EVERY DAY         Last Office Visit:   7/2/2021      Next Visit Date:  Future Appointments   Date Time Provider Marleny Lan   9/17/2021  9:30 AM Pastora Franklin MD [facility]   12/17/2021 10:30 AM Pastora Franklin MD [address] [place] [address]

## 2021-09-17 DIAGNOSIS — Z11.52 ENCOUNTER FOR SCREENING FOR COVID-19: Primary | ICD-10-CM

## 2021-09-20 ENCOUNTER — TELEPHONE (OUTPATIENT)
Dept: FAMILY MEDICINE CLINIC | Age: 81
End: 2021-09-20

## 2021-09-20 NOTE — TELEPHONE ENCOUNTER
Please overbook a DOXY visit for this problem. In the meantime Tylenol OTC max 3000 mg daily. If headache is caused by COVID, COVID headaches are very difficult to treat, very little helps.

## 2021-09-20 NOTE — TELEPHONE ENCOUNTER
Patients doctor called and stated she has been dealing with a headache she is unable to get rid of is trying a lot of OTC medications for pain and has not had any relief Did have a COVID test done but result is not back yet   Please advise

## 2021-09-25 ENCOUNTER — APPOINTMENT (OUTPATIENT)
Dept: CT IMAGING | Age: 81
End: 2021-09-25
Payer: MEDICARE

## 2021-09-25 ENCOUNTER — HOSPITAL ENCOUNTER (EMERGENCY)
Age: 81
Discharge: HOME | End: 2021-09-25
Payer: MEDICARE

## 2021-09-25 VITALS
TEMPERATURE: 98.4 F | HEIGHT: 62 IN | HEART RATE: 73 BPM | RESPIRATION RATE: 23 BRPM | SYSTOLIC BLOOD PRESSURE: 149 MMHG | OXYGEN SATURATION: 97 % | WEIGHT: 118 LBS | BODY MASS INDEX: 21.71 KG/M2 | DIASTOLIC BLOOD PRESSURE: 71 MMHG

## 2021-09-25 DIAGNOSIS — R51.9 ACUTE NONINTRACTABLE HEADACHE, UNSPECIFIED HEADACHE TYPE: Primary | ICD-10-CM

## 2021-09-25 LAB
ALBUMIN SERPL-MCNC: 4 G/DL (ref 3.5–4.6)
ALP BLD-CCNC: 72 U/L (ref 40–130)
ALT SERPL-CCNC: 13 U/L (ref 0–33)
ANION GAP SERPL CALCULATED.3IONS-SCNC: 16 MEQ/L (ref 9–15)
AST SERPL-CCNC: 16 U/L (ref 0–35)
BASOPHILS ABSOLUTE: 0 K/UL (ref 0–0.2)
BASOPHILS RELATIVE PERCENT: 0.4 %
BILIRUB SERPL-MCNC: <0.2 MG/DL (ref 0.2–0.7)
BUN BLDV-MCNC: 22 MG/DL (ref 8–23)
CALCIUM SERPL-MCNC: 9.5 MG/DL (ref 8.5–9.9)
CHLORIDE BLD-SCNC: 98 MEQ/L (ref 95–107)
CO2: 27 MEQ/L (ref 20–31)
CREAT SERPL-MCNC: 0.89 MG/DL (ref 0.5–0.9)
EOSINOPHILS ABSOLUTE: 0.1 K/UL (ref 0–0.7)
EOSINOPHILS RELATIVE PERCENT: 1.5 %
GFR AFRICAN AMERICAN: >60
GFR NON-AFRICAN AMERICAN: >60
GLOBULIN: 2.3 G/DL (ref 2.3–3.5)
GLUCOSE BLD-MCNC: 124 MG/DL (ref 70–99)
HCT VFR BLD CALC: 36.2 % (ref 37–47)
HEMOGLOBIN: 12.3 G/DL (ref 12–16)
LYMPHOCYTES ABSOLUTE: 1.2 K/UL (ref 1–4.8)
LYMPHOCYTES RELATIVE PERCENT: 24.9 %
MCH RBC QN AUTO: 30.8 PG (ref 27–31.3)
MCHC RBC AUTO-ENTMCNC: 34 % (ref 33–37)
MCV RBC AUTO: 90.5 FL (ref 82–100)
MONOCYTES ABSOLUTE: 0.4 K/UL (ref 0.2–0.8)
MONOCYTES RELATIVE PERCENT: 8 %
NEUTROPHILS ABSOLUTE: 3.2 K/UL (ref 1.4–6.5)
NEUTROPHILS RELATIVE PERCENT: 65.2 %
PDW BLD-RTO: 13.6 % (ref 11.5–14.5)
PLATELET # BLD: 134 K/UL (ref 130–400)
POTASSIUM SERPL-SCNC: 4.2 MEQ/L (ref 3.4–4.9)
RBC # BLD: 4 M/UL (ref 4.2–5.4)
SODIUM BLD-SCNC: 141 MEQ/L (ref 135–144)
TOTAL PROTEIN: 6.3 G/DL (ref 6.3–8)
WBC # BLD: 4.9 K/UL (ref 4.8–10.8)

## 2021-09-25 PROCEDURE — 6370000000 HC RX 637 (ALT 250 FOR IP): Performed by: PHYSICIAN ASSISTANT

## 2021-09-25 PROCEDURE — 99285 EMERGENCY DEPT VISIT HI MDM: CPT

## 2021-09-25 PROCEDURE — 70450 CT HEAD/BRAIN W/O DYE: CPT

## 2021-09-25 PROCEDURE — 6360000002 HC RX W HCPCS: Performed by: PHYSICIAN ASSISTANT

## 2021-09-25 PROCEDURE — 96375 TX/PRO/DX INJ NEW DRUG ADDON: CPT

## 2021-09-25 PROCEDURE — 80053 COMPREHEN METABOLIC PANEL: CPT

## 2021-09-25 PROCEDURE — 85025 COMPLETE CBC W/AUTO DIFF WBC: CPT

## 2021-09-25 PROCEDURE — 2580000003 HC RX 258: Performed by: PHYSICIAN ASSISTANT

## 2021-09-25 PROCEDURE — 96374 THER/PROPH/DIAG INJ IV PUSH: CPT

## 2021-09-25 PROCEDURE — 36415 COLL VENOUS BLD VENIPUNCTURE: CPT

## 2021-09-25 RX ORDER — KETOROLAC TROMETHAMINE 15 MG/ML
15 INJECTION, SOLUTION INTRAMUSCULAR; INTRAVENOUS ONCE
Status: COMPLETED | OUTPATIENT
Start: 2021-09-25 | End: 2021-09-25

## 2021-09-25 RX ORDER — ORPHENADRINE CITRATE 30 MG/ML
60 INJECTION INTRAMUSCULAR; INTRAVENOUS ONCE
Status: COMPLETED | OUTPATIENT
Start: 2021-09-25 | End: 2021-09-25

## 2021-09-25 RX ORDER — DIPHENHYDRAMINE HYDROCHLORIDE 50 MG/ML
25 INJECTION INTRAMUSCULAR; INTRAVENOUS ONCE
Status: COMPLETED | OUTPATIENT
Start: 2021-09-25 | End: 2021-09-25

## 2021-09-25 RX ORDER — METOCLOPRAMIDE HYDROCHLORIDE 5 MG/ML
10 INJECTION INTRAMUSCULAR; INTRAVENOUS ONCE
Status: COMPLETED | OUTPATIENT
Start: 2021-09-25 | End: 2021-09-25

## 2021-09-25 RX ORDER — ACETAMINOPHEN, ASPIRIN AND CAFFEINE 250; 250; 65 MG/1; MG/1; MG/1
1 TABLET, FILM COATED ORAL EVERY 6 HOURS PRN
Qty: 90 TABLET | Refills: 3 | Status: SHIPPED | OUTPATIENT
Start: 2021-09-25 | End: 2022-03-18

## 2021-09-25 RX ORDER — DEXAMETHASONE SODIUM PHOSPHATE 10 MG/ML
6 INJECTION INTRAMUSCULAR; INTRAVENOUS ONCE
Status: COMPLETED | OUTPATIENT
Start: 2021-09-25 | End: 2021-09-25

## 2021-09-25 RX ORDER — ACETAMINOPHEN 500 MG
1000 TABLET ORAL ONCE
Status: COMPLETED | OUTPATIENT
Start: 2021-09-25 | End: 2021-09-25

## 2021-09-25 RX ORDER — 0.9 % SODIUM CHLORIDE 0.9 %
1000 INTRAVENOUS SOLUTION INTRAVENOUS ONCE
Status: COMPLETED | OUTPATIENT
Start: 2021-09-25 | End: 2021-09-25

## 2021-09-25 RX ADMIN — KETOROLAC TROMETHAMINE 15 MG: 15 INJECTION, SOLUTION INTRAMUSCULAR; INTRAVENOUS at 16:21

## 2021-09-25 RX ADMIN — DIPHENHYDRAMINE HYDROCHLORIDE 25 MG: 50 INJECTION, SOLUTION INTRAMUSCULAR; INTRAVENOUS at 15:17

## 2021-09-25 RX ADMIN — DEXAMETHASONE SODIUM PHOSPHATE 6 MG: 10 INJECTION INTRAMUSCULAR; INTRAVENOUS at 15:20

## 2021-09-25 RX ADMIN — ACETAMINOPHEN 1000 MG: 500 TABLET ORAL at 15:21

## 2021-09-25 RX ADMIN — METOCLOPRAMIDE HYDROCHLORIDE 10 MG: 5 INJECTION INTRAMUSCULAR; INTRAVENOUS at 15:14

## 2021-09-25 RX ADMIN — ORPHENADRINE CITRATE 60 MG: 60 INJECTION INTRAMUSCULAR; INTRAVENOUS at 16:55

## 2021-09-25 RX ADMIN — SODIUM CHLORIDE 1000 ML: 9 INJECTION, SOLUTION INTRAVENOUS at 15:15

## 2021-09-25 ASSESSMENT — ENCOUNTER SYMPTOMS
DIARRHEA: 0
SORE THROAT: 0
COUGH: 0
EYE PAIN: 0
RHINORRHEA: 0
PHOTOPHOBIA: 0
VOMITING: 0
SHORTNESS OF BREATH: 0
BACK PAIN: 0
ABDOMINAL PAIN: 0
NAUSEA: 0

## 2021-09-25 ASSESSMENT — PAIN DESCRIPTION - LOCATION
LOCATION: HEAD

## 2021-09-25 ASSESSMENT — PAIN SCALES - GENERAL
PAINLEVEL_OUTOF10: 7
PAINLEVEL_OUTOF10: 7
PAINLEVEL_OUTOF10: 10
PAINLEVEL_OUTOF10: 10
PAINLEVEL_OUTOF10: 5

## 2021-09-25 ASSESSMENT — PAIN DESCRIPTION - PAIN TYPE
TYPE: ACUTE PAIN

## 2021-09-25 ASSESSMENT — PAIN DESCRIPTION - DESCRIPTORS: DESCRIPTORS: SQUEEZING;PRESSURE

## 2021-09-25 ASSESSMENT — PAIN DESCRIPTION - FREQUENCY: FREQUENCY: CONTINUOUS

## 2021-09-25 ASSESSMENT — PAIN DESCRIPTION - ORIENTATION: ORIENTATION: LEFT

## 2021-09-25 NOTE — ED TRIAGE NOTES
A & Ox4. Skin pink warm and dry. Pt points to left front of head for headache constant for 24 days. States she went to see Dr Callie Heller 9/17/21 but they just ordered a Covid test that was negative and didn't see her as a patient. States she gets dizzy at times. Denies blurred vision. Denies N/V/D.

## 2021-09-25 NOTE — ED PROVIDER NOTES
[address] [facility] ED  [facility]      Pt Name: Christian Stein  MRN: [id number]  Armspetersongfzahira 1940  Date ofevaluation: 9/25/2021  Provider: Carine Fernandez Dr       Chief Complaint   Patient presents with    Headache     x 24 days         HISTORY OF PRESENT ILLNESS   (Location/Symptom, Timing/Onset,Context/Setting, Quality, Duration, Modifying Factors, Severity)  Note limiting factors. Christian Stein is a 79 y.o. female who presents to the emergency department headache for 24 days. Pain is all over. Goes from shoulders to diffusly over head and across forehead. Describes as squeezing. Patient will take Tylenol or Motrin the pain will go away but the next day she wakes up with another headache. She denies any fevers chills nausea vomiting diarrhea. Patient was required to have a negative Covid test before her family doctor would see her at the Covid test was negative. She does have associated light sensitivity. Patient denies any changes in her vision any nausea vomiting. She denies any head injury or trauma. HPI    NursingNotes were reviewed. REVIEW OF SYSTEMS    (2-9 systems for level 4, 10 or more for level 5)     Review of Systems   Constitutional: Negative for chills, diaphoresis, fatigue and fever. HENT: Negative for congestion, rhinorrhea and sore throat. Eyes: Negative for photophobia and pain. Respiratory: Negative for cough and shortness of breath. Cardiovascular: Negative for chest pain and palpitations. Gastrointestinal: Negative for abdominal pain, diarrhea, nausea and vomiting. Genitourinary: Negative for dysuria and flank pain. Musculoskeletal: Negative for back pain. Skin: Negative for rash. Neurological: Positive for headaches. Negative for dizziness and light-headedness. Psychiatric/Behavioral: Negative. All other systems reviewed and are negative.       Except as noted above the remainder of the review of systems was reviewed and negative. PAST MEDICAL HISTORY     Past Medical History:   Diagnosis Date    Arthritis     Chronic bronchitis ([address]     Chronic pruritus 1/15/2021    COVID-19 virus infection 1/4/2021    Degenerative disc disease, cervical     Depression     Depression with anxiety     Easy bruising 6/17/2021    Esophagitis     Fibromyositis     GERD (gastroesophageal reflux disease)     Headache(784.0)     Hyperlipidemia     Hypertension     Migraines     Pleural effusion     Right-sided chest wall pain     Type 2 diabetes mellitus without complication ([address] 6/53/0674    no medication per patient         SURGICALHISTORY       Past Surgical History:   Procedure Laterality Date    CHOLECYSTECTOMY  1988    COLONOSCOPY  05/04/2009    COLONOSCOPY N/A 10/24/2019    COLORECTAL CANCER SCREENING, NOT HIGH RISK performed by Lydia Gonzalez MD at [address] Left 04/27/15     [facility] EYE VITRECTOMY W MACULAR EPIRETINAL MEMBRANE    OVARY REMOVAL      UPPER GASTROINTESTINAL ENDOSCOPY  04/16/2013    UPPER GASTROINTESTINAL ENDOSCOPY  10/29/15    rAaseli Tucker MD         CURRENT MEDICATIONS       Discharge Medication List as of 9/25/2021  5:15 PM      CONTINUE these medications which have NOT CHANGED    Details   pantoprazole (PROTONIX) 40 MG tablet TAKE ONE TABLET BY MOUTH EVERY DAY, Disp-180 tablet, R-4Normal      lisinopril (PRINIVIL;ZESTRIL) 20 MG tablet TAKE ONE TABLET BY MOUTH EVERY DAY, Disp-90 tablet, R-4Normal      sertraline (ZOLOFT) 100 MG tablet TAKE ONE TABLET BY MOUTH EVERY DAY, Disp-90 tablet, R-1Normal      Cholecalciferol (VITAMIN D) 50 MCG (2000 UT) CAPS capsule Take 1 capsule by mouth daily, Disp-90 capsule, R-4Normal      blood glucose monitor strips Test up to daily    Type 2 diabetes mellitus without complication, without long-term current use of insulin (HCC) (E11.9), Disp-100 strip, R-12, Normal      !!  Angie Dyer PLUS [id number]) MISC TEST BLOOD SUGAR daily and PRN illness (NIDDM E11.9), Disp-100 each, R-12Normal      !! Lancets MISC DAILY Starting u 6/17/2021, Disp-100 each, R-5, NormalType 2 diabetes mellitus without complication, without long-term current use of insulin (HCC) (E11.9)      sodium chloride (OCEAN) 0.65 % nasal spray 2 sprays by Nasal route every 3 hours, Disp-1 Bottle, R-0Print      acetaminophen (APAP EXTRA STRENGTH) 500 MG tablet Take 2 tablets by mouth every 6 hours as needed for Pain or Fever, Disp-60 tablet, R-0Print      psyllium (METAMUCIL SMOOTH TEXTURE) 28 % packet Take 1 packet by mouth 2 times daily Take with full glass of h20 or juice, Disp-60 packet, R-12Normal      docusate sodium (COLACE) 100 MG capsule Take 1 capsule by mouth daily as needed for Constipation, Disp-90 capsule, R-4Normal      alendronate (FOSAMAX) 70 MG tablet Take 1 tablet by mouth every 7 days, Disp-15 tablet,R-4Normal      meloxicam (MOBIC) 7.5 MG tablet Take 1 tablet by mouth daily, Disp-7 tablet, R-0Print      aspirin 81 MG chewable tablet Take 81 mg by mouth dailyHistorical Med      Multiple Vitamins-Minerals (PRESERVISION AREDS) CAPS Take by mouthHistorical Med      Blood Glucose Monitoring Suppl LONG Use as directed up to TID Type 2 diabetes mellitus without complication, without long-term current use of insulin (HCC) (E11.9), Disp-1 Device, R-0, Normal      Alcohol Swabs PADS Disp-100 each, R-5, NormalUse as directed       !! - Potential duplicate medications found. Please discuss with provider. ALLERGIES     Codeine    FAMILY HISTORY       Family History   Problem Relation Age of Onset    Other Mother         Neurological Disease    Cancer Sister     Diabetes Brother     Cancer Brother         leukemia          SOCIAL HISTORY       Social History     Socioeconomic History    Marital status:       Spouse name: None    Number of children: None    Years of education: None    Highest education General: Lids are normal.      Extraocular Movements: Extraocular movements intact. Conjunctiva/sclera: Conjunctivae normal.      Pupils: Pupils are equal, round, and reactive to light. Visual Fields: Right eye visual fields normal and left eye visual fields normal.   Neck:        Comments: B/l tendersness to trapezius. Cardiovascular:      Rate and Rhythm: Normal rate and regular rhythm. Pulses: Normal pulses. Heart sounds: Normal heart sounds. Pulmonary:      Effort: Pulmonary effort is normal.      Breath sounds: Normal breath sounds. Abdominal:      General: Bowel sounds are normal.      Palpations: Abdomen is soft. Tenderness: There is no abdominal tenderness. Musculoskeletal:      Cervical back: Normal range of motion and neck supple. Lymphadenopathy:      Cervical: No cervical adenopathy. Skin:     General: Skin is warm and dry. Capillary Refill: Capillary refill takes less than 2 seconds. Findings: No rash. Neurological:      General: No focal deficit present. Mental Status: She is alert and oriented to person, place, and time. Comments: No motor or neuro deficits. Bilateral equal hand grasp. No slurred speech. No facial droop. Moves all 4 extremities without difficulty. Equal push and pulls. Psychiatric:         Thought Content: Thought content normal.         Judgment: Judgment normal.         DIAGNOSTIC RESULTS     EKG: All EKG's are interpreted by the Emergency Department Physician who either signs or Co-signsthis chart in the absence of a cardiologist.        RADIOLOGY:   Ferdie Coma such as CT, Ultrasound and MRI are read by the radiologist. Plain radiographic images are visualized and preliminarily interpreted by the emergency physician with the below findings:        Interpretation per the Radiologist below, if available at the time ofthis note:    CT HEAD WO CONTRAST   Final Result      No acute intracranial process. ED BEDSIDE ULTRASOUND:   Performed by ED Physician - none    LABS:  Labs Reviewed   COMPREHENSIVE METABOLIC PANEL - Abnormal; Notable for the following components:       Result Value    Anion Gap 16 (*)     Glucose 124 (*)     All other components within normal limits   CBC WITH AUTO DIFFERENTIAL - Abnormal; Notable for the following components:    RBC 4.00 (*)     Hematocrit 36.2 (*)     All other components within normal limits       All other labs were within normal range or not returned as of this dictation. EMERGENCY DEPARTMENT COURSE and DIFFERENTIAL DIAGNOSIS/MDM:   Vitals:    Vitals:    09/25/21 1431 09/25/21 1547 09/25/21 1712   BP: (!) 163/84 (!) 152/72 (!) 149/71   Pulse: 81 68 73   Resp: 24 18 23   Temp: 98.4 °F (36.9 °C)     TempSrc: Oral     SpO2: 97% 98% 97%   Weight: 118 lb (53.5 kg)     Height: 5' 2\" (1.575 m)             MDM  Patient is nontoxic no acute distress she is afebrile and hemodynamically stable. She has no meningeal signs on exam.  She has no motor or neurological deficits. She has had no injuries or trauma. She is not on any type of blood thinners. Patient was provided with IV fluids, Reglan, Benadryl, and p.o. Tylenol. On reevaluation patient's pain is a 5 out of 10. Patient was then given Toradol and Norflex. Patient has a lot of musculoskeletal tenderness to palpation of her trapezius bilaterally she has no focal deficits and has equal strength bilaterally. She has had no fevers this headache has been ongoing I have low suspicion for acute intracranial pathology. Ct brain shows no acute process. Labs wnl. Most likely tension headache, migraine, or headache of non-emergent etiology. No focal neuro symptoms. Neuro exam is benign. VSS. Unlikely tumor, intracranial bleed, meningitis, temporal arteritis, glaucoma, co poisoning. Headache is non thunderclap.  Head is gradual, non-maximal at onset and similar to headaches in the past. No history of trauma, no anticoagulation. Pt is afebrile, no meningismus, mild photophobia. Pts is <60, no tenderness in temporal area. PERRLA, no eye pain. No other house members with similar symptoms. Pt most likely has benign headache, recommend rest, hydration and otc pain relievers. rec'd close f/u with pcp. Advised to return to the emergency department for any new worsening or concerning symptoms. Patient and family verbalized understanding. Patient stable for discharge. REASSESSMENT          CRITICAL CARE TIME   Total Critical Care time was  minutes, excluding separatelyreportable procedures. There was a high probability ofclinically significant/life threatening deterioration in the patient's condition which required my urgent intervention. CONSULTS:  None    PROCEDURES:  Unless otherwise noted below, none     Procedures    FINAL IMPRESSION      1.  Acute nonintractable headache, unspecified headache type          DISPOSITION/PLAN   DISPOSITION Decision To Discharge 09/25/2021 05:11:34 PM      PATIENT REFERREDTO:  Edward Lagunas MD  [address], [address] [place] [address]  [phone]    Schedule an appointment as soon as possible for a visit in 2 days        DISCHARGEMEDICATIONS:  Discharge Medication List as of 9/25/2021  5:15 PM      START taking these medications    Details   aspirin-acetaminophen-caffeine (EXCEDRIN EXTRA STRENGTH) 250-250-65 MG per tablet Take 1 tablet by mouth every 6 hours as needed for Headaches, Disp-90 tablet, R-3Print                (Please note that portions of this note were completed with a voice recognition program.  Efforts were made to edit the dictations but occasionally words are mis-transcribed.)    Raheel Mcdermott (electronically signed)  Attending Emergency Physician         Malik Ferrer PA-C  09/25/21 4215

## 2021-10-04 ENCOUNTER — TELEPHONE (OUTPATIENT)
Dept: FAMILY MEDICINE CLINIC | Age: 81
End: 2021-10-04

## 2021-10-04 NOTE — TELEPHONE ENCOUNTER
Pt's daughter called the office about her mother's appt at 10:45. I explained to her that it was a virtual appt. Daughter stated that she can't do virtual and only phone. She can't come into the office because pt doesn't drive. Pt's daughter works so it is hard to get off for her. Pt needs a new appt to be scheduled and the appt needs to be a phone call.

## 2021-10-07 ENCOUNTER — CLINICAL DOCUMENTATION (OUTPATIENT)
Dept: SPIRITUAL SERVICES | Age: 81
End: 2021-10-07

## 2021-10-07 NOTE — PROGRESS NOTES
Advance Care Planning   Ambulatory ACP Specialist Patient Outreach    Date:  10/7/2021  ACP Specialist:  Valentin De La Cruz    Outreach call to patient in follow-up to ACP Specialist referral from: Mary Grier MD    [x] PCP  [] Provider   [] Ambulatory Care Management [] Other for Reason:    [x] Advance Directive Assistance  [] Code Status Discussion  [] Complete Portable DNR Order  [] Discuss Goals of Care  [] Complete POST/MOST  [] Early ACP Decision-Making  [] Other    Date Referral Received:9/8/21    Today's Outreach:  [] First   [x] Second  [] Third                               Third outreach made by [x]  phone  [] email [x]   [employer]     Intervention:  [] Spoke with Patient  [x] Left  requesting return call      Outcome: Unable to leave , [facility], No Email on file. Next Step:   [] ACP scheduled conversation  [x] Outreach again in two week               [] Email / Mail ACP Info Sheets  [] Email / Mail Advance Directive            [] Close Referral. Routing closure to referring provider/staff and to ACP Specialist .      Thank you for this referral.

## 2021-10-21 ENCOUNTER — OFFICE VISIT (OUTPATIENT)
Dept: FAMILY MEDICINE CLINIC | Age: 81
End: 2021-10-21
Payer: MEDICARE

## 2021-10-21 VITALS
RESPIRATION RATE: 15 BRPM | OXYGEN SATURATION: 97 % | SYSTOLIC BLOOD PRESSURE: 122 MMHG | BODY MASS INDEX: 22.01 KG/M2 | HEIGHT: 62 IN | DIASTOLIC BLOOD PRESSURE: 72 MMHG | WEIGHT: 119.6 LBS | HEART RATE: 77 BPM | TEMPERATURE: 96.5 F

## 2021-10-21 DIAGNOSIS — I10 ESSENTIAL HYPERTENSION: ICD-10-CM

## 2021-10-21 DIAGNOSIS — G44.52 NEW DAILY PERSISTENT HEADACHE: Primary | ICD-10-CM

## 2021-10-21 DIAGNOSIS — Z23 NEED FOR INFLUENZA VACCINATION: ICD-10-CM

## 2021-10-21 DIAGNOSIS — F41.8 DEPRESSION WITH ANXIETY: ICD-10-CM

## 2021-10-21 DIAGNOSIS — K21.9 GASTROESOPHAGEAL REFLUX DISEASE WITHOUT ESOPHAGITIS: ICD-10-CM

## 2021-10-21 DIAGNOSIS — E11.9 TYPE 2 DIABETES MELLITUS WITHOUT COMPLICATION, WITHOUT LONG-TERM CURRENT USE OF INSULIN (HCC): ICD-10-CM

## 2021-10-21 PROCEDURE — 99215 OFFICE O/P EST HI 40 MIN: CPT | Performed by: FAMILY MEDICINE

## 2021-10-21 PROCEDURE — G8420 CALC BMI NORM PARAMETERS: HCPCS | Performed by: FAMILY MEDICINE

## 2021-10-21 PROCEDURE — 4040F PNEUMOC VAC/ADMIN/RCVD: CPT | Performed by: FAMILY MEDICINE

## 2021-10-21 PROCEDURE — 1090F PRES/ABSN URINE INCON ASSESS: CPT | Performed by: FAMILY MEDICINE

## 2021-10-21 PROCEDURE — G8427 DOCREV CUR MEDS BY ELIG CLIN: HCPCS | Performed by: FAMILY MEDICINE

## 2021-10-21 PROCEDURE — 90674 CCIIV4 VAC NO PRSV 0.5 ML IM: CPT | Performed by: FAMILY MEDICINE

## 2021-10-21 PROCEDURE — G8399 PT W/DXA RESULTS DOCUMENT: HCPCS | Performed by: FAMILY MEDICINE

## 2021-10-21 PROCEDURE — 1123F ACP DISCUSS/DSCN MKR DOCD: CPT | Performed by: FAMILY MEDICINE

## 2021-10-21 PROCEDURE — 1036F TOBACCO NON-USER: CPT | Performed by: FAMILY MEDICINE

## 2021-10-21 PROCEDURE — G0008 ADMIN INFLUENZA VIRUS VAC: HCPCS | Performed by: FAMILY MEDICINE

## 2021-10-21 PROCEDURE — G8482 FLU IMMUNIZE ORDER/ADMIN: HCPCS | Performed by: FAMILY MEDICINE

## 2021-10-21 RX ORDER — VERAPAMIL HYDROCHLORIDE 100 MG/1
1 CAPSULE, EXTENDED RELEASE ORAL DAILY
Qty: 30 CAPSULE | Refills: 3 | Status: SHIPPED | OUTPATIENT
Start: 2021-10-21 | End: 2021-11-22 | Stop reason: SDUPTHER

## 2021-10-21 RX ORDER — PREDNISONE 20 MG/1
40 TABLET ORAL DAILY
Qty: 20 TABLET | Refills: 0 | Status: SHIPPED | OUTPATIENT
Start: 2021-10-21 | End: 2021-10-31

## 2021-10-21 RX ORDER — TRAMADOL HYDROCHLORIDE 50 MG/1
25 TABLET ORAL EVERY 6 HOURS PRN
Qty: 42 TABLET | Refills: 0 | Status: SHIPPED | OUTPATIENT
Start: 2021-10-21 | End: 2021-10-28

## 2021-10-21 NOTE — PROGRESS NOTES
Subjective  Rizwana Johnson, 79 y.o. female presents today with:  Chief Complaint   Patient presents with    Headache     chronic headaches; off and on for a while now     Hip Pain     right hip pain and arm pain that radiates down the arm            HPI    Patient is here for f/u HTN. Is compliant with meds and has no side effects from them. Avoids added salt. Tries to eat healthy. Exercises occasionally. Has no chest pain, shortness of breath, palpitations or edema. Patient is here for DM f/u. Sugars have been moderately well-controlled and patient has not been experiencing hyper- or hypoglycemic symptoms. Compliance with meds is good and there are no side effects. Patient exercises occasionally and tries to eat healthy. Is up to date on foot and eye exams and immunizations. Was seen in ED recently for headache. PA evaluation as follows:    MDM  Patient is nontoxic no acute distress she is afebrile and hemodynamically stable. She has no meningeal signs on exam.  She has no motor or neurological deficits. She has had no injuries or trauma. She is not on any type of blood thinners. Patient was provided with IV fluids, Reglan, Benadryl, and p.o. Tylenol. On reevaluation patient's pain is a 5 out of 10. Patient was then given Toradol and Norflex. Patient has a lot of musculoskeletal tenderness to palpation of her trapezius bilaterally she has no focal deficits and has equal strength bilaterally. She has had no fevers this headache has been ongoing I have low suspicion for acute intracranial pathology. Ct brain shows no acute process. Labs wnl. Most likely tension headache, migraine, or headache of non-emergent etiology. No focal neuro symptoms. Neuro exam is benign. VSS. Unlikely tumor, intracranial bleed, meningitis, temporal arteritis, glaucoma, co poisoning. Headache is non thunderclap.  Head is gradual, non-maximal at onset and similar to headaches in the past. No history of trauma, no anticoagulation. Pt is afebrile, no meningismus, mild photophobia. Pts is <60, no tenderness in temporal area. PERRLA, no eye pain. No other house members with similar symptoms. Pt most likely has benign headache, recommend rest, hydration and otc pain relievers. rec'd close f/u with pcp. Advised to return to the emergency department for any new worsening or concerning symptoms. Patient and family verbalized understanding. Patient stable for discharge. She continues to have severe headaches which she is unable to localize. There is minimal relief between headache episodes. She does complain of watery eyes and runny nose when she has a headache. No vision changes. No dizziness. No other questions and or concerns for today's visit      Review of Systems    See above  Past Medical History:   Diagnosis Date    Arthritis     Chronic bronchitis ([address]     Chronic pruritus 1/15/2021    COVID-19 virus infection 1/4/2021    Degenerative disc disease, cervical     Depression     Depression with anxiety     Easy bruising 6/17/2021    Esophagitis     Fibromyositis     GERD (gastroesophageal reflux disease)     Headache(784.0)     Hyperlipidemia     Hypertension     Migraines     Pleural effusion     Right-sided chest wall pain     Type 2 diabetes mellitus without complication ([address] 3/63/6841    no medication per patient     Past Surgical History:   Procedure Laterality Date    CHOLECYSTECTOMY  1988    COLONOSCOPY  05/04/2009    COLONOSCOPY N/A 10/24/2019    COLORECTAL CANCER SCREENING, NOT HIGH RISK performed by Conner Schwartz MD at [address] Left 04/27/15     [facility]YE VITRECTOMY W MACULAR EPIRETINAL MEMBRANE    OVARY REMOVAL      UPPER GASTROINTESTINAL ENDOSCOPY  04/16/2013    UPPER GASTROINTESTINAL ENDOSCOPY  10/29/15    Arya Delcid MD     Social History     Socioeconomic History    Marital status:       Spouse name: Not on file    Number of children: Not on file    Years of education: Not on file    Highest education level: Not on file   Occupational History    Not on file   Tobacco Use    Smoking status: Never Smoker    Smokeless tobacco: Never Used   Vaping Use    Vaping Use: Never used   Substance and Sexual Activity    Alcohol use: No    Drug use: No    Sexual activity: Not on file   Other Topics Concern    Not on file   Social History Narrative    Not on file     Social Determinants of Health     Financial Resource Strain: Low Risk     Difficulty of Paying Living Expenses: Not hard at all   Food Insecurity: No Food Insecurity    Worried About [address] [employer] [employer] in the Last Year: Never true    [facility] in the Last Year: Never true   Transportation Needs:     Lack of Transportation (Medical):  Lack of Transportation (Non-Medical):    Physical Activity:     Days of Exercise per Week:     Minutes of Exercise per Session:    Stress:     Feeling of Stress :    Social Connections:     Frequency of Communication with Friends and Family:     Frequency of Social Gatherings with Friends and Family:     Attends Congregational Services:     Active Member of Clubs or Organizations:     Attends Club or Organization Meetings:     Marital Status:    Intimate Partner Violence:     Fear of Current or Ex-Partner:     Emotionally Abused:     Physically Abused:     Sexually Abused:      Family History   Problem Relation Age of Onset    Other Mother         Neurological Disease    Cancer Sister     Diabetes Brother     Cancer Brother         leukemia     Allergies   Allergen Reactions    Codeine Shortness Of Breath     tired     Current Outpatient Medications   Medication Sig Dispense Refill    traMADol (ULTRAM) 50 MG tablet Take 0.5 tablets by mouth every 6 hours as needed for Pain for up to 7 days. Intended supply: 7 days.  Take lowest dose possible to manage pain 42 tablet 0    verapamil (VERELAN PM) 100 MG CP24 extended release capsule Take 1 capsule by mouth daily 30 capsule 3    predniSONE (DELTASONE) 20 MG tablet Take 2 tablets by mouth daily for 10 days 20 tablet 0    aspirin-acetaminophen-caffeine (EXCEDRIN EXTRA STRENGTH) 250-250-65 MG per tablet Take 1 tablet by mouth every 6 hours as needed for Headaches 90 tablet 3    pantoprazole (PROTONIX) 40 MG tablet TAKE ONE TABLET BY MOUTH EVERY  tablet 4    lisinopril (PRINIVIL;ZESTRIL) 20 MG tablet TAKE ONE TABLET BY MOUTH EVERY DAY 90 tablet 4    sertraline (ZOLOFT) 100 MG tablet TAKE ONE TABLET BY MOUTH EVERY DAY 90 tablet 1    Cholecalciferol (VITAMIN D) 50 MCG (2000 UT) CAPS capsule Take 1 capsule by mouth daily 90 capsule 4    blood glucose monitor strips Test up to daily    Type 2 diabetes mellitus without complication, without long-term current use of insulin (HCC) (E11.9) 100 strip 12    Lancets (ONETOUCH DELICA PLUS [id number]) MISC TEST BLOOD SUGAR daily and PRN illness (NIDDM E11.9) 100 each 12    Lancets MISC 1 each by Does not apply route daily Type 2 diabetes mellitus without complication, without long-term current use of insulin (HCC) (E11.9) 100 each 5    sodium chloride (OCEAN) 0.65 % nasal spray 2 sprays by Nasal route every 3 hours 1 Bottle 0    acetaminophen (APAP EXTRA STRENGTH) 500 MG tablet Take 2 tablets by mouth every 6 hours as needed for Pain or Fever 60 tablet 0    psyllium (METAMUCIL SMOOTH TEXTURE) 28 % packet Take 1 packet by mouth 2 times daily Take with full glass of h20 or juice 60 packet 12    docusate sodium (COLACE) 100 MG capsule Take 1 capsule by mouth daily as needed for Constipation 90 capsule 4    alendronate (FOSAMAX) 70 MG tablet Take 1 tablet by mouth every 7 days 15 tablet 4    meloxicam (MOBIC) 7.5 MG tablet Take 1 tablet by mouth daily 7 tablet 0    aspirin 81 MG chewable tablet Take 81 mg by mouth daily      Multiple Vitamins-Minerals (PRESERVISION AREDS) CAPS Take by mouth      Blood Glucose care.  I have reviewed the patient's medical history in detail and updated the computerized patient record. More than 50% of the appointment was spent in face-to-face counseling, education and care coordination. Orders Placed This Encounter   Procedures    MRI BRAIN WO CONTRAST     Standing Status:   Future     Standing Expiration Date:   10/21/2022     Order Specific Question:   Reason for exam:     Answer:   Headache; Neurologic deficit, non-traumatic; Altered mental status; Neurologic deficit onset > 24 hours; No known/automatically detected potential contraindications to imaging    MRA HEAD WO CONTRAST     Standing Status:   Future     Standing Expiration Date:   10/21/2022     Order Specific Question:   Reason for exam:     Answer:   Headache; Neurologic deficit, non-traumatic; Altered mental status; Neurologic deficit onset > 24 hours; No known/automatically detected potential contraindications to imaging    MRA NECK W CONTRAST     Standing Status:   Future     Standing Expiration Date:   10/21/2022     Order Specific Question:   Reason for exam:     Answer:   Headache; Neurologic deficit, non-traumatic; Altered mental status; Neurologic deficit onset > 24 hours;  No known/automatically detected potential contraindications to imaging    INFLUENZA, MDCK QUADV, 2 YRS AND OLDER, IM, PF, PREFILL SYR OR SDV, 0.5ML (FLUCELVAX QUADV, PF)    Hemoglobin A1C     Standing Status:   Future     Standing Expiration Date:   4/21/2022    Basic Metabolic Panel     Standing Status:   Future     Standing Expiration Date:   2/21/2022   Kayli Mcclure MD,  Neurology [facility]     Referral Priority:   Routine     Referral Type:   Eval and Treat     Referral Reason:   Specialty Services Required     Referred to Provider:   Gin Booker MD     Requested Specialty:   Neurology     Number of Visits Requested:   1     Orders Placed This Encounter   Medications    traMADol (ULTRAM) 50 MG tablet     Sig: Take 0.5

## 2021-10-21 NOTE — PROGRESS NOTES
Vaccine Information Sheet, \"Influenza - Inactivated\"  given to Hoda Cardona, or parent/legal guardian of  Hoda Cardona and verbalized understanding. Patient responses:    Have you ever had a reaction to a flu vaccine? No  Are you able to eat eggs without adverse effects? Yes  Do you have any current illness? No  Have you ever had Guillian Wynot Syndrome? No    Flu vaccine given per order. Please see immunization tab.

## 2021-11-04 ENCOUNTER — OFFICE VISIT (OUTPATIENT)
Dept: FAMILY MEDICINE CLINIC | Age: 81
End: 2021-11-04
Payer: MEDICARE

## 2021-11-04 VITALS
RESPIRATION RATE: 16 BRPM | DIASTOLIC BLOOD PRESSURE: 80 MMHG | BODY MASS INDEX: 21.35 KG/M2 | HEART RATE: 82 BPM | SYSTOLIC BLOOD PRESSURE: 100 MMHG | OXYGEN SATURATION: 99 % | HEIGHT: 62 IN | TEMPERATURE: 96.9 F | WEIGHT: 116 LBS

## 2021-11-04 DIAGNOSIS — G44.52 NEW DAILY PERSISTENT HEADACHE: Primary | ICD-10-CM

## 2021-11-04 PROCEDURE — G8427 DOCREV CUR MEDS BY ELIG CLIN: HCPCS | Performed by: FAMILY MEDICINE

## 2021-11-04 PROCEDURE — 1123F ACP DISCUSS/DSCN MKR DOCD: CPT | Performed by: FAMILY MEDICINE

## 2021-11-04 PROCEDURE — 99214 OFFICE O/P EST MOD 30 MIN: CPT | Performed by: FAMILY MEDICINE

## 2021-11-04 PROCEDURE — 4040F PNEUMOC VAC/ADMIN/RCVD: CPT | Performed by: FAMILY MEDICINE

## 2021-11-04 PROCEDURE — G8420 CALC BMI NORM PARAMETERS: HCPCS | Performed by: FAMILY MEDICINE

## 2021-11-04 PROCEDURE — 1090F PRES/ABSN URINE INCON ASSESS: CPT | Performed by: FAMILY MEDICINE

## 2021-11-04 PROCEDURE — G8399 PT W/DXA RESULTS DOCUMENT: HCPCS | Performed by: FAMILY MEDICINE

## 2021-11-04 PROCEDURE — G8482 FLU IMMUNIZE ORDER/ADMIN: HCPCS | Performed by: FAMILY MEDICINE

## 2021-11-04 PROCEDURE — 1036F TOBACCO NON-USER: CPT | Performed by: FAMILY MEDICINE

## 2021-11-04 NOTE — PROGRESS NOTES
Subjective  Sendy Aragon, 79 y.o. female presents today with:  Chief Complaint   Patient presents with    Headache     states her headaches have gotten better since last OV            HPI    Was placed on prednisone and verapamil for ddx of giant cell temporal arthritis v. cluster headaches. Headaches have resolved. Sugars got very high and she urinated more while taking prednisone so she stopped the verapamil to which she attributed the problems. MRI/MRA pending. No other questions and or concerns for today's visit      Review of Systems  See above    Past Medical History:   Diagnosis Date    Arthritis     Chronic bronchitis ([address]     Chronic pruritus 1/15/2021    COVID-19 virus infection 1/4/2021    Degenerative disc disease, cervical     Depression     Depression with anxiety     Easy bruising 6/17/2021    Esophagitis     Fibromyositis     GERD (gastroesophageal reflux disease)     Headache(784.0)     Hyperlipidemia     Hypertension     Migraines     Pleural effusion     Right-sided chest wall pain     Type 2 diabetes mellitus without complication ([facility] [address] 1/89/1194    no medication per patient     Past Surgical History:   Procedure Laterality Date    CHOLECYSTECTOMY  1988    COLONOSCOPY  05/04/2009    COLONOSCOPY N/A 10/24/2019    COLORECTAL CANCER SCREENING, NOT HIGH RISK performed by Raymundo Wilcox MD at [address] Left 04/27/15     [facility] VITRECTOMY W MACULAR EPIRETINAL MEMBRANE    OVARY REMOVAL      UPPER GASTROINTESTINAL ENDOSCOPY  04/16/2013    UPPER GASTROINTESTINAL ENDOSCOPY  10/29/15    Remedios Bernal MD     Social History     Socioeconomic History    Marital status:       Spouse name: Not on file    Number of children: Not on file    Years of education: Not on file    Highest education level: Not on file   Occupational History    Not on file   Tobacco Use    Smoking status: Never Smoker    Smokeless tobacco: Never Used   Vaping Use    Vaping Use: Never used   Substance and Sexual Activity    Alcohol use: No    Drug use: No    Sexual activity: Not on file   Other Topics Concern    Not on file   Social History Narrative    Not on file     Social Determinants of Health     Financial Resource Strain: Low Risk     Difficulty of Paying Living Expenses: Not hard at all   Food Insecurity: No Food Insecurity    Worried About Running Out of Food in the Last Year: Never true    [address] in the Last Year: Never true   Transportation Needs:     Lack of Transportation (Medical):      Lack of Transportation (Non-Medical):    Physical Activity:     Days of Exercise per Week:     Minutes of Exercise per Session:    Stress:     Feeling of Stress :    Social Connections:     Frequency of Communication with Friends and Family:     Frequency of Social Gatherings with Friends and Family:     Attends Rastafari Services:     Active Member of Clubs or Organizations:     Attends Club or Organization Meetings:     Marital Status:    Intimate Partner Violence:     Fear of Current or Ex-Partner:     Emotionally Abused:     Physically Abused:     Sexually Abused:      Family History   Problem Relation Age of Onset    Other Mother         Neurological Disease    Cancer Sister     Diabetes Brother     Cancer Brother         leukemia     Allergies   Allergen Reactions    Codeine Shortness Of Breath     tired     Current Outpatient Medications   Medication Sig Dispense Refill    verapamil (VERELAN PM) 100 MG CP24 extended release capsule Take 1 capsule by mouth daily 30 capsule 3    aspirin-acetaminophen-caffeine (EXCEDRIN EXTRA STRENGTH) 250-250-65 MG per tablet Take 1 tablet by mouth every 6 hours as needed for Headaches 90 tablet 3    pantoprazole (PROTONIX) 40 MG tablet TAKE ONE TABLET BY MOUTH EVERY  tablet 4    lisinopril (PRINIVIL;ZESTRIL) 20 MG tablet TAKE ONE TABLET BY MOUTH EVERY DAY 90 tablet 4    sertraline (ZOLOFT) 100 MG tablet TAKE ONE TABLET BY MOUTH EVERY DAY 90 tablet 1    Cholecalciferol (VITAMIN D) 50 MCG (2000 UT) CAPS capsule Take 1 capsule by mouth daily 90 capsule 4    blood glucose monitor strips Test up to daily    Type 2 diabetes mellitus without complication, without long-term current use of insulin (HCC) (E11.9) 100 strip 12    Lancets (ONETOUCH DELICA PLUS [id number]) MISC TEST BLOOD SUGAR daily and PRN illness (NIDDM E11.9) 100 each 12    Lancets MISC 1 each by Does not apply route daily Type 2 diabetes mellitus without complication, without long-term current use of insulin (HCC) (E11.9) 100 each 5    sodium chloride (OCEAN) 0.65 % nasal spray 2 sprays by Nasal route every 3 hours 1 Bottle 0    acetaminophen (APAP EXTRA STRENGTH) 500 MG tablet Take 2 tablets by mouth every 6 hours as needed for Pain or Fever 60 tablet 0    psyllium (METAMUCIL SMOOTH TEXTURE) 28 % packet Take 1 packet by mouth 2 times daily Take with full glass of h20 or juice 60 packet 12    docusate sodium (COLACE) 100 MG capsule Take 1 capsule by mouth daily as needed for Constipation 90 capsule 4    alendronate (FOSAMAX) 70 MG tablet Take 1 tablet by mouth every 7 days 15 tablet 4    meloxicam (MOBIC) 7.5 MG tablet Take 1 tablet by mouth daily 7 tablet 0    aspirin 81 MG chewable tablet Take 81 mg by mouth daily      Multiple Vitamins-Minerals (PRESERVISION AREDS) CAPS Take by mouth      Blood Glucose Monitoring Suppl LONG Use as directed up to TID Type 2 diabetes mellitus without complication, without long-term current use of insulin (HCC) (E11.9) 1 Device 0    Alcohol Swabs PADS Use as directed 100 each 5     No current facility-administered medications for this visit. PMH, Surgical Hx, Family Hx, and Social Hxreviewed and updated. Health Maintenance reviewed.     Objective    Vitals:    11/04/21 1046   BP: 100/80   Site: Left Upper Arm   Position: Sitting   Cuff Size: Medium Adult Pulse: 82   Resp: 16   Temp: 96.9 °F (36.1 °C)   TempSrc: Temporal   SpO2: 99%   Weight: 116 lb (52.6 kg)   Height: 5' 2\" (1.575 m)        Physical Exam  Constitutional:       Appearance: She is well-developed. HENT:      Head: Normocephalic. Eyes:      Conjunctiva/sclera: Conjunctivae normal.   Pulmonary:      Effort: Pulmonary effort is normal.   Neurological:      Mental Status: She is alert and oriented to person, place, and time. Psychiatric:         Behavior: Behavior normal.         Thought Content: Thought content normal.         Judgment: Judgment normal.           Lab Results   Component Value Date    LABA1C 6.1 (H) 06/17/2021    LABA1C 6.2 (H) 09/22/2020    LABA1C 6.0 (H) 10/24/2019     Lab Results   Component Value Date    LABMICR <1.20 12/12/2018    CREATININE 0.89 09/25/2021     Lab Results   Component Value Date    ALT 13 09/25/2021    AST 16 09/25/2021     Lab Results   Component Value Date    CHOL 240 (H) 05/07/2019    TRIG 163 (H) 05/07/2019    HDL 58 05/07/2019    LDLCALC 149 (H) 05/07/2019        Assessment & Plan   Visit Diagnoses and Associated Orders     New daily persistent headache    -  Primary    Improving. Has appt with neuro and for MRI/MRI of head and neck. Resume Verapamil                 Reviewed with the patient: all disease processes, current clinical status, medications, activities and diet.      Side effects, adverse effects of the medication prescribed today, as well as treatment plan/ rationale and result expectations have been discussed with the patient who expresses understanding and desires to proceed.     Close follow up to evaluate treatment results and for coordination of care. I have reviewed the patient's medical history in detail and updated the computerized patient record. More than 50% of the appointment was spent in face-to-face counseling, education and care coordination.     Please note this report has been partially produced using speech recognition software and may contain mistakes related to that system including errors in grammar, punctuation and spelling as well as words and phrases that may seem inappropriate. If there are questions or concerns, please feel free to contact me to clarify. No orders of the defined types were placed in this encounter. No orders of the defined types were placed in this encounter. There are no discontinued medications. Return for please change 12/17 appt to OV. Controlled Substance Monitoring:    Acute and Chronic Pain Monitoring:   RX Monitoring 10/21/2021   Periodic Controlled Substance Monitoring Possible medication side effects, risk of tolerance/dependence & alternative treatments discussed. ;No signs of potential drug abuse or diversion identified. ;Assessed functional status.            Hayder Welsh MD

## 2021-11-22 ENCOUNTER — OFFICE VISIT (OUTPATIENT)
Dept: NEUROLOGY | Age: 81
End: 2021-11-22
Payer: MEDICARE

## 2021-11-22 VITALS
DIASTOLIC BLOOD PRESSURE: 68 MMHG | BODY MASS INDEX: 21.95 KG/M2 | WEIGHT: 120 LBS | HEART RATE: 77 BPM | SYSTOLIC BLOOD PRESSURE: 124 MMHG

## 2021-11-22 DIAGNOSIS — F41.8 DEPRESSION WITH ANXIETY: Primary | Chronic | ICD-10-CM

## 2021-11-22 DIAGNOSIS — G44.52 NEW DAILY PERSISTENT HEADACHE: ICD-10-CM

## 2021-11-22 DIAGNOSIS — R41.3 MEMORY LOSS OF UNKNOWN CAUSE: ICD-10-CM

## 2021-11-22 PROCEDURE — 99204 OFFICE O/P NEW MOD 45 MIN: CPT

## 2021-11-22 RX ORDER — VERAPAMIL HYDROCHLORIDE 100 MG/1
1 CAPSULE, EXTENDED RELEASE ORAL DAILY
Qty: 30 CAPSULE | Refills: 5 | Status: SHIPPED | OUTPATIENT
Start: 2021-11-22
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
## 2021-11-26 ENCOUNTER — HOSPITAL ENCOUNTER (OUTPATIENT)
Dept: MRI IMAGING | Age: 81
Discharge: HOME | End: 2021-11-28
Payer: MEDICARE

## 2021-11-26 ENCOUNTER — APPOINTMENT (OUTPATIENT)
Dept: MRI IMAGING | Age: 81
End: 2021-11-26
Payer: MEDICARE

## 2021-11-26 DIAGNOSIS — G44.52 NEW DAILY PERSISTENT HEADACHE: ICD-10-CM

## 2021-11-26 PROCEDURE — G1010 CDSM STANSON: HCPCS

## 2021-11-26 PROCEDURE — 70547 MR ANGIOGRAPHY NECK W/O DYE: CPT

## 2021-11-30 ENCOUNTER — HOSPITAL ENCOUNTER (OUTPATIENT)
Dept: LAB | Age: 81
Discharge: HOME | End: 2021-11-30
Payer: MEDICARE

## 2021-11-30 LAB
ANION GAP SERPL CALCULATED.3IONS-SCNC: 12 MEQ/L (ref 9–15)
BUN BLDV-MCNC: 29 MG/DL (ref 8–23)
CALCIUM SERPL-MCNC: 9.7 MG/DL (ref 8.5–9.9)
CHLORIDE BLD-SCNC: 102 MEQ/L (ref 95–107)
CO2: 29 MEQ/L (ref 20–31)
CREAT SERPL-MCNC: 0.82 MG/DL (ref 0.5–0.9)
GFR AFRICAN AMERICAN: >60
GFR NON-AFRICAN AMERICAN: >60
GLUCOSE BLD-MCNC: 73 MG/DL (ref 70–99)
POTASSIUM SERPL-SCNC: 4.6 MEQ/L (ref 3.4–4.9)
SODIUM BLD-SCNC: 143 MEQ/L (ref 135–144)

## 2021-11-30 PROCEDURE — 80048 BASIC METABOLIC PNL TOTAL CA: CPT

## 2021-11-30 PROCEDURE — 83036 HEMOGLOBIN GLYCOSYLATED A1C: CPT

## 2021-12-01 LAB — HBA1C MFR BLD: 6.3 % (ref 4.8–5.9)

## 2021-12-08 ENCOUNTER — OFFICE VISIT (OUTPATIENT)
Dept: FAMILY MEDICINE CLINIC | Age: 81
End: 2021-12-08
Payer: MEDICARE

## 2021-12-08 VITALS
WEIGHT: 121 LBS | HEART RATE: 69 BPM | OXYGEN SATURATION: 99 % | BODY MASS INDEX: 22.26 KG/M2 | DIASTOLIC BLOOD PRESSURE: 78 MMHG | HEIGHT: 62 IN | TEMPERATURE: 97.1 F | SYSTOLIC BLOOD PRESSURE: 128 MMHG | RESPIRATION RATE: 15 BRPM

## 2021-12-08 DIAGNOSIS — I65.1 BASILAR ARTERY STENOSIS/OCCLUSION: Chronic | ICD-10-CM

## 2021-12-08 DIAGNOSIS — M81.0 AGE-RELATED OSTEOPOROSIS WITHOUT CURRENT PATHOLOGICAL FRACTURE: Chronic | ICD-10-CM

## 2021-12-08 DIAGNOSIS — I67.82 SUBCORTICAL MICROVASCULAR ISCHEMIC OCCLUSIVE DISEASE: Chronic | ICD-10-CM

## 2021-12-08 PROCEDURE — 1036F TOBACCO NON-USER: CPT | Performed by: FAMILY MEDICINE

## 2021-12-08 PROCEDURE — 1123F ACP DISCUSS/DSCN MKR DOCD: CPT | Performed by: FAMILY MEDICINE

## 2021-12-08 PROCEDURE — 1090F PRES/ABSN URINE INCON ASSESS: CPT | Performed by: FAMILY MEDICINE

## 2021-12-08 PROCEDURE — G8420 CALC BMI NORM PARAMETERS: HCPCS | Performed by: FAMILY MEDICINE

## 2021-12-08 PROCEDURE — 4040F PNEUMOC VAC/ADMIN/RCVD: CPT | Performed by: FAMILY MEDICINE

## 2021-12-08 PROCEDURE — G8482 FLU IMMUNIZE ORDER/ADMIN: HCPCS | Performed by: FAMILY MEDICINE

## 2021-12-08 PROCEDURE — G8427 DOCREV CUR MEDS BY ELIG CLIN: HCPCS | Performed by: FAMILY MEDICINE

## 2021-12-08 PROCEDURE — 99214 OFFICE O/P EST MOD 30 MIN: CPT | Performed by: FAMILY MEDICINE

## 2021-12-08 PROCEDURE — G8399 PT W/DXA RESULTS DOCUMENT: HCPCS | Performed by: FAMILY MEDICINE

## 2021-12-08 RX ORDER — CLOPIDOGREL BISULFATE 75 MG/1
75 TABLET ORAL DAILY
Qty: 90 TABLET | Refills: 1 | Status: SHIPPED | OUTPATIENT
Start: 2021-12-08 | End: 2022-06-06

## 2021-12-08 RX ORDER — ASPIRIN 81 MG/1
81 TABLET ORAL DAILY
Qty: 90 TABLET | Refills: 1 | Status: SHIPPED | OUTPATIENT
Start: 2021-12-08 | End: 2022-06-06

## 2021-12-08 NOTE — PROGRESS NOTES
Subjective  Karin Swenson, 79 y.o. female presents today with:  Chief Complaint   Patient presents with    Migraine     headaches are still there; still pain during the afternoon            HPI  10/21/2021:   Patient is here for f/u HTN. Is compliant with meds and has no side effects from them. Avoids added salt. Tries to eat healthy. Exercises occasionally. Has no chest pain, shortness of breath, palpitations or edema.     Patient is here for DM f/u. Sugars have been moderately well-controlled and patient has not been experiencing hyper- or hypoglycemic symptoms. Compliance with meds is good and there are no side effects. Patient exercises occasionally and tries to eat healthy. Is up to date on foot and eye exams and immunizations.      Was seen in ED recently for headache. PA evaluation as follows:     MDM  Patient is nontoxic no acute distress she is afebrile and hemodynamically stable.  She has no meningeal signs on exam.  She has no motor or neurological deficits.  She has had no injuries or trauma.  She is not on any type of blood thinners.  Patient was provided with IV fluids, Reglan, Benadryl, and p.o. Tylenol.  On reevaluation patient's pain is a 5 out of 10.  Patient was then given Toradol and Norflex.  Patient has a lot of musculoskeletal tenderness to palpation of her trapezius bilaterally she has no focal deficits and has equal strength bilaterally.  She has had no fevers this headache has been ongoing I have low suspicion for acute intracranial pathology. Ct brain shows no acute process. Labs wnl. Most likely tension headache, migraine, or headache of non-emergent etiology. No focal neuro symptoms. Neuro exam is benign. VSS. Unlikely tumor, intracranial bleed, meningitis, temporal arteritis, glaucoma, co poisoning. Headache is non thunderclap. Head is gradual, non-maximal at onset and similar to headaches in the past. No history of trauma, no anticoagulation.  Pt is afebrile, no meningismus, mild photophobia. Pts is <60, no tenderness in temporal area. PERRLA, no eye pain. No other house members with similar symptoms. Pt most likely has benign headache, recommend rest, hydration and otc pain relievers. rec'd close f/u with pcp.  Advised to return to the emergency department for any new worsening or concerning symptoms.  Patient and family verbalized understanding.  Patient stable for discharge.     She continues to have severe headaches which she is unable to localize. There is minimal relief between headache episodes. She does complain of watery eyes and runny nose when she has a headache. No vision changes. No dizziness. 11/04/2021:  Was placed on prednisone and verapamil for ddx of giant cell temporal arthritis v. cluster headaches. Headaches have resolved. Sugars got very high and she urinated more while taking prednisone so she stopped the verapamil to which she attributed the problems. MRI/MRA pending. 12/21/2021:   Mrs. Eloisa Briggs had her MRI and MRA performed on 11/30/2021. See results below. She was seen by Dr. [facility] on 11/22/2021. It was determined that giant cell arteritis was unlikely due to normal ESR, clinical course, no visual findings. She was instructed to continue to take verapamil. Memory issues continue to be of concern. Today she states that she last had severe run of HAs for 4 days before Thanksgiving and since then has had headaches off and on . They are not as strong and do not last as long. SHe is now taking the verapamil daily. She does not that she has eye watering OU occasionally with the headaches. Memory - Went to room 220 to see me today even though I have never worked out of that space.      No other questions and or concerns for today's visit      Review of Systems      Past Medical History:   Diagnosis Date    Arthritis     Basilar artery stenosis/occlusion 12/8/2021    Chronic bronchitis ([address]     Chronic pruritus 1/15/2021    COVID-19 virus infection 1/4/2021    Degenerative disc disease, cervical     Depression     Depression with anxiety     Easy bruising 6/17/2021    Esophagitis     Fibromyositis     GERD (gastroesophageal reflux disease)     Headache(784.0)     Hyperlipidemia     Hypertension     Migraines     Pleural effusion     Right-sided chest wall pain     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    Type 2 diabetes mellitus without complication ([facility] 1/47/8966    no medication per patient     Past Surgical History:   Procedure Laterality Date    CHOLECYSTECTOMY  1988    COLONOSCOPY  05/04/2009    COLONOSCOPY N/A 10/24/2019    COLORECTAL CANCER SCREENING, NOT HIGH RISK performed by Rodrick Rao MD at [address] [place] [address] HISTORY Left 04/27/15     [facility] VITRECTOMY W MACULAR EPIRETINAL MEMBRANE    OVARY REMOVAL      UPPER GASTROINTESTINAL ENDOSCOPY  04/16/2013    UPPER GASTROINTESTINAL ENDOSCOPY  10/29/15    Eliza Burks MD     Social History     Socioeconomic History    Marital status:      Spouse name: Not on file    Number of children: Not on file    Years of education: Not on file    Highest education level: Not on file   Occupational History    Not on file   Tobacco Use    Smoking status: Never Smoker    Smokeless tobacco: Never Used   Vaping Use    Vaping Use: Never used   Substance and Sexual Activity    Alcohol use: No    Drug use: No    Sexual activity: Not on file   Other Topics Concern    Not on file   Social History Narrative    Not on file     Social Determinants of Health     Financial Resource Strain: Low Risk     Difficulty of Paying Living Expenses: Not hard at all   Food Insecurity: No Food Insecurity    Worried About [address] in the Last Year: Never true    [address] in the Last Year: Never true   Transportation Needs:     Lack of Transportation (Medical):  Not on file    Lack of Transportation (Non-Medical):  Not on file   Physical Activity:     Days of Exercise per Week: Not on file    Minutes of Exercise per Session: Not on file   Stress:     Feeling of Stress : Not on file   Social Connections:     Frequency of Communication with Friends and Family: Not on file    Frequency of Social Gatherings with Friends and Family: Not on file    Attends Synagogue Services: Not on file    Active Member of [address] [place] [address] or Organizations: Not on file    Attends Club or Organization Meetings: Not on file    Marital Status: Not on file   Intimate Partner Violence:     Fear of Current or Ex-Partner: Not on file    Emotionally Abused: Not on file    Physically Abused: Not on file    Sexually Abused: Not on file   Housing Stability:     Unable to Pay for Housing in the Last Year: Not on file    Number of [place] in the Last Year: Not on file    Unstable Housing in the Last Year: Not on file     Family History   Problem Relation Age of Onset    Other Mother         Neurological Disease    Cancer Sister     Diabetes Brother     Cancer Brother         leukemia     Allergies   Allergen Reactions    Codeine Shortness Of Breath     tired     Current Outpatient Medications   Medication Sig Dispense Refill    aspirin EC 81 MG EC tablet Take 1 tablet by mouth daily 90 tablet 1    clopidogrel (PLAVIX) 75 MG tablet Take 1 tablet by mouth daily 90 tablet 1    verapamil (VERELAN PM) 100 MG CP24 extended release capsule Take 1 capsule by mouth daily 30 capsule 5    aspirin-acetaminophen-caffeine (EXCEDRIN EXTRA STRENGTH) 250-250-65 MG per tablet Take 1 tablet by mouth every 6 hours as needed for Headaches 90 tablet 3    pantoprazole (PROTONIX) 40 MG tablet TAKE ONE TABLET BY MOUTH EVERY  tablet 4    lisinopril (PRINIVIL;ZESTRIL) 20 MG tablet TAKE ONE TABLET BY MOUTH EVERY DAY 90 tablet 4    sertraline (ZOLOFT) 100 MG tablet TAKE ONE TABLET BY MOUTH EVERY DAY 90 tablet 1    Cholecalciferol (VITAMIN D) 50 MCG (2000 UT) CAPS capsule Take 1 capsule by mouth daily 90 capsule 4    blood glucose monitor strips Test up to daily    Type 2 diabetes mellitus without complication, without long-term current use of insulin (HCC) (E11.9) 100 strip 12    Lancets (ONETOUCH DELICA PLUS HHGAGH92B) MISC TEST BLOOD SUGAR daily and PRN illness (NIDDM E11.9) 100 each 12    Lancets MISC 1 each by Does not apply route daily Type 2 diabetes mellitus without complication, without long-term current use of insulin (HCC) (E11.9) 100 each 5    sodium chloride (OCEAN) 0.65 % nasal spray 2 sprays by Nasal route every 3 hours 1 Bottle 0    acetaminophen (APAP EXTRA STRENGTH) 500 MG tablet Take 2 tablets by mouth every 6 hours as needed for Pain or Fever 60 tablet 0    psyllium (METAMUCIL SMOOTH TEXTURE) 28 % packet Take 1 packet by mouth 2 times daily Take with full glass of h20 or juice 60 packet 12    docusate sodium (COLACE) 100 MG capsule Take 1 capsule by mouth daily as needed for Constipation 90 capsule 4    meloxicam (MOBIC) 7.5 MG tablet Take 1 tablet by mouth daily 7 tablet 0    Multiple Vitamins-Minerals (PRESERVISION AREDS) CAPS Take by mouth      Blood Glucose Monitoring Suppl LONG Use as directed up to TID Type 2 diabetes mellitus without complication, without long-term current use of insulin (HCC) (E11.9) 1 Device 0    Alcohol Swabs PADS Use as directed 100 each 5    alendronate (FOSAMAX) 70 MG tablet Take 1 tablet by mouth every 7 days 15 tablet 4     No current facility-administered medications for this visit. PMH, Surgical Hx, Family Hx, and Social Hxreviewed and updated. Health Maintenance reviewed.     Objective    Vitals:    12/08/21 1121   BP: 128/78   Pulse: 69   Resp: 15   Temp: 97.1 °F (36.2 °C)   TempSrc: Temporal   SpO2: 99%   Weight: 121 lb (54.9 kg)   Height: 5' 2\" (1.575 m)        Physical Exam    Lab Results   Component Value Date    WBC 4.9 09/25/2021    HGB 12.3 09/25/2021    HCT 36.2 (L) 09/25/2021  09/25/2021    CHOL 240 (H) 05/07/2019    TRIG 163 (H) 05/07/2019    HDL 58 05/07/2019    ALT 13 09/25/2021    AST 16 09/25/2021     11/30/2021    K 4.6 11/30/2021     11/30/2021    CREATININE 0.82 11/30/2021    BUN 29 (H) 11/30/2021    CO2 29 11/30/2021    TSH 1.350 06/17/2021    INR 0.9 12/26/2020    LABA1C 6.3 (H) 11/30/2021    LABMICR <1.20 12/12/2018     MRA NECK WO CONTRAST  Narrative: EXAMINATION:  MRA HEAD WO CONTRAST, MRA NECK WO CONTRAST, MRI BRAIN WO CONTRAST    HISTORY:  New daily persistent headache    TECHNIQUE: Routine noncontrast MRI protocol including diffusion and gradient echo images. Intracranial 3D time-of-flight MRA with 2D multiplanar and 3D maximum intensity projections calculated on the imaging workstation under physician supervision. COMPARISON:  CT brain 9/25/2021 and MRI brain 12/19/2018. RESULT:    BRAIN:    Acute Change: There is no evidence of restricted diffusion to suggest an acute infarct. Hemorrhage:   No evidence of prior parenchymal hemorrhage on the gradient echo images. .      Mass Effect / Mass Lesion:   No evidence of an intracranial mass or extra-axial fluid collection. No significant mass effect. Chronic Change:  Scattered patchy and confluent areas of increased T2 and FLAIR signal are present in the supratentorial white matter which is nonspecific but likely represents chronic microvascular ischemia. Parenchyma: There is mild generalized parenchymal volume loss. Ventricles:     Ventriculomegaly corresponds to the degree of parenchymal volume loss. Skull Base:    Hypothalamic and pituitary region are grossly normal.   Craniocervical junction is normal. No significant marrow replacement process. Other:  The visualized paranasal sinuses and mastoid air cells are clear. Status post bilateral lens replacements. .    INTRACRANIAL MRA:     Severe stenosis of the distal basilar artery (series 9 image 79).  Otherwise, the visualized distal vertebral arteries are widely patent. There is partial loss of flow related signal within the distal right M1 and proximal M2 segments, suspicious for   severe stenosis. There is also mild caliber attenuation of the mid to distal left M1 and proximal M2 segments. . Otherwise, the distal ICAs are patent and within normal limits of caliber. The proximal ACAs, and PCAs are patent and within normal limits of caliber and configuration. There is no evidence of aneurysm in the visualized vessels. MR ANGIOGRAM NECK:    Please note, the origins of the great vessels are not imaged on this study. The common carotid arteries, carotid bifurcations, and imaged cervical internal carotid arteries are unremarkable, without irregularity or stenosis. The imaged vertebral arteries are unremarkable without significant hemodynamic stenosis. Impression: MRI brain:  No acute intracranial abnormality; no acute infarct or intracranial mass. Sequela of moderate chronic microvascular ischemia, progressed since 2018. MRA BRAIN:  Severe stenosis of the distal basilar artery and right distal M1 proximal M2, and mild stenosis of the left M1 and M2 segments as described above, likely secondary to atherosclerotic disease. MRA NECK:  Patent neck vessels without evidence of high-grade stenosis. MRA HEAD WO CONTRAST  Narrative: EXAMINATION:  MRA HEAD WO CONTRAST, MRA NECK WO CONTRAST, MRI BRAIN WO CONTRAST    HISTORY:  New daily persistent headache    TECHNIQUE: Routine noncontrast MRI protocol including diffusion and gradient echo images. Intracranial 3D time-of-flight MRA with 2D multiplanar and 3D maximum intensity projections calculated on the imaging workstation under physician supervision. COMPARISON:  CT brain 9/25/2021 and MRI brain 12/19/2018. RESULT:    BRAIN:    Acute Change: There is no evidence of restricted diffusion to suggest an acute infarct.       Hemorrhage:   No evidence of prior ischemia, progressed since 2018. MRA BRAIN:  Severe stenosis of the distal basilar artery and right distal M1 proximal M2, and mild stenosis of the left M1 and M2 segments as described above, likely secondary to atherosclerotic disease. MRA NECK:  Patent neck vessels without evidence of high-grade stenosis. MRI BRAIN WO CONTRAST  Narrative: EXAMINATION:  MRA HEAD WO CONTRAST, MRA NECK WO CONTRAST, MRI BRAIN WO CONTRAST    HISTORY:  New daily persistent headache    TECHNIQUE: Routine noncontrast MRI protocol including diffusion and gradient echo images. Intracranial 3D time-of-flight MRA with 2D multiplanar and 3D maximum intensity projections calculated on the imaging workstation under physician supervision. COMPARISON:  CT brain 9/25/2021 and MRI brain 12/19/2018. RESULT:    BRAIN:    Acute Change: There is no evidence of restricted diffusion to suggest an acute infarct. Hemorrhage:   No evidence of prior parenchymal hemorrhage on the gradient echo images. .      Mass Effect / Mass Lesion:   No evidence of an intracranial mass or extra-axial fluid collection. No significant mass effect. Chronic Change:  Scattered patchy and confluent areas of increased T2 and FLAIR signal are present in the supratentorial white matter which is nonspecific but likely represents chronic microvascular ischemia. Parenchyma: There is mild generalized parenchymal volume loss. Ventricles:     Ventriculomegaly corresponds to the degree of parenchymal volume loss. Skull Base:    Hypothalamic and pituitary region are grossly normal.   Craniocervical junction is normal. No significant marrow replacement process. Other:  The visualized paranasal sinuses and mastoid air cells are clear. Status post bilateral lens replacements. .    INTRACRANIAL MRA:     Severe stenosis of the distal basilar artery (series 9 image 79). Otherwise, the visualized distal vertebral arteries are widely patent.  There is partial loss of flow related signal within the distal right M1 and proximal M2 segments, suspicious for   severe stenosis. There is also mild caliber attenuation of the mid to distal left M1 and proximal M2 segments. . Otherwise, the distal ICAs are patent and within normal limits of caliber. The proximal ACAs, and PCAs are patent and within normal limits of caliber and configuration. There is no evidence of aneurysm in the visualized vessels. MR ANGIOGRAM NECK:    Please note, the origins of the great vessels are not imaged on this study. The common carotid arteries, carotid bifurcations, and imaged cervical internal carotid arteries are unremarkable, without irregularity or stenosis. The imaged vertebral arteries are unremarkable without significant hemodynamic stenosis. Impression: MRI brain:  No acute intracranial abnormality; no acute infarct or intracranial mass. Sequela of moderate chronic microvascular ischemia, progressed since 2018. MRA BRAIN:  Severe stenosis of the distal basilar artery and right distal M1 proximal M2, and mild stenosis of the left M1 and M2 segments as described above, likely secondary to atherosclerotic disease. MRA NECK:  Patent neck vessels without evidence of high-grade stenosis.       Lab Results   Component Value Date    LABA1C 6.3 (H) 11/30/2021    LABA1C 6.1 (H) 06/17/2021    LABA1C 6.2 (H) 09/22/2020     Lab Results   Component Value Date    LABMICR <1.20 12/12/2018    CREATININE 0.82 11/30/2021     Lab Results   Component Value Date    ALT 13 09/25/2021    AST 16 09/25/2021     Lab Results   Component Value Date    CHOL 240 (H) 05/07/2019    TRIG 163 (H) 05/07/2019    HDL 58 05/07/2019    LDLCALC 149 (H) 05/07/2019        Assessment & Plan   Visit Diagnoses and Associated Orders     Basilar artery stenosis/occlusion        aspirin EC 81 MG EC tablet [[id number]]      clopidogrel (PLAVIX) 75 MG tablet [[id number]]           Subcortical microvascular ischemic occlusive disease        aspirin EC 81 MG EC tablet [[id number]]      clopidogrel (PLAVIX) 75 MG tablet [[id number]]                 Reviewed results with patient. Started on Plavix and ASA continued. Advised of signs and sx of stroke and indications for immediate ER evaluation. Has neuro f/u with Dr. Carlin Dutton.      Reviewed with the patient: all disease processes, current clinical status, medications, activities and diet.      Side effects, adverse effects of the medication prescribed today, as well as treatment plan/ rationale and result expectations have been discussed with the patient who expresses understanding and desires to proceed.     Close follow up to evaluate treatment results and for coordination of care. I have reviewed the patient's medical history in detail and updated the computerized patient record. More than 50% of the appointment was spent in face-to-face counseling, education and care coordination. Please note this report has been partially produced using speech recognition software and may contain mistakes related to that system including errors in grammar, punctuation and spelling as well as words and phrases that may seem inappropriate. If there are questions or concerns, please feel free to contact me to clarify. No orders of the defined types were placed in this encounter. Orders Placed This Encounter   Medications    aspirin EC 81 MG EC tablet     Sig: Take 1 tablet by mouth daily     Dispense:  90 tablet     Refill:  1    clopidogrel (PLAVIX) 75 MG tablet     Sig: Take 1 tablet by mouth daily     Dispense:  90 tablet     Refill:  1     Medications Discontinued During This Encounter   Medication Reason    aspirin 81 MG chewable tablet Therapy completed     Return for change next appt to March please for routine f/u.         Controlled Substance Monitoring:    Acute and Chronic Pain Monitoring:   RX Monitoring 10/21/2021   Periodic Controlled Substance Monitoring Possible medication side effects, risk of tolerance/dependence & alternative treatments discussed. ;No signs of potential drug abuse or diversion identified. ;Assessed functional status.            Boo Galvan MD

## 2021-12-09 RX ORDER — ALENDRONATE SODIUM 70 MG/1
70 TABLET ORAL
Qty: 15 TABLET | Refills: 4 | Status: SHIPPED | OUTPATIENT
Start: 2021-12-09

## 2021-12-25 DIAGNOSIS — F41.8 DEPRESSION WITH ANXIETY: Chronic | ICD-10-CM

## 2022-01-05 DIAGNOSIS — F41.8 DEPRESSION WITH ANXIETY: Chronic | ICD-10-CM

## 2022-01-05 RX ORDER — SERTRALINE HYDROCHLORIDE 100 MG/1
TABLET, FILM COATED ORAL
Qty: 90 TABLET | Refills: 1 | Status: SHIPPED | OUTPATIENT
Start: 2022-01-05 | End: 2022-06-30

## 2022-01-05 RX ORDER — SERTRALINE HYDROCHLORIDE 100 MG/1
TABLET, FILM COATED ORAL
Qty: 90 TABLET | Refills: 1 | OUTPATIENT
Start: 2022-01-05

## 2022-01-05 NOTE — TELEPHONE ENCOUNTER
Pharmacy is  requesting medication refill.  Please approve or deny this request.    Rx requested:  Requested Prescriptions     Pending Prescriptions Disp Refills    sertraline (ZOLOFT) 100 MG tablet [Pharmacy Med Name: SERTRALINE HCL 100MG TABS] 90 tablet 1     Sig: TAKE ONE TABLET BY MOUTH EVERY DAY         Last Office Visit:   12/8/2021      Next Visit Date:  Future Appointments   Date Time Provider Marleny Lan   3/18/2022 11:30 AM Eliana Suh MD [facility]   5/23/2022 11:15 AM William Collier MD [facility]

## 2022-01-19 DIAGNOSIS — E11.9 TYPE 2 DIABETES MELLITUS WITHOUT COMPLICATION, WITHOUT LONG-TERM CURRENT USE OF INSULIN (HCC): Chronic | ICD-10-CM

## 2022-01-19 RX ORDER — GLUCOSAMINE HCL/CHONDROITIN SU 500-400 MG
CAPSULE ORAL
Qty: 100 STRIP | Refills: 12 | Status: SHIPPED | OUTPATIENT
Start: 2022-01-19 | End: 2022-06-07 | Stop reason: SDUPTHER

## 2022-01-19 NOTE — TELEPHONE ENCOUNTER
Pharmacy is requesting new prescription be sent over every 6 months due to Medicare coverage     Thank you   Rx request   Requested Prescriptions     Pending Prescriptions Disp Refills    blood glucose monitor strips 100 strip 12     Sig: Test up to daily    Type 2 diabetes mellitus without complication, without long-term current use of insulin ([facility] (E11.9)     LOV 12/8/2021  Next Visit Date:  Future Appointments   Date Time Provider Marleny Lan   3/18/2022 11:30 AM Mynor Hahn MD [facility]   5/23/2022 11:15 AM Igor Carrillo MD [facility]

## 2022-01-29 ENCOUNTER — HOSPITAL ENCOUNTER (EMERGENCY)
Age: 82
Discharge: HOME | End: 2022-01-29
Attending: EMERGENCY MEDICINE
Payer: MEDICARE

## 2022-01-29 ENCOUNTER — APPOINTMENT (OUTPATIENT)
Dept: CT IMAGING | Age: 82
End: 2022-01-29
Payer: MEDICARE

## 2022-01-29 VITALS
OXYGEN SATURATION: 98 % | TEMPERATURE: 98 F | DIASTOLIC BLOOD PRESSURE: 56 MMHG | SYSTOLIC BLOOD PRESSURE: 147 MMHG | HEART RATE: 88 BPM | BODY MASS INDEX: 22.26 KG/M2 | RESPIRATION RATE: 19 BRPM | HEIGHT: 62 IN | WEIGHT: 121 LBS

## 2022-01-29 DIAGNOSIS — R11.2 NON-INTRACTABLE VOMITING WITH NAUSEA, UNSPECIFIED VOMITING TYPE: ICD-10-CM

## 2022-01-29 DIAGNOSIS — R10.9 ABDOMINAL PAIN, UNSPECIFIED ABDOMINAL LOCATION: Primary | ICD-10-CM

## 2022-01-29 DIAGNOSIS — K52.9 ENTERITIS: ICD-10-CM

## 2022-01-29 LAB
ALBUMIN SERPL-MCNC: 4.2 G/DL (ref 3.5–4.6)
ALP BLD-CCNC: 76 U/L (ref 40–130)
ALT SERPL-CCNC: 20 U/L (ref 0–33)
ANION GAP SERPL CALCULATED.3IONS-SCNC: 14 MEQ/L (ref 9–15)
AST SERPL-CCNC: 19 U/L (ref 0–35)
BASOPHILS ABSOLUTE: 0 K/UL (ref 0–0.2)
BASOPHILS RELATIVE PERCENT: 0.2 %
BILIRUB SERPL-MCNC: 0.6 MG/DL (ref 0.2–0.7)
BUN BLDV-MCNC: 28 MG/DL (ref 8–23)
CALCIUM SERPL-MCNC: 9.6 MG/DL (ref 8.5–9.9)
CHLORIDE BLD-SCNC: 100 MEQ/L (ref 95–107)
CO2: 22 MEQ/L (ref 20–31)
CREAT SERPL-MCNC: 0.88 MG/DL (ref 0.5–0.9)
EOSINOPHILS ABSOLUTE: 0 K/UL (ref 0–0.7)
EOSINOPHILS RELATIVE PERCENT: 0.1 %
GFR AFRICAN AMERICAN: >60
GFR NON-AFRICAN AMERICAN: >60
GLOBULIN: 2.9 G/DL (ref 2.3–3.5)
GLUCOSE BLD-MCNC: 177 MG/DL (ref 70–99)
HCT VFR BLD CALC: 39.8 % (ref 37–47)
HEMOGLOBIN: 13.7 G/DL (ref 12–16)
LACTIC ACID: 2.5 MMOL/L (ref 0.5–2.2)
LIPASE: 20 U/L (ref 12–95)
LYMPHOCYTES ABSOLUTE: 0.3 K/UL (ref 1–4.8)
LYMPHOCYTES RELATIVE PERCENT: 3.3 %
MAGNESIUM: 2.1 MG/DL (ref 1.7–2.4)
MCH RBC QN AUTO: 30.5 PG (ref 27–31.3)
MCHC RBC AUTO-ENTMCNC: 34.4 % (ref 33–37)
MCV RBC AUTO: 88.5 FL (ref 82–100)
MONOCYTES ABSOLUTE: 0.4 K/UL (ref 0.2–0.8)
MONOCYTES RELATIVE PERCENT: 4.6 %
NEUTROPHILS ABSOLUTE: 7.5 K/UL (ref 1.4–6.5)
NEUTROPHILS RELATIVE PERCENT: 91.8 %
PDW BLD-RTO: 13.4 % (ref 11.5–14.5)
PLATELET # BLD: 117 K/UL (ref 130–400)
POC CREATININE WHOLE BLOOD: 0.9
POTASSIUM SERPL-SCNC: 4.6 MEQ/L (ref 3.4–4.9)
RBC # BLD: 4.5 M/UL (ref 4.2–5.4)
SARS-COV-2, NAAT: NOT DETECTED
SODIUM BLD-SCNC: 136 MEQ/L (ref 135–144)
TOTAL PROTEIN: 7.1 G/DL (ref 6.3–8)
TROPONIN: <0.01 NG/ML (ref 0–0.01)
WBC # BLD: 8.1 K/UL (ref 4.8–10.8)

## 2022-01-29 PROCEDURE — 80053 COMPREHEN METABOLIC PANEL: CPT

## 2022-01-29 PROCEDURE — 96375 TX/PRO/DX INJ NEW DRUG ADDON: CPT

## 2022-01-29 PROCEDURE — 6370000000 HC RX 637 (ALT 250 FOR IP): Performed by: EMERGENCY MEDICINE

## 2022-01-29 PROCEDURE — 83735 ASSAY OF MAGNESIUM: CPT

## 2022-01-29 PROCEDURE — 6360000004 HC RX CONTRAST MEDICATION: Performed by: EMERGENCY MEDICINE

## 2022-01-29 PROCEDURE — 36415 COLL VENOUS BLD VENIPUNCTURE: CPT

## 2022-01-29 PROCEDURE — 2580000003 HC RX 258: Performed by: EMERGENCY MEDICINE

## 2022-01-29 PROCEDURE — 99284 EMERGENCY DEPT VISIT MOD MDM: CPT

## 2022-01-29 PROCEDURE — 87635 SARS-COV-2 COVID-19 AMP PRB: CPT

## 2022-01-29 PROCEDURE — 74177 CT ABD & PELVIS W/CONTRAST: CPT

## 2022-01-29 PROCEDURE — 2500000003 HC RX 250 WO HCPCS: Performed by: EMERGENCY MEDICINE

## 2022-01-29 PROCEDURE — 83605 ASSAY OF LACTIC ACID: CPT

## 2022-01-29 PROCEDURE — 84484 ASSAY OF TROPONIN QUANT: CPT

## 2022-01-29 PROCEDURE — 6360000002 HC RX W HCPCS: Performed by: EMERGENCY MEDICINE

## 2022-01-29 PROCEDURE — 83690 ASSAY OF LIPASE: CPT

## 2022-01-29 PROCEDURE — 96374 THER/PROPH/DIAG INJ IV PUSH: CPT

## 2022-01-29 PROCEDURE — 85025 COMPLETE CBC W/AUTO DIFF WBC: CPT

## 2022-01-29 RX ORDER — METOCLOPRAMIDE HYDROCHLORIDE 5 MG/ML
10 INJECTION INTRAMUSCULAR; INTRAVENOUS ONCE
Status: COMPLETED | OUTPATIENT
Start: 2022-01-29 | End: 2022-01-29

## 2022-01-29 RX ORDER — METRONIDAZOLE 500 MG/1
500 TABLET ORAL ONCE
Status: COMPLETED | OUTPATIENT
Start: 2022-01-29 | End: 2022-01-29

## 2022-01-29 RX ORDER — CIPROFLOXACIN 500 MG/1
500 TABLET, FILM COATED ORAL 2 TIMES DAILY
Qty: 14 TABLET | Refills: 0 | Status: SHIPPED | OUTPATIENT
Start: 2022-01-29 | End: 2022-02-05

## 2022-01-29 RX ORDER — CIPROFLOXACIN 500 MG/1
500 TABLET, FILM COATED ORAL ONCE
Status: COMPLETED | OUTPATIENT
Start: 2022-01-29 | End: 2022-01-29

## 2022-01-29 RX ORDER — HYDROCODONE BITARTRATE AND ACETAMINOPHEN 5; 325 MG/1; MG/1
1 TABLET ORAL EVERY 6 HOURS PRN
Qty: 10 TABLET | Refills: 0 | Status: SHIPPED | OUTPATIENT
Start: 2022-01-29 | End: 2022-02-01

## 2022-01-29 RX ORDER — METRONIDAZOLE 500 MG/1
500 TABLET ORAL 2 TIMES DAILY
Qty: 14 TABLET | Refills: 0 | Status: SHIPPED | OUTPATIENT
Start: 2022-01-29 | End: 2022-02-05

## 2022-01-29 RX ORDER — METOCLOPRAMIDE 10 MG/1
10 TABLET ORAL 2 TIMES DAILY PRN
Qty: 60 TABLET | Refills: 0 | Status: SHIPPED | OUTPATIENT
Start: 2022-01-29

## 2022-01-29 RX ORDER — FENTANYL CITRATE 50 UG/ML
50 INJECTION, SOLUTION INTRAMUSCULAR; INTRAVENOUS ONCE
Status: COMPLETED | OUTPATIENT
Start: 2022-01-29 | End: 2022-01-29

## 2022-01-29 RX ORDER — DIPHENHYDRAMINE HYDROCHLORIDE 50 MG/ML
25 INJECTION INTRAMUSCULAR; INTRAVENOUS ONCE
Status: COMPLETED | OUTPATIENT
Start: 2022-01-29 | End: 2022-01-29

## 2022-01-29 RX ORDER — 0.9 % SODIUM CHLORIDE 0.9 %
1000 INTRAVENOUS SOLUTION INTRAVENOUS ONCE
Status: COMPLETED | OUTPATIENT
Start: 2022-01-29 | End: 2022-01-29

## 2022-01-29 RX ADMIN — FENTANYL CITRATE 50 MCG: 50 INJECTION INTRAMUSCULAR; INTRAVENOUS at 17:16

## 2022-01-29 RX ADMIN — FAMOTIDINE 20 MG: 10 INJECTION, SOLUTION INTRAVENOUS at 17:16

## 2022-01-29 RX ADMIN — IOPAMIDOL 100 ML: 612 INJECTION, SOLUTION INTRAVENOUS at 18:10

## 2022-01-29 RX ADMIN — SODIUM CHLORIDE 1000 ML: 9 INJECTION, SOLUTION INTRAVENOUS at 17:15

## 2022-01-29 RX ADMIN — METRONIDAZOLE 500 MG: 500 TABLET ORAL at 18:54

## 2022-01-29 RX ADMIN — METOCLOPRAMIDE HYDROCHLORIDE 10 MG: 5 INJECTION INTRAMUSCULAR; INTRAVENOUS at 17:16

## 2022-01-29 RX ADMIN — CIPROFLOXACIN HYDROCHLORIDE 500 MG: 500 TABLET, FILM COATED ORAL at 18:54

## 2022-01-29 RX ADMIN — DIPHENHYDRAMINE HYDROCHLORIDE 25 MG: 50 INJECTION, SOLUTION INTRAMUSCULAR; INTRAVENOUS at 17:16

## 2022-01-29 ASSESSMENT — PAIN DESCRIPTION - DESCRIPTORS: DESCRIPTORS: SHARP

## 2022-01-29 ASSESSMENT — ENCOUNTER SYMPTOMS
NAUSEA: 1
SORE THROAT: 0
COUGH: 0
VOMITING: 1
BACK PAIN: 0
DIARRHEA: 1
ABDOMINAL PAIN: 1
SHORTNESS OF BREATH: 0

## 2022-01-29 ASSESSMENT — PAIN DESCRIPTION - ONSET: ONSET: ON-GOING

## 2022-01-29 ASSESSMENT — PAIN DESCRIPTION - LOCATION: LOCATION: ABDOMEN

## 2022-01-29 ASSESSMENT — PAIN DESCRIPTION - FREQUENCY: FREQUENCY: CONTINUOUS

## 2022-01-29 ASSESSMENT — PAIN DESCRIPTION - PAIN TYPE: TYPE: ACUTE PAIN

## 2022-01-29 ASSESSMENT — PAIN SCALES - GENERAL: PAINLEVEL_OUTOF10: 10

## 2022-01-29 ASSESSMENT — PAIN DESCRIPTION - PROGRESSION: CLINICAL_PROGRESSION: GRADUALLY WORSENING

## 2022-01-29 NOTE — ED PROVIDER NOTES
[address] [facility] ED  eMERGENCYdEPARTMENT eNCOUnter      Pt Name: Arti Cooper  MRN: [id number]  [place] 1940  Date of evaluation: 1/29/2022  Elda Calhoun MD    CHIEF COMPLAINT           HPI  Arti Cooper is a 79 y.o. female per chart review has a h/o DM II, HTN, hpl presents to the ED with ab pain, n/v/d. Pt notes gradual onset, moderate, constant, aching, diffuse ab pain since yesterday. +N/v/d. Pt denies fever, cp, sob, dysuria. ROS  Review of Systems   Constitutional: Negative for activity change, chills and fever. HENT: Negative for ear pain and sore throat. Eyes: Negative for visual disturbance. Respiratory: Negative for cough and shortness of breath. Cardiovascular: Negative for chest pain, palpitations and leg swelling. Gastrointestinal: Positive for abdominal pain, diarrhea, nausea and vomiting. Genitourinary: Negative for dysuria. Musculoskeletal: Negative for back pain. Skin: Negative for rash. Neurological: Negative for dizziness and weakness. Except as noted above the remainder of the review of systems was reviewed and negative.        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    Type 2 diabetes mellitus without complication ([address] 2/13/0529    no medication per patient         SURGICAL HISTORY       Past Surgical History:   Procedure Laterality Date    CHOLECYSTECTOMY  1988    COLONOSCOPY 05/04/2009    COLONOSCOPY N/A 10/24/2019    COLORECTAL CANCER SCREENING, NOT HIGH RISK performed by Mercedes Guo MD at [address] Left 04/27/15     [facility] EYE VITRECTOMY W MACULAR EPIRETINAL MEMBRANE    OVARY REMOVAL      UPPER GASTROINTESTINAL ENDOSCOPY  04/16/2013    UPPER GASTROINTESTINAL ENDOSCOPY  10/29/15    Ace Cain MD         CURRENTMEDICATIONS       Previous Medications    ACETAMINOPHEN (APAP EXTRA STRENGTH) 500 MG TABLET    Take 2 tablets by mouth every 6 hours as needed for Pain or Fever    ALCOHOL SWABS PADS    Use as directed    ALENDRONATE (FOSAMAX) 70 MG TABLET    Take 1 tablet by mouth every 7 days    ASPIRIN EC 81 MG EC TABLET    Take 1 tablet by mouth daily    ASPIRIN-ACETAMINOPHEN-CAFFEINE (EXCEDRIN EXTRA STRENGTH) 250-250-65 MG PER TABLET    Take 1 tablet by mouth every 6 hours as needed for Headaches    BLOOD GLUCOSE MONITOR STRIPS    Test up to daily    Type 2 diabetes mellitus without complication, without long-term current use of insulin (HCC) (E11.9)    BLOOD GLUCOSE MONITORING SUPPL LONG    Use as directed up to TID Type 2 diabetes mellitus without complication, without long-term current use of insulin (HCC) (E11.9)    CHOLECALCIFEROL (VITAMIN D) 50 MCG (2000 UT) CAPS CAPSULE    Take 1 capsule by mouth daily    CLOPIDOGREL (PLAVIX) 75 MG TABLET    Take 1 tablet by mouth daily    DOCUSATE SODIUM (COLACE) 100 MG CAPSULE    Take 1 capsule by mouth daily as needed for Constipation    LANCETS (ONETOUCH DELICA PLUS [id number]) MISC    TEST BLOOD SUGAR daily and PRN illness (NIDDM E11.9)    LANCETS MISC    1 each by Does not apply route daily Type 2 diabetes mellitus without complication, without long-term current use of insulin (HCC) (E11.9)    LISINOPRIL (PRINIVIL;ZESTRIL) 20 MG TABLET    TAKE ONE TABLET BY MOUTH EVERY DAY    MELOXICAM (MOBIC) 7.5 MG TABLET    Take 1 tablet by mouth daily    MULTIPLE VITAMINS-MINERALS (PRESERVISION AREDS) CAPS    Take by mouth    PANTOPRAZOLE (PROTONIX) 40 MG TABLET    TAKE ONE TABLET BY MOUTH EVERY DAY    PSYLLIUM (METAMUCIL SMOOTH TEXTURE) 28 % PACKET    Take 1 packet by mouth 2 times daily Take with full glass of h20 or juice    SERTRALINE (ZOLOFT) 100 MG TABLET    TAKE ONE TABLET BY MOUTH EVERY DAY    SODIUM CHLORIDE (OCEAN) 0.65 % NASAL SPRAY    2 sprays by Nasal route every 3 hours    VERAPAMIL (VERELAN PM) 100 MG CP24 EXTENDED RELEASE CAPSULE    Take 1 capsule by mouth daily       ALLERGIES     Codeine    FAMILY HISTORY       Family History   Problem Relation Age of Onset    Other Mother         Neurological Disease    Cancer Sister     Diabetes Brother     Cancer Brother         leukemia          SOCIAL HISTORY       Social History     Socioeconomic History    Marital status:      Spouse name: None    Number of children: None    Years of education: None    Highest education level: None   Occupational History    None   Tobacco Use    Smoking status: Never Smoker    Smokeless tobacco: Never Used   Vaping Use    Vaping Use: Never used   Substance and Sexual Activity    Alcohol use: No    Drug use: No    Sexual activity: None   Other Topics Concern    None   Social History Narrative    None     Social Determinants of Health     Financial Resource Strain: Low Risk     Difficulty of Paying Living Expenses: Not hard at all   Food Insecurity: No Food Insecurity    Worried About Running Out of Food in the Last Year: Never true    Natalee of Food in the Last Year: Never true   Transportation Needs:     Lack of Transportation (Medical): Not on file    Lack of Transportation (Non-Medical):  Not on file   Physical Activity:     Days of Exercise per Week: Not on file    Minutes of Exercise per Session: Not on file   Stress:     Feeling of Stress : Not on file   Social Connections:     Frequency of Communication with Friends and Family: Not on file    Frequency of Social Gatherings with Friends and Family: Not on file    Attends Cheondoism Services: Not on file    Active Member of Clubs or Organizations: Not on file    Attends Club or Organization Meetings: Not on file    Marital Status: Not on file   Intimate Partner Violence:     Fear of Current or Ex-Partner: Not on file    Emotionally Abused: Not on file    Physically Abused: Not on file    Sexually Abused: Not on file   Housing Stability:     Unable to Pay for Housing in the Last Year: Not on file    Number of [place] in the Last Year: Not on file    Unstable Housing in the Last Year: Not on file         PHYSICAL EXAM       ED Triage Vitals   BP Temp Temp src Pulse Resp SpO2 Height Weight   -- -- -- -- -- -- -- --       Physical Exam  Vitals and nursing note reviewed. Constitutional:       Appearance: She is well-developed. HENT:      Head: Normocephalic. Right Ear: External ear normal.      Left Ear: External ear normal.   Eyes:      Conjunctiva/sclera: Conjunctivae normal.      Pupils: Pupils are equal, round, and reactive to light. Cardiovascular:      Rate and Rhythm: Normal rate and regular rhythm. Heart sounds: Normal heart sounds. Pulmonary:      Effort: Pulmonary effort is normal.      Breath sounds: Normal breath sounds. Abdominal:      General: Bowel sounds are normal. There is no distension. Palpations: Abdomen is soft. Tenderness: There is generalized abdominal tenderness. There is no guarding or rebound. Musculoskeletal:         General: Normal range of motion. Cervical back: Normal range of motion and neck supple. Skin:     General: Skin is warm and dry. Neurological:      Mental Status: She is alert and oriented to person, place, and time. Psychiatric:         Mood and Affect: Mood normal.           MDM  80 yo female presents to the ED with ab pain, n/v/d. Pt is afebrile, hemodynamically stable.   Pt given 1 L NS, IV fentanyl, IV reglan, IV benadryl, IV pepcid with moderate relief. Labs unremarkable. CT AP shows enteritis. Pt reassessed and feels much better. Pt tolerating PO water in the ED. Pt educated about ab pain, n/v/d. Pt given PO cipro and PO flagyl in the ED. Pt given prescription for reglan, norco, cipro, flagyl. Pt will f/u with pcp. Pt understands plan. FINAL IMPRESSION      1. Abdominal pain, unspecified abdominal location    2. Non-intractable vomiting with nausea, unspecified vomiting type    3.  Enteritis          DISPOSITION/PLAN   DISPOSITION Decision To Discharge 01/29/2022 06:41:47 PM        DISCHARGE MEDICATIONS:  [unfilled]         Martin Ugarte MD(electronically signed)  Attending Emergency Physician            Martin Ugarte MD  01/29/22 [id number]

## 2022-02-01 LAB
GFR AFRICAN AMERICAN: >60
GFR NON-AFRICAN AMERICAN: 60
PERFORMED ON: ABNORMAL
POC CREATININE: 0.9 MG/DL (ref 0.6–1.2)
POC SAMPLE TYPE: ABNORMAL

## 2022-02-23 ENCOUNTER — TELEPHONE (OUTPATIENT)
Dept: FAMILY MEDICINE CLINIC | Age: 82
End: 2022-02-23

## 2022-02-23 DIAGNOSIS — N30.00 ACUTE CYSTITIS WITHOUT HEMATURIA: Primary | ICD-10-CM

## 2022-02-23 RX ORDER — SULFAMETHOXAZOLE AND TRIMETHOPRIM 800; 160 MG/1; MG/1
1 TABLET ORAL 2 TIMES DAILY
Qty: 6 TABLET | Refills: 0 | Status: SHIPPED | OUTPATIENT
Start: 2022-02-23 | End: 2022-02-26

## 2022-02-23 NOTE — TELEPHONE ENCOUNTER
Patient daughter calling in stating patient is complaining of urinary frequency and burning with urination     Advised the walk in clinic but daughter thought it would be easier for the patient to call in     Please advise

## 2022-02-23 NOTE — TELEPHONE ENCOUNTER
I am sorry that Anahy Arias is uncomfortable. She may have a urinary tract infection. I have sent a prescription in for Bactrim for 3 days. However, if she is having moderate to severe abdominal pain or flank pain, nausea/vomiting, fevers/chills/sweats, she needs to go to the emergency room for what could be a more severe infection of the kidneys.

## 2022-03-10 DIAGNOSIS — G44.52 NEW DAILY PERSISTENT HEADACHE: ICD-10-CM

## 2022-03-11 RX ORDER — VERAPAMIL HYDROCHLORIDE 100 MG/1
CAPSULE, EXTENDED RELEASE ORAL
Qty: 30 CAPSULE | Refills: 3 | OUTPATIENT
Start: 2022-03-11

## 2022-03-18 ENCOUNTER — OFFICE VISIT (OUTPATIENT)
Dept: FAMILY MEDICINE CLINIC | Age: 82
End: 2022-03-18
Payer: MEDICARE

## 2022-03-18 VITALS
BODY MASS INDEX: 21.71 KG/M2 | RESPIRATION RATE: 17 BRPM | SYSTOLIC BLOOD PRESSURE: 108 MMHG | OXYGEN SATURATION: 97 % | DIASTOLIC BLOOD PRESSURE: 62 MMHG | HEIGHT: 62 IN | TEMPERATURE: 96.3 F | WEIGHT: 118 LBS | HEART RATE: 78 BPM

## 2022-03-18 DIAGNOSIS — M75.41 IMPINGEMENT SYNDROME OF RIGHT SHOULDER: ICD-10-CM

## 2022-03-18 DIAGNOSIS — B34.9 VIRAL ILLNESS: ICD-10-CM

## 2022-03-18 DIAGNOSIS — R41.3 MEMORY LOSS OF UNKNOWN CAUSE: ICD-10-CM

## 2022-03-18 DIAGNOSIS — M19.011 ARTHRITIS OF RIGHT SHOULDER REGION: ICD-10-CM

## 2022-03-18 DIAGNOSIS — I67.82 SUBCORTICAL MICROVASCULAR ISCHEMIC OCCLUSIVE DISEASE: ICD-10-CM

## 2022-03-18 DIAGNOSIS — I65.1 BASILAR ARTERY STENOSIS/OCCLUSION: ICD-10-CM

## 2022-03-18 DIAGNOSIS — M25.511 CHRONIC RIGHT SHOULDER PAIN: ICD-10-CM

## 2022-03-18 DIAGNOSIS — F41.8 DEPRESSION WITH ANXIETY: ICD-10-CM

## 2022-03-18 DIAGNOSIS — E11.9 TYPE 2 DIABETES MELLITUS WITHOUT COMPLICATION, WITHOUT LONG-TERM CURRENT USE OF INSULIN (HCC): Primary | ICD-10-CM

## 2022-03-18 DIAGNOSIS — G44.52 NEW DAILY PERSISTENT HEADACHE: ICD-10-CM

## 2022-03-18 DIAGNOSIS — G89.29 CHRONIC RIGHT SHOULDER PAIN: ICD-10-CM

## 2022-03-18 DIAGNOSIS — I10 ESSENTIAL HYPERTENSION: ICD-10-CM

## 2022-03-18 LAB
INFLUENZA A ANTIBODY: NORMAL
INFLUENZA B ANTIBODY: NORMAL
Lab: NORMAL
PERFORMING INSTRUMENT: NORMAL
QC PASS/FAIL: NORMAL
SARS-COV-2, POC: NORMAL

## 2022-03-18 PROCEDURE — 87804 INFLUENZA ASSAY W/OPTIC: CPT | Performed by: FAMILY MEDICINE

## 2022-03-18 PROCEDURE — G8482 FLU IMMUNIZE ORDER/ADMIN: HCPCS | Performed by: FAMILY MEDICINE

## 2022-03-18 PROCEDURE — G8427 DOCREV CUR MEDS BY ELIG CLIN: HCPCS | Performed by: FAMILY MEDICINE

## 2022-03-18 PROCEDURE — 1090F PRES/ABSN URINE INCON ASSESS: CPT | Performed by: FAMILY MEDICINE

## 2022-03-18 PROCEDURE — 1036F TOBACCO NON-USER: CPT | Performed by: FAMILY MEDICINE

## 2022-03-18 PROCEDURE — 99215 OFFICE O/P EST HI 40 MIN: CPT | Performed by: FAMILY MEDICINE

## 2022-03-18 PROCEDURE — G8399 PT W/DXA RESULTS DOCUMENT: HCPCS | Performed by: FAMILY MEDICINE

## 2022-03-18 PROCEDURE — 4040F PNEUMOC VAC/ADMIN/RCVD: CPT | Performed by: FAMILY MEDICINE

## 2022-03-18 PROCEDURE — 1123F ACP DISCUSS/DSCN MKR DOCD: CPT | Performed by: FAMILY MEDICINE

## 2022-03-18 PROCEDURE — 87426 SARSCOV CORONAVIRUS AG IA: CPT | Performed by: FAMILY MEDICINE

## 2022-03-18 PROCEDURE — G8420 CALC BMI NORM PARAMETERS: HCPCS | Performed by: FAMILY MEDICINE

## 2022-03-18 RX ORDER — BENZONATATE 100 MG/1
100 CAPSULE ORAL 3 TIMES DAILY PRN
Qty: 30 CAPSULE | Refills: 0 | Status: SHIPPED | OUTPATIENT
Start: 2022-03-18 | End: 2022-03-25

## 2022-03-18 NOTE — PROGRESS NOTES
Subjective  Lucina Mcnulty, 79 y.o. female presents today with:  Chief Complaint   Patient presents with    6 Month Follow-Up    Headache     still has her recurring headaches; pain feels like pin needles ; headaches happen more often afternoon     Insomnia           HPI    10/21/2021:   Patient is here for f/u HTN. Is compliant with meds and has no side effects from them. Avoids added salt. Tries to eat healthy. Exercises occasionally. Has no chest pain, shortness of breath, palpitations or edema.     Patient is here for DM f/u. Sugars have been moderately well-controlled and patient has not been experiencing hyper- or hypoglycemic symptoms. Compliance with meds is good and there are no side effects. Patient exercises occasionally and tries to eat healthy. Is up to date on foot and eye exams and immunizations.      Was seen in ED recently for headache.     11/04/2021:  Was placed on prednisone and verapamil for ddx of giant cell temporal arthritis v. cluster headaches. Headaches have resolved. Sugars got very high and she urinated more while taking prednisone so she stopped the verapamil to which she attributed the problems.  MRI/MRA pending.     12/21/2021:   Mrs. Edward Stevens had her MRI and MRA performed on 11/30/2021. See results below. She was seen by Dr. Adeline Mcdonnell on 11/22/2021. It was determined that giant cell arteritis was unlikely due to normal ESR, clinical course, no visual findings. She was instructed to continue to take verapamil. Memory issues continue to be of concern.     Today she states that she last had severe run of HAs for 4 days before Thanksgiving and since then has had headaches off and on . They are not as strong and do not last as long. SHe is now taking the verapamil daily. She does not that she has eye watering OU occasionally with the headaches.      Memory - Went to room 220 to see me today even though I have never worked out of that space.      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 Su who endorses daily Verapamil. States concern for memory and safety. Headaches occurring less frequently. May occur on right side or left side alternately. Was seen in ER for abdominal pain and diarrhea on 01/29/2022. Resolved. Viral sx - sneezing, coughing, fevers, achiness. RIght shoulder pain and decreased  ROM x years. Has tried \"everything you can get at the pharmacy\" and ketorolac topical and nothing works.      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    [facility] bronchitis ([facility] [address]     Chronic pruritus 1/15/2021    COVID-19 virus infection 1/4/2021    Degenerative disc disease, cervical     Depression     Depression with anxiety     Easy bruising 6/17/2021    Esophagitis     Fibromyositis     GERD (gastroesophageal reflux disease)     Headache(784.0)     Hyperlipidemia     Hypertension     Migraines     Pleural effusion     Right-sided chest wall pain     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    Type 2 diabetes mellitus without complication ([facility] [address] 0/34/3233    no medication per patient     Past Surgical History:   Procedure Laterality Date    CHOLECYSTECTOMY  1988    COLONOSCOPY  05/04/2009    COLONOSCOPY N/A 10/24/2019    COLORECTAL CANCER SCREENING, NOT HIGH RISK performed by Vel Castaneda MD at [address] [place] [address] HISTORY Left 04/27/15     [facility] VITRECTOMY W MACULAR EPIRETINAL MEMBRANE    OVARY REMOVAL      UPPER GASTROINTESTINAL ENDOSCOPY  04/16/2013    UPPER GASTROINTESTINAL ENDOSCOPY  10/29/15    Darylene Cunas MD     Social History     Socioeconomic History    Marital status:       Spouse name: Not on file    Number of children: Not on file    Years of education: Not on file    Highest education level: Not on file Occupational History    Not on file   Tobacco Use    Smoking status: Never Smoker    Smokeless tobacco: Never Used   Vaping Use    Vaping Use: Never used   Substance and Sexual Activity    Alcohol use: No    Drug use: No    Sexual activity: Not on file   Other Topics Concern    Not on file   Social History Narrative    Not on file     Social Determinants of Health     Financial Resource Strain: Low Risk     Difficulty of Paying Living Expenses: Not hard at all   Food Insecurity: No Food Insecurity    Worried About [facility] in the Last Year: Never true    [facility] in the Last Year: Never true   Transportation Needs:     Lack of Transportation (Medical): Not on file    Lack of Transportation (Non-Medical):  Not on file   Physical Activity:     Days of Exercise per Week: Not on file    Minutes of Exercise per Session: Not on file   Stress:     Feeling of Stress : Not on file   Social Connections:     Frequency of Communication with Friends and Family: Not on file    Frequency of Social Gatherings with Friends and Family: Not on file    Attends Restoration[employer] Services: Not on file    Active Member of [address] [place] [address] or Organizations: Not on file    Attends Club or Organization Meetings: Not on file    Marital Status: Not on file   Intimate Partner Violence:     Fear of Current or Ex-Partner: Not on file    Emotionally Abused: Not on file    Physically Abused: Not on file    Sexually Abused: Not on file   Housing Stability:     Unable to Pay for Housing in the Last Year: Not on file    Number of [place] in the Last Year: Not on file    Unstable Housing in the Last Year: Not on file     Family History   Problem Relation Age of Onset    Other Mother         Neurological Disease    Cancer Sister     Diabetes Brother     Cancer Brother         leukemia     Allergies   Allergen Reactions    Codeine Shortness Of Breath     tired     Current Outpatient Medications   Medication Sig Dispense Refill    metoclopramide (REGLAN) 10 MG tablet Take 1 tablet by mouth 2 times daily as needed (Nausea/vomiting) 60 tablet 0    blood glucose monitor strips Test up to daily    Type 2 diabetes mellitus without complication, without long-term current use of insulin (HCC) (E11.9) 100 strip 12    sertraline (ZOLOFT) 100 MG tablet TAKE ONE TABLET BY MOUTH EVERY DAY 90 tablet 1    alendronate (FOSAMAX) 70 MG tablet Take 1 tablet by mouth every 7 days 15 tablet 4    aspirin EC 81 MG EC tablet Take 1 tablet by mouth daily 90 tablet 1    clopidogrel (PLAVIX) 75 MG tablet Take 1 tablet by mouth daily 90 tablet 1    verapamil (VERELAN PM) 100 MG CP24 extended release capsule Take 1 capsule by mouth daily 30 capsule 5    lisinopril (PRINIVIL;ZESTRIL) 20 MG tablet TAKE ONE TABLET BY MOUTH EVERY DAY 90 tablet 4    Cholecalciferol (VITAMIN D) 50 MCG (2000 UT) CAPS capsule Take 1 capsule by mouth daily 90 capsule 4    Lancets (ONETOUCH DELICA PLUS [id number]) MISC TEST BLOOD SUGAR daily and PRN illness (NIDDM E11.9) 100 each 12    Lancets MISC 1 each by Does not apply route daily Type 2 diabetes mellitus without complication, without long-term current use of insulin (HCC) (E11.9) 100 each 5    sodium chloride (OCEAN) 0.65 % nasal spray 2 sprays by Nasal route every 3 hours 1 Bottle 0    acetaminophen (APAP EXTRA STRENGTH) 500 MG tablet Take 2 tablets by mouth every 6 hours as needed for Pain or Fever 60 tablet 0    psyllium (METAMUCIL SMOOTH TEXTURE) 28 % packet Take 1 packet by mouth 2 times daily Take with full glass of h20 or juice 60 packet 12    docusate sodium (COLACE) 100 MG capsule Take 1 capsule by mouth daily as needed for Constipation 90 capsule 4    Multiple Vitamins-Minerals (PRESERVISION AREDS) CAPS Take by mouth      Blood Glucose Monitoring Suppl LONG Use as directed up to TID Type 2 diabetes mellitus without complication, without long-term current use of insulin (HCC) (E11.9) 1 Device 0    Alcohol Swabs PADS Use as directed 100 each 5    pantoprazole (PROTONIX) 40 MG tablet TAKE ONE TABLET BY MOUTH EVERY  tablet 4     No current facility-administered medications for this visit. PMH, Surgical Hx, Family Hx, and Social Hxreviewed and updated. Health Maintenance reviewed. Objective    Vitals:    03/18/22 1422   BP: 108/62   Pulse: 78   Resp: 17   Temp: 96.3 °F (35.7 °C)   TempSrc: Temporal   SpO2: 97%   Weight: 118 lb (53.5 kg)   Height: 5' 2\" (1.575 m)        Physical Exam patient with what appears to be significant existential distress regarding chronic pain and multiple medical problems      Lab Results   Component Value Date    LABA1C 6.3 (H) 11/30/2021    LABA1C 6.1 (H) 06/17/2021    LABA1C 6.2 (H) 09/22/2020     Lab Results   Component Value Date    LABMICR <1.20 12/12/2018    CREATININE 0.9 01/29/2022     Lab Results   Component Value Date    ALT 20 01/29/2022    AST 19 01/29/2022     Lab Results   Component Value Date    CHOL 240 (H) 05/07/2019    TRIG 163 (H) 05/07/2019    HDL 58 05/07/2019    LDLCALC 149 (H) 05/07/2019        Assessment & Plan   Visit Diagnoses and Associated Orders     Type 2 diabetes mellitus without complication, without long-term current use of insulin (HCC)    -  Primary    Stable and well-controlled on current medications    Hemoglobin A1C [[id number] Custom]   - Future Order    Amb External Referral To Neurology [[id number] Custom]      Lipid, Fasting [[id number] Custom]   - Future Order         Basilar artery stenosis/occlusion        Amb External Referral To Neurology [[id number] Custom]           Subcortical microvascular ischemic occlusive disease        Referred to neuro. Continue clopidogrel. Unclear if DAPT is indicated. Amb External Referral To Neurology [[id number] Custom]           Memory loss of unknown cause        Unclear etiology. Vague complaint.   Patient experiencing significant consternation regarding health issues    Amb External Referral To Neurology [[id number] Custom]           New daily persistent headache        Amb External Referral To Neurology [[id number] Custom]           Essential hypertension        Stable and well-controlled on current meds         Depression with anxiety        Patient is on sertraline 100 mg and defers adjustment. Depression and anxiety do not appear to be very well controlled. Chronic right shoulder pain        KEVIN Olivia MD, Orthopaedic Surgery [[id number] Custom]      XR SHOULDER RIGHT (MIN 2 VIEWS) [[id number] Custom]   - Future Order         Viral illness        Supportive care measures. Tests for flu and Covid negative. If symptoms worsen then will retest for Covid. May be premature to test today. POCT Influenza A/B [[id number] Custom]      POCT COVID-19, Antigen [[id number] Custom]      benzonatate (TESSALON) 100 MG capsule [988]           Impingement syndrome of right shoulder        KEVIN Olivia MD, Orthopaedic Surgery [[id number] Custom]      XR SHOULDER RIGHT (MIN 2 VIEWS) [[id number] Custom]   - Future Order         Arthritis of right shoulder region        KEVIN Olivia MD, Orthopaedic Surgery [[id number] Custom]      XR SHOULDER RIGHT (MIN 2 VIEWS) [[id number] Custom]   - Future Order           Time spent on appointment included reviewing past laboratory and radiographic results, previous notes, consultations, past procedures, medications, obtaining history and performing physical, formulating mutually agreed upon plan of care with patient - 40 minutes    Reviewed with the patient: all disease processes, current clinical status, medications, activities and diet.      Side effects, adverse effects of the medication prescribed today, as well as treatment plan/ rationale and result expectations have been discussed with the patient who expresses understanding and desires to proceed.     Close follow up to evaluate treatment results and for coordination of care.   I have reviewed the patient's medical history in detail and updated the computerized patient record. More than 50% of the appointment was spent in face-to-face counseling, education and care coordination. Please note this report has been partially produced using speech recognition software and may contain mistakes related to that system including errors in grammar, punctuation and spelling as well as words and phrases that may seem inappropriate. If there are questions or concerns, please feel free to contact me to clarify. Orders Placed This Encounter   Procedures    XR SHOULDER RIGHT (MIN 2 VIEWS)     Standing Status:   Future     Number of Occurrences:   1     Standing Expiration Date:   3/18/2023     Order Specific Question:   Reason for exam:     Answer:   chronic right shoulder pain vague complaints with decreased rom all directions    Hemoglobin A1C     Standing Status:   Future     Standing Expiration Date:   3/18/2023    Lipid, Fasting     Standing Status:   Future     Standing Expiration Date:   3/18/2023    Amb External Referral To Neurology     Referral Priority:   Routine     Referral Type:   Eval and Treat     Referral Reason:   Specialty Services Required     Referred to Provider:   Silverio Wilson MD     Requested Specialty:   Neurology     Number of Visits Requested:   [facility] [place] MD, Orthopaedic Surgery     Referral Priority:   Routine     Referral Type:   Eval and Treat     Referral Reason:   Specialty Services Required     Referred to Provider:   Hardeep Shields MD     Requested Specialty:   Orthopedic Surgery     Number of Visits Requested:   1    POCT Influenza A/B    POCT COVID-19, Antigen     Order Specific Question:   Is this test for diagnosis or screening? Answer:   Diagnosis of ill patient     Order Specific Question:   Symptomatic for COVID-19 as defined by CDC? Answer:   Yes     Order Specific Question:   Date of Symptom Onset     Answer:   3/12/2022     Order Specific Question:   Hospitalized for COVID-19? Answer:   No     Order Specific Question:   Admitted to ICU for COVID-19? Answer:   No     Order Specific Question:   Employed in healthcare setting? Answer:   No     Order Specific Question:   Resident in a congregate (group) care setting? Answer:   No     Order Specific Question:   Pregnant? Answer:   No     Order Specific Question:   Previously tested for COVID-19? Answer:   Yes     Orders Placed This Encounter   Medications    benzonatate (TESSALON) 100 MG capsule     Sig: Take 1 capsule by mouth 3 times daily as needed for Cough     Dispense:  30 capsule     Refill:  0     Medications Discontinued During This Encounter   Medication Reason    meloxicam (MOBIC) 7.5 MG tablet LIST CLEANUP    aspirin-acetaminophen-caffeine (EXCEDRIN EXTRA STRENGTH) 719-497-31 MG per tablet LIST CLEANUP     Return in about 3 months (around 6/18/2022) for f/u. Controlled Substance Monitoring:    Acute and Chronic Pain Monitoring:   RX Monitoring 10/21/2021   Periodic Controlled Substance Monitoring Possible medication side effects, risk of tolerance/dependence & alternative treatments discussed. ;No signs of potential drug abuse or diversion identified. ;Assessed functional status.            Mike Gonzalez MD

## 2022-03-31 ENCOUNTER — HOSPITAL ENCOUNTER (OUTPATIENT)
Dept: GENERAL RADIOLOGY | Age: 82
Discharge: HOME | End: 2022-04-02
Payer: MEDICARE

## 2022-03-31 DIAGNOSIS — G89.29 CHRONIC RIGHT SHOULDER PAIN: ICD-10-CM

## 2022-03-31 DIAGNOSIS — M19.011 ARTHRITIS OF RIGHT SHOULDER REGION: ICD-10-CM

## 2022-03-31 DIAGNOSIS — M75.41 IMPINGEMENT SYNDROME OF RIGHT SHOULDER: ICD-10-CM

## 2022-03-31 DIAGNOSIS — M25.511 CHRONIC RIGHT SHOULDER PAIN: ICD-10-CM

## 2022-03-31 PROCEDURE — 73030 X-RAY EXAM OF SHOULDER: CPT

## 2022-04-07 ENCOUNTER — HOSPITAL ENCOUNTER (OUTPATIENT)
Dept: LAB | Age: 82
Discharge: HOME | End: 2022-04-07
Payer: MEDICARE

## 2022-04-07 LAB
CHOLESTEROL, FASTING: 173 MG/DL (ref 0–199)
HBA1C MFR BLD: 6 % (ref 4.8–5.9)
HDLC SERPL-MCNC: 50 MG/DL (ref 40–59)
LDL CHOLESTEROL CALCULATED: 92 MG/DL (ref 0–129)
TRIGLYCERIDE, FASTING: 154 MG/DL (ref 0–150)

## 2022-04-07 PROCEDURE — 83036 HEMOGLOBIN GLYCOSYLATED A1C: CPT

## 2022-04-07 PROCEDURE — 80061 LIPID PANEL: CPT

## 2022-04-18 ENCOUNTER — HOSPITAL ENCOUNTER (OUTPATIENT)
Dept: PHYSICAL THERAPY | Age: 82
Setting detail: THERAPIES SERIES
Discharge: HOME | End: 2022-04-18
Payer: MEDICARE

## 2022-04-18 PROCEDURE — 97162 PT EVAL MOD COMPLEX 30 MIN: CPT

## 2022-04-18 ASSESSMENT — PAIN DESCRIPTION - DESCRIPTORS: DESCRIPTORS: SHARP;ACHING

## 2022-04-18 ASSESSMENT — PAIN DESCRIPTION - ORIENTATION: ORIENTATION: RIGHT

## 2022-04-18 ASSESSMENT — PAIN DESCRIPTION - PAIN TYPE: TYPE: CHRONIC PAIN

## 2022-04-18 ASSESSMENT — PAIN DESCRIPTION - LOCATION: LOCATION: SHOULDER

## 2022-04-18 ASSESSMENT — PAIN SCALES - GENERAL: PAINLEVEL_OUTOF10: 10

## 2022-04-18 ASSESSMENT — PAIN - FUNCTIONAL ASSESSMENT: PAIN_FUNCTIONAL_ASSESSMENT: PREVENTS OR INTERFERES WITH ALL ACTIVE AND SOME PASSIVE ACTIVITIES

## 2022-04-18 NOTE — PROGRESS NOTES
[facility]   Outpatient Physical Therapy   Evaluation      [x] [address]  [] [facility]            of [address]     Date: 2022  Patient: Delmy Hair  : 1940  ACCT #: [de-identified]  Referring physician: Referring Practitioner: Dr. Shade Wise    Referring Practitioner: Dr. Shade Wise    Referral Date : 22    Diagnosis: ataxic gait, unsteadiness on feet, other abnormalities of gait and mobility    Treatment Diagnosis: imbalance, impaired mobility, gait abnormality, LE weakness  Onset Date:  (past few mos)  PT Insurance Information: medicare, 3scale  Total # of Visits Approved: 60   Total # of Visits to Date: 1  No Show: 0  Canceled Appointment: 0  Progress Note Due Date:   Plan of Care/Certification Expiration Date: 22    History   has a past medical history of Arthritis, Basilar artery stenosis/occlusion, Chronic bronchitis ([facility] [address] Chronic pruritus, COVID-19 virus infection, Degenerative disc disease, cervical, Depression, Depression with anxiety, Easy bruising, Esophagitis, Fibromyositis, GERD (gastroesophageal reflux disease), Headache(784.0), Hyperlipidemia, Hypertension, Migraines, Pleural effusion, Right-sided chest wall pain, Subcortical microvascular ischemic occlusive disease, and Type 2 diabetes mellitus without complication ([address] has a past surgical history that includes Upper gastrointestinal endoscopy (2013); Colonoscopy (2009); Hysterectomy (); Cholecystectomy (); other surgical history (Left, 04/27/15 ); Upper gastrointestinal endoscopy (10/29/15);  Colonoscopy (N/A, 10/24/2019); and Ovary removal.    Allergies   Allergen Reactions    Codeine Shortness Of Breath     tired     Current Outpatient Medications on File Prior to Encounter   Medication Sig Dispense Refill    metoclopramide (REGLAN) 10 MG tablet Take 1 tablet by mouth 2 times daily as needed (Nausea/vomiting) 60 tablet 0    blood glucose monitor strips Test up to daily    Type 2 diabetes mellitus without complication, without long-term current use of insulin (HCC) (E11.9) 100 strip 12    sertraline (ZOLOFT) 100 MG tablet TAKE ONE TABLET BY MOUTH EVERY DAY 90 tablet 1    alendronate (FOSAMAX) 70 MG tablet Take 1 tablet by mouth every 7 days 15 tablet 4    aspirin EC 81 MG EC tablet Take 1 tablet by mouth daily 90 tablet 1    clopidogrel (PLAVIX) 75 MG tablet Take 1 tablet by mouth daily 90 tablet 1    verapamil (VERELAN PM) 100 MG CP24 extended release capsule Take 1 capsule by mouth daily 30 capsule 5    pantoprazole (PROTONIX) 40 MG tablet TAKE ONE TABLET BY MOUTH EVERY  tablet 4    lisinopril (PRINIVIL;ZESTRIL) 20 MG tablet TAKE ONE TABLET BY MOUTH EVERY DAY 90 tablet 4    Cholecalciferol (VITAMIN D) 50 MCG (2000 UT) CAPS capsule Take 1 capsule by mouth daily 90 capsule 4    Lancets (ONETOUCH DELICA PLUS [id number]) MISC TEST BLOOD SUGAR daily and PRN illness (NIDDM E11.9) 100 each 12    Lancets MISC 1 each by Does not apply route daily Type 2 diabetes mellitus without complication, without long-term current use of insulin (HCC) (E11.9) 100 each 5    sodium chloride (OCEAN) 0.65 % nasal spray 2 sprays by Nasal route every 3 hours 1 Bottle 0    acetaminophen (APAP EXTRA STRENGTH) 500 MG tablet Take 2 tablets by mouth every 6 hours as needed for Pain or Fever 60 tablet 0    psyllium (METAMUCIL SMOOTH TEXTURE) 28 % packet Take 1 packet by mouth 2 times daily Take with full glass of h20 or juice 60 packet 12    docusate sodium (COLACE) 100 MG capsule Take 1 capsule by mouth daily as needed for Constipation 90 capsule 4    Multiple Vitamins-Minerals (PRESERVISION AREDS) CAPS Take by mouth      Blood Glucose Monitoring Suppl LONG Use as directed up to TID Type 2 diabetes mellitus without complication, without long-term current use of insulin (HCC) (E11.9) 1 Device 0    Alcohol Swabs PADS Use as directed 100 each 5     No current facility-administered medications on file prior to encounter. Subjective  Subjective: Pt presents with increasing unsteadiness. States worse in am with frequent LOB, usually able to catch self. Reports intermittent dizziness, but denies spinnign sensation. Dtr reports diagnosis of vascular problems affecting memory and lightheadedness. Seeing nuerologiest. Dtr states pt is frequently unsteady, taking multiple steps to regain her balance, unable to safely due stairs, and often holds on for assistance with gait.   Additional Pertinent Hx: DDD, depression, anxiety, DM, HTN, OA  Pain Screening  Patient Currently in Pain: Yes  Pain Assessment  Pain Assessment: 0-10  Pain Level: 10 (has ortho appt tomorrow for shoulder problem)  Pain Type: Chronic pain  Pain Location: Shoulder  Pain Orientation: Right  Pain Descriptors: Sharp,Aching  Functional Pain Assessment: Prevents or interferes with all active and some passive activities    Social/Functional History  Lives With: Alone  Type of Home: Apartment  Home Layout: One level  ADL Assistance: Independent  Homemaking Assistance: Independent (dtr assists with driving, appointments, shopping)  Ambulation Assistance: Independent (without AD, has cane, but does not use)         Objective  Vision  Vision: Impaired  Hearing  Hearing: Exceptions to [facility]  Hearing Exceptions: Hard of hearing/hearing concerns  Observation/Palpation  Posture: Poor (moderate kyphoscoliosis, mild asymmetry)  Observation: facial grimacing with use of Rt shoulder    Strength RLE  Strength RLE: Exception  R Hip Flexion: 3+/5  R Knee Flexion: 4-/5  R Knee Extension: 4+/5  R Ankle Dorsiflexion: 4-/5  Strength LLE  Strength LLE: Exception  L Hip Flexion: 4/5  L Knee Flexion: 4/5  L Knee Extension: 4+/5  L Ankle Dorsiflexion: 4-/5     AROM RLE (degrees)  RLE AROM: WFL     AROM LLE (degrees)  LLE AROM : WFL     AROM RUE (degrees)  RUE General AROM: limited shoulder all planes with increased pain Spine  Cervical: limited all planes                Sensation  Overall Sensation Status: WFL         Transfers  Sit to Stand: Independent  Stand to sit: Independent  Comment: increased time and effort with UE use, mild imbalance after sit to stand  Ambulation 1  Surface: carpet  Device: No Device  Assistance: Supervision,Independent  Quality of Gait: pt with mild/ moderate pathway deviations, very narrow ASHU with increased sway - able to correct with VCs, but does not carry over. Decreased step length and step height. Gait Deviations: Slow Lyn  Distance: 150 ft  Stairs  # Steps : 4  Stairs Height: 6\"  Rails: Bilateral (VCs for hand placement and safety)  Assistance: Stand by assistance  Comment: decreased safety, non-reciprocal pattern     Balance  Posture: Fair  Sitting - Static: Good  Sitting - Dynamic: Good  Standing - Static: Fair  Standing - Dynamic: Fair             Exercises:   Exercises  Exercise 1: ** sink exs  Exercise 2: ** seated open chain  Exercise 3: ** static stand  Exercise 4: ** gait drills  Exercise 5: ** standing wt shifts  Exercise 6: ** 90/180/360  Exercise 7: ** Nu step  Exercise 20: HEP: practice walking with increased ASHU     ** indicates treatment to be performed at future treatment     POST-PAIN    Pain Rating (0-10 pain scale): 10  Location and Pain Description same as pre-pain unless otherwise indicated. Action: [] NA  [x] Call Physician  [] Perform HEP  [] Meds as prescribed     Assessment   Conditions Requiring Skilled Therapeutic Intervention  Body structures, Functions, Activity limitations: Decreased functional mobility ,Decreased strength,Decreased balance,Decreased coordination  Assessment: Pt presents with increasing imbalance and near falls. Pt demonstrates lower extremity weakness, impaired static and dynamic balance. Pt would benefit from Physical Therapy intervention to address deficits and improve functional balance and safety with all functional mobility.   Treatment Diagnosis: imbalance, impaired mobility, gait abnormality, LE weakness  Prognosis: Good  Decision Making: Medium Complexity  History: personal factors: age, lives alone; contributing PMH: DDD, depression, anxiety, DM, HTN, OA  Exam: musculoskeletal, pain and sensory systems involved impacting strength, balance, gait, transfers; Yoselin Sheldon: 31/56  Clinical Presentation: complicated  Barriers to Learning: no  REQUIRES PT FOLLOW UP: Yes    Patient Education   PT Education: Geoffery Schwartz of Care,Home Exercise Program,Disease Specific Education    Pt verbalized/demonstrated good understanding:     [x] Yes         [] No, pt required further clarification. Goals   Patient goal: Patient goals : improve balance, decrease risk for falls    Short term goals  Time Frame for Short term goals: 2 wks  Short term goal 1: Independent with HEP  Short term goal 2: Improve LE strength 1/2 grade to improve balance and gait    Long term goals  Time Frame for Long term goals : 6 wks  Long term goal 1: ambulate 500 ft independent without AD with minimal pathway deviations  Long term goal 2: Up/ down 4-6\" steps with one handrail with supervision  Long term goal 3: Andrade >/= 45/56 to demonstrate improved static balance and decreased risk for falls. Plan:  Plan  Times per week: 2  Plan weeks: 6  Current Treatment Recommendations: Strengthening,Balance Training,Functional Mobility Training,Gait Training,Transfer Training,Stair training,Home Exercise Program,Safety Education & Training,Patient/Caregiver Education & Training       Evaluation and patient rights have been reviewed and patient agrees with plan of care. Yes  [x]  No  []   Explain:     Signature: Electronically signed by Vivi Pal PT on 4/18/22 at 6:12 PM EDT    PT Individual Minutes  Time In: 1055  Time Out: 5219  Minutes: 37  Timed Code Treatment Minutes: 5 Minutes (gait trg)  Procedure Minutes: 32 sean;      Stephan Fall Risk Assessment  Risk Factor Scale  Score History of Falls [x] Yes  [] No 25  0 25   Secondary Diagnosis [] Yes  [x] No 15  0 0   Ambulatory Aid [] Furniture  [] Crutches/cane/walker  [x] None/bedrest/wheelchair/nurse 30  15  0 0   IV/Heparin Lock [] Yes  [x] No 20  0 0   Gait/Transferring [] Impaired  [x] Weak  [] Normal/bedrest/immobile 20  10  0 10   Mental Status [] Forgets limitations  [x] Oriented to own ability 15  0 0      Total:35     Based on the Assessment score: check the appropriate box.   []  No intervention needed   Low =   Score of 0-24  [x]  Use standard prevention interventions Moderate =  Score of 24-44   [x] Discuss fall prevention strategies   [x] Indicate moderate falls risk on eval  []  Use high risk prevention interventions High = Score of 45 and higher   [] Discuss fall prevention strategies   [] Provide supervision during treatment time

## 2022-04-18 NOTE — PLAN OF CARE
[address]     Ph: [phone]  Fax: [phone]    [x] Certification  [] Recertification [x]  Plan of Care  [] Progress Note [] Discharge      To:  Dr. Carmen Garcia       From:  Luz Toro, PT  Patient: Latrice Ansari       : 1940  Diagnosis: ataxic gait, unsteadiness on feet, other abnormalities of gait and mobility     Date: 2022  Treatment Diagnosis: imbalance, impaired mobility, gait abnormality, LE weakness    Plan of Care/Certification Expiration Date: 22  Progress Report Period from:  2022  to 2022    Total # of Visits to Date: 1   No Show: 0    Canceled Appointment: 0     OBJECTIVE:   Short Term Goals - Time Frame for Short term goals: 2 wks    Goals Current/Discharge status  Met   Short term goal 1: Independent with HEP  Need for HEP  [] yes  [] no   Short term goal 2: Improve LE strength 1/2 grade to improve balance and gait  Strength RLE  Strength RLE: Exception  R Hip Flexion: 3+/5  R Knee Flexion: 4-/5  R Knee Extension: 4+/5  R Ankle Dorsiflexion: 4-/5  Strength LLE  Strength LLE: Exception  L Hip Flexion: 4/5  L Knee Flexion: 4/5  L Knee Extension: 4+/5  L Ankle Dorsiflexion: 4-/5     [] yes  [] no     Long Term Goals - Time Frame for Long term goals : 6 wks  Goals Current/ Discharge status Met   Long term goal 1: ambulate 500 ft independent without AD with minimal pathway deviations Ambulates without AD with HHA at times, pt with mild/ moderate pathway deviations, very narrow ASHU with increased sway - able to correct with VCs, but does not carry over. Decreased step length and step height.   Gait Deviations: Slow Lyn [] yes  [] no   Long term goal 2: Up/ down 4-6\" steps with one handrail with supervision Bilateral HRs with tactile/ VCs for hand placement for safety, SBA  [] yes  [] no   Long term goal 3: Andrade >/= 45/56 to demonstrate improved static balance and decreased risk for falls. 31/56 [] yes  [] no       Body structures, Functions, Activity limitations: Decreased functional mobility ,Decreased strength,Decreased balance,Decreased coordination  Assessment: Pt presents with increasing imbalance and near falls. Pt demonstrates lower extremity weakness, impaired static and dynamic balance. Pt would benefit from Physical Therapy intervention to address deficits and improve functional balance and safety with all functional mobility. Prognosis: Good      PT Education: Goals;PT Role;Plan of Care;Home Exercise Program;Disease Specific Education    PLAN: [x] Evaluate and Treat  Frequency/Duration:  Plan  Times per week: 2  Plan weeks: 6  Current Treatment Recommendations: Strengthening,Balance Training,Functional Mobility Training,Gait Training,Transfer Training,Stair training,Home Exercise Program,Safety Education & Training,Patient/Caregiver Education & Training     Precautions:       falls                  Patient Status:[x] Continue/ Initiate plan of Care    [] Discharge PT. Recommend pt continue with HEP. [] Additional visits requested, Please re-certify for additional visits:        Signature: Electronically signed by Claudio Joe PT on 4/18/22 at 6:13 PM EDT      If you have any questions or concerns, please don't hesitate to call. Thank you for your referral.    I have reviewed this plan of care and certify a need for medically necessary rehabilitation services.     Physician Signature:__________________________________________________________  Date:  Please sign and return

## 2022-04-19 ENCOUNTER — OFFICE VISIT (OUTPATIENT)
Dept: ORTHOPEDIC SURGERY | Age: 82
End: 2022-04-19
Payer: MEDICARE

## 2022-04-19 VITALS
OXYGEN SATURATION: 97 % | HEIGHT: 62 IN | HEART RATE: 63 BPM | WEIGHT: 118 LBS | BODY MASS INDEX: 21.71 KG/M2 | TEMPERATURE: 97 F

## 2022-04-19 DIAGNOSIS — M75.81 RIGHT ROTATOR CUFF TENDONITIS: Primary | ICD-10-CM

## 2022-04-19 PROCEDURE — G8399 PT W/DXA RESULTS DOCUMENT: HCPCS | Performed by: ORTHOPAEDIC SURGERY

## 2022-04-19 PROCEDURE — 4040F PNEUMOC VAC/ADMIN/RCVD: CPT | Performed by: ORTHOPAEDIC SURGERY

## 2022-04-19 PROCEDURE — 1036F TOBACCO NON-USER: CPT | Performed by: ORTHOPAEDIC SURGERY

## 2022-04-19 PROCEDURE — G8420 CALC BMI NORM PARAMETERS: HCPCS | Performed by: ORTHOPAEDIC SURGERY

## 2022-04-19 PROCEDURE — G8427 DOCREV CUR MEDS BY ELIG CLIN: HCPCS | Performed by: ORTHOPAEDIC SURGERY

## 2022-04-19 PROCEDURE — 20610 DRAIN/INJ JOINT/BURSA W/O US: CPT | Performed by: ORTHOPAEDIC SURGERY

## 2022-04-19 PROCEDURE — 99204 OFFICE O/P NEW MOD 45 MIN: CPT | Performed by: ORTHOPAEDIC SURGERY

## 2022-04-19 PROCEDURE — 1123F ACP DISCUSS/DSCN MKR DOCD: CPT | Performed by: ORTHOPAEDIC SURGERY

## 2022-04-19 PROCEDURE — 1090F PRES/ABSN URINE INCON ASSESS: CPT | Performed by: ORTHOPAEDIC SURGERY

## 2022-04-19 RX ORDER — ATORVASTATIN CALCIUM 40 MG/1
TABLET, FILM COATED ORAL
COMMUNITY
Start: 2022-04-01 | End: 2022-06-07 | Stop reason: SDUPTHER

## 2022-04-19 RX ORDER — LIDOCAINE HYDROCHLORIDE 10 MG/ML
5 INJECTION, SOLUTION INFILTRATION; PERINEURAL ONCE
Status: COMPLETED | OUTPATIENT
Start: 2022-04-19 | End: 2022-04-19

## 2022-04-19 RX ORDER — METHYLPREDNISOLONE ACETATE 80 MG/ML
80 INJECTION, SUSPENSION INTRA-ARTICULAR; INTRALESIONAL; INTRAMUSCULAR; SOFT TISSUE ONCE
Status: COMPLETED | OUTPATIENT
Start: 2022-04-19 | End: 2022-04-19

## 2022-04-19 RX ADMIN — METHYLPREDNISOLONE ACETATE 80 MG: 80 INJECTION, SUSPENSION INTRA-ARTICULAR; INTRALESIONAL; INTRAMUSCULAR; SOFT TISSUE at 11:37

## 2022-04-19 RX ADMIN — LIDOCAINE HYDROCHLORIDE 5 ML: 10 INJECTION, SOLUTION INFILTRATION; PERINEURAL at 11:35

## 2022-04-19 ASSESSMENT — ENCOUNTER SYMPTOMS: BACK PAIN: 0

## 2022-04-19 NOTE — PROGRESS NOTES
Subjective:      Patient ID: Kayy Herbert is a 79 y.o. female who presents today for:  Chief Complaint   Patient presents with    Shoulder Pain     The patient c/o right shoulder pain. The patient rates her pain as a 10/10 today. HPI  79 yoa RHD female with right shoulder pain for 1 year. Prior h/o shoulder pain in 2018 and saw physician. Went to Physical therapy and got better. Has had injection in past which worked. rEcent onset. No treatment currently. Daughter present acting as . Past Medical History:   Diagnosis Date    Arthritis     Basilar artery stenosis/occlusion 12/8/2021    Chronic bronchitis (HCC)     Chronic pruritus 1/15/2021    COVID-19 virus infection 1/4/2021    Degenerative disc disease, cervical     Depression     Depression with anxiety     Easy bruising 6/17/2021    Esophagitis     Fibromyositis     GERD (gastroesophageal reflux disease)     Headache(784.0)     Hyperlipidemia     Hypertension     Migraines     Pleural effusion     Right-sided chest wall pain     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    Type 2 diabetes mellitus without complication ([facility] [address] 9/21/2537    no medication per patient      Past Surgical History:   Procedure Laterality Date    CHOLECYSTECTOMY  1988    COLONOSCOPY  05/04/2009    COLONOSCOPY N/A 10/24/2019    COLORECTAL CANCER SCREENING, NOT HIGH RISK performed by Hilario Charlton MD at [address] [place] [address] HISTORY Left 04/27/15     [facility] E[facility] VITRECTOMY W MACULAR EPIRETINAL MEMBRANE    OVARY REMOVAL      UPPER GASTROINTESTINAL ENDOSCOPY  04/16/2013    UPPER GASTROINTESTINAL ENDOSCOPY  10/29/15    Frankie Cruz MD     Social History     Socioeconomic History    Marital status:       Spouse name: Not on file    Number of children: Not on file    Years of education: Not on file    Highest education level: Not on file   Occupational History    Not on file   Tobacco Use    Smoking status: Never Smoker    Smokeless tobacco: Never Used   Vaping Use    Vaping Use: Never used   Substance and Sexual Activity    Alcohol use: No    Drug use: No    Sexual activity: Not on file   Other Topics Concern    Not on file   Social History Narrative    Not on file     Social Determinants of Health     Financial Resource Strain: Low Risk     Difficulty of Paying Living Expenses: Not hard at all   Food Insecurity: No Food Insecurity    Worried About [address] in the Last Year: Never true    [facility] in the Last Year: Never true   Transportation Needs:     Lack of Transportation (Medical): Not on file    Lack of Transportation (Non-Medical):  Not on file   Physical Activity:     Days of Exercise per Week: Not on file    Minutes of Exercise per Session: Not on file   Stress:     Feeling of Stress : Not on file   Social Connections:     Frequency of Communication with Friends and Family: Not on file    Frequency of Social Gatherings with Friends and Family: Not on file    Attends Ep[employer] Services: Not on file    Active Member of [address] [place] [address] or Organizations: Not on file    Attends Club or Organization Meetings: Not on file    Marital Status: Not on file   Intimate Partner Violence:     Fear of Current or Ex-Partner: Not on file    Emotionally Abused: Not on file    Physically Abused: Not on file    Sexually Abused: Not on file   Housing Stability:     Unable to Pay for Housing in the Last Year: Not on file    Number of [place] in the Last Year: Not on file    Unstable Housing in the Last Year: Not on file     Family History   Problem Relation Age of Onset    Other Mother         Neurological Disease    Cancer Sister     Diabetes Brother     Cancer Brother         leukemia     Allergies   Allergen Reactions    Codeine Shortness Of Breath     tired     Current Outpatient Medications on File Prior to Visit   Medication Sig Dispense Refill    atorvastatin (LIPITOR) 40 MG tablet       metoclopramide (REGLAN) 10 MG tablet Take 1 tablet by mouth 2 times daily as needed (Nausea/vomiting) 60 tablet 0    blood glucose monitor strips Test up to daily    Type 2 diabetes mellitus without complication, without long-term current use of insulin (HCC) (E11.9) 100 strip 12    sertraline (ZOLOFT) 100 MG tablet TAKE ONE TABLET BY MOUTH EVERY DAY 90 tablet 1    alendronate (FOSAMAX) 70 MG tablet Take 1 tablet by mouth every 7 days 15 tablet 4    aspirin EC 81 MG EC tablet Take 1 tablet by mouth daily 90 tablet 1    clopidogrel (PLAVIX) 75 MG tablet Take 1 tablet by mouth daily 90 tablet 1    verapamil (VERELAN PM) 100 MG CP24 extended release capsule Take 1 capsule by mouth daily 30 capsule 5    pantoprazole (PROTONIX) 40 MG tablet TAKE ONE TABLET BY MOUTH EVERY  tablet 4    lisinopril (PRINIVIL;ZESTRIL) 20 MG tablet TAKE ONE TABLET BY MOUTH EVERY DAY 90 tablet 4    Cholecalciferol (VITAMIN D) 50 MCG (2000 UT) CAPS capsule Take 1 capsule by mouth daily 90 capsule 4    Lancets (ONETOUCH DELICA PLUS [id number]) MISC TEST BLOOD SUGAR daily and PRN illness (NIDDM E11.9) 100 each 12    Lancets MISC 1 each by Does not apply route daily Type 2 diabetes mellitus without complication, without long-term current use of insulin (HCC) (E11.9) 100 each 5    sodium chloride (OCEAN) 0.65 % nasal spray 2 sprays by Nasal route every 3 hours 1 Bottle 0    acetaminophen (APAP EXTRA STRENGTH) 500 MG tablet Take 2 tablets by mouth every 6 hours as needed for Pain or Fever 60 tablet 0    psyllium (METAMUCIL SMOOTH TEXTURE) 28 % packet Take 1 packet by mouth 2 times daily Take with full glass of h20 or juice 60 packet 12    docusate sodium (COLACE) 100 MG capsule Take 1 capsule by mouth daily as needed for Constipation 90 capsule 4    Multiple Vitamins-Minerals (PRESERVISION AREDS) CAPS Take by mouth      Blood Glucose Monitoring Suppl LONG Use as directed up to TID Type 2 diabetes mellitus without complication, without long-term current use of insulin (HCC) (E11.9) 1 Device 0    Alcohol Swabs PADS Use as directed 100 each 5     No current facility-administered medications on file prior to visit. Review of Systems   Constitutional: Negative for fever. Cardiovascular: Negative for leg swelling. Musculoskeletal: Positive for neck pain (trapezial.). Negative for arthralgias, back pain, gait problem and joint swelling. Skin: Negative for rash. Neurological: Negative for weakness and numbness. Objective:   Pulse 63   Temp 97 °F (36.1 °C) (Temporal)   Ht 5' 2\" (1.575 m)   Wt 118 lb (53.5 kg)   LMP  (LMP Unknown)   SpO2 97%   BMI 21.58 kg/m²     Right Shoulder Exam     Tenderness   The patient is experiencing tenderness in the acromion and acromioclavicular joint.     Range of Motion   Active abduction: 90   Passive abduction: 90   Extension: 60   External rotation: 70   Forward flexion: 160   Internal rotation 0 degrees: T12   Internal rotation 90 degrees: 80     Muscle Strength   Abduction: 5/5   Internal rotation: 5/5   External rotation: 5/5   Supraspinatus: 5/5   Subscapularis: 5/5   Biceps: 5/5     Tests   Apprehension: negative  Mccabe test: positive  Cross arm: negative  Impingement: positive  Drop arm: negative  Sulcus: absent    Other   Scars: absent  Sensation: normal  Pulse: present      Left Shoulder Exam     Range of Motion   Active abduction: 90   Passive abduction: 90   Extension: 60   External rotation: 70   Forward flexion: 180   Internal rotation 0 degrees: T12   Internal rotation 90 degrees: 80     Muscle Strength   Abduction: 5/5   Internal rotation: 5/5   External rotation: 5/5   Supraspinatus: 5/5   Subscapularis: 5/5   Biceps: 5/5     Tests   Apprehension: negative  Mccabe test: negative  Cross arm: negative  Impingement: negative  Drop arm: negative  Sulcus: absent    Other   Scars: absent  Sensation: normal  Pulse: present               Radiographs and Laboratory Studies:     Diagnostic Imaging Studies:    Plain x-rays of the right shoulder obtained March 31, 2022. This demonstrates type II acromion. There is narrowing of the acromioclavicular joint. There is no acute abnormality. Laboratory Studies:   Lab Results   Component Value Date    WBC 8.1 01/29/2022    HGB 13.7 01/29/2022    HCT 39.8 01/29/2022    MCV 88.5 01/29/2022     (L) 01/29/2022     No results found for: Fabián Erwin  Lab Results   Component Value Date    CRP 2.4 12/26/2020       Assessment:      Diagnosis Orders   1. Right rotator cuff tendonitis  MN ARTHROCENTESIS ASPIR&/INJ MAJOR JT/BURSA W/O US    methylPREDNISolone acetate (DEPO-MEDROL) injection 80 mg    lidocaine 1 % injection 5 mL    Ambulatory referral to Physical Therapy          Plan:     I discussed the natural history of this with the patient and her daughter. She has done well with therapy and corticosteroid injection remotely. She was given a prescription for physical therapy. In addition, we discussed risk and benefits of corticosteroid injection in the right shoulder subacromial space. They were informed to risk to include but not limited to hyperglycemia, infection, allergic reaction, skin complications. All questions were answered consent was obtained. Procedure: Posterior aspect of the right shoulder was prepped draped the usual sterile fashion. 80 mg methylprednisolone with lidocaine was injected into the right shoulder subacromial space without difficulty. Patient tolerated procedure well.     Orders Placed This Encounter   Procedures    Ambulatory referral to Physical Therapy     Referral Priority:   Routine     Referral Type:   Eval and Treat     Referral Reason:   Specialty Services Required     Requested Specialty:   Physical Therapy     Number of Visits Requested:   1    MN ARTHROCENTESIS ASPIR&/INJ MAJOR JT/BURSA W/O US     Orders Placed This Encounter Medications    methylPREDNISolone acetate (DEPO-MEDROL) injection 80 mg    lidocaine 1 % injection 5 mL       Return if symptoms worsen or fail to improve.       Phil Zavala MD

## 2022-04-19 NOTE — PROGRESS NOTES
Patient's name, date of birth, and allergies have been confirmed. Patient is aware that injection is to be given in Right shoulder. He/she is aware that they will be seeing Dr. Jacquie Paz and the injection will be given by him. A timeout was performed immediately prior to the start of the injection procedure and included the correct patient (two identifiers), correct procedure and correct site(s). Procedure consent and allergies were also verified.

## 2022-04-25 ENCOUNTER — HOSPITAL ENCOUNTER (OUTPATIENT)
Dept: PHYSICAL THERAPY | Age: 82
Setting detail: THERAPIES SERIES
Discharge: HOME | End: 2022-04-25
Payer: MEDICARE

## 2022-04-25 PROCEDURE — 97162 PT EVAL MOD COMPLEX 30 MIN: CPT

## 2022-04-25 PROCEDURE — 97112 NEUROMUSCULAR REEDUCATION: CPT

## 2022-04-25 PROCEDURE — 97110 THERAPEUTIC EXERCISES: CPT

## 2022-04-25 ASSESSMENT — PAIN SCALES - GENERAL: PAINLEVEL_OUTOF10: 0

## 2022-04-25 NOTE — PROGRESS NOTES
[facility]   Outpatient Physical Therapy   Evaluation         [facility]        Physical Therapy: Initial Evaluation    Patient: Claudell Cookey (83 y.o.     female)   Examination Date: 06/15/8598  Plan of Care Certification Period: 2022 to 22  Progress Note Counter: 2/10   :  1940 ;    Confirmed: Yes MRN: [id number]  CSN: [id number]   Insurance: Payor: [address] [place] [address],[address] / Plan: MEDICARE PART A AND B / Product Type: *No Product type* /   Insurance ID: [id number] - (Medicare) Secondary [facility]if applicable): [facility]   Referring Physician: MD Dr. Ying Leiva   PCP: Sj Gandhi MD Visits to Date/Visits Approved:     No Show/Cancelled Appts: 0 / 0     Medical Diagnosis: Ataxic gait [R26.0]  Unsteadiness on feet [R26.81]  Other abnormalities of gait and mobility [R26.89] secondary diagnosis: rotator cuff tendonitis 22  Treatment Diagnosis: imbalance, impaired mobility, gait abnormality, LE weakness, Rt shoulder pain, difficulty with ADLs     PERTINENT MEDICAL HISTORY           Medical History: Chart Reviewed: Yes   Past Medical History:   Diagnosis Date    Arthritis     Basilar artery stenosis/occlusion 2021    Chronic bronchitis ([address]     Chronic pruritus 1/15/2021    COVID-19 virus infection 2021    Degenerative disc disease, cervical     Depression     Depression with anxiety     Easy bruising 2021    Esophagitis     Fibromyositis     GERD (gastroesophageal reflux disease)     Headache(784.0)     Hyperlipidemia     Hypertension     Migraines     Pleural effusion     Right-sided chest wall pain     Subcortical microvascular ischemic occlusive disease 2021    Type 2 diabetes mellitus without complication ([address]     no medication per patient     Surgical History:   Past Surgical History:   Procedure Laterality Date   [address] COLONOSCOPY  2009    COLONOSCOPY N/A 10/24/2019 COLORECTAL CANCER SCREENING, NOT HIGH RISK performed by Cecilia Robles MD at [address] Left 04/27/15     [facility]YE VITRECTOMY W MACULAR EPIRETINAL MEMBRANE    OVARY REMOVAL      UPPER GASTROINTESTINAL ENDOSCOPY  04/16/2013    UPPER GASTROINTESTINAL ENDOSCOPY  10/29/15    Deysi Ferrer MD       Medications:   Current Outpatient Medications:     atorvastatin (LIPITOR) 40 MG tablet, , Disp: , Rfl:     metoclopramide (REGLAN) 10 MG tablet, Take 1 tablet by mouth 2 times daily as needed (Nausea/vomiting), Disp: 60 tablet, Rfl: 0    blood glucose monitor strips, Test up to daily    Type 2 diabetes mellitus without complication, without long-term current use of insulin (HCC) (E11.9), Disp: 100 strip, Rfl: 12    sertraline (ZOLOFT) 100 MG tablet, TAKE ONE TABLET BY MOUTH EVERY DAY, Disp: 90 tablet, Rfl: 1    alendronate (FOSAMAX) 70 MG tablet, Take 1 tablet by mouth every 7 days, Disp: 15 tablet, Rfl: 4    aspirin EC 81 MG EC tablet, Take 1 tablet by mouth daily, Disp: 90 tablet, Rfl: 1    clopidogrel (PLAVIX) 75 MG tablet, Take 1 tablet by mouth daily, Disp: 90 tablet, Rfl: 1    verapamil (VERELAN PM) 100 MG CP24 extended release capsule, Take 1 capsule by mouth daily, Disp: 30 capsule, Rfl: 5    pantoprazole (PROTONIX) 40 MG tablet, TAKE ONE TABLET BY MOUTH EVERY DAY, Disp: 180 tablet, Rfl: 4    lisinopril (PRINIVIL;ZESTRIL) 20 MG tablet, TAKE ONE TABLET BY MOUTH EVERY DAY, Disp: 90 tablet, Rfl: 4    Cholecalciferol (VITAMIN D) 50 MCG (2000 UT) CAPS capsule, Take 1 capsule by mouth daily, Disp: 90 capsule, Rfl: 4    Lancets (ONETOUCH DELICA PLUS TYLBBG41A) MISC, TEST BLOOD SUGAR daily and PRN illness (NIDDM E11.9), Disp: 100 each, Rfl: 12    Lancets MISC, 1 each by Does not apply route daily Type 2 diabetes mellitus without complication, without long-term current use of insulin (HCC) (E11.9), Disp: 100 each, Rfl: 5    sodium chloride (OCEAN) 0.65 % nasal spray, 2 sprays by Nasal route every 3 hours, Disp: 1 Bottle, Rfl: 0    acetaminophen (APAP EXTRA STRENGTH) 500 MG tablet, Take 2 tablets by mouth every 6 hours as needed for Pain or Fever, Disp: 60 tablet, Rfl: 0    psyllium (METAMUCIL SMOOTH TEXTURE) 28 % packet, Take 1 packet by mouth 2 times daily Take with full glass of h20 or juice, Disp: 60 packet, Rfl: 12    docusate sodium (COLACE) 100 MG capsule, Take 1 capsule by mouth daily as needed for Constipation, Disp: 90 capsule, Rfl: 4    Multiple Vitamins-Minerals (PRESERVISION AREDS) CAPS, Take by mouth, Disp: , Rfl:     Blood Glucose Monitoring Suppl LONG, Use as directed up to TID Type 2 diabetes mellitus without complication, without long-term current use of insulin (HCC) (E11.9), Disp: 1 Device, Rfl: 0    Alcohol Swabs PADS, Use as directed, Disp: 100 each, Rfl: 5  Allergies: Codeine      SUBJECTIVE EXAMINATION     History obtained from[de-identified] Patient,Chart Review,      Family/Caregiver Present: Yes (dtr)    Subjective History: Onset Date:  (past few mos)  Subjective: Pt presents with new order for Rt rotator cuff tendinitis. Pt states onset of shoulder pain \"years ago\" with insidious onset. Xrays revealed arthritis. pt received cortisone injection 4/19/22. Pt states good relief with injection. \"No complaints today\". pt states she has functional limitations with shoulder due to pain such as no heavy lifting, difficulty with all household chores. Able to perform self care independently, but increased discomfort.   Additional Pertinent Hx (if applicable): DDD, depression, anxiety, DM, HTN, OA   Prior diagnostic testing[de-identified] X-ray  Previous treatments prior to current episode?: Injections (4/19/22)      Learning/Language: Learning  Will there be a co-learner?: Yes  Co-learner name (if applicable): dtr  What is the preferred language of the co-learner?: English  What is the preferred language of the patient/guardian?: Guyanese  Is an  required?: Yes (pt declined use of ipad, requesting dtr as she feels more comfortable.  Able to understand some English.)  Is an  present?: No     Pain Screening    Pain Screening  Patient Currently in Pain: Yes  Pain Level: 0    Functional Status    Social History:    Social History  Lives With: Alone  Type of Home: Apartment  Home Layout: One level    Occupation/Interests:   Occupation: Retired    Prior Level of Function:     Independent        Current Level of Function:   increased time and effort to complete tasks, increased imbalance, increased pain Rt shoulder      Receives Help From: Family  ADL Assistance: Independent  Homemaking Assistance: Independent (dtr assists with driving, appointments, shopping)  Ambulation Assistance: Independent (without AD, has cane, but does not use)  Active : No         OBJECTIVE EXAMINATION     Restrictions:   Restrictions/Precautions: Fall Risk          Review of Systems:  Vision: Impaired  Hearing: Exceptions to [facility]  Hearing Exceptions: Hard of hearing/hearing concerns  Overall Orientation Status: Within Functional Limits  Follows Commands: Within Functional Limits    Observations:   General Observations  General Observations: Yes  Description: fwd flexed, rounded shoulders    Palpation:   Right Shoulder Palpation: pain anterior shoulder, tenderness, clavicle, spine of scap, UT, AC joint; denies pain delt, biceps, triceps, interscap, infraspinatus    Neuro Screen: Sensation  Overall Sensation Status: WFL    Left AROM  Right AROM         AROM LUE (degrees)  LUE AROM : Exceptions  L Shoulder Flexion 0-180: 165  L Shoulder ABduction 0-180: 161  L Shoulder Int Rotation  0-70: 70  L Shoulder Ext Rotation  0-90: 82    AROM RUE (degrees)  R Shoulder Flexion 0-180: 160  R Shoulder ABduction 0-180: 150  R Shoulder Int Rotation  0-70: 70  R Shoulder Ext Rotation 0-90: 71           Left Strength  Right Strength         Strength LUE  L Shoulder Flexion: 4-/5  L Shoulder Extension: 4/5  L Shoulder ABduction: 4-/5  L Shoulder Internal Rotation: 4-/5  L Shoulder External Rotation: 4-/5  L Elbow Flexion: 4+/5  L Elbow Extension: 4+/5    Strength RUE  R Shoulder Flexion: 4-/5  R Shoulder Extension: 4/5  R Shoulder ABduction: 4-/5  R Shoulder Internal Rotation: 4-/5  R Shoulder External Rotation: 4-/5  R Elbow Flexion: 4+/5  R Elbow Extension: 4+/5     Cervical Assessment     AROM Cervical Spine   Cervical Spine AROM : WFL             Special Tests:   Impingement Tests  Neer Test: R (-)  Hawkins Vladislav Test: R (-)  Drop Arm: R (-)  Empty Can: R (-)    Outcomes Score:  Exam: musculoskeletal, pain and sensory systems involved impacting strength, ROM, ADLs     Treatment:    Exercises:   Exercises  Exercise 1: sink exs x10  Exercise 3: static stand 30 sec ea: WBOS EO/ EC, NBOS EO/ EC - min A with eyes closed with NBOS, CGA with eyes closed with WBOS, SBA for eyes open  Exercise 4: gait drills march, lateral, retro at // bars, min A with retro to avoid LOB  Exercise 5: standing wt shifts AP and lateral, 1 min A to correct posterior LOB  Exercise 7: Nu step L2 x5 min, seat 4  Exercise 9: shrugs and retraction x10  Exercise 10: ** pulleys  Exercise 11: ** wand exs  Exercise 12: ** shldr isometrics  Exercise 20: HEP: sink exs     Manual:  Manual Therapy  Soft Tissue Mobilizaton: ** UT  Other: ** Rt shoulder gentle PROM  *Indicates exercise,modality, or manual techniques to be initiated when appropriate       ASSESSMENT     Impression: Assessment: Pt presents with new order for Rt rotator cuff tendinitis. Pt states onset of shoulder pain \"years ago\" with insidious onset. Xrays revealed arthritis. pt received cortisone injection 4/19/22. Pt states good relief with injection. Pt demonstrates functional limitations with shoulder due to pain such as unable to do heavy lifting, difficulty with all household chores. Able to perform self care independently with increased discomfort.  Pt with generalized weakness in bilateral shoulders. Mildly limited Rt shoulder AROM compared to Lt. Mild pain with palpation anterior shoulder. No pain with palpation musculature. Pt with (-) impingement tests. Initiated ther ex with increased discomfort Rt shoulder rolls. Initiated ther ex for previous diagnosis of gait impairment and imbalance. Pt with good tolerance to that treatment. Would benefit from skilled PT to address Rt shoulder and continue to address deficits from initial diagnosis. Body Structures, Functions, Activity Limitations Requiring Skilled Therapeutic Intervention: Decreased functional mobility ,Decreased strength,Decreased balance,Decreased coordination,Decreased ROM,Decreased ADL status,Decreased tolerance to work activity    Statement of Medical Necessity: Physical Therapy is both indicated and medically necessary as outlined in the POC to increase the likelihood of meeting the functionally related goals stated below. Patient's Activity Tolerance: Patient tolerated evaluation without incident      Patient's rehabilitation potential/prognosis is considered to be: Good    Factors which may impact rehabilitation potential include:       Patient Education: [facility],Home Exercise Program,Disease Specific Education      GOALS   Patient Goal(s):      Short Term Goals Completed by 2 wks Goal Status   Independent with HEP In progress   Improve LE strength 1/2 grade to improve balance and gait In progress   Increase Rt shoulder ROM 5-10 degrees to improve ease with all ADLs New                   Long Term Goals Completed by 6 wks Goal Status   ambulate 500 ft independent without AD with minimal pathway deviations In progress   Up/ down 4-6\" steps with one handrail with supervision In progress   Andrade >/= 45/56 to demonstrate improved static balance and decreased risk for falls. In progress   Pt will report 50% reduction in Rt UE symptoms with improved ease with housekeeping activities. TREATMENT PLAN            Treatment may include any combination of the following: Strengthening,ROM,Transfer training,Functional mobility training,Balance training,Gait training,Stair training,Neuromuscular re-education,Home exercise program,Safety education & training,Patient/Caregiver education & training,Equipment evaluation, education, & procurement,Modalities     Frequency / Duration:  Patient to be seen 1-2 times per week for 6 weeks  Plan Comment:    frequency dependent upon pt and dtr's schedule          Eval Complexity:   History: Personal Factors and/or Comorbidities Impacting POC: Medium  History: personal factors: lives alone, age, pain; contributing PMH: DDD, depression, anxiety, DM, HTN, OA  Examination of body system(s) including body structures and functions, activity limitations, and/or participation restrictions: Medium  Exam: musculoskeletal, pain and sensory systems involved impacting strength, ROM, ADLs  Clinical Presentation: Medium  Clinical Presentation: evolving with increasing pain over past few mos  Clinical Decision Making : Medium Complexity    POST-PAIN     Pain Rating (0-10 pain scale):  2 /10  Location and pain description same as pre-treatment unless indicated. Action: [x] NA  [] Call Physician  [] Perform HEP  [] Meds as prescribed    Evaluation and patient rights have been reviewed and patient agrees with plan of care. Yes  [x]  No  []   Explain:     [place] Fall Risk Assessment  Risk Factor Scale  Score   History of Falls [x] Yes  [] No 25  0 25   Secondary Diagnosis [] Yes  [x] No 15  0 0   Ambulatory Aid [] Furniture  [] Crutches/cane/walker  [x] None/bedrest/wheelchair/nurse 30  15  0 0   IV/Heparin Lock [] Yes  [x] No 20  0 0   Gait/Transferring [] Impaired  [x] Weak  [] Normal/bedrest/immobile 20  10  0 10   Mental Status [] Forgets limitations  [x] Oriented to own ability 15  0 0      Total:35     Based on the Assessment score: check the appropriate box.   []  No intervention needed   Low =   Score of 0-24  [x]  Use standard prevention interventions Moderate =  Score of 24-44   [x] Discuss fall prevention strategies   [x] Indicate moderate falls risk on eval  []  Use high risk prevention interventions High = Score of 45 and higher   [] Discuss fall prevention strategies   [] Provide supervision during treatment time      Minutes:  PT Individual Minutes  Time In: 1640  Time Out: 1735  Minutes: 55  Timed Code Treatment Minutes: 35 Minutes  Procedure Minutes: 20 eval      Timed Activity Minutes Units   Ther Ex 20 1   Neuro   15 1       Electronically signed by Ivania Mistry PT on 4/25/22 at 6:08 PM EDT

## 2022-05-03 ENCOUNTER — HOSPITAL ENCOUNTER (OUTPATIENT)
Dept: PHYSICAL THERAPY | Age: 82
Setting detail: THERAPIES SERIES
Discharge: HOME | End: 2022-05-03
Payer: MEDICARE

## 2022-05-03 PROCEDURE — 97110 THERAPEUTIC EXERCISES: CPT

## 2022-05-03 PROCEDURE — 97140 MANUAL THERAPY 1/> REGIONS: CPT

## 2022-05-03 PROCEDURE — 97112 NEUROMUSCULAR REEDUCATION: CPT

## 2022-05-03 NOTE — PROGRESS NOTES
[facility]  Outpatient Physical Therapy    Treatment Note        Date: 5/3/2022  Patient: Cheyenne Current  : 1940   Confirmed: Yes  MRN: [id number]  Referring Provider: Padmaja Wood MD   Secondary Referring Provider (If applicable): Dr. Gwendolyn Sin Diagnosis: Ataxic gait [R26.0]  Unsteadiness on feet [R26.81]  Other abnormalities of gait and mobility [R26.89]    Treatment Diagnosis: imbalance, impaired mobility, gait abnormality, LE weakness, Rt shoulder pain, difficulty with ADLs    Visit Information:  Insurance: Payor: [employer] / Plan: MEDICARE PART A AND B / Product Type: *No Product type* /   PT Visit Information  Onset Date:  (past few mos)  Total # of Visits Approved: 60  Total # of Visits to Date: 3  Plan of Care/Certification Expiration Date: 22  No Show: 0  Progress Note Due Date: 22  Canceled Appointment: 0  Progress Note Counter: 3/10    Subjective Information:  Subjective: Pt states Rt quad soreness, no pain. Pt states nothing new since LV  HEP Compliance:  [x] Good [x] Fair [] Poor [] Reports not doing due to:    Pain Screening  Patient Currently in Pain: Denies    Treatment:  Exercises:  Exercises  Exercise 1: sink exs x10  Exercise 4: gait drills march, lateral, retro, March, Tandem at // bars  Exercise 6: 180<>360 Contreras, no dizziness, 2 step turns  Exercise 7: Sci[employer] L2 x 5 minSeat 2  Exercise 8: 4way hip  YTB x 10 on Rt, hip ext/abd  on Lt x 10, inc'd pain in rt quads  Exercise 10: pulleys x 4'  Exercise 11: wand exs: Flex, abd,ER 5\" X 10 ea  Exercise 20: HEP: Wand ex's    Manual:   Manual Therapy  Soft Tissue Mobilizaton: UT  Other: Rt shoulder gentle PROM x 8'    Modalities:Declined          *Indicates exercise, modality, or manual techniques to be initiated when appropriate    Objective Measures:      Strength: [x] NT  [] MMT completed:   ROM: [x] NT  [] ROM measurements:   Assessment:    Body Structures, Functions, Activity Limitations Requiring Skilled Therapeutic Intervention: Decreased functional mobility ,Decreased strength,Decreased balance,Decreased coordination,Decreased ROM,Decreased ADL status,Decreased tolerance to work activity  Assessment: Continued LE and balance  to promote safety with functional activites. Added marching and Tandem to challenge pts' balance which required 1-2 HHa on // bar. Initiated shoulder protocol per POC with pt requiring VCs to keep rom ex's with wand in comfortable range to not increase pain. Pt with increased pain in Flexion and abduction which reduces with decrease range. Pt will apply Heat after return to home, declined modalites. Treatment Diagnosis: imbalance, impaired mobility, gait abnormality, LE weakness, Rt shoulder pain, difficulty with ADLs             Post-Pain Assessment:       Pain Rating (0-10 pain scale):   0/10   Location and pain description same as pre-treatment unless indicated. Action: [x] NA   [] Perform HEP  [] Meds as prescribed  [] Modalities as prescribed   [] Call Physician     GOALS   Patient Goal(s):      Short Term Goals Completed by 2 wks Goal Status   STG 1 Independent with HEP In progress   STG 2 Improve LE strength 1/2 grade to improve balance and gait In progress   STG 3 Increase Rt shoulder ROM 5-10 degrees to improve ease with all ADLs In progress       Long Term Goals Completed by 6 wks Goal Status   LTG 1 ambulate 500 ft independent without AD with minimal pathway deviations In progress   LTG 2 Up/ down 4-6\" steps with one handrail with supervision In progress   LTG 3 Andrade >/= 45/56 to demonstrate improved static balance and decreased risk for falls. In progress   LTG 4 Pt will report 50% reduction in Rt UE symptoms with improved ease with housekeeping activities.  In progress   LTG 5 Increase Rt shoulder strength >/= 4/5 to return to all housekeeping duties In progress            Plan:  Frequency/Duration:  Plan  Plan Frequency: 1-2  Plan weeks: 6  Current Treatment Recommendations: Strengthening,ROM,Transfer training,Functional mobility training,Balance training,Gait training,Stair training,Neuromuscular re-education,Home exercise program,Safety education & training,Patient/Caregiver education & training,Equipment evaluation, education, & procurement,Modalities  Plan Comment: frequency dependent upon pt and dtr's schedule  Pt to continue current HEP. See objective section for any therapeutic exercise changes, additions or modifications this date.     Therapy Time:      PT Individual Minutes  Time In: 9507  Time Out: 8954  Minutes: 57  Timed Code Treatment Minutes: 57 Minutes  Procedure Minutes:0  Timed Activity Minutes Units   Ther Ex 24 2   Neuro 25 1   Manual  8 1     Electronically signed by Dallas Ching PTA on 5/3/22 at 1:05 PM EDT

## 2022-05-05 ENCOUNTER — HOSPITAL ENCOUNTER (OUTPATIENT)
Dept: PHYSICAL THERAPY | Age: 82
Setting detail: THERAPIES SERIES
Discharge: HOME | End: 2022-05-05
Payer: MEDICARE

## 2022-05-05 PROCEDURE — 97110 THERAPEUTIC EXERCISES: CPT

## 2022-05-05 PROCEDURE — 97112 NEUROMUSCULAR REEDUCATION: CPT

## 2022-05-05 ASSESSMENT — PAIN DESCRIPTION - ORIENTATION: ORIENTATION: RIGHT

## 2022-05-05 ASSESSMENT — PAIN DESCRIPTION - DESCRIPTORS: DESCRIPTORS: ACHING

## 2022-05-05 ASSESSMENT — PAIN SCALES - GENERAL: PAINLEVEL_OUTOF10: 4

## 2022-05-05 ASSESSMENT — PAIN DESCRIPTION - LOCATION: LOCATION: FOOT;SHOULDER

## 2022-05-05 NOTE — PROGRESS NOTES
[facility]  Outpatient Physical Therapy    Treatment Note        Date: 2022  Patient: Antonio Boeck  : 1940   Confirmed: Yes  MRN: [id number]  Referring Provider: Zina Ocampo MD   Secondary Referring Provider (If applicable): Dr. Alex Hardin Diagnosis: Ataxic gait [R26.0]  Unsteadiness on feet [R26.81]  Other abnormalities of gait and mobility [R26.89]    Treatment Diagnosis: imbalance, impaired mobility, gait abnormality, LE weakness, Rt shoulder pain, difficulty with ADLs    Visit Information:  Insurance: Payor: Saad Mckeon / Plan: MEDICARE PART A AND B / Product Type: *No Product type* /   PT Visit Information  Onset Date:  (past few mos)  Total # of Visits Approved: 60  Total # of Visits to Date: 4  Plan of Care/Certification Expiration Date: 22  No Show: 0  Progress Note Due Date: 22  Canceled Appointment: 0  Progress Note Counter: 4/10    Subjective Information:  Subjective: Pt's dtr, Ama, present during session. Pt states soreness in shoulder and Rt foot. Pt reports compliance with HEP. HEP Compliance:  [x] Good [] Fair [] Poor [] Reports not doing due to:    Pain Screening  Patient Currently in Pain: Yes  Pain Level: 4  Pain Location: Foot,Shoulder  Pain Orientation: Right  Pain Descriptors: Aching    Treatment:  Exercises:  Exercises  Exercise 3: Static stand: FA, FA with head turns, FT, FT with head turns, semitandem (CGA-MODA with all with multiple lateral LOB)  Exercise 4: gait drills: marching, tandem, retro, lateral in // bars x2 laps ea with 0-1 UE support CGA  Exercise 6: 90 and 180 deg turns with chairs with emphasis on improved ASHU with turning and improved safety with approach to chair  Exercise 9: PROM Rt shoulder in all planes x8 min  Exercise 10: pulleys x 4'  Exercise 11: wand exs sitting: Flex, abd, ER 5\" x 10 ea  Exercise 12:  Wall Slides Flx, scaption 5 sec x 10  Exercise 20: HEP: wall slides, tandem, FT    *Indicates exercise, modality, or manual techniques to be initiated when appropriate    Assessment: Body Structures, Functions, Activity Limitations Requiring Skilled Therapeutic Intervention: Decreased functional mobility ,Decreased strength,Decreased balance,Decreased coordination,Decreased ROM,Decreased ADL status,Decreased tolerance to work activity  Assessment: Pt reports discomfort with abd wand ex with cues to keep within tolerable ROM. Added wall slides without discomfort. Reports relief with PROM, though moderately guarded. Increased instability noted with turns this date with multiple LOB. Focus on improving ASHU and decreasing speed with turns to improve safety. Poor ankle strategies noted. Given static  tandem and with feet together for HEP, though instructed pt and dtr to complete only when dtr present for safety. Cues needed for safety in approach to chair in making sure pt is aligned in front of chair before sitting. Treatment Diagnosis: imbalance, impaired mobility, gait abnormality, LE weakness, Rt shoulder pain, difficulty with ADLs           Post-Pain Assessment:       Pain Rating (0-10 pain scale):   0/10   Location and pain description same as pre-treatment unless indicated. Action: [] NA   [x] Perform HEP  [] Meds as prescribed  [] Modalities as prescribed   [] Call Physician     GOALS   Patient Goal(s):      Short Term Goals Completed by 2 wks Goal Status   STG 1 Independent with HEP In progress   STG 2 Improve LE strength 1/2 grade to improve balance and gait In progress   STG 3 Increase Rt shoulder ROM 5-10 degrees to improve ease with all ADLs In progress       Long Term Goals Completed by 6 wks Goal Status   LTG 1 ambulate 500 ft independent without AD with minimal pathway deviations In progress   LTG 2 Up/ down 4-6\" steps with one handrail with supervision In progress   LTG 3 Andrade >/= 45/56 to demonstrate improved static balance and decreased risk for falls.  In progress   LTG 4 Pt will report 50% reduction in Rt UE symptoms with improved ease with housekeeping activities. In progress   LTG 5 Increase Rt shoulder strength >/= 4/5 to return to all housekeeping duties In progress            Plan:  Frequency/Duration:  Plan  Plan Frequency: 1-2  Plan weeks: 6  Current Treatment Recommendations: Strengthening,ROM,Transfer training,Functional mobility training,Balance training,Gait training,Stair training,Neuromuscular re-education,Home exercise program,Safety education & training,Patient/Caregiver education & training,Equipment evaluation, education, & procurement,Modalities  Plan Comment: frequency dependent upon pt and dtr's schedule  Pt to continue current HEP. See objective section for any therapeutic exercise changes, additions or modifications this date.     Therapy Time:      PT Individual Minutes  Time In: 8296  Time Out: 1140  Minutes: 60  Timed Code Treatment Minutes: 60 Minutes  Procedure Minutes: 0  Timed Activity Minutes Units   Ther Ex 30 2   Neuro 30 2     Electronically signed by Ye Villasenor PTA on 5/5/22 at 10:41 AM EDT

## 2022-05-09 ENCOUNTER — HOSPITAL ENCOUNTER (OUTPATIENT)
Dept: PHYSICAL THERAPY | Age: 82
Setting detail: THERAPIES SERIES
Discharge: HOME | End: 2022-05-09
Payer: MEDICARE

## 2022-05-09 PROCEDURE — 97110 THERAPEUTIC EXERCISES: CPT

## 2022-05-09 PROCEDURE — 97112 NEUROMUSCULAR REEDUCATION: CPT

## 2022-05-09 ASSESSMENT — PAIN DESCRIPTION - ORIENTATION: ORIENTATION: RIGHT

## 2022-05-09 ASSESSMENT — PAIN SCALES - GENERAL: PAINLEVEL_OUTOF10: 2

## 2022-05-09 ASSESSMENT — PAIN DESCRIPTION - LOCATION: LOCATION: SHOULDER

## 2022-05-09 ASSESSMENT — PAIN DESCRIPTION - PAIN TYPE: TYPE: CHRONIC PAIN

## 2022-05-09 ASSESSMENT — PAIN DESCRIPTION - DESCRIPTORS: DESCRIPTORS: SORE

## 2022-05-09 NOTE — PROGRESS NOTES
[facility]  Outpatient Physical Therapy    Treatment Note        Date: 2022  Patient: Johanne Rivrea  : 1940   Confirmed: Yes  MRN: [id number]  Referring Provider: Kervin Gibbons MD   Secondary Referring Provider (If applicable): Dr. Flaca Mario Diagnosis: Ataxic gait [R26.0]  Unsteadiness on feet [R26.81]  Other abnormalities of gait and mobility [R26.89]    Treatment Diagnosis: imbalance, impaired mobility, gait abnormality, LE weakness, Rt shoulder pain, difficulty with ADLs    Visit Information:  Insurance: Payor: Emil Dollar / Plan: MEDICARE PART A AND B / Product Type: *No Product type* /   PT Visit Information  Onset Date:  (past few mos)  Total # of Visits Approved: 60  Total # of Visits to Date: 5  Plan of Care/Certification Expiration Date: 22  No Show: 0  Progress Note Due Date: 22  Canceled Appointment: 0  Progress Note Counter: 5/10    Subjective Information:  Subjective: Pt's dtr, Ama, present during session. Pt statest a Little pain in Rt shoulder, mostly when performing HEP  HEP Compliance:  [x] Good [] Fair [] Poor [] Reports not doing due to:    Pain Screening  Patient Currently in Pain: Yes  Pain Assessment: 0-10  Pain Level: 2 (with motion)  Pain Type: Chronic pain  Pain Location: Shoulder  Pain Orientation: Right  Pain Descriptors: Sore    Treatment:  Exercises:  Exercises  Exercise 2: seated open chain: LAQs/ hip flex 5\" x 10, Ball squeeze/ YTB abd 5\" x 10  Exercise 4: gait drills: marching, tandem, retro, lateral in // bars x2 laps ea with 0-1 UE support CGA  Exercise 6: 90 and 180 deg turns with spotting to improve balance, speed with turns  Exercise 10: pulleys x 4'  Exercise 11: wand exs sitting: Flex, abd, ER 5\" x 10 ea  Exercise 12:  Wall Slides Flx, scaption 5 sec x 10  Exercise 13: Shoulder isometrics 5\" x 5, Flex/abd/ER/ext  Exercise 20: HEP: shoulder Isometrics, open chain       Manual: NT          Modalities:NA          *Indicates exercise, modality, or manual techniques to be initiated when appropriate  Strength: [x] NT  [] MMT completed:   ROM: [] NT  [x] ROM measurements:         AROM RUE (degrees)  R Shoulder Flexion 0-180: Seated 155*  R Shoulder ABduction 0-180: Seated 145*        Assessment: Body Structures, Functions, Activity Limitations Requiring Skilled Therapeutic Intervention: Decreased functional mobility ,Decreased strength,Decreased balance,Decreased coordination,Decreased ROM,Decreased ADL status,Decreased tolerance to work activity  Assessment: Pt with slight decrease in ROM measures possibly d/t seated vs antigravity position. Increased pain  with Rt shoulder adduction on  return approx. 45 deg*. Pt continued to demo imbalance with turns during gait drills, improved with  VCs for  \" spotting\", however needed additional cueing and occassional CGA when looking up toward ceiling vs. Midline spotting. Treatment Diagnosis: imbalance, impaired mobility, gait abnormality, LE weakness, Rt shoulder pain, difficulty with ADLs     Post-Pain Assessment:       Pain Rating (0-10 pain scale):  2 /10   Location and pain description same as pre-treatment unless indicated. Action: [x] NA   [] Perform HEP  [] Meds as prescribed  [] Modalities as prescribed   [] Call Physician     GOALS   Patient Goal(s):      Short Term Goals Completed by 2 wks Goal Status   STG 1 Independent with HEP In progress   STG 2 Improve LE strength 1/2 grade to improve balance and gait In progress   STG 3 Increase Rt shoulder ROM 5-10 degrees to improve ease with all ADLs In progress       Long Term Goals Completed by 6 wks Goal Status   LTG 1 ambulate 500 ft independent without AD with minimal pathway deviations In progress   LTG 2 Up/ down 4-6\" steps with one handrail with supervision In progress   LTG 3 Andrade >/= 45/56 to demonstrate improved static balance and decreased risk for falls.  In progress   LTG 4 Pt will report 50% reduction in Rt UE symptoms with improved ease with housekeeping activities. In progress   LTG 5 Increase Rt shoulder strength >/= 4/5 to return to all housekeeping duties In progress            Plan:  Frequency/Duration:  Plan  Plan Frequency: 1-2  Plan weeks: 6  Current Treatment Recommendations: Strengthening,ROM,Transfer training,Functional mobility training,Balance training,Gait training,Stair training,Neuromuscular re-education,Home exercise program,Safety education & training,Patient/Caregiver education & training,Equipment evaluation, education, & procurement,Modalities  Plan Comment: frequency dependent upon pt and dtr's schedule  Pt to continue current HEP. See objective section for any therapeutic exercise changes, additions or modifications this date.     Therapy Time:      PT Individual Minutes  Time In: 3982  Time Out: 1500  Minutes: 56  Timed Code Treatment Minutes: 56 Minutes  Procedure Minutes:0  Timed Activity Minutes Units   Ther Ex 35 3   Neuro 21 1     Electronically signed by Denia Quiroz PTA on 5/9/22 at 2:09 PM EDT

## 2022-05-11 ENCOUNTER — HOSPITAL ENCOUNTER (OUTPATIENT)
Dept: PHYSICAL THERAPY | Age: 82
Setting detail: THERAPIES SERIES
Discharge: HOME | End: 2022-05-11
Payer: MEDICARE

## 2022-05-11 PROCEDURE — 97116 GAIT TRAINING THERAPY: CPT

## 2022-05-11 PROCEDURE — 97110 THERAPEUTIC EXERCISES: CPT

## 2022-05-11 PROCEDURE — 97112 NEUROMUSCULAR REEDUCATION: CPT

## 2022-05-11 ASSESSMENT — PAIN DESCRIPTION - DESCRIPTORS: DESCRIPTORS: SORE

## 2022-05-11 ASSESSMENT — PAIN DESCRIPTION - LOCATION: LOCATION: SHOULDER

## 2022-05-11 ASSESSMENT — PAIN SCALES - GENERAL: PAINLEVEL_OUTOF10: 2

## 2022-05-11 ASSESSMENT — PAIN DESCRIPTION - ORIENTATION: ORIENTATION: RIGHT

## 2022-05-11 NOTE — PROGRESS NOTES
[facility]  Outpatient Physical Therapy    Treatment Note        Date: 2022  Patient: Mireya Anaya  : 1940   Confirmed: Yes  MRN: [id number]  Referring Provider: Krishan Denson MD   Secondary Referring Provider (If applicable): Dr. Francisco Sheth Diagnosis: Ataxic gait [R26.0]  Unsteadiness on feet [R26.81]  Other abnormalities of gait and mobility [R26.89]    Treatment Diagnosis: imbalance, impaired mobility, gait abnormality, LE weakness, Rt shoulder pain, difficulty with ADLs    Visit Information:  Insurance: Payor: Grace Ore / Plan: MEDICARE PART A AND B / Product Type: *No Product type* /   PT Visit Information  Onset Date:  (past few mos)  Total # of Visits Approved: 60  Total # of Visits to Date: 6  Plan of Care/Certification Expiration Date: 22  No Show: 0  Progress Note Due Date: 22  Canceled Appointment: 0  Progress Note Counter: 6/10    Subjective Information:  Subjective: Pt reports increased soreness. Pt's dtr reports pt was going a lot of cooking today.   HEP Compliance:  [x] Good [] Fair [] Poor [] Reports not doing due to:    Pain Screening  Patient Currently in Pain: Yes  Pain Assessment: 0-10  Pain Level: 2  Pain Location: Shoulder  Pain Orientation: Right  Pain Descriptors: Sore    Treatment:  Exercises:  Exercises  Exercise 5: Andrade/56  Exercise 9: PROM Rt shoulder in all planes x5 min  Exercise 11: wand exs sitting: Flex, abd, ER 5\" x 10 ea  Exercise 12: Table Slides Flx, scaption 5 sec x 10  Exercise 13: Shoulder isometrics against therapist resistance 5\" x 5, Flex/abd/ER/ext     *Indicates exercise, modality, or manual techniques to be initiated when appropriate    Objective Measures:   Ambulation  Surface: carpet  Device: Single point cane  Assistance: Contact guard assistance  Quality of Gait: Str cane in Lt hand, NBOS with occasional scissoring, decreased step length, decreased step height, rigid trunk  Distance: 150 ft    Ambulation 2  Surface - 2: carpet  Device 2: Rolling Walker  Assistance 2: Modified Independent  Quality of Gait 2: Improved stability, continued NBOS especially with turns, decreased noa, one heel catch  Distance: 150 ft    Assessment: Body Structures, Functions, Activity Limitations Requiring Skilled Therapeutic Intervention: Decreased functional mobility ,Decreased strength,Decreased balance,Decreased coordination,Decreased ROM,Decreased ADL status,Decreased tolerance to work activity  Assessment: Pt with baseline score of 32/56 obtained. Initiated training with str cane, as dtr reports pt has at home from late  though has never been educated in use. Difficulty with coordination of movement. Continues to require cues to increase ASHU. Safest with [employer]. Discussed obtaining for home to decrease risk for falls. Reports discomfort with shoulder AAROM table slides and with isometrics. Relief with PROM. Treatment Diagnosis: imbalance, impaired mobility, gait abnormality, LE weakness, Rt shoulder pain, difficulty with ADLs           Post-Pain Assessment:       Pain Rating (0-10 pain scale):   0/10   Location and pain description same as pre-treatment unless indicated. Action: [] NA   [x] Perform HEP  [] Meds as prescribed  [] Modalities as prescribed   [] Call Physician     GOALS   Patient Goal(s):      Short Term Goals Completed by 2 wks Goal Status   STG 1 Independent with HEP In progress   STG 2 Improve LE strength 1/2 grade to improve balance and gait In progress   STG 3 Increase Rt shoulder ROM 5-10 degrees to improve ease with all ADLs In progress     LTG 1 ambulate 500 ft independent without AD with minimal pathway deviations In progress   LTG 2 Up/ down 4-6\" steps with one handrail with supervision In progress   LTG 3 Andrade >/= 45/56 to demonstrate improved static balance and decreased risk for falls. In progress   LTG 4 Pt will report 50% reduction in Rt UE symptoms with improved ease with housekeeping activities.  In progress   LTG 5 Increase Rt shoulder strength >/= 4/5 to return to all housekeeping duties In progress            Plan:  Frequency/Duration:  Plan  Plan Frequency: 1-2  Plan weeks: 6  Current Treatment Recommendations: Strengthening,ROM,Transfer training,Functional mobility training,Balance training,Gait training,Stair training,Neuromuscular re-education,Home exercise program,Safety education & training,Patient/Caregiver education & training,Equipment evaluation, education, & procurement,Modalities  Plan Comment: frequency dependent upon pt and dtr's schedule  Pt to continue current HEP. See objective section for any therapeutic exercise changes, additions or modifications this date.     Therapy Time:      PT Individual Minutes  Time In: 8450  Time Out: 6331  Minutes: 56  Timed Code Treatment Minutes: 56 Minutes  Procedure Minutes: 0    Timed Activity Minutes Units   Ther Ex 25 2   Neuro 10 1   Gait 21 1     Electronically signed by Ulices Parker PTA on 5/11/22 at 5:40 PM EDT

## 2022-05-17 ENCOUNTER — HOSPITAL ENCOUNTER (OUTPATIENT)
Dept: PHYSICAL THERAPY | Age: 82
Setting detail: THERAPIES SERIES
Discharge: HOME | End: 2022-05-17
Payer: MEDICARE

## 2022-05-17 PROCEDURE — 97110 THERAPEUTIC EXERCISES: CPT

## 2022-05-17 PROCEDURE — 97116 GAIT TRAINING THERAPY: CPT

## 2022-05-17 PROCEDURE — 97112 NEUROMUSCULAR REEDUCATION: CPT

## 2022-05-17 NOTE — PROGRESS NOTES
ROM,Decreased ADL status,Decreased tolerance to work activity  Assessment: Focus of session on gait drills, dynamic balance, and gaze stabilization stategies with ambulating especially during turns. Dtr with pt and it is their preference that dtr interpret for pt. Pt exeriencing B LE fatigue, R LE >L LE requiring sitting rest breaks. Pt demonstrates frequent small LOB though out session and requires SBA in therapy gym. Pt and dtr ed in use of SPC especially on therapy days when pt c/o increased LE mm fatigue post session. Treatment Diagnosis: imbalance, impaired mobility, gait abnormality, LE weakness, Rt shoulder pain, difficulty with ADLs           Post-Pain Assessment:       Pain Rating (0-10 pain scale):  0 /10   Location and pain description same as pre-treatment unless indicated. Action: [] NA   [x] Perform HEP  [] Meds as prescribed  [] Modalities as prescribed   [] Call Physician     GOALS   Patient Goal(s):      Short Term Goals Completed by 2 wks Goal Status   STG 1 Independent with HEP In progress   STG 2 Improve LE strength 1/2 grade to improve balance and gait In progress   STG 3 Increase Rt shoulder ROM 5-10 degrees to improve ease with all ADLs In progress       Long Term Goals Completed by 6 wks Goal Status   LTG 1 ambulate 500 ft independent without AD with minimal pathway deviations In progress   LTG 2 Up/ down 4-6\" steps with one handrail with supervision In progress   LTG 3 Andrade >/= 45/56 to demonstrate improved static balance and decreased risk for falls. In progress   LTG 4 Pt will report 50% reduction in Rt UE symptoms with improved ease with housekeeping activities.  In progress   LTG 5 Increase Rt shoulder strength >/= 4/5 to return to all housekeeping duties In progress          Plan:  Frequency/Duration:  Plan  Plan Frequency: 1-2  Plan weeks: 6  Current Treatment Recommendations: Strengthening,ROM,Transfer training,Functional mobility training,Balance training,Gait training,Stair training,Neuromuscular re-education,Home exercise program,Safety education & training,Patient/Caregiver education & training,Equipment evaluation, education, & procurement,Modalities  Plan Comment: frequency dependent upon pt and dtr's schedule  Pt to continue current HEP. See objective section for any therapeutic exercise changes, additions or modifications this date.     Therapy Time:      PT Individual Minutes  Time In: 1301  Time Out: 1400  Minutes: 59  Timed Code Treatment Minutes: 59 Minutes  Procedure Minutes:  Timed Activity Minutes Units   Ther Ex 19 1   NMR 10 1   Gait  30  2     Electronically signed by Lizzy Martin PT on 5/17/22 at 2:03 PM EDT

## 2022-05-19 ENCOUNTER — HOSPITAL ENCOUNTER (OUTPATIENT)
Dept: PHYSICAL THERAPY | Age: 82
Setting detail: THERAPIES SERIES
Discharge: HOME | End: 2022-05-19
Payer: MEDICARE

## 2022-05-19 PROCEDURE — 97112 NEUROMUSCULAR REEDUCATION: CPT

## 2022-05-19 PROCEDURE — 97110 THERAPEUTIC EXERCISES: CPT

## 2022-05-19 PROCEDURE — 97116 GAIT TRAINING THERAPY: CPT

## 2022-05-19 ASSESSMENT — PAIN SCALES - GENERAL: PAINLEVEL_OUTOF10: 0

## 2022-05-19 NOTE — PROGRESS NOTES
[facility]  Outpatient Physical Therapy    Treatment Note        Date: 2022  Patient: Jasper Lopez  : 1940   Confirmed: Yes  MRN: [id number]  Referring Provider: Mike Jordan MD   Secondary Referring Provider (If applicable): Dr. Teddy Cruz Diagnosis: Ataxic gait [R26.0]  Unsteadiness on feet [R26.81]  Other abnormalities of gait and mobility [R26.89]    Treatment Diagnosis: imbalance, impaired mobility, gait abnormality, LE weakness, Rt shoulder pain, difficulty with ADLs    Visit Information:  Insurance: Payor: Chayaguilherme Corrigan / Plan: MEDICARE PART A AND B / Product Type: *No Product type* /   PT Visit Information  Onset Date:  (past few mos)  Total # of Visits Approved: 60  Total # of Visits to Date: 8  Plan of Care/Certification Expiration Date: 22  No Show: 0  Progress Note Due Date: 22  Canceled Appointment: 0  Progress Note Counter: 8/10    Subjective Information:  Subjective: Pt does not feel shoulder has improved since beginning therapy. States completes HEP, though notes continues to have increased pain with all activities. Feels balance and walking improved.   HEP Compliance:  [x] Good [] Fair [] Poor [] Reports not doing due to:    Pain Screening  Patient Currently in Pain: Denies  Pain Assessment: 0-10  Pain Level: 0    Treatment:  Exercises:  Exercises  Exercise 1: Tretha Samara 37/56  Exercise 2: 4 square stepping over TB CGA-RICO  Exercise 5: Dual tasking: amb with ball on cone, passing ball around body with mildly increased deviations  Exercise 13: Shoulder flxn and abd to 90 deg with 1# wt x 10       Modalities:  Cryotherapy (CPT 12034)  Patient Position: Seated  Number Minutes Cryotherapy: 10  Cryotherapy location: Shoulder,Right  Limitations addressed: Pain modulation,Edema       *Indicates exercise, modality, or manual techniques to be initiated when appropriate    Objective Measures:   Ambulation  Surface: carpet  Device: No Device  Assistance: Supervision  Quality of Gait: Pt reaching for wall in first lap through clinic with running into wall turning corner on first lap, improved confidence and safety in subsequent laps, rigid trunk, decreased arm swing, occasional lateral excursions  Distance: 500 ft    Stairs  # Steps : 4  Stairs Height: 6\"  Rails: Bilateral  Assistance: Stand by assistance  Comment: decreased safety, non-reciprocal pattern    Strength: [] NT  [x] MMT completed:  Strength RLE  R Hip Flexion: 4-/5  R Knee Flexion: 4-/5  R Knee Extension: 4+/5  R Ankle Dorsiflexion: 4+/5  Strength LLE  L Hip Flexion: 4/5  L Knee Flexion: 4/5  L Knee Extension: 4+/5  L Ankle Dorsiflexion: 4+/5  Strength RUE  R Shoulder Flexion: 4/5  R Shoulder ABduction: 4/5  R Shoulder Internal Rotation: 4-/5  R Shoulder External Rotation: 4/5  R Elbow Flexion: 5/5  R Elbow Extension: 5/5    ROM: [] NT  [x] ROM measurements:  AROM RUE (degrees)  R Shoulder Flexion 0-180: Seated 146  R Shoulder ABduction 0-180: 136  R Shoulder Int Rotation  0-70: 70  R Shoulder Ext Rotation 0-90: 90     Assessment: Body Structures, Functions, Activity Limitations Requiring Skilled Therapeutic Intervention: Decreased functional mobility ,Decreased strength,Decreased balance,Decreased coordination,Decreased ROM,Decreased ADL status,Decreased tolerance to work activity  Assessment: Pt demonstrates decreased RUE ROM vs evaluation. Demonstrates 5 point improvement on Andrade vs last measure to 37/56. Continues to demonstrate difficulty with dual tasking with gait. Safety awareness questionable at times. Pt would benefit from continued therapy to end of currently scheduled visits (2) and reassess for continue vs DC at that time. Treatment Diagnosis: imbalance, impaired mobility, gait abnormality, LE weakness, Rt shoulder pain, difficulty with ADLs             Post-Pain Assessment:       Pain Rating (0-10 pain scale):  0 /10   Location and pain description same as pre-treatment unless indicated. Action: [] NA   [x] Perform HEP  [] Meds as prescribed  [] Modalities as prescribed   [] Call Physician     GOALS   Patient Goal(s):      Short Term Goals Completed by 2 wks Goal Status   STG 1 Independent with HEP In progress   STG 2 Improve LE strength 1/2 grade to improve balance and gait In progress   STG 3 Increase Rt shoulder ROM 5-10 degrees to improve ease with all ADLs In progress       Long Term Goals Completed by 6 wks Goal Status   LTG 1 ambulate 500 ft independent without AD with minimal pathway deviations In progress   LTG 2 Up/ down 4-6\" steps with one handrail with supervision In progress   LTG 3 Andrade >/= 45/56 to demonstrate improved static balance and decreased risk for falls. In progress   LTG 4 Pt will report 50% reduction in Rt UE symptoms with improved ease with housekeeping activities. In progress   LTG 5 Increase Rt shoulder strength >/= 4/5 to return to all housekeeping duties In progress            Plan:  Frequency/Duration:  Plan  Plan Frequency: 1-2  Plan weeks: 6  Current Treatment Recommendations: Strengthening,ROM,Transfer training,Functional mobility training,Balance training,Gait training,Stair training,Neuromuscular re-education,Home exercise program,Safety education & training,Patient/Caregiver education & training,Equipment evaluation, education, & procurement,Modalities  Plan Comment: frequency dependent upon pt and dtr's schedule  Pt to continue current HEP. See objective section for any therapeutic exercise changes, additions or modifications this date.     Therapy Time:      PT Individual Minutes  Time In: 1595  Time Out: 8114  Minutes: 65  Timed Code Treatment Minutes: 55 Minutes  Procedure Minutes: CP x10 min    Timed Activity Minutes Units   Ther Ex 10 1   Gait 15 1   Neuro 30 2     Electronically signed by Tex Paget, PTA on 5/19/22 at 3:00 PM EDT

## 2022-05-19 NOTE — PROGRESS NOTES
[address]     Ph: [phone]  Fax: [phone]      [] Certification  [] Recertification []  Plan of Care  [x] Progress Note [] Discharge      Referring Provider: Lyric Tong MD  Referring Provider (secondary): Dr. Darryl Rios   From:  Shay Fung, PT  Patient: Deepthi Rosario (37 y.o. female) : 1940 Date: 2022   Medical Diagnosis: Ataxic gait [R26.0]  Unsteadiness on feet [R26.81]  Other abnormalities of gait and mobility [R26.89]    Treatment Diagnosis: imbalance, impaired mobility, gait abnormality, LE weakness, Rt shoulder pain, difficulty with ADLs    Plan of Care/Certification Expiration Date: : 22   Progress Report Period from:  2022 to 2022    Visits to Date: 8 No Show: 0 Cancelled Appts: 0    OBJECTIVE:   Short Term Goals - Time Frame for Short term goals: 2 wks    Goals Current/Discharge status  Status   Short term goal 1: Independent with HEP  Pt reports compliance with HEP In progress   Short term goal 2: Improve LE strength 1/2 grade to improve balance and gait  Strength LLE  L Hip Flexion: 4/5  L Knee Flexion: 4/5  L Knee Extension: 4+/5  L Ankle Dorsiflexion: 4+/5    Strength RLE  R Hip Flexion: 4-/5  R Knee Flexion: 4-/5  R Knee Extension: 4+/5  R Ankle Dorsiflexion: 4+/5    In progress   Short term goal 3:  Increase Rt shoulder ROM 5-10 degrees to improve ease with all ADLs    AROM RUE (degrees)  R Shoulder Flexion 0-180: Seated 146  R Shoulder ABduction 0-180: 136  R Shoulder Int Rotation  0-70: 70  R Shoulder Ext Rotation 0-90: 90    In progress     Long Term Goals - Time Frame for Long term goals : 6 wks  Goals Current/ Discharge status Status   Long term goal 1: ambulate 500 ft independent without AD with minimal pathway deviations Ambulation  Surface: carpet  Device: No Device  Assistance: Supervision  Quality of Gait: Pt reaching for wall in first lap through clinic with running into wall turning corner on first lap, improved confidence and safety in subsequent laps, rigid trunk, decreased arm swing, occasional lateral excursions  Distance: 500 ft   In progress   Long term goal 2: Up/ down 4-6\" steps with one handrail with supervision Stairs  # Steps : 4  Stairs Height: 6\"  Rails: Bilateral  Assistance: Stand by assistance  Comment: decreased safety, non-reciprocal pattern   In progress   Long term goal 3: Andrade >/= 45/56 to demonstrate improved static balance and decreased risk for falls. Andrade Balance Score: 37   In progress   Long term goal 4: Pt will report 50% reduction in Rt UE symptoms with improved ease with housekeeping activities. Denies change in RUE pain In progress   Long term goal 5: Increase Rt shoulder strength >/= 4/5 to return to all housekeeping duties Strength RUE  R Shoulder Flexion: 4/5  R Shoulder ABduction: 4/5  R Shoulder Internal Rotation: 4-/5  R Shoulder External Rotation: 4/5  R Elbow Flexion: 5/5  R Elbow Extension: 5/5 In progress       Body Structures, Functions, Activity Limitations Requiring Skilled Therapeutic Intervention: Decreased functional mobility ,Decreased strength,Decreased balance,Decreased coordination,Decreased ROM,Decreased ADL status,Decreased tolerance to work activity  Assessment: Pt demonstrates decreased RUE ROM vs evaluation. Demonstrates 5 point improvement on Andrade vs last measure to 37/56. Continues to demonstrate difficulty with dual tasking with gait. Safety awareness questionable at times. Pt would benefit from continued therapy to end of currently scheduled visits (2) and reassess for continue vs DC at that time.      PLAN: [] Evaluate and Treat  Frequency/Duration:  Plan Frequency: 1-2  Plan weeks: 6  Current Treatment Recommendations: Strengthening,ROM,Transfer training,Functional mobility training,Balance training,Gait training,Stair training,Neuromuscular re-education,Home exercise program,Safety education & training,Patient/Caregiver education & training,Equipment evaluation, education, & procurement,Modalities  Plan Comment: frequency dependent upon pt and dtr's schedule                              Patient Status:[x] Continue/ Initiate plan of Care    [] Discharge PT. Recommend pt continue with HEP. [] Additional visits requested, Please re-certify for additional visits:    [] Hold         Signature: Objective information by: Electronically signed by Sho Rudolph PTA on 5/19/22 at 2:17 PM EDT  Electronically signed by Carlos Richardson PT on 5/23/22 at 4:40 PM EDT    If you have any questions or concerns, please don't hesitate to call. Thank you for your referral.    I have reviewed this plan of care and certify a need for medically necessary rehabilitation services.     Physician Signature:__________________________________________________________  Date:  Please sign and return

## 2022-05-23 ENCOUNTER — HOSPITAL ENCOUNTER (OUTPATIENT)
Dept: PHYSICAL THERAPY | Age: 82
Setting detail: THERAPIES SERIES
Discharge: HOME | End: 2022-05-23
Payer: MEDICARE

## 2022-05-23 PROCEDURE — 97112 NEUROMUSCULAR REEDUCATION: CPT

## 2022-05-23 PROCEDURE — 97110 THERAPEUTIC EXERCISES: CPT

## 2022-05-23 ASSESSMENT — PAIN DESCRIPTION - ORIENTATION: ORIENTATION: RIGHT

## 2022-05-23 ASSESSMENT — PAIN DESCRIPTION - LOCATION: LOCATION: SHOULDER

## 2022-05-23 ASSESSMENT — PAIN DESCRIPTION - DESCRIPTORS: DESCRIPTORS: SORE

## 2022-05-23 ASSESSMENT — PAIN SCALES - GENERAL: PAINLEVEL_OUTOF10: 3

## 2022-05-23 NOTE — PROGRESS NOTES
[facility]  Outpatient Physical Therapy    Treatment Note        Date: 2022  Patient: Demario Grossman  : 1940   Confirmed: Yes  MRN: [id number]  Referring Provider: Tru Kay MD   Secondary Referring Provider (If applicable): Dr. Lisa Dennison Diagnosis: Ataxic gait [R26.0]  Unsteadiness on feet [R26.81]  Other abnormalities of gait and mobility [R26.89]    Treatment Diagnosis: imbalance, impaired mobility, gait abnormality, LE weakness, Rt shoulder pain, difficulty with ADLs    Visit Information:  Insurance: Payor: Felicia Wilcox / Plan: MEDICARE PART A AND B / Product Type: *No Product type* /   PT Visit Information  Onset Date:  (past few mos)  Total # of Visits Approved: 60  Total # of Visits to Date: 5  Plan of Care/Certification Expiration Date: 22  No Show: 0  Progress Note Due Date: 06/15/22  Canceled Appointment: 0  Progress Note Counter: 9/10    Subjective Information:  Subjective: Patient reports she will be ready or D/C next visit, compliance with HEP. HEP Compliance:  [x] Good [] Fair [] Poor [] Reports not doing due to:    Pain Screening  Patient Currently in Pain: Yes  Pain Level: 3  Pain Location: Shoulder  Pain Orientation: Right  Pain Descriptors: Sore    Treatment:  Exercises:  Exercises  Exercise 3: Chest pulls (3way) YTB  Exercise 4: Gait: march and holds x2 laps  Exercise 6: Single stepping over 3'' box F/L occ LOB  Exercise 7: Foam: shoulder flexion,  head turns, FT 30sec x2  Exercise 8: 4'' step ups 0-1 UE support F/L  Exercise 9: PROM Rt shoulder in all planes x5 min  Exercise 10: Standing: Pball chop/lifts x10  Exercise 20: HEP: Chest pulls       *Indicates exercise, modality, or manual techniques to be initiated when appropriate          Assessment:    Body Structures, Functions, Activity Limitations Requiring Skilled Therapeutic Intervention: Decreased functional mobility ,Decreased strength,Decreased balance,Decreased coordination,Decreased ROM,Decreased ADL status,Decreased tolerance to work activity  Assessment: Initiated higher level balance activities on compliant surface. VCs to slow down each exercise as patient loses balance when performing too quick. Pain reported with shoulder flexion AROM. Anticipate D/C next visit with continuation of HEP. Treatment Diagnosis: imbalance, impaired mobility, gait abnormality, LE weakness, Rt shoulder pain, difficulty with ADLs             Post-Pain Assessment:       Pain Rating (0-10 pain scale):  3 /10   Location and pain description same as pre-treatment unless indicated. Action: [] NA   [] Perform HEP  [] Meds as prescribed  [x] Modalities as prescribed   [] Call Physician     GOALS   Patient Goal(s):      Short Term Goals Completed by 2 wks Goal Status   STG 1 Independent with HEP In progress   STG 2 Improve LE strength 1/2 grade to improve balance and gait In progress   STG 3 Increase Rt shoulder ROM 5-10 degrees to improve ease with all ADLs In progress     Long Term Goals Completed by 6 wks Goal Status   LTG 1 ambulate 500 ft independent without AD with minimal pathway deviations In progress   LTG 2 Up/ down 4-6\" steps with one handrail with supervision In progress   LTG 3 Andrade >/= 45/56 to demonstrate improved static balance and decreased risk for falls. In progress   LTG 4 Pt will report 50% reduction in Rt UE symptoms with improved ease with housekeeping activities.  In progress   LTG 5 Increase Rt shoulder strength >/= 4/5 to return to all housekeeping duties In progress   LTG 6               Plan:  Frequency/Duration:  Plan  Plan Frequency: 1-2  Plan weeks: 6  Current Treatment Recommendations: Strengthening,ROM,Transfer training,Functional mobility training,Balance training,Gait training,Stair training,Neuromuscular re-education,Home exercise program,Safety education & training,Patient/Caregiver education & training,Equipment evaluation, education, & procurement,Modalities  Plan Comment:

## 2022-05-26 ENCOUNTER — HOSPITAL ENCOUNTER (OUTPATIENT)
Dept: PHYSICAL THERAPY | Age: 82
Setting detail: THERAPIES SERIES
Discharge: HOME | End: 2022-05-26
Payer: MEDICARE

## 2022-05-26 PROCEDURE — 97110 THERAPEUTIC EXERCISES: CPT

## 2022-05-26 PROCEDURE — 97112 NEUROMUSCULAR REEDUCATION: CPT

## 2022-05-26 ASSESSMENT — PAIN SCALES - GENERAL: PAINLEVEL_OUTOF10: 0

## 2022-05-26 NOTE — PROGRESS NOTES
[facility]  Outpatient Physical Therapy    Treatment Note        Date: 2022  Patient: Jaren Charlton  : 1940   Confirmed: Yes  MRN: [id number]  Referring Provider: Jozef Matta MD   Secondary Referring Provider (If applicable): Dr. Mg Arroyo Diagnosis: Ataxic gait [R26.0]  Unsteadiness on feet [R26.81]  Other abnormalities of gait and mobility [R26.89]    Treatment Diagnosis: imbalance, impaired mobility, gait abnormality, LE weakness, Rt shoulder pain, difficulty with ADLs    Visit Information:  Insurance: Payor: Norma Burows / Plan: MEDICARE PART A AND B / Product Type: *No Product type* /   PT Visit Information  Onset Date:  (past few mos)  Total # of Visits Approved: 60  Total # of Visits to Date: 10  Plan of Care/Certification Expiration Date: 22  No Show: 0  Progress Note Due Date: 06/15/22  Canceled Appointment: 0  Progress Note Counter: 10/10    Subjective Information:  Subjective: Pt's dtr reports pt went to store on Tuesday without cane without issue. Denies shoulder pain at rest, though states worsens with most activities. HEP Compliance:  [x] Good [] Fair [] Poor [] Reports not doing due to:    Pain Screening  Patient Currently in Pain: Denies  Pain Assessment: 0-10  Pain Level: 0    Treatment:  Exercises:  Exercises  Exercise 2: 4 square stepping over TB CGA-[place]  Exercise 3: Step and toss bean bags x10 b/l  Exercise 4: Gait: march and holds x3 laps  Exercise 5: Dual tasking: amb with cones on SB  Exercise 6: Single stepping over TB F/L without UE x10  Exercise 7: Foam: shoulder flexion,  head turns, trunk rotation, FT 30sec x2  Exercise 10: Chest pulls YTB x10  Exercise 11: Rows/lats YTB x10 ea  Exercise 12:  Wall slide flx, abd 5 sec x 10 ea     *Indicates exercise, modality, or manual techniques to be initiated when appropriate    Objective Measures:     ROM: [] NT  [x] ROM measurements:  AROM RUE (degrees)  R Shoulder Flexion 0-180: 156  R Shoulder ABduction 0-180: 135     Assessment: Body Structures, Functions, Activity Limitations Requiring Skilled Therapeutic Intervention: Decreased functional mobility ,Decreased strength,Decreased balance,Decreased coordination,Decreased ROM,Decreased ADL status,Decreased tolerance to work activity  Assessment: Pt demonstrates improve shoulder ROM vs last measures, though with continued discomfort. Overall demonstrates improved balance via Andrade. Improved gait stability noted. Pt's balance most challenged by distractibility, though without deviations noted with dual tasking. Pt and dtr report compliance with HEP and feel comfortable to continue HEP independently. Pt to be discharged from therapy at this time. Treatment Diagnosis: imbalance, impaired mobility, gait abnormality, LE weakness, Rt shoulder pain, difficulty with ADLs             Post-Pain Assessment:       Pain Rating (0-10 pain scale):   0/10   Location and pain description same as pre-treatment unless indicated. Action: [] NA   [x] Perform HEP  [] Meds as prescribed  [] Modalities as prescribed   [] Call Physician     GOALS   Patient Goal(s):      Short Term Goals Completed by 2 wks Goal Status   STG 1 Independent with HEP Met   STG 2 Improve LE strength 1/2 grade to improve balance and gait Partially met   STG 3 Increase Rt shoulder ROM 5-10 degrees to improve ease with all ADLs Not Met       Long Term Goals Completed by 6 wks Goal Status   LTG 1 ambulate 500 ft independent without AD with minimal pathway deviations Partially met   LTG 2 Up/ down 4-6\" steps with one handrail with supervision Partially met   LTG 3 Andrade >/= 45/56 to demonstrate improved static balance and decreased risk for falls. Not Met   LTG 4 Pt will report 50% reduction in Rt UE symptoms with improved ease with housekeeping activities.  Not Met   LTG 5 Increase Rt shoulder strength >/= 4/5 to return to all housekeeping duties Not Met            Plan:  Frequency/Duration:  Plan  Plan Comment:

## 2022-05-26 NOTE — PROGRESS NOTES
[address]     Ph: [phone]  Fax: [phone]      [] Certification  [] Recertification []  Plan of Care  [] Progress Note [x] Discharge      Referring Provider: Zulma Vallecillo MD  Referring Provider (secondary): Dr. Irma Jefferson   From: Ginny Regalado, PT  Patient: Sarina Shaw (45 y.o. female) : 1940 Date: 2022   Medical Diagnosis: Ataxic gait [R26.0]  Unsteadiness on feet [R26.81]  Other abnormalities of gait and mobility [R26.89]    Treatment Diagnosis: imbalance, impaired mobility, gait abnormality, LE weakness, Rt shoulder pain, difficulty with ADLs    Plan of Care/Certification Expiration Date: : 22   Progress Report Period from:  2022 to 2022     Visits to Date: 10 No Show: 0 Cancelled Appts: 0    OBJECTIVE:   Short Term Goals - Time Frame for Short term goals: 2 wks    Goals Current/Discharge status  Status   Short term goal 1: Independent with HEP  Pt reports compliance with HEP Met   Short term goal 2: Improve LE strength 1/2 grade to improve balance and gait  Strength LLE  L Hip Flexion: 4/5  L Knee Flexion: 4/5  L Knee Extension: 4+/5  L Ankle Dorsiflexion: 4+/5     Strength RLE  R Hip Flexion: 4-/5  R Knee Flexion: 4-/5  R Knee Extension: 4+/5  R Ankle Dorsiflexion: 4+/5     Partially met   Short term goal 3:  Increase Rt shoulder ROM 5-10 degrees to improve ease with all ADLs    AROM RUE (degrees)  R Shoulder Flexion 0-180: 156  R Shoulder ABduction 0-180: 135        Not Met     Long Term Goals - Time Frame for Long term goals : 6 wks  Goals Current/ Discharge status Status   Long term goal 1: ambulate 500 ft independent without AD with minimal pathway deviations Ambulation  Surface: carpet  Device: No Device  Assistance: Supervision  Quality of Gait: Pt reaching for wall in first lap through clinic with running into wall turning corner on first lap, improved confidence and safety in subsequent laps, rigid trunk, decreased arm swing, occasional lateral excursions  Distance: 500 ft    Partially met   Long term goal 2: Up/ down 4-6\" steps with one handrail with supervision Stairs  # Steps : 4  Stairs Height: 6\"  Rails: Bilateral  Assistance: Stand by assistance  Comment: decreased safety, non-reciprocal pattern    Partially met   Long term goal 3: Andrade >/= 45/56 to demonstrate improved static balance and decreased risk for falls. 40 Not Met   Long term goal 4: Pt will report 50% reduction in Rt UE symptoms with improved ease with housekeeping activities. Denies change in RUE pain Not Met   Long term goal 5: Increase Rt shoulder strength >/= 4/5 to return to all housekeeping duties Strength RUE  R Shoulder Flexion: 4/5  R Shoulder ABduction: 4/5  R Shoulder Internal Rotation: 4-/5  R Shoulder External Rotation: 4/5  R Elbow Flexion: 5/5  R Elbow Extension: 5/5 Not Met       Body Structures, Functions, Activity Limitations Requiring Skilled Therapeutic Intervention: Decreased functional mobility ,Decreased strength,Decreased balance,Decreased coordination,Decreased ROM,Decreased ADL status,Decreased tolerance to work activity  Assessment: Pt demonstrates improve shoulder ROM vs last measures, though with continued discomfort. Overall demonstrates improved balance via Andrade. Improved gait stability noted. Pt's balance most challenged by distractibility, though without deviations noted with dual tasking. Pt and dtr report compliance with HEP and feel comfortable to continue HEP independently. Pt to be discharged from therapy at this time. PLAN: [] Evaluate and Treat  Frequency/Duration:  Plan Comment: Discharge PT                    Patient Status:[] Continue/ Initiate plan of Care    [x] Discharge PT. Recommend pt continue with HEP.      [] Additional visits requested, Please re-certify for additional visits:    [] Hold         Signature: Objective information by: Electronically signed by Marilyn Pavon PTA on 5/26/22 at 3:10 PM EDT  Electronically signed by Lasha Araujo PT on 6/6/22 at 12:41 PM EDT    If you have any questions or concerns, please don't hesitate to call.   Thank you for your referral.

## 2022-06-06 DIAGNOSIS — I65.1 BASILAR ARTERY STENOSIS/OCCLUSION: Chronic | ICD-10-CM

## 2022-06-06 DIAGNOSIS — I67.82 SUBCORTICAL MICROVASCULAR ISCHEMIC OCCLUSIVE DISEASE: Chronic | ICD-10-CM

## 2022-06-06 RX ORDER — CLOPIDOGREL BISULFATE 75 MG/1
TABLET ORAL
Qty: 90 TABLET | Refills: 1 | Status: SHIPPED | OUTPATIENT
Start: 2022-06-06

## 2022-06-06 RX ORDER — ASPIRIN 81 MG/1
TABLET, COATED ORAL
Qty: 90 TABLET | Refills: 1 | Status: SHIPPED | OUTPATIENT
Start: 2022-06-06

## 2022-06-06 NOTE — TELEPHONE ENCOUNTER
pharmacy requesting medication refill.  Please approve or deny this request.    Rx requested:  Requested Prescriptions     Pending Prescriptions Disp Refills    clopidogrel (PLAVIX) 75 MG tablet [Pharmacy Med Name: CLOPIDOGREL BISULFATE 75MG TABS] 90 tablet 1     Sig: TAKE ONE TABLET BY MOUTH EVERY DAY    ASPIRIN LOW DOSE 81 MG EC tablet [Pharmacy Med Name: ASPIRIN LOW DOSE 81MG TBEC] 90 tablet 1     Sig: TAKE ONE TABLET BY MOUTH EVERY DAY         Last Office Visit:   3/18/2022      Next Visit Date:  Future Appointments   Date Time Provider Marleny Lan   6/7/2022  9:30 AM Edward Lagunas MD [address] [place] [address]

## 2022-06-07 ENCOUNTER — OFFICE VISIT (OUTPATIENT)
Dept: FAMILY MEDICINE CLINIC | Age: 82
End: 2022-06-07
Payer: MEDICARE

## 2022-06-07 VITALS
BODY MASS INDEX: 22.16 KG/M2 | OXYGEN SATURATION: 99 % | SYSTOLIC BLOOD PRESSURE: 120 MMHG | WEIGHT: 120.4 LBS | HEIGHT: 62 IN | TEMPERATURE: 96 F | DIASTOLIC BLOOD PRESSURE: 72 MMHG | HEART RATE: 71 BPM

## 2022-06-07 DIAGNOSIS — Z23 NEED FOR SHINGLES VACCINE: ICD-10-CM

## 2022-06-07 DIAGNOSIS — E11.9 TYPE 2 DIABETES MELLITUS WITHOUT COMPLICATION, WITHOUT LONG-TERM CURRENT USE OF INSULIN (HCC): Primary | Chronic | ICD-10-CM

## 2022-06-07 DIAGNOSIS — L29.0 PRURITUS ANI: ICD-10-CM

## 2022-06-07 DIAGNOSIS — I67.82 SUBCORTICAL MICROVASCULAR ISCHEMIC OCCLUSIVE DISEASE: ICD-10-CM

## 2022-06-07 DIAGNOSIS — K64.8 INTERNAL HEMORRHOID: ICD-10-CM

## 2022-06-07 DIAGNOSIS — I10 ESSENTIAL HYPERTENSION: ICD-10-CM

## 2022-06-07 PROCEDURE — 1090F PRES/ABSN URINE INCON ASSESS: CPT | Performed by: FAMILY MEDICINE

## 2022-06-07 PROCEDURE — 99214 OFFICE O/P EST MOD 30 MIN: CPT | Performed by: FAMILY MEDICINE

## 2022-06-07 PROCEDURE — G8427 DOCREV CUR MEDS BY ELIG CLIN: HCPCS | Performed by: FAMILY MEDICINE

## 2022-06-07 PROCEDURE — 1036F TOBACCO NON-USER: CPT | Performed by: FAMILY MEDICINE

## 2022-06-07 PROCEDURE — G8420 CALC BMI NORM PARAMETERS: HCPCS | Performed by: FAMILY MEDICINE

## 2022-06-07 PROCEDURE — 3044F HG A1C LEVEL LT 7.0%: CPT | Performed by: FAMILY MEDICINE

## 2022-06-07 PROCEDURE — 1123F ACP DISCUSS/DSCN MKR DOCD: CPT | Performed by: FAMILY MEDICINE

## 2022-06-07 PROCEDURE — G8399 PT W/DXA RESULTS DOCUMENT: HCPCS | Performed by: FAMILY MEDICINE

## 2022-06-07 RX ORDER — GLUCOSAMINE HCL/CHONDROITIN SU 500-400 MG
CAPSULE ORAL
Qty: 100 STRIP | Refills: 12 | Status: SHIPPED | OUTPATIENT
Start: 2022-06-07

## 2022-06-07 RX ORDER — HYDROCORTISONE ACETATE SUPPOSITORY 30 MG/1
1 SUPPOSITORY RECTAL 2 TIMES DAILY PRN
Qty: 20 SUPPOSITORY | Refills: 1 | Status: SHIPPED | OUTPATIENT
Start: 2022-06-07

## 2022-06-07 RX ORDER — ATORVASTATIN CALCIUM 40 MG/1
40 TABLET, FILM COATED ORAL DAILY
Qty: 90 TABLET | Refills: 4 | Status: SHIPPED | OUTPATIENT
Start: 2022-06-07

## 2022-06-07 ASSESSMENT — PATIENT HEALTH QUESTIONNAIRE - PHQ9
6. FEELING BAD ABOUT YOURSELF - OR THAT YOU ARE A FAILURE OR HAVE LET YOURSELF OR YOUR FAMILY DOWN: 0
SUM OF ALL RESPONSES TO PHQ9 QUESTIONS 1 & 2: 0
8. MOVING OR SPEAKING SO SLOWLY THAT OTHER PEOPLE COULD HAVE NOTICED. OR THE OPPOSITE, BEING SO FIGETY OR RESTLESS THAT YOU HAVE BEEN MOVING AROUND A LOT MORE THAN USUAL: 0
SUM OF ALL RESPONSES TO PHQ QUESTIONS 1-9: 0
9. THOUGHTS THAT YOU WOULD BE BETTER OFF DEAD, OR OF HURTING YOURSELF: 0
SUM OF ALL RESPONSES TO PHQ QUESTIONS 1-9: 0
SUM OF ALL RESPONSES TO PHQ QUESTIONS 1-9: 0
3. TROUBLE FALLING OR STAYING ASLEEP: 0
10. IF YOU CHECKED OFF ANY PROBLEMS, HOW DIFFICULT HAVE THESE PROBLEMS MADE IT FOR YOU TO DO YOUR WORK, TAKE CARE OF THINGS AT HOME, OR GET ALONG WITH OTHER PEOPLE: 0
2. FEELING DOWN, DEPRESSED OR HOPELESS: 0
4. FEELING TIRED OR HAVING LITTLE ENERGY: 0
SUM OF ALL RESPONSES TO PHQ QUESTIONS 1-9: 0
1. LITTLE INTEREST OR PLEASURE IN DOING THINGS: 0
5. POOR APPETITE OR OVEREATING: 0
7. TROUBLE CONCENTRATING ON THINGS, SUCH AS READING THE NEWSPAPER OR WATCHING TELEVISION: 0

## 2022-06-07 NOTE — PATIENT INSTRUCTIONS
Patient Education        Hemorroides: Instrucciones de cuidado  Hemorrhoids: Care Instructions  Instrucciones de [place] hemorroides son Louis Melba agrandadas que se desarrollan en el canal del ano. Los síntomas más comunes son [address] evacuaciones del intestino, comezón, hinchazón y dolor rectal. A veces pueden ser incómodas, lila lashemorroides constantin vez son un problema grave. [address] hemorroides se pueden tratar con cambios sencillos en nathan Venus Koehler y shalini hábitos intestinales. Entre estos cambios se encuentran consumir más [place] y no esforzarse (pujar) para eliminar las heces (defecar). [address] hemorroides no requieren de [place] u otro tratamiento a menos que seanmuy grandes y dolorosas o sangren mucho. La atención de seguimiento es roman parte clave de nathan tratamiento y seguridad. Asegúrese de hacer y acudir a todas las citas, y llame a nathan médico si está teniendo problemas. También es roman buena idea saber los [place] de susexámenes y mantener roman lista de los medicamentos que jazzy. ¿Cómo puede cuidarse en el hogar?  Siéntese en unas pocas pulgadas de agua tibia (baño de asiento) 3 veces al día y después de evacuar el intestino. El agua tibia ayuda con el dolor y la comezón.  Colóquese hielo en la juan anal varias veces al día alba 10 minutos cada vez. Póngase un paño unger entre el hielo y la piel. Enseguida póngase roman toalla húmeda tibia en la juan alba otros 10 a 20 minutos.  Octavia los analgésicos (medicamentos para el dolor) exactamente según las indicaciones. ? Si el médico le recetó un analgésico, tómelo según las indicaciones. ? Si no está tomando un analgésico recetado, pregúntele a nathan médico si puede tuyet nae de [employer].  Mantenga limpia la juan del ano, lila límpiese con suavidad. Utilice agua y [address] [facility], o use toallitas para bebé o R Greg Dolores 1 medicinales, alber las de [place].    Utilice ropa interior de algodón y ropa holgada para reducir la humedad en la juan del ano.  Consuma más [place]. Gates Mallorie alber panes y cereales integrales, vegetales crudos, frutas crudas y secas, y frijoles (habichuelas).  Chrystal mucho líquido. Si tiene roman enfermedad renal, cardíaca o hepática y tiene que restringir los líquidos, hable con nathan médico antes de aumentar la cantidad de líquido que nas.  Utilice un ablandador de heces que contenga salvado o psilio. Puede ahorrar dinero comprando salvado o psilio (disponible a granel en la mayoría de las tiendas naturistas) y [employer] comidas o mezclándolo con jugo de fruta. O puede utilizar productos alber Metamucil o Hydrocil.  Practique hábitos intestinales saludables. ? Vaya al baño tan pronto alber tenga ganas. ? Evite esforzarse al evacuar. Relájese y dese tiempo para dejar que las cosas ocurran de forma natural.  ? No contenga la respiración mientras evacúa. ? No marisol mientras está sentado en el inodoro. Levántese del inodoro theodore pronto haya terminado.  [employer] medicamentos exactamente alber le fueron recetados. Llame a nathan médico si deonte estar teniendo problemas con nathan medicamento. ¿Cuándo debe pedir ayuda? Llame al 911 en cualquier momento que considere que necesita atención de emergencia. Por ejemplo, llame si:     Karlene heces son de color rojizo o muy sanguinolentas (con yane). Llame a nathan médico ahora mismo o busque atención médica inmediata si:     Siente un dolor más intenso.      Tiene mayor sangrado. Preste especial atención a los cambios en nathan shyam y asegúrese de comunicarsecon nathan médico si:     Karlene síntomas no rice merlin después de 3 ó 4 días. ¿Dónde puede encontrar más información en inglés? Rosemarie Wilkerson a [email or web address] e ingrese a nathan cuenta de MyChart. Sheba Noon V600 en el Alexandre Debra \"Search Health Information\" para más información (en inglés) sobre \"Hemorroides: Instrucciones de cuidado. \"     Si no tiene roman Lorean Golder jaison en el enlace \"Sign Up Now\". Revisado: 8 septiembre, 2021               Versión del contenido: 13.2  © 4512-1345 Healthwise, Incorporated. Las instrucciones de cuidado fueron adaptadas bajo licencia por [facility] C[facility] ([facility]). Si usted tiene [place] afección médica o sobre estas instrucciones, siempre pregunte a nathan profesional de shyam. Healthwise, Incorporated niega toda garantía o responsabilidad por nathan uso de esta información. Patient Education        Comezón anal: Instrucciones de cuidado  Anal Itching: Care Instructions  Instrucciones de cuidado  La comezón anal puede estar provocada por reacciones alérgicas, hemorroides y otras afecciones médicas. Cyn la mayoría de las causas no son graves. Las comidas condimentadas, los cítricos, la cafeína y el alcohol pueden irritar la juan anal y causar comezón. No limpiarse kathy la juan anal, o limpiárselademasiado kathy frotándola muy german, también puede causar comezón. El tratamiento casero puede ayudar a aliviar la comezón. La atención de seguimiento es roman parte clave de nathan tratamiento y seguridad. Asegúrese de hacer y acudir a todas las citas, y llame a nathan médico si está teniendo problemas. También es roman buena idea saber los [place] de susexámenes y mantener roman lista de los medicamentos que jazzy. ¿Cómo puede cuidarse en el hogar?  Después de las evacuaciones, limpie la juan suavemente con un paño tibio o roman toallita, alber las para bebés. No use productos que contengan alcohol.  Si nathan médico le receta roman crema o pomada, úsela exactamente alber le fue recetada. Llame a nathan médico si deonte estar teniendo problemas con nathan medicamento.  Evite los jabones yaz que contengan fragancia.  No use papel higiénico perfumado o de colores.  Aplíquese hielo o roman compresa fría sobre la juan alba 10 a 20 minutos cada vez. Póngase un paño unger entre el hielo y la piel.    Aplíquese en la juan óxido de zinc, vaselina o crema de hidrocortisona al 1%. No use productos anestésicos cuyo nombre termine en \"-caína\" (\"-elizabeth\" en inglés) sin hablar rafiq con nathan médico. Algunas personas pueden ser alérgicas a estos productos.  Mantenga un diario de comidas en donde registre todo lo que come. Ciertos alimentos pueden irritar la juan anal después de evacuar el intestino.  Use ropa interior de algodón. Evite pantimedias u otras prendas apretadas. ¿Cuándo debe pedir ayuda? Llame a nathan médico ahora mismo o busque atención médica inmediata si:     Tiene dolor anal con fiebre. Preste especial atención a los cambios en nathan shyam y asegúrese de comunicarsecon nathan médico si:     Sangra de la juan anal.      Tiene dolor en la juan anal.      La comezón continúa después de un par de días de tratamiento en el [facility]. ¿Dónde puede encontrar más información en [facility]? Denver Dame a [email or web address] e ingrese a nathan cuenta de [place]. Snehal Ivan Z212 en el Aster [employer] \"Search Health Information\" para más información (en [facility]) sobre \"Comezón anal: Instrucciones de cuidado. \"     Si no tiene roman cuenta, meri jaison en el enlace \"Sign Up Now\". Revisado: 8 septiembre, 2021               Versión del contenido: 13.2  © 0488-3995 Healthwise, Incorporated. Las instrucciones de cuidado fueron adaptadas bajo licencia por [facility] ([facility]). Si usted tiene [place] afección médica o sobre estas instrucciones, siempre pregunte a nathan profesional de shyam. Healthwise, Incorporated niega toda garantía o responsabilidad por nathan uso de esta información.

## 2022-06-07 NOTE — PROGRESS NOTES
No chest pain, pressure or tightness, dizziness, headache, vision changes, palpitations, swelling in feet/legs. No fevers, chills, sweats. No unintended weight loss. No abdominal pain, nausea, vomiting, diarrhea, constipation, bloody stools, black tarry stools. No rashes. No swollen glands. Chaperone for Intimate Exam   Chaperone was offered and accepted as part of the rooming process.    Chaperone: Italo Valentine CMA

## 2022-06-07 NOTE — PROGRESS NOTES
Subjective  Cheyenne Current, 79 y.o. female presents today with:  Chief Complaint   Patient presents with    3 Month Follow-Up     has hemorrhoids for a month; has been soaking them in warm water and some lotion            HPI      10/21/2021:   Patient is here for f/u HTN. Is compliant with meds and has no side effects from them. Avoids added salt. Tries to eat healthy. Exercises occasionally. Has no chest pain, shortness of breath, palpitations or edema.     Patient is here for DM f/u. Sugars have been moderately well-controlled and patient has not been experiencing hyper- or hypoglycemic symptoms. Compliance with meds is good and there are no side effects. Patient exercises occasionally and tries to eat healthy. Is up to date on foot and eye exams and immunizations.      Was seen in ED recently for headache.     11/04/2021:  Was placed on prednisone and verapamil for ddx of giant cell temporal arthritis v. cluster headaches. Headaches have resolved. Sugars got very high and she urinated more while taking prednisone so she stopped the verapamil to which she attributed the problems.  MRI/MRA pending.     12/21/2021:   Mrs. Nazanin Amezquita had her MRI and MRA performed on 11/30/2021.  See results below. Preston Hodgson was seen by Dr. Kris Zepeda on 11/22/2021. [facility] was determined that giant cell arteritis was unlikely due to normal ESR, clinical course, no visual findings.  She was instructed to continue to take verapamil.  Memory issues continue to be of concern.     Today she states that she last had severe run of HAs for 4 days before Thanksgiving and since then has had headaches off and on . They are not as strong and do not last as long.  SHe is now taking the verapamil daily.  She does not that she has eye watering OU occasionally with the headaches.      Memory - Went to room 220 to see me today even though I have never worked out of that space.      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 Kris Zepeda who endorses daily Verapamil. States concern for memory and safety. Headaches occurring less frequently. May occur on right side or left side alternately.      Was seen in ER for abdominal pain and diarrhea on 01/29/2022. Resolved.     Viral sx - sneezing, coughing, fevers, achiness.     RIght shoulder pain and decreased  ROM x years. Has tried \"everything you can get at the pharmacy\" and ketorolac topical and nothing works. 06/07/2022: Was referred to neuro and shoulder surgeon. Dx'd with right RC tendonitis and was given steroid injection and referred to PT. She was seen by neuro who recommended continued ASA, verapamil, Plavix, atorvastatin. Went to PT for balance as well. HTN: No chest pain, pressure or tightness, dizziness, headache, vision changes, palpitations, swelling in feet/legs. Suspects hemorrhoids: coming and going x 1 onths; slight bleeding on toilet paper; itching; external; h/o intermittent constipation which is controlled now.         No other questions and or concerns for today's visit      Review of Systems      Past Medical History:   Diagnosis Date    Arthritis     Basilar artery stenosis/occlusion 12/8/2021    Chronic bronchitis ([address]     Chronic pruritus 1/15/2021    COVID-19 virus infection 1/4/2021    Degenerative disc disease, cervical     Depression     Depression with anxiety     Easy bruising 6/17/2021    Esophagitis     Fibromyositis     GERD (gastroesophageal reflux disease)     Headache(784.0)     Hyperlipidemia     Hypertension     Migraines     Pleural effusion     Right-sided chest wall pain     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    Type 2 diabetes mellitus without complication ([address] 9/62/1212    no medication per patient     Past Surgical History:   Procedure Laterality Date    CHOLECYSTECTOMY  1988    COLONOSCOPY  05/04/2009    COLONOSCOPY N/A 10/24/2019    COLORECTAL CANCER SCREENING, NOT HIGH RISK performed by Cydney Frame Rene Guido MD at [address] Left 04/27/15     [facility] EYE VITRECTOMY W MACULAR EPIRETINAL MEMBRANE    OVARY REMOVAL      UPPER GASTROINTESTINAL ENDOSCOPY  04/16/2013    UPPER GASTROINTESTINAL ENDOSCOPY  10/29/15    Deysi Ferrer MD     Social History     Socioeconomic History    Marital status:      Spouse name: Not on file    Number of children: Not on file    Years of education: Not on file    Highest education level: Not on file   Occupational History    Not on file   Tobacco Use    Smoking status: Never Smoker    Smokeless tobacco: Never Used   Vaping Use    Vaping Use: Never used   Substance and Sexual Activity    Alcohol use: No    Drug use: No    Sexual activity: Not on file   Other Topics Concern    Not on file   Social History Narrative    Not on file     Social Determinants of Health     Financial Resource Strain: Low Risk     Difficulty of Paying Living Expenses: Not hard at all   Food Insecurity: No Food Insecurity    Worried About [facility] in the Last Year: Never true    [facility] in the Last Year: Never true   Transportation Needs:     Lack of Transportation (Medical): Not on file    Lack of Transportation (Non-Medical):  Not on file   Physical Activity:     Days of Exercise per Week: Not on file    Minutes of Exercise per Session: Not on file   Stress:     Feeling of Stress : Not on file   Social Connections:     Frequency of Communication with Friends and Family: Not on file    Frequency of Social Gatherings with Friends and Family: Not on file    Attends [employer] Services: Not on file    Active Member of Clubs or Organizations: Not on file    Attends Club or Organization Meetings: Not on file    Marital Status: Not on file   Intimate Partner Violence:     Fear of Current or Ex-Partner: Not on file    Emotionally Abused: Not on file    Physically Abused: Not on file    Sexually Abused: Not on file   Housing Stability:     Unable to Pay for Housing in the Last Year: Not on file    Number of Places Lived in the Last Year: Not on file    Unstable Housing in the Last Year: Not on file     Family History   Problem Relation Age of Onset    Other Mother         Neurological Disease    Cancer Sister     Diabetes Brother     Cancer Brother         leukemia     Allergies   Allergen Reactions    Codeine Shortness Of Breath     tired     Current Outpatient Medications   Medication Sig Dispense Refill    atorvastatin (LIPITOR) 40 MG tablet Take 1 tablet by mouth daily 90 tablet 4    blood glucose monitor strips Test up to daily    Type 2 diabetes mellitus without complication, without long-term current use of insulin (HCC) (E11.9) 100 strip 12    zoster recombinant adjuvanted vaccine (SHINGRIX) 50 MCG/0.5ML SUSR injection Inject 0.5 mLs into the muscle See Admin Instructions 1 dose now and repeat in 2-6 months 0.5 mL 0    pramox-PE-glycerin-petrolatum 1-0.25-14.4-15 % cream AAA TID prn itching, irritation, hemorrhoids 51 g 1    HYDROCORTISONE ACE, RECTAL, (PROCTOCORT) 30 MG SUPP Place 1 each rectally 2 times daily as needed (hemorrhoids, anal irritation) 20 suppository 1    clopidogrel (PLAVIX) 75 MG tablet TAKE ONE TABLET BY MOUTH EVERY DAY 90 tablet 1    ASPIRIN LOW DOSE 81 MG EC tablet TAKE ONE TABLET BY MOUTH EVERY DAY 90 tablet 1    metoclopramide (REGLAN) 10 MG tablet Take 1 tablet by mouth 2 times daily as needed (Nausea/vomiting) 60 tablet 0    sertraline (ZOLOFT) 100 MG tablet TAKE ONE TABLET BY MOUTH EVERY DAY 90 tablet 1    alendronate (FOSAMAX) 70 MG tablet Take 1 tablet by mouth every 7 days 15 tablet 4    verapamil (VERELAN PM) 100 MG CP24 extended release capsule Take 1 capsule by mouth daily 30 capsule 5    pantoprazole (PROTONIX) 40 MG tablet TAKE ONE TABLET BY MOUTH EVERY  tablet 4    lisinopril (PRINIVIL;ZESTRIL) 20 MG tablet TAKE ONE TABLET BY MOUTH EVERY DAY 90 tablet 4    Cholecalciferol (VITAMIN D) 50 MCG (2000 UT) CAPS capsule Take 1 capsule by mouth daily 90 capsule 4    Lancets (ONETOUCH DELICA PLUS CVHHDR68C) MISC TEST BLOOD SUGAR daily and PRN illness (NIDDM E11.9) 100 each 12    Lancets MISC 1 each by Does not apply route daily Type 2 diabetes mellitus without complication, without long-term current use of insulin (HCC) (E11.9) 100 each 5    sodium chloride (OCEAN) 0.65 % nasal spray 2 sprays by Nasal route every 3 hours 1 Bottle 0    acetaminophen (APAP EXTRA STRENGTH) 500 MG tablet Take 2 tablets by mouth every 6 hours as needed for Pain or Fever 60 tablet 0    psyllium (METAMUCIL SMOOTH TEXTURE) 28 % packet Take 1 packet by mouth 2 times daily Take with full glass of h20 or juice 60 packet 12    docusate sodium (COLACE) 100 MG capsule Take 1 capsule by mouth daily as needed for Constipation 90 capsule 4    Multiple Vitamins-Minerals (PRESERVISION AREDS) CAPS Take by mouth      Blood Glucose Monitoring Suppl LONG Use as directed up to TID Type 2 diabetes mellitus without complication, without long-term current use of insulin (HCC) (E11.9) 1 Device 0    Alcohol Swabs PADS Use as directed 100 each 5     No current facility-administered medications for this visit. PMH, Surgical Hx, Family Hx, and Social Hxreviewed and updated. Health Maintenance reviewed.     Objective    Vitals:    06/07/22 0933   BP: 120/72   Pulse: 71   Temp: (!) 96 °F (35.6 °C)   TempSrc: Temporal   SpO2: 99%   Weight: 120 lb 6.4 oz (54.6 kg)   Height: 5' 2\" (1.575 m)        Physical Exam      Lab Results   Component Value Date    LABA1C 6.0 (H) 04/07/2022    LABA1C 6.3 (H) 11/30/2021    LABA1C 6.1 (H) 06/17/2021     Lab Results   Component Value Date    LABMICR <1.20 12/12/2018    CREATININE 0.9 01/29/2022     Lab Results   Component Value Date    ALT 20 01/29/2022    AST 19 01/29/2022     Lab Results   Component Value Date    CHOL 240 (H) 05/07/2019    TRIG 163 (H) 05/07/2019 HDL 50 04/07/2022    [facility] 92 04/07/2022        Assessment & Plan   Visit Diagnoses and Associated Orders     Type 2 diabetes mellitus without complication, without long-term current use of insulin ([address]    -  Primary    Stable and well-controlled on current meds. atorvastatin (LIPITOR) 40 MG tablet [[id number]]      blood glucose monitor strips [[id number]]           Essential hypertension        Stable and well-controlled on current meds. Pruritus ani        Continue to control constipation; HC and Prami and glycerin PRN; clean with soft fiber and water/sitz baths    pramox-PE-glycerin-petrolatum 1-0.25-14.4-15 % cream [[id number]]      HYDROCORTISONE ACE, RECTAL, (PROCTOCORT) 30 MG SUPP [[id number]]           Subcortical microvascular ischemic occlusive disease        Stable and well-controlled on current meds. Followed by Dr. Radha Vera. atorvastatin (LIPITOR) 40 MG tablet [[id number]]           Internal hemorrhoid        See abbove. pramox-PE-glycerin-petrolatum 1-0.25-14.4-15 % cream [[id number]]      HYDROCORTISONE ACE, RECTAL, (PROCTOCORT) 30 MG SUPP [[id number]]           Need for shingles vaccine        zoster recombinant adjuvanted vaccine [facility]) 50 MCG/0.5ML SUSR injection [[id number]]                   Reviewed with the patient: all disease processes, current clinical status, medications, activities and diet.      Side effects, adverse effects of the medication prescribed today, as well as treatment plan/ rationale and result expectations have been discussed with the patient who expresses understanding and desires to proceed.     Close follow up to evaluate treatment results and for coordination of care. I have reviewed the patient's medical history in detail and updated the computerized patient record. More than 50% of the appointment was spent in face-to-face counseling, education and care coordination.     Please note this report has been partially produced using speech recognition software and may contain mistakes related to that system including errors in grammar, punctuation and spelling as well as words and phrases that may seem inappropriate. If there are questions or concerns, please feel free to contact me to clarify. No orders of the defined types were placed in this encounter. Orders Placed This Encounter   Medications    atorvastatin (LIPITOR) 40 MG tablet     Sig: Take 1 tablet by mouth daily     Dispense:  90 tablet     Refill:  4    blood glucose monitor strips     Sig: Test up to daily    Type 2 diabetes mellitus without complication, without long-term current use of insulin (HCC) (E11.9)     Dispense:  100 strip     Refill:  12    zoster recombinant adjuvanted vaccine (SHINGRIX) 50 MCG/0.5ML SUSR injection     Sig: Inject 0.5 mLs into the muscle See Admin Instructions 1 dose now and repeat in 2-6 months     Dispense:  0.5 mL     Refill:  0    pramox-PE-glycerin-petrolatum 1-0.25-14.4-15 % cream     Sig: AAA TID prn itching, irritation, hemorrhoids     Dispense:  51 g     Refill:  1    HYDROCORTISONE ACE, RECTAL, (PROCTOCORT) 30 MG SUPP     Sig: Place 1 each rectally 2 times daily as needed (hemorrhoids, anal irritation)     Dispense:  20 suppository     Refill:  1     Medications Discontinued During This Encounter   Medication Reason    blood glucose monitor strips REORDER    atorvastatin (LIPITOR) 40 MG tablet REORDER     Return in about 6 months (around 12/7/2022) for DM, HTN, CVD - OV. Controlled Substance Monitoring:    Acute and Chronic Pain Monitoring:   RX Monitoring 10/21/2021   Periodic Controlled Substance Monitoring Possible medication side effects, risk of tolerance/dependence & alternative treatments discussed. ;No signs of potential drug abuse or diversion identified. ;Assessed functional status.            Karli Thornton MD

## 2022-06-24 ENCOUNTER — TELEPHONE (OUTPATIENT)
Dept: FAMILY MEDICINE CLINIC | Age: 82
End: 2022-06-24

## 2022-06-24 NOTE — TELEPHONE ENCOUNTER
Patients states the medication that was given to help her moms headaches with her condition she has is not working. She is still having the headaches. She is asking what should they do?     Please advise and thank you

## 2022-06-27 NOTE — TELEPHONE ENCOUNTER
I am so sorry that she is having headaches.   I have referred her to Dr. Fabi Avalos and Dr. Roger Manzanares.  I have exhausted what I know to do and referred her to neurologist.  Please ask family to call the neurologist.

## 2022-06-30 DIAGNOSIS — F41.8 DEPRESSION WITH ANXIETY: Chronic | ICD-10-CM

## 2022-06-30 RX ORDER — SERTRALINE HYDROCHLORIDE 100 MG/1
TABLET, FILM COATED ORAL
Qty: 90 TABLET | Refills: 1 | Status: SHIPPED | OUTPATIENT
Start: 2022-06-30

## 2022-06-30 NOTE — TELEPHONE ENCOUNTER
Comments: This request is coming from the pharmacy. Last Office Visit (last PCP visit):   6/7/2022    Next Visit Date:  Future Appointments   Date Time Provider Marleny Lan   12/7/2022 10:30 AM Blaise Lockhart MD [facility]       If hasn't been seen in over a year OR hasn't followed up according to last diabetes/ADHD visit, make appointment for patient before sending refill to provider.     Rx requested:  Requested Prescriptions     Pending Prescriptions Disp Refills    sertraline (ZOLOFT) 100 MG tablet [Pharmacy Med Name: SERTRALINE HCL 100MG TABS] 90 tablet 1     Sig: TAKE ONE TABLET BY MOUTH EVERY DAY

## 2022-08-04 DIAGNOSIS — M81.0 AGE-RELATED OSTEOPOROSIS WITHOUT CURRENT PATHOLOGICAL FRACTURE: Chronic | ICD-10-CM

## 2022-08-07 ENCOUNTER — HOSPITAL ENCOUNTER (EMERGENCY)
Age: 82
Discharge: HOME | End: 2022-08-07
Attending: EMERGENCY MEDICINE
Payer: MEDICARE

## 2022-08-07 ENCOUNTER — APPOINTMENT (OUTPATIENT)
Dept: CT IMAGING | Age: 82
End: 2022-08-07
Payer: MEDICARE

## 2022-08-07 VITALS
TEMPERATURE: 98 F | HEART RATE: 57 BPM | DIASTOLIC BLOOD PRESSURE: 71 MMHG | WEIGHT: 120 LBS | BODY MASS INDEX: 23.56 KG/M2 | OXYGEN SATURATION: 97 % | RESPIRATION RATE: 20 BRPM | HEIGHT: 60 IN | SYSTOLIC BLOOD PRESSURE: 147 MMHG

## 2022-08-07 DIAGNOSIS — N30.00 ACUTE CYSTITIS WITHOUT HEMATURIA: Primary | ICD-10-CM

## 2022-08-07 DIAGNOSIS — R10.9 RIGHT FLANK PAIN: ICD-10-CM

## 2022-08-07 LAB
ALBUMIN SERPL-MCNC: 4.2 G/DL (ref 3.5–4.6)
ALP BLD-CCNC: 100 U/L (ref 40–130)
ALT SERPL-CCNC: 18 U/L (ref 0–33)
ANION GAP SERPL CALCULATED.3IONS-SCNC: 10 MEQ/L (ref 9–15)
AST SERPL-CCNC: 15 U/L (ref 0–35)
BACTERIA: ABNORMAL /HPF
BASOPHILS ABSOLUTE: 0 K/UL (ref 0–0.2)
BASOPHILS RELATIVE PERCENT: 0.2 %
BILIRUB SERPL-MCNC: 0.4 MG/DL (ref 0.2–0.7)
BILIRUBIN URINE: NEGATIVE
BLOOD, URINE: ABNORMAL
BUN BLDV-MCNC: 20 MG/DL (ref 8–23)
CALCIUM SERPL-MCNC: 9.3 MG/DL (ref 8.5–9.9)
CHLORIDE BLD-SCNC: 101 MEQ/L (ref 95–107)
CLARITY: ABNORMAL
CO2: 24 MEQ/L (ref 20–31)
COLOR: YELLOW
CREAT SERPL-MCNC: 0.67 MG/DL (ref 0.5–0.9)
EOSINOPHILS ABSOLUTE: 0 K/UL (ref 0–0.7)
EOSINOPHILS RELATIVE PERCENT: 0.3 %
EPITHELIAL CELLS, UA: ABNORMAL /HPF (ref 0–5)
GFR AFRICAN AMERICAN: >60
GFR NON-AFRICAN AMERICAN: >60
GLOBULIN: 2.5 G/DL (ref 2.3–3.5)
GLUCOSE BLD-MCNC: 123 MG/DL (ref 70–99)
GLUCOSE URINE: NEGATIVE MG/DL
HCT VFR BLD CALC: 36.2 % (ref 37–47)
HEMOGLOBIN: 12.1 G/DL (ref 12–16)
HYALINE CASTS: ABNORMAL /HPF (ref 0–5)
KETONES, URINE: NEGATIVE MG/DL
LACTIC ACID: 1 MMOL/L (ref 0.5–2.2)
LEUKOCYTE ESTERASE, URINE: ABNORMAL
LIPASE: 36 U/L (ref 12–95)
LYMPHOCYTES ABSOLUTE: 0.8 K/UL (ref 1–4.8)
LYMPHOCYTES RELATIVE PERCENT: 9.2 %
MAGNESIUM: 1.9 MG/DL (ref 1.7–2.4)
MCH RBC QN AUTO: 30.1 PG (ref 27–31.3)
MCHC RBC AUTO-ENTMCNC: 33.5 % (ref 33–37)
MCV RBC AUTO: 89.8 FL (ref 82–100)
MONOCYTES ABSOLUTE: 0.6 K/UL (ref 0.2–0.8)
MONOCYTES RELATIVE PERCENT: 6.4 %
NEUTROPHILS ABSOLUTE: 7.4 K/UL (ref 1.4–6.5)
NEUTROPHILS RELATIVE PERCENT: 83.9 %
NITRITE, URINE: NEGATIVE
PDW BLD-RTO: 13.9 % (ref 11.5–14.5)
PH UA: 7 (ref 5–9)
PLATELET # BLD: 120 K/UL (ref 130–400)
POTASSIUM SERPL-SCNC: 4.1 MEQ/L (ref 3.4–4.9)
PROTEIN UA: ABNORMAL MG/DL
RBC # BLD: 4.03 M/UL (ref 4.2–5.4)
RBC UA: ABNORMAL /HPF (ref 0–5)
SODIUM BLD-SCNC: 135 MEQ/L (ref 135–144)
SPECIFIC GRAVITY UA: 1.02 (ref 1–1.03)
TOTAL PROTEIN: 6.7 G/DL (ref 6.3–8)
UROBILINOGEN, URINE: 1 E.U./DL
WBC # BLD: 8.9 K/UL (ref 4.8–10.8)
WBC UA: >100 /HPF (ref 0–5)

## 2022-08-07 PROCEDURE — 83735 ASSAY OF MAGNESIUM: CPT

## 2022-08-07 PROCEDURE — 83690 ASSAY OF LIPASE: CPT

## 2022-08-07 PROCEDURE — 81001 URINALYSIS AUTO W/SCOPE: CPT

## 2022-08-07 PROCEDURE — 99284 EMERGENCY DEPT VISIT MOD MDM: CPT

## 2022-08-07 PROCEDURE — 74176 CT ABD & PELVIS W/O CONTRAST: CPT

## 2022-08-07 PROCEDURE — 96375 TX/PRO/DX INJ NEW DRUG ADDON: CPT

## 2022-08-07 PROCEDURE — 83605 ASSAY OF LACTIC ACID: CPT

## 2022-08-07 PROCEDURE — 96365 THER/PROPH/DIAG IV INF INIT: CPT

## 2022-08-07 PROCEDURE — 85025 COMPLETE CBC W/AUTO DIFF WBC: CPT

## 2022-08-07 PROCEDURE — 96361 HYDRATE IV INFUSION ADD-ON: CPT

## 2022-08-07 PROCEDURE — 36415 COLL VENOUS BLD VENIPUNCTURE: CPT

## 2022-08-07 PROCEDURE — 2580000003 HC RX 258: Performed by: EMERGENCY MEDICINE

## 2022-08-07 PROCEDURE — 80053 COMPREHEN METABOLIC PANEL: CPT

## 2022-08-07 PROCEDURE — 6360000002 HC RX W HCPCS: Performed by: EMERGENCY MEDICINE

## 2022-08-07 RX ORDER — FENTANYL CITRATE 50 UG/ML
50 INJECTION, SOLUTION INTRAMUSCULAR; INTRAVENOUS ONCE
Status: COMPLETED | OUTPATIENT
Start: 2022-08-07 | End: 2022-08-07

## 2022-08-07 RX ORDER — 0.9 % SODIUM CHLORIDE 0.9 %
1000 INTRAVENOUS SOLUTION INTRAVENOUS ONCE
Status: COMPLETED | OUTPATIENT
Start: 2022-08-07 | End: 2022-08-07

## 2022-08-07 RX ORDER — ONDANSETRON 2 MG/ML
4 INJECTION INTRAMUSCULAR; INTRAVENOUS ONCE
Status: COMPLETED | OUTPATIENT
Start: 2022-08-07 | End: 2022-08-07

## 2022-08-07 RX ORDER — TRAMADOL HYDROCHLORIDE 50 MG/1
50 TABLET ORAL EVERY 4 HOURS PRN
Qty: 18 TABLET | Refills: 0 | Status: SHIPPED | OUTPATIENT
Start: 2022-08-07 | End: 2022-08-10

## 2022-08-07 RX ORDER — ONDANSETRON 4 MG/1
4 TABLET, ORALLY DISINTEGRATING ORAL 3 TIMES DAILY PRN
Qty: 21 TABLET | Refills: 0 | Status: SHIPPED | OUTPATIENT
Start: 2022-08-07

## 2022-08-07 RX ORDER — CIPROFLOXACIN 500 MG/1
500 TABLET, FILM COATED ORAL 2 TIMES DAILY
Qty: 14 TABLET | Refills: 0 | Status: SHIPPED | OUTPATIENT
Start: 2022-08-07 | End: 2022-08-14

## 2022-08-07 RX ADMIN — CEFTRIAXONE SODIUM 1000 MG: 1 INJECTION, POWDER, FOR SOLUTION INTRAMUSCULAR; INTRAVENOUS at 14:30

## 2022-08-07 RX ADMIN — FENTANYL CITRATE 50 MCG: 50 INJECTION, SOLUTION INTRAMUSCULAR; INTRAVENOUS at 13:17

## 2022-08-07 RX ADMIN — ONDANSETRON 4 MG: 2 INJECTION INTRAMUSCULAR; INTRAVENOUS at 13:17

## 2022-08-07 RX ADMIN — SODIUM CHLORIDE 1000 ML: 9 INJECTION, SOLUTION INTRAVENOUS at 13:16

## 2022-08-07 ASSESSMENT — PAIN DESCRIPTION - ORIENTATION
ORIENTATION: RIGHT
ORIENTATION: RIGHT

## 2022-08-07 ASSESSMENT — PAIN DESCRIPTION - DESCRIPTORS: DESCRIPTORS: ACHING

## 2022-08-07 ASSESSMENT — PAIN DESCRIPTION - LOCATION
LOCATION: ABDOMEN;BACK
LOCATION: FLANK

## 2022-08-07 ASSESSMENT — ENCOUNTER SYMPTOMS
BACK PAIN: 0
NAUSEA: 0
COUGH: 0
ABDOMINAL PAIN: 1
SHORTNESS OF BREATH: 0
SORE THROAT: 0
DIARRHEA: 0
VOMITING: 0

## 2022-08-07 ASSESSMENT — PAIN SCALES - GENERAL
PAINLEVEL_OUTOF10: 6
PAINLEVEL_OUTOF10: 10

## 2022-08-07 ASSESSMENT — PAIN DESCRIPTION - FREQUENCY: FREQUENCY: INTERMITTENT

## 2022-08-07 ASSESSMENT — PAIN - FUNCTIONAL ASSESSMENT: PAIN_FUNCTIONAL_ASSESSMENT: 0-10

## 2022-08-07 NOTE — TELEPHONE ENCOUNTER
Rx request   Requested Prescriptions     Pending Prescriptions Disp Refills    Cholecalciferol (VITAMIN D3) 50 MCG (2000 UT) CAPS [Pharmacy Med Name: VITAMIN D3 50 MCG(2000 UT) CAPS] 90 capsule 4     Sig: TAKE ONE CAPSULE BY MOUTH EVERY DAY     LOV 6/7/2022  Next Visit Date:  Future Appointments   Date Time Provider Marleny Lan   12/7/2022 10:30 AM Amena Dominguez MD [facility]   12/7/2022 10:45 AM Amena Dominguez MD [address] [place] [address]

## 2022-08-07 NOTE — ED PROVIDER NOTES
[address] [facility] ED  eMERGENCYdEPARTMENT eNCOUnter      Pt Name: Saba Yost  MRN: [id number]  Curtisgfzahira 1940  Date of evaluation: 8/7/2022  Karen Santiago MD    CHIEF COMPLAINT           HPI  Saba Yost is a 79 y.o. female per chart review has a h/o OA, GERD, DM II, HTn, hpl presents to the ED with dysuria, ab pain. Pt gradual onset, moderate, constant, sharp, RLQ ab pain since this am.  +N/v.  +Dysuria x 1 week. Pt denies fever, cp, sob, hematuria, diarrhea. ROS  Review of Systems   Constitutional:  Negative for activity change, chills and fever. HENT:  Negative for ear pain and sore throat. Eyes:  Negative for visual disturbance. Respiratory:  Negative for cough and shortness of breath. Cardiovascular:  Negative for chest pain, palpitations and leg swelling. Gastrointestinal:  Positive for abdominal pain. Negative for diarrhea, nausea and vomiting. Genitourinary:  Positive for dysuria. Musculoskeletal:  Negative for back pain. Skin:  Negative for rash. Neurological:  Negative for dizziness and weakness. Except as noted above the remainder of the review of systems was reviewed and negative.        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    Type 2 diabetes mellitus without complication ([facility] [address] 1/25/4650    no medication per patient         SURGICAL HISTORY       Past Surgical History:   Procedure Laterality Date CHOLECYSTECTOMY  1988    COLONOSCOPY  05/04/2009    COLONOSCOPY N/A 10/24/2019    COLORECTAL CANCER SCREENING, NOT HIGH RISK [facility]  [address] Left 04/27/15     [facility] EYE VITRECTOMY W MACULAR EPIRETINAL MEMBRANE    OVARY REMOVAL      UPPER GASTROINTESTINAL ENDOSCOPY  04/16/2013    UPPER GASTROINTESTINAL ENDOSCOPY  10/29/15    Juventino Tello MD         CURRENTMEDICATIONS       Previous Medications    ACETAMINOPHEN (APAP EXTRA STRENGTH) 500 MG TABLET    Take 2 tablets by mouth every 6 hours as needed for Pain or Fever    ALCOHOL SWABS PADS    Use as directed    ALENDRONATE (FOSAMAX) 70 MG TABLET    Take 1 tablet by mouth every 7 days    ASPIRIN LOW DOSE 81 MG EC TABLET    TAKE ONE TABLET BY MOUTH EVERY DAY    ATORVASTATIN (LIPITOR) 40 MG TABLET    Take 1 tablet by mouth daily    BLOOD GLUCOSE MONITOR STRIPS    Test up to daily    Type 2 diabetes mellitus without complication, without long-term current use of insulin (HCC) (E11.9)    BLOOD GLUCOSE MONITORING SUPPL LONG    Use as directed up to TID Type 2 diabetes mellitus without complication, without long-term current use of insulin (HCC) (E11.9)    CHOLECALCIFEROL (VITAMIN D) 50 MCG (2000 UT) CAPS CAPSULE    Take 1 capsule by mouth daily    CLOPIDOGREL (PLAVIX) 75 MG TABLET    TAKE ONE TABLET BY MOUTH EVERY DAY    DOCUSATE SODIUM (COLACE) 100 MG CAPSULE    Take 1 capsule by mouth daily as needed for Constipation    HYDROCORTISONE ACE, RECTAL, (PROCTOCORT) 30 MG SUPP    Place 1 each rectally 2 times daily as needed (hemorrhoids, anal irritation)    LANCETS (ONETOUCH DELICA PLUS [id number]) MISC    TEST BLOOD SUGAR daily and PRN illness (NIDDM E11.9)    LANCETS MISC    1 each by Does not apply route daily Type 2 diabetes mellitus without complication, without long-term current use of insulin (HCC) (E11.9)    LISINOPRIL (PRINIVIL;ZESTRIL) 20 MG TABLET    TAKE ONE TABLET BY MOUTH EVERY DAY    METOCLOPRAMIDE (REGLAN) 10 MG TABLET    Take 1 tablet by mouth 2 times daily as needed (Nausea/vomiting)    MULTIPLE VITAMINS-MINERALS (PRESERVISION AREDS) CAPS    Take by mouth    PANTOPRAZOLE (PROTONIX) 40 MG TABLET    TAKE ONE TABLET BY MOUTH EVERY DAY    PRAMOX-PE-GLYCERIN-PETROLATUM 1-0.25-14.4-15 % CREAM    AAA TID prn itching, irritation, hemorrhoids    PSYLLIUM (METAMUCIL SMOOTH TEXTURE) 28 % PACKET    Take 1 packet by mouth 2 times daily Take with full glass of h20 or juice    SERTRALINE (ZOLOFT) 100 MG TABLET    TAKE ONE TABLET BY MOUTH EVERY DAY    SODIUM CHLORIDE (OCEAN) 0.65 % NASAL SPRAY    2 sprays by Nasal route every 3 hours    VERAPAMIL (VERELAN PM) 100 MG CP24 EXTENDED RELEASE CAPSULE    Take 1 capsule by mouth daily    ZOSTER RECOMBINANT ADJUVANTED VACCINE (SHINGRIX) 50 MCG/0.5ML SUSR INJECTION    Inject 0.5 mLs into the muscle See Admin Instructions 1 dose now and repeat in 2-6 months       ALLERGIES     Codeine    FAMILY HISTORY       Family History   Problem Relation Age of Onset    Other Mother         Neurological Disease    Cancer Sister     Diabetes Brother     Cancer Brother         leukemia          SOCIAL HISTORY       Social History     Socioeconomic History    Marital status:    Tobacco Use    Smoking status: Never    Smokeless tobacco: Never   Vaping Use    Vaping Use: Never used   Substance and Sexual Activity    Alcohol use: No    Drug use: No         PHYSICAL EXAM       ED Triage Vitals [08/07/22 1244]   BP Temp Temp src Heart Rate Resp SpO2 Height Weight   (!) 147/71 98 °F (36.7 °C) -- 57 20 97 % 5' (1.524 m) 120 lb (54.4 kg)       Physical Exam  Vitals and nursing note reviewed. Constitutional:       Appearance: She is well-developed. HENT:      Head: Normocephalic.       Right Ear: External ear normal.      Left Ear: External ear normal.   Eyes:      Conjunctiva/sclera: Conjunctivae normal.      Pupils: Pupils are equal, round, and reactive to light. Cardiovascular:      Rate and Rhythm: Normal rate and regular rhythm. Heart sounds: Normal heart sounds. Pulmonary:      Effort: Pulmonary effort is normal.      Breath sounds: Normal breath sounds. Abdominal:      Comments: Soft, nondistended. Moderate tenderness to palpation in RLQ. No rebound, guarding, peritoneal signs. Musculoskeletal:         General: Normal range of motion. Cervical back: Normal range of motion and neck supple. Skin:     General: Skin is warm and dry. Neurological:      Mental Status: She is alert and oriented to person, place, and time. Psychiatric:         Mood and Affect: Mood normal.         MDM  78 yo female presents to the ED with ab pain, n/v, dysuria. Pt is afebrile, hemodynamically stable. Pt given 1 L NS, IV fentanyl, IV zofran in the ED. Labs unremarkable. UA shows UTI. CT AP shows non obstructing kidney stone. Pt likely with early pyelo vs complicated UTI. Pt reassessed and feels much better. Pt given IV rocephin in the ED. Pt educated about UTIs and pyelo. Pt given prescription for cipro, tramadol, zofran. Pt given pyelo warning signs and will f/u with pcp. FINAL IMPRESSION      1. Acute cystitis without hematuria    2.  Right flank pain          DISPOSITION/PLAN   DISPOSITION Discharge - Pending Orders Complete 08/07/2022 02:23:44 PM        DISCHARGE MEDICATIONS:  [unfilled]         Kadeem Kim MD(electronically signed)  Attending Emergency Physician            Kadeem Kim MD  08/07/22 5470

## 2022-08-07 NOTE — ED TRIAGE NOTES
Pt to ed from home via triage with c/o jourdan hyatt flank pain, radiating to RLQ, onset today  Pt  reports pain and burning with urination for a week, increased urinary frequency   Pt reports nausea with pain today, emesis x2  Skin WDI. Respirations even and unlabored.

## 2022-08-08 RX ORDER — ACETAMINOPHEN 160 MG
TABLET,DISINTEGRATING ORAL
Qty: 90 CAPSULE | Refills: 4 | Status: SHIPPED | OUTPATIENT
Start: 2022-08-08

## 2022-08-31 DIAGNOSIS — I67.82 SUBCORTICAL MICROVASCULAR ISCHEMIC OCCLUSIVE DISEASE: Chronic | ICD-10-CM

## 2022-08-31 DIAGNOSIS — I65.1 BASILAR ARTERY STENOSIS/OCCLUSION: Chronic | ICD-10-CM

## 2022-08-31 RX ORDER — ASPIRIN 81 MG/1
TABLET, COATED ORAL
Qty: 90 TABLET | Refills: 1 | OUTPATIENT
Start: 2022-08-31

## 2022-08-31 RX ORDER — CLOPIDOGREL BISULFATE 75 MG/1
TABLET ORAL
Qty: 90 TABLET | Refills: 1 | OUTPATIENT
Start: 2022-08-31

## 2022-10-07 DIAGNOSIS — F41.8 DEPRESSION WITH ANXIETY: Chronic | ICD-10-CM

## 2022-10-07 RX ORDER — SERTRALINE HYDROCHLORIDE 100 MG/1
TABLET, FILM COATED ORAL
Qty: 90 TABLET | Refills: 1 | OUTPATIENT
Start: 2022-10-07

## 2023-08-01 ENCOUNTER — APPOINTMENT (OUTPATIENT)
Dept: CT IMAGING | Age: 83
End: 2023-08-01
Payer: MEDICARE

## 2023-08-01 ENCOUNTER — HOSPITAL ENCOUNTER (EMERGENCY)
Age: 83
Discharge: HOME | End: 2023-08-01
Attending: STUDENT IN AN ORGANIZED HEALTH CARE EDUCATION/TRAINING PROGRAM
Payer: MEDICARE

## 2023-08-01 VITALS
DIASTOLIC BLOOD PRESSURE: 61 MMHG | SYSTOLIC BLOOD PRESSURE: 171 MMHG | TEMPERATURE: 98.4 F | HEIGHT: 62 IN | BODY MASS INDEX: 21.9 KG/M2 | WEIGHT: 119 LBS | OXYGEN SATURATION: 100 % | HEART RATE: 66 BPM | RESPIRATION RATE: 17 BRPM

## 2023-08-01 DIAGNOSIS — R31.9 URINARY TRACT INFECTION WITH HEMATURIA, SITE UNSPECIFIED: ICD-10-CM

## 2023-08-01 DIAGNOSIS — N39.0 URINARY TRACT INFECTION WITH HEMATURIA, SITE UNSPECIFIED: ICD-10-CM

## 2023-08-01 DIAGNOSIS — R10.30 LOWER ABDOMINAL PAIN: Primary | ICD-10-CM

## 2023-08-01 DIAGNOSIS — R19.7 DIARRHEA, UNSPECIFIED TYPE: ICD-10-CM

## 2023-08-01 LAB
ALBUMIN SERPL-MCNC: 4.2 G/DL (ref 3.5–4.6)
ALP SERPL-CCNC: 89 U/L (ref 40–130)
ALT SERPL-CCNC: 16 U/L (ref 0–33)
ANION GAP SERPL CALCULATED.3IONS-SCNC: 8 MEQ/L (ref 9–15)
AST SERPL-CCNC: 16 U/L (ref 0–35)
BACTERIA URNS QL MICRO: NEGATIVE /HPF
BASOPHILS # BLD: 0 K/UL (ref 0–0.2)
BASOPHILS NFR BLD: 0.3 %
BILIRUB SERPL-MCNC: 0.3 MG/DL (ref 0.2–0.7)
BILIRUB UR QL STRIP: NEGATIVE
BUN SERPL-MCNC: 23 MG/DL (ref 8–23)
CALCIUM SERPL-MCNC: 9.5 MG/DL (ref 8.5–9.9)
CHLORIDE SERPL-SCNC: 105 MEQ/L (ref 95–107)
CLARITY UR: ABNORMAL
CO2 SERPL-SCNC: 28 MEQ/L (ref 20–31)
COLOR UR: YELLOW
CREAT SERPL-MCNC: 0.82 MG/DL (ref 0.5–0.9)
EOSINOPHIL # BLD: 0.1 K/UL (ref 0–0.7)
EOSINOPHIL NFR BLD: 1 %
EPI CELLS #/AREA URNS AUTO: ABNORMAL /HPF (ref 0–5)
ERYTHROCYTE [DISTWIDTH] IN BLOOD BY AUTOMATED COUNT: 13.7 % (ref 11.5–14.5)
GLOBULIN SER CALC-MCNC: 2 G/DL (ref 2.3–3.5)
GLUCOSE SERPL-MCNC: 83 MG/DL (ref 70–99)
GLUCOSE UR STRIP-MCNC: NEGATIVE MG/DL
HCT VFR BLD AUTO: 36.6 % (ref 37–47)
HGB BLD-MCNC: 11.9 G/DL (ref 12–16)
HGB UR QL STRIP: ABNORMAL
HYALINE CASTS #/AREA URNS AUTO: ABNORMAL /HPF (ref 0–5)
KETONES UR STRIP-MCNC: NEGATIVE MG/DL
LACTATE BLDV-SCNC: 0.6 MMOL/L (ref 0.5–2.2)
LEUKOCYTE ESTERASE UR QL STRIP: ABNORMAL
LIPASE SERPL-CCNC: 42 U/L (ref 12–95)
LYMPHOCYTES # BLD: 1.2 K/UL (ref 1–4.8)
LYMPHOCYTES NFR BLD: 20.1 %
MAGNESIUM SERPL-MCNC: 2 MG/DL (ref 1.7–2.4)
MCH RBC QN AUTO: 29.7 PG (ref 27–31.3)
MCHC RBC AUTO-ENTMCNC: 32.5 % (ref 33–37)
MCV RBC AUTO: 91.4 FL (ref 79.4–94.8)
MONOCYTES # BLD: 0.5 K/UL (ref 0.2–0.8)
MONOCYTES NFR BLD: 7.6 %
NEUTROPHILS # BLD: 4.4 K/UL (ref 1.4–6.5)
NEUTS SEG NFR BLD: 71 %
NITRITE UR QL STRIP: NEGATIVE
PH UR STRIP: 8 [PH] (ref 5–9)
PLATELET # BLD AUTO: 123 K/UL (ref 130–400)
POTASSIUM SERPL-SCNC: 4.2 MEQ/L (ref 3.4–4.9)
PROCALCITONIN SERPL IA-MCNC: 0.05 NG/ML (ref 0–0.15)
PROT SERPL-MCNC: 6.2 G/DL (ref 6.3–8)
PROT UR STRIP-MCNC: 30 MG/DL
RBC # BLD AUTO: 4 M/UL (ref 4.2–5.4)
RBC #/AREA URNS HPF: ABNORMAL /HPF (ref 0–2)
SARS-COV-2 RDRP RESP QL NAA+PROBE: NOT DETECTED
SODIUM SERPL-SCNC: 141 MEQ/L (ref 135–144)
SP GR UR STRIP: 1.02 (ref 1–1.03)
TROPONIN T SERPL-MCNC: <0.01 NG/ML (ref 0–0.01)
TSH REFLEX: 1.01 UIU/ML (ref 0.44–3.86)
URINE REFLEX TO CULTURE: YES
UROBILINOGEN UR STRIP-ACNC: 1 E.U./DL
WBC # BLD AUTO: 6.2 K/UL (ref 4.8–10.8)
WBC #/AREA URNS AUTO: >100 /HPF (ref 0–5)

## 2023-08-01 PROCEDURE — 85025 COMPLETE CBC W/AUTO DIFF WBC: CPT

## 2023-08-01 PROCEDURE — 2580000003 HC RX 258: Performed by: STUDENT IN AN ORGANIZED HEALTH CARE EDUCATION/TRAINING PROGRAM

## 2023-08-01 PROCEDURE — 87086 URINE CULTURE/COLONY COUNT: CPT

## 2023-08-01 PROCEDURE — A4216 STERILE WATER/SALINE, 10 ML: HCPCS | Performed by: STUDENT IN AN ORGANIZED HEALTH CARE EDUCATION/TRAINING PROGRAM

## 2023-08-01 PROCEDURE — 96361 HYDRATE IV INFUSION ADD-ON: CPT

## 2023-08-01 PROCEDURE — 6370000000 HC RX 637 (ALT 250 FOR IP): Performed by: STUDENT IN AN ORGANIZED HEALTH CARE EDUCATION/TRAINING PROGRAM

## 2023-08-01 PROCEDURE — 96365 THER/PROPH/DIAG IV INF INIT: CPT

## 2023-08-01 PROCEDURE — 83735 ASSAY OF MAGNESIUM: CPT

## 2023-08-01 PROCEDURE — 83605 ASSAY OF LACTIC ACID: CPT

## 2023-08-01 PROCEDURE — 87635 SARS-COV-2 COVID-19 AMP PRB: CPT

## 2023-08-01 PROCEDURE — 84145 PROCALCITONIN (PCT): CPT

## 2023-08-01 PROCEDURE — 74177 CT ABD & PELVIS W/CONTRAST: CPT

## 2023-08-01 PROCEDURE — 96375 TX/PRO/DX INJ NEW DRUG ADDON: CPT

## 2023-08-01 PROCEDURE — 36415 COLL VENOUS BLD VENIPUNCTURE: CPT

## 2023-08-01 PROCEDURE — 87186 SC STD MICRODIL/AGAR DIL: CPT

## 2023-08-01 PROCEDURE — 80053 COMPREHEN METABOLIC PANEL: CPT

## 2023-08-01 PROCEDURE — 81001 URINALYSIS AUTO W/SCOPE: CPT

## 2023-08-01 PROCEDURE — 99285 EMERGENCY DEPT VISIT HI MDM: CPT

## 2023-08-01 PROCEDURE — 84443 ASSAY THYROID STIM HORMONE: CPT

## 2023-08-01 PROCEDURE — 84484 ASSAY OF TROPONIN QUANT: CPT

## 2023-08-01 PROCEDURE — 83690 ASSAY OF LIPASE: CPT

## 2023-08-01 PROCEDURE — 2500000003 HC RX 250 WO HCPCS: Performed by: STUDENT IN AN ORGANIZED HEALTH CARE EDUCATION/TRAINING PROGRAM

## 2023-08-01 PROCEDURE — 6360000004 HC RX CONTRAST MEDICATION: Performed by: STUDENT IN AN ORGANIZED HEALTH CARE EDUCATION/TRAINING PROGRAM

## 2023-08-01 PROCEDURE — 93005 ELECTROCARDIOGRAM TRACING: CPT | Performed by: STUDENT IN AN ORGANIZED HEALTH CARE EDUCATION/TRAINING PROGRAM

## 2023-08-01 PROCEDURE — 6360000002 HC RX W HCPCS: Performed by: STUDENT IN AN ORGANIZED HEALTH CARE EDUCATION/TRAINING PROGRAM

## 2023-08-01 PROCEDURE — 87077 CULTURE AEROBIC IDENTIFY: CPT

## 2023-08-01 RX ORDER — CEPHALEXIN 500 MG/1
500 CAPSULE ORAL 2 TIMES DAILY
Qty: 14 CAPSULE | Refills: 0 | Status: SHIPPED | OUTPATIENT
Start: 2023-08-01 | End: 2023-08-08

## 2023-08-01 RX ORDER — ACETAMINOPHEN 500 MG
1000 TABLET ORAL ONCE
Status: COMPLETED | OUTPATIENT
Start: 2023-08-01 | End: 2023-08-01

## 2023-08-01 RX ORDER — 0.9 % SODIUM CHLORIDE 0.9 %
1000 INTRAVENOUS SOLUTION INTRAVENOUS ONCE
Status: COMPLETED | OUTPATIENT
Start: 2023-08-01 | End: 2023-08-01

## 2023-08-01 RX ORDER — ACETAMINOPHEN 500 MG
1000 TABLET ORAL EVERY 6 HOURS PRN
Qty: 60 TABLET | Refills: 0 | Status: SHIPPED | OUTPATIENT
Start: 2023-08-01

## 2023-08-01 RX ORDER — ONDANSETRON 2 MG/ML
4 INJECTION INTRAMUSCULAR; INTRAVENOUS ONCE
Status: COMPLETED | OUTPATIENT
Start: 2023-08-01 | End: 2023-08-01

## 2023-08-01 RX ADMIN — ONDANSETRON 4 MG: 2 INJECTION INTRAMUSCULAR; INTRAVENOUS at 14:15

## 2023-08-01 RX ADMIN — CEFTRIAXONE SODIUM 1000 MG: 1 INJECTION, POWDER, FOR SOLUTION INTRAMUSCULAR; INTRAVENOUS at 14:59

## 2023-08-01 RX ADMIN — ACETAMINOPHEN 1000 MG: 500 TABLET ORAL at 14:15

## 2023-08-01 RX ADMIN — FAMOTIDINE 20 MG: 10 INJECTION, SOLUTION INTRAVENOUS at 14:15

## 2023-08-01 RX ADMIN — IOPAMIDOL 50 ML: 612 INJECTION, SOLUTION INTRAVENOUS at 14:47

## 2023-08-01 RX ADMIN — SODIUM CHLORIDE 1000 ML: 9 INJECTION, SOLUTION INTRAVENOUS at 14:16

## 2023-08-01 ASSESSMENT — LIFESTYLE VARIABLES
HOW MANY STANDARD DRINKS CONTAINING ALCOHOL DO YOU HAVE ON A TYPICAL DAY: PATIENT DOES NOT DRINK
HOW OFTEN DO YOU HAVE A DRINK CONTAINING ALCOHOL: NEVER

## 2023-08-01 ASSESSMENT — PAIN DESCRIPTION - DESCRIPTORS: DESCRIPTORS: SPASM;PRESSURE

## 2023-08-01 ASSESSMENT — PAIN DESCRIPTION - PAIN TYPE: TYPE: ACUTE PAIN

## 2023-08-01 ASSESSMENT — PAIN SCALES - GENERAL: PAINLEVEL_OUTOF10: 10

## 2023-08-01 ASSESSMENT — PAIN DESCRIPTION - ORIENTATION: ORIENTATION: MID;LOWER

## 2023-08-01 ASSESSMENT — PAIN - FUNCTIONAL ASSESSMENT
PAIN_FUNCTIONAL_ASSESSMENT: NONE - DENIES PAIN
PAIN_FUNCTIONAL_ASSESSMENT: 0-10

## 2023-08-01 ASSESSMENT — PAIN DESCRIPTION - LOCATION: LOCATION: ABDOMEN

## 2023-08-01 ASSESSMENT — PAIN DESCRIPTION - FREQUENCY: FREQUENCY: CONTINUOUS

## 2023-08-01 NOTE — ED NOTES
Discharge instructions reviewed with patient. Medications reviewed and explained to patient. Patient denies any further questions at this time. Pt encouraged to make follow up appointments with PCP and any speciality referrals.         Leidy Burns RN  08/01/23 0641

## 2023-08-01 NOTE — ED TRIAGE NOTES
Pt states that she has been having lower ABD pain and back pain x5 days. Pt stated that she is now having urinary retention.

## 2023-08-02 LAB
EKG ATRIAL RATE: 60 BPM
EKG P AXIS: 46 DEGREES
EKG P-R INTERVAL: 142 MS
EKG Q-T INTERVAL: 394 MS
EKG QRS DURATION: 84 MS
EKG QTC CALCULATION (BAZETT): 394 MS
EKG R AXIS: -8 DEGREES
EKG T AXIS: 17 DEGREES
EKG VENTRICULAR RATE: 60 BPM

## 2023-08-02 PROCEDURE — 93010 ELECTROCARDIOGRAM REPORT: CPT | Performed by: INTERNAL MEDICINE

## 2023-08-04 LAB
BACTERIA UR CULT: ABNORMAL
BACTERIA UR CULT: ABNORMAL
ORGANISM: ABNORMAL
PERFORMED ON: NORMAL
POC CREATININE: 0.8 MG/DL (ref 0.6–1.2)
POC SAMPLE TYPE: NORMAL

## 2023-09-07 ENCOUNTER — OFFICE VISIT (OUTPATIENT)
Dept: FAMILY MEDICINE CLINIC | Age: 83
End: 2023-09-07
Payer: MEDICARE

## 2023-09-07 VITALS
HEART RATE: 78 BPM | HEIGHT: 62 IN | OXYGEN SATURATION: 98 % | BODY MASS INDEX: 21.71 KG/M2 | WEIGHT: 118 LBS | DIASTOLIC BLOOD PRESSURE: 60 MMHG | TEMPERATURE: 97.4 F | SYSTOLIC BLOOD PRESSURE: 130 MMHG

## 2023-09-07 DIAGNOSIS — N30.01 ACUTE CYSTITIS WITH HEMATURIA: ICD-10-CM

## 2023-09-07 DIAGNOSIS — R06.89 ADVENTITIOUS BREATH SOUNDS: ICD-10-CM

## 2023-09-07 DIAGNOSIS — N39.0 RECURRENT UTI: ICD-10-CM

## 2023-09-07 DIAGNOSIS — R05.1 ACUTE COUGH: Primary | ICD-10-CM

## 2023-09-07 DIAGNOSIS — J40 BRONCHITIS: ICD-10-CM

## 2023-09-07 DIAGNOSIS — R05.1 ACUTE COUGH: ICD-10-CM

## 2023-09-07 LAB
ANION GAP SERPL CALCULATED.3IONS-SCNC: 10 MEQ/L (ref 9–15)
BASOPHILS # BLD: 0 K/UL (ref 0–0.2)
BASOPHILS NFR BLD: 0.5 %
BILIRUBIN, POC: NORMAL
BLOOD URINE, POC: 25
BUN SERPL-MCNC: 22 MG/DL (ref 8–23)
CALCIUM SERPL-MCNC: 9.7 MG/DL (ref 8.5–9.9)
CHLORIDE SERPL-SCNC: 104 MEQ/L (ref 95–107)
CLARITY, POC: CLEAR
CO2 SERPL-SCNC: 26 MEQ/L (ref 20–31)
COLOR, POC: NORMAL
CREAT SERPL-MCNC: 0.77 MG/DL (ref 0.5–0.9)
EOSINOPHIL # BLD: 0.2 K/UL (ref 0–0.7)
EOSINOPHIL NFR BLD: 2.9 %
ERYTHROCYTE [DISTWIDTH] IN BLOOD BY AUTOMATED COUNT: 13.7 % (ref 11.5–14.5)
GLUCOSE SERPL-MCNC: 78 MG/DL (ref 70–99)
GLUCOSE URINE, POC: NORMAL
HCT VFR BLD AUTO: 38.4 % (ref 37–47)
HGB BLD-MCNC: 12.6 G/DL (ref 12–16)
INFLUENZA A ANTIBODY: NEGATIVE
INFLUENZA B ANTIBODY: NEGATIVE
KETONES, POC: NORMAL
LEUKOCYTE EST, POC: 500
LYMPHOCYTES # BLD: 1.3 K/UL (ref 1–4.8)
LYMPHOCYTES NFR BLD: 21.3 %
Lab: NORMAL
MCH RBC QN AUTO: 29.6 PG (ref 27–31.3)
MCHC RBC AUTO-ENTMCNC: 32.7 % (ref 33–37)
MCV RBC AUTO: 90.6 FL (ref 79.4–94.8)
MONOCYTES # BLD: 0.6 K/UL (ref 0.2–0.8)
MONOCYTES NFR BLD: 10.7 %
NEUTROPHILS # BLD: 3.8 K/UL (ref 1.4–6.5)
NEUTS SEG NFR BLD: 64.6 %
NITRITE, POC: NORMAL
PERFORMING INSTRUMENT: NORMAL
PH, POC: 6
PLATELET # BLD AUTO: 123 K/UL (ref 130–400)
POTASSIUM SERPL-SCNC: 4.4 MEQ/L (ref 3.4–4.9)
PROTEIN, POC: 0.3
QC PASS/FAIL: NORMAL
RBC # BLD AUTO: 4.24 M/UL (ref 4.2–5.4)
SARS-COV-2, POC: NORMAL
SODIUM SERPL-SCNC: 140 MEQ/L (ref 135–144)
SPECIFIC GRAVITY, POC: 1.03
UROBILINOGEN, POC: 3.5
WBC # BLD AUTO: 5.9 K/UL (ref 4.8–10.8)

## 2023-09-07 PROCEDURE — 87804 INFLUENZA ASSAY W/OPTIC: CPT | Performed by: NURSE PRACTITIONER

## 2023-09-07 PROCEDURE — 87426 SARSCOV CORONAVIRUS AG IA: CPT | Performed by: NURSE PRACTITIONER

## 2023-09-07 PROCEDURE — 1090F PRES/ABSN URINE INCON ASSESS: CPT | Performed by: NURSE PRACTITIONER

## 2023-09-07 PROCEDURE — 3075F SYST BP GE 130 - 139MM HG: CPT | Performed by: NURSE PRACTITIONER

## 2023-09-07 PROCEDURE — 1123F ACP DISCUSS/DSCN MKR DOCD: CPT | Performed by: NURSE PRACTITIONER

## 2023-09-07 PROCEDURE — G8427 DOCREV CUR MEDS BY ELIG CLIN: HCPCS | Performed by: NURSE PRACTITIONER

## 2023-09-07 PROCEDURE — 99214 OFFICE O/P EST MOD 30 MIN: CPT | Performed by: NURSE PRACTITIONER

## 2023-09-07 PROCEDURE — 81003 URINALYSIS AUTO W/O SCOPE: CPT | Performed by: NURSE PRACTITIONER

## 2023-09-07 PROCEDURE — G8399 PT W/DXA RESULTS DOCUMENT: HCPCS | Performed by: NURSE PRACTITIONER

## 2023-09-07 PROCEDURE — 1036F TOBACCO NON-USER: CPT | Performed by: NURSE PRACTITIONER

## 2023-09-07 PROCEDURE — G8420 CALC BMI NORM PARAMETERS: HCPCS | Performed by: NURSE PRACTITIONER

## 2023-09-07 PROCEDURE — 3078F DIAST BP <80 MM HG: CPT | Performed by: NURSE PRACTITIONER

## 2023-09-07 RX ORDER — AMOXICILLIN AND CLAVULANATE POTASSIUM 875; 125 MG/1; MG/1
1 TABLET, FILM COATED ORAL 2 TIMES DAILY
Qty: 20 TABLET | Refills: 0 | Status: SHIPPED | OUTPATIENT
Start: 2023-09-07 | End: 2023-09-17

## 2023-09-07 RX ORDER — GUAIFENESIN 600 MG/1
600 TABLET, EXTENDED RELEASE ORAL 2 TIMES DAILY
Qty: 30 TABLET | Refills: 0 | Status: SHIPPED | OUTPATIENT
Start: 2023-09-07 | End: 2023-09-22

## 2023-09-07 RX ORDER — ALBUTEROL SULFATE 90 UG/1
2 AEROSOL, METERED RESPIRATORY (INHALATION) 4 TIMES DAILY PRN
Qty: 18 G | Refills: 0 | Status: SHIPPED | OUTPATIENT
Start: 2023-09-07

## 2023-09-07 RX ORDER — BENZONATATE 100 MG/1
100 CAPSULE ORAL 3 TIMES DAILY PRN
Qty: 30 CAPSULE | Refills: 0 | Status: SHIPPED | OUTPATIENT
Start: 2023-09-07 | End: 2023-09-17

## 2023-09-07 SDOH — ECONOMIC STABILITY: INCOME INSECURITY: HOW HARD IS IT FOR YOU TO PAY FOR THE VERY BASICS LIKE FOOD, HOUSING, MEDICAL CARE, AND HEATING?: NOT VERY HARD

## 2023-09-07 SDOH — ECONOMIC STABILITY: FOOD INSECURITY: WITHIN THE PAST 12 MONTHS, THE FOOD YOU BOUGHT JUST DIDN'T LAST AND YOU DIDN'T HAVE MONEY TO GET MORE.: NEVER TRUE

## 2023-09-07 SDOH — ECONOMIC STABILITY: FOOD INSECURITY: WITHIN THE PAST 12 MONTHS, YOU WORRIED THAT YOUR FOOD WOULD RUN OUT BEFORE YOU GOT MONEY TO BUY MORE.: NEVER TRUE

## 2023-09-07 SDOH — ECONOMIC STABILITY: HOUSING INSECURITY
IN THE LAST 12 MONTHS, WAS THERE A TIME WHEN YOU DID NOT HAVE A STEADY PLACE TO SLEEP OR SLEPT IN A SHELTER (INCLUDING NOW)?: NO

## 2023-09-07 ASSESSMENT — ENCOUNTER SYMPTOMS
SHORTNESS OF BREATH: 0
SORE THROAT: 1
COUGH: 1
VOMITING: 0
WHEEZING: 0
ABDOMINAL PAIN: 1
DIARRHEA: 1
NAUSEA: 0

## 2023-09-07 ASSESSMENT — PATIENT HEALTH QUESTIONNAIRE - PHQ9
2. FEELING DOWN, DEPRESSED OR HOPELESS: 0
SUM OF ALL RESPONSES TO PHQ QUESTIONS 1-9: 0
9. THOUGHTS THAT YOU WOULD BE BETTER OFF DEAD, OR OF HURTING YOURSELF: 0
4. FEELING TIRED OR HAVING LITTLE ENERGY: 0
5. POOR APPETITE OR OVEREATING: 0
6. FEELING BAD ABOUT YOURSELF - OR THAT YOU ARE A FAILURE OR HAVE LET YOURSELF OR YOUR FAMILY DOWN: 0
1. LITTLE INTEREST OR PLEASURE IN DOING THINGS: 0
7. TROUBLE CONCENTRATING ON THINGS, SUCH AS READING THE NEWSPAPER OR WATCHING TELEVISION: 0
SUM OF ALL RESPONSES TO PHQ QUESTIONS 1-9: 0
8. MOVING OR SPEAKING SO SLOWLY THAT OTHER PEOPLE COULD HAVE NOTICED. OR THE OPPOSITE, BEING SO FIGETY OR RESTLESS THAT YOU HAVE BEEN MOVING AROUND A LOT MORE THAN USUAL: 0
10. IF YOU CHECKED OFF ANY PROBLEMS, HOW DIFFICULT HAVE THESE PROBLEMS MADE IT FOR YOU TO DO YOUR WORK, TAKE CARE OF THINGS AT HOME, OR GET ALONG WITH OTHER PEOPLE: 0
SUM OF ALL RESPONSES TO PHQ9 QUESTIONS 1 & 2: 0
SUM OF ALL RESPONSES TO PHQ QUESTIONS 1-9: 0
3. TROUBLE FALLING OR STAYING ASLEEP: 0
SUM OF ALL RESPONSES TO PHQ QUESTIONS 1-9: 0

## 2023-09-07 NOTE — PROGRESS NOTES
wheezing- no breathing issues, no SOB- xray neg  Augmentin, albuterol for wheezing, mucinex and tesslaon for cough  Labs, aware they may not be resulted until tomorrow  Any worsening needs to go to ER, daughter verbalized understanding  Aware if labs abnormal , ER    Urine dip+ will start Augmentin based of last culture    Orders Placed This Encounter   Procedures    Culture, Urine     Standing Status:   Future     Standing Expiration Date:   9/7/2024    XR CHEST (2 VW)     Standing Status:   Future     Number of Occurrences:   1     Standing Expiration Date:   9/7/2024     Order Specific Question:   Reason for exam:     Answer:   r/o PNA    CBC with Auto Differential     Standing Status:   Future     Number of Occurrences:   1     Standing Expiration Date:   5/2/9985    Basic Metabolic Panel     Standing Status:   Future     Number of Occurrences:   1     Standing Expiration Date:   9/6/2024    [facility] [place],  Urology[facility]     Referral Priority:   Routine     Referral Type:   Eval and Treat     Referral Reason:   Specialty Services Required     Requested Specialty:   Internal Medicine     Number of Visits Requested:   1    POCT COVID-19, Antigen     Order Specific Question:   Pregnant? Answer:   No     Order Specific Question:   Previously tested for COVID-19?      Answer:   Yes    POCT Influenza A/B    POCT Urinalysis No Micro (Auto)     Orders Placed This Encounter   Medications    guaiFENesin (MUCINEX) 600 MG extended release tablet     Sig: Take 1 tablet by mouth 2 times daily for 15 days     Dispense:  30 tablet     Refill:  0    amoxicillin-clavulanate (AUGMENTIN) 875-125 MG per tablet     Sig: Take 1 tablet by mouth 2 times daily for 10 days     Dispense:  20 tablet     Refill:  0    benzonatate (TESSALON) 100 MG capsule     Sig: Take 1 capsule by mouth 3 times daily as needed for Cough     Dispense:  30 capsule     Refill:  0    albuterol sulfate HFA (VENTOLIN HFA) 108 (90 Base) MCG/ACT

## 2023-12-05 ENCOUNTER — HOSPITAL ENCOUNTER (OUTPATIENT)
Dept: GENERAL RADIOLOGY | Age: 83
Discharge: HOME | End: 2023-12-07
Attending: PSYCHIATRY & NEUROLOGY
Payer: MEDICARE

## 2023-12-05 DIAGNOSIS — S79.811A: ICD-10-CM

## 2023-12-05 DIAGNOSIS — Z91.81 PERSONAL HISTORY OF FALL: ICD-10-CM

## 2023-12-05 PROCEDURE — 73502 X-RAY EXAM HIP UNI 2-3 VIEWS: CPT

## 2024-02-19 ENCOUNTER — APPOINTMENT (OUTPATIENT)
Dept: GENERAL RADIOLOGY | Age: 84
End: 2024-02-19
Payer: MEDICARE

## 2024-02-19 ENCOUNTER — HOSPITAL ENCOUNTER (EMERGENCY)
Age: 84
Discharge: HOME | End: 2024-02-19
Attending: EMERGENCY MEDICINE
Payer: MEDICARE

## 2024-02-19 VITALS
HEIGHT: 62 IN | SYSTOLIC BLOOD PRESSURE: 114 MMHG | HEART RATE: 61 BPM | BODY MASS INDEX: 22.08 KG/M2 | OXYGEN SATURATION: 97 % | RESPIRATION RATE: 18 BRPM | DIASTOLIC BLOOD PRESSURE: 57 MMHG | TEMPERATURE: 98.4 F | WEIGHT: 120 LBS

## 2024-02-19 DIAGNOSIS — R07.89 ATYPICAL CHEST PAIN: Primary | ICD-10-CM

## 2024-02-19 LAB
ALBUMIN SERPL-MCNC: 4.1 G/DL (ref 3.5–4.6)
ALP SERPL-CCNC: 98 U/L (ref 40–130)
ALT SERPL-CCNC: 20 U/L (ref 0–33)
ANION GAP SERPL CALCULATED.3IONS-SCNC: 9 MEQ/L (ref 9–15)
AST SERPL-CCNC: 18 U/L (ref 0–35)
BACTERIA URNS QL MICRO: NEGATIVE /HPF
BASOPHILS # BLD: 0 K/UL (ref 0–0.2)
BASOPHILS NFR BLD: 0.4 %
BILIRUB SERPL-MCNC: 0.3 MG/DL (ref 0.2–0.7)
BILIRUB UR QL STRIP: NEGATIVE
BUN SERPL-MCNC: 19 MG/DL (ref 8–23)
CALCIUM SERPL-MCNC: 9.5 MG/DL (ref 8.5–9.9)
CHLORIDE SERPL-SCNC: 102 MEQ/L (ref 95–107)
CLARITY UR: CLEAR
CO2 SERPL-SCNC: 28 MEQ/L (ref 20–31)
COLOR UR: YELLOW
CREAT SERPL-MCNC: 0.82 MG/DL (ref 0.5–0.9)
EOSINOPHIL # BLD: 0.1 K/UL (ref 0–0.7)
EOSINOPHIL NFR BLD: 1.5 %
EPI CELLS #/AREA URNS AUTO: ABNORMAL /HPF (ref 0–5)
ERYTHROCYTE [DISTWIDTH] IN BLOOD BY AUTOMATED COUNT: 13.2 % (ref 11.5–14.5)
GLOBULIN SER CALC-MCNC: 2.3 G/DL (ref 2.3–3.5)
GLUCOSE SERPL-MCNC: 117 MG/DL (ref 70–99)
GLUCOSE UR STRIP-MCNC: NEGATIVE MG/DL
HCT VFR BLD AUTO: 38.3 % (ref 37–47)
HGB BLD-MCNC: 12.1 G/DL (ref 12–16)
HGB UR QL STRIP: NEGATIVE
HYALINE CASTS #/AREA URNS AUTO: ABNORMAL /HPF (ref 0–5)
INR PPP: 1.1
KETONES UR STRIP-MCNC: NEGATIVE MG/DL
LEUKOCYTE ESTERASE UR QL STRIP: ABNORMAL
LYMPHOCYTES # BLD: 1.1 K/UL (ref 1–4.8)
LYMPHOCYTES NFR BLD: 24.3 %
MCH RBC QN AUTO: 29 PG (ref 27–31.3)
MCHC RBC AUTO-ENTMCNC: 31.6 % (ref 33–37)
MCV RBC AUTO: 91.8 FL (ref 79.4–94.8)
MONOCYTES # BLD: 0.4 K/UL (ref 0.2–0.8)
MONOCYTES NFR BLD: 9.1 %
NEUTROPHILS # BLD: 3 K/UL (ref 1.4–6.5)
NEUTS SEG NFR BLD: 64.3 %
NITRITE UR QL STRIP: NEGATIVE
PH UR STRIP: 5.5 [PH] (ref 5–9)
PLATELET # BLD AUTO: 113 K/UL (ref 130–400)
POTASSIUM SERPL-SCNC: 4.6 MEQ/L (ref 3.4–4.9)
PROT SERPL-MCNC: 6.4 G/DL (ref 6.3–8)
PROT UR STRIP-MCNC: NEGATIVE MG/DL
PROTHROMBIN TIME: 13.9 SEC (ref 12.3–14.9)
RBC # BLD AUTO: 4.17 M/UL (ref 4.2–5.4)
RBC #/AREA URNS AUTO: ABNORMAL /HPF (ref 0–5)
SODIUM SERPL-SCNC: 139 MEQ/L (ref 135–144)
SP GR UR STRIP: 1.02 (ref 1–1.03)
TROPONIN, HIGH SENSITIVITY: 16 NG/L (ref 0–19)
TROPONIN, HIGH SENSITIVITY: 16 NG/L (ref 0–19)
URINE REFLEX TO CULTURE: YES
UROBILINOGEN UR STRIP-ACNC: 0.2 E.U./DL
WBC # BLD AUTO: 4.6 K/UL (ref 4.8–10.8)
WBC #/AREA URNS AUTO: ABNORMAL /HPF (ref 0–5)

## 2024-02-19 PROCEDURE — 84484 ASSAY OF TROPONIN QUANT: CPT

## 2024-02-19 PROCEDURE — 85610 PROTHROMBIN TIME: CPT

## 2024-02-19 PROCEDURE — 99285 EMERGENCY DEPT VISIT HI MDM: CPT

## 2024-02-19 PROCEDURE — 80053 COMPREHEN METABOLIC PANEL: CPT

## 2024-02-19 PROCEDURE — 71045 X-RAY EXAM CHEST 1 VIEW: CPT

## 2024-02-19 PROCEDURE — 6370000000 HC RX 637 (ALT 250 FOR IP): Performed by: PERSONAL EMERGENCY RESPONSE ATTENDANT

## 2024-02-19 PROCEDURE — 85025 COMPLETE CBC W/AUTO DIFF WBC: CPT

## 2024-02-19 PROCEDURE — 87086 URINE CULTURE/COLONY COUNT: CPT

## 2024-02-19 PROCEDURE — 36415 COLL VENOUS BLD VENIPUNCTURE: CPT

## 2024-02-19 PROCEDURE — 81001 URINALYSIS AUTO W/SCOPE: CPT

## 2024-02-19 PROCEDURE — 93005 ELECTROCARDIOGRAM TRACING: CPT | Performed by: PERSONAL EMERGENCY RESPONSE ATTENDANT

## 2024-02-19 PROCEDURE — 6370000000 HC RX 637 (ALT 250 FOR IP): Performed by: EMERGENCY MEDICINE

## 2024-02-19 RX ORDER — FAMOTIDINE 20 MG/1
20 TABLET, FILM COATED ORAL 2 TIMES DAILY
Qty: 30 TABLET | Refills: 0 | Status: SHIPPED | OUTPATIENT
Start: 2024-02-19

## 2024-02-19 RX ORDER — NITROGLYCERIN 0.4 MG/1
0.4 TABLET SUBLINGUAL EVERY 5 MIN PRN
Status: DISCONTINUED | OUTPATIENT
Start: 2024-02-19 | End: 2024-02-19 | Stop reason: HOSPADM

## 2024-02-19 RX ADMIN — NITROGLYCERIN 0.4 MG: 0.4 TABLET, ORALLY DISINTEGRATING SUBLINGUAL at 16:22

## 2024-02-19 RX ADMIN — NITROGLYCERIN 0.4 MG: 0.4 TABLET, ORALLY DISINTEGRATING SUBLINGUAL at 15:36

## 2024-02-19 RX ADMIN — Medication: at 13:39

## 2024-02-19 ASSESSMENT — PAIN DESCRIPTION - FREQUENCY: FREQUENCY: CONTINUOUS

## 2024-02-19 ASSESSMENT — PAIN SCALES - GENERAL: PAINLEVEL_OUTOF10: 6

## 2024-02-19 ASSESSMENT — ENCOUNTER SYMPTOMS
VOMITING: 0
ABDOMINAL PAIN: 0
SORE THROAT: 0
NAUSEA: 0
CHEST TIGHTNESS: 0
EYE PAIN: 0
SHORTNESS OF BREATH: 0

## 2024-02-19 ASSESSMENT — PAIN DESCRIPTION - PAIN TYPE: TYPE: ACUTE PAIN

## 2024-02-19 ASSESSMENT — PAIN - FUNCTIONAL ASSESSMENT: PAIN_FUNCTIONAL_ASSESSMENT: 0-10

## 2024-02-19 NOTE — ED NOTES
Family sitting at the bedside with the patient   Patient is resting in the bed with lights on and eyes open and talking

## 2024-02-19 NOTE — ED PROVIDER NOTES
Basic Information   Time Seen: 1:30 PM   Primary Care Provider: Madi Kelly DO     Chief Complaint   Patient presents with    Chest Pain     Chest pain x 1 day       HPI   Tanya Liz is a 84 yrs female who presents with chest pain. Yesterday started with chest pain radiating to left shoulder. Yesterday coming and goes, today constant. Described as burning. Sitting makes it worse.     No fevers, URI, shortness of breath, abdominal pain, nausea, vomiting, leg swelling.        Physical Exam     /72 (02/19/24 1327)    Temp 98.4 °F (36.9 °C) (02/19/24 1327)    Pulse 71 (02/19/24 1327)   Resp 18 (02/19/24 1327)    SpO2 97 % (02/19/24 1327)       General: Awake and Alert, no acute distress   CV: RRR, S1, S2   Resp: LCTAB, even and non labored   Other:   Impression and Plan   Labs Reviewed - No data to display     No orders to display      Final Impression   I have performed a medical screening exam on Tanya Liz. Based on this patient's chief complaint/symptoms of   Chief Complaint   Patient presents with    Chest Pain     Chest pain x 1 day     and my focused exam, their care will be started and transitioned to provider when room is available       Gloria Bingham PA  02/19/24 5994    
Substance Monitoring   10/21/2021   9:34 AM Possible medication side effects, risk of tolerance/dependence & alternative treatments discussed.;No signs of potential drug abuse or diversion identified.;Assessed functional status.       (Please note that portions of this note were completed with a voice recognition program.  Efforts were made to edit the dictations but occasionally words are mis-transcribed.)    Kelli Olivia DO (electronically signed)  Attending Emergency Physician            Kelli Olivia DO  02/19/24 1724       Kelli Olivia DO  02/19/24 1727

## 2024-02-19 NOTE — ED TRIAGE NOTES
Patient arrived via private car due to chest pain that radiates to the back. Patient states this started yesterday and would go away, but this time it would not go away.

## 2024-02-19 NOTE — ED NOTES
Patient states that the GI cocktail that was given did help her pain  Her pain after  gave her a nitro is a 4, but the pain prior to the nitro was a 4

## 2024-02-20 LAB
BACTERIA UR CULT: NORMAL
EKG ATRIAL RATE: 58 BPM
EKG P AXIS: 57 DEGREES
EKG P-R INTERVAL: 146 MS
EKG Q-T INTERVAL: 390 MS
EKG QRS DURATION: 92 MS
EKG QTC CALCULATION (BAZETT): 382 MS
EKG R AXIS: 18 DEGREES
EKG T AXIS: 28 DEGREES
EKG VENTRICULAR RATE: 58 BPM

## 2024-02-20 PROCEDURE — 93010 ELECTROCARDIOGRAM REPORT: CPT | Performed by: INTERNAL MEDICINE

## 2024-05-13 ENCOUNTER — HOSPITAL ENCOUNTER (OUTPATIENT)
Dept: RADIOLOGY | Facility: HOSPITAL | Age: 84
Discharge: HOME | End: 2024-05-13
Payer: MEDICARE

## 2024-05-13 DIAGNOSIS — R26.89 OTHER ABNORMALITIES OF GAIT AND MOBILITY: ICD-10-CM

## 2024-05-13 DIAGNOSIS — I67.2 CEREBRAL ATHEROSCLEROSIS: ICD-10-CM

## 2024-05-13 PROCEDURE — 70547 MR ANGIOGRAPHY NECK W/O DYE: CPT

## 2024-05-13 PROCEDURE — 70544 MR ANGIOGRAPHY HEAD W/O DYE: CPT | Performed by: RADIOLOGY

## 2024-05-13 PROCEDURE — 70544 MR ANGIOGRAPHY HEAD W/O DYE: CPT

## 2024-05-13 PROCEDURE — 70547 MR ANGIOGRAPHY NECK W/O DYE: CPT | Performed by: RADIOLOGY

## 2024-05-30 ENCOUNTER — HOSPITAL ENCOUNTER (OUTPATIENT)
Dept: LAB | Age: 84
Discharge: HOME | End: 2024-05-30
Payer: MEDICARE

## 2024-05-30 ENCOUNTER — OFFICE VISIT (OUTPATIENT)
Dept: PRIMARY CARE CLINIC | Age: 84
End: 2024-05-30
Payer: MEDICARE

## 2024-05-30 VITALS
HEIGHT: 62 IN | BODY MASS INDEX: 21.79 KG/M2 | WEIGHT: 118.4 LBS | DIASTOLIC BLOOD PRESSURE: 68 MMHG | OXYGEN SATURATION: 94 % | TEMPERATURE: 99.1 F | HEART RATE: 85 BPM | SYSTOLIC BLOOD PRESSURE: 118 MMHG

## 2024-05-30 DIAGNOSIS — J02.9 ACUTE PHARYNGITIS, UNSPECIFIED ETIOLOGY: ICD-10-CM

## 2024-05-30 DIAGNOSIS — J02.9 ACUTE PHARYNGITIS, UNSPECIFIED ETIOLOGY: Primary | ICD-10-CM

## 2024-05-30 DIAGNOSIS — J02.9 SORE THROAT: ICD-10-CM

## 2024-05-30 LAB
BASOPHILS # BLD: 0 K/UL (ref 0–0.2)
BASOPHILS NFR BLD: 0.1 %
EOSINOPHIL # BLD: 0 K/UL (ref 0–0.7)
EOSINOPHIL NFR BLD: 0.5 %
ERYTHROCYTE [DISTWIDTH] IN BLOOD BY AUTOMATED COUNT: 13.4 % (ref 11.5–14.5)
HCT VFR BLD AUTO: 39.6 % (ref 37–47)
HGB BLD-MCNC: 12.8 G/DL (ref 12–16)
LYMPHOCYTES # BLD: 1 K/UL (ref 1–4.8)
LYMPHOCYTES NFR BLD: 11.2 %
MCH RBC QN AUTO: 29.4 PG (ref 27–31.3)
MCHC RBC AUTO-ENTMCNC: 32.3 % (ref 33–37)
MCV RBC AUTO: 91 FL (ref 79.4–94.8)
MONOCYTES # BLD: 0.7 K/UL (ref 0.2–0.8)
MONOCYTES NFR BLD: 8.6 %
NEUTROPHILS # BLD: 6.8 K/UL (ref 1.4–6.5)
NEUTS SEG NFR BLD: 79.2 %
PLATELET # BLD AUTO: 120 K/UL (ref 130–400)
RBC # BLD AUTO: 4.35 M/UL (ref 4.2–5.4)
S PYO AG THROAT QL: NORMAL
WBC # BLD AUTO: 8.6 K/UL (ref 4.8–10.8)

## 2024-05-30 PROCEDURE — 83036 HEMOGLOBIN GLYCOSYLATED A1C: CPT

## 2024-05-30 PROCEDURE — 1090F PRES/ABSN URINE INCON ASSESS: CPT | Performed by: STUDENT IN AN ORGANIZED HEALTH CARE EDUCATION/TRAINING PROGRAM

## 2024-05-30 PROCEDURE — G8399 PT W/DXA RESULTS DOCUMENT: HCPCS | Performed by: STUDENT IN AN ORGANIZED HEALTH CARE EDUCATION/TRAINING PROGRAM

## 2024-05-30 PROCEDURE — 82746 ASSAY OF FOLIC ACID SERUM: CPT

## 2024-05-30 PROCEDURE — 1036F TOBACCO NON-USER: CPT | Performed by: STUDENT IN AN ORGANIZED HEALTH CARE EDUCATION/TRAINING PROGRAM

## 2024-05-30 PROCEDURE — 3074F SYST BP LT 130 MM HG: CPT | Performed by: STUDENT IN AN ORGANIZED HEALTH CARE EDUCATION/TRAINING PROGRAM

## 2024-05-30 PROCEDURE — 99214 OFFICE O/P EST MOD 30 MIN: CPT | Performed by: STUDENT IN AN ORGANIZED HEALTH CARE EDUCATION/TRAINING PROGRAM

## 2024-05-30 PROCEDURE — G8427 DOCREV CUR MEDS BY ELIG CLIN: HCPCS | Performed by: STUDENT IN AN ORGANIZED HEALTH CARE EDUCATION/TRAINING PROGRAM

## 2024-05-30 PROCEDURE — 87880 STREP A ASSAY W/OPTIC: CPT | Performed by: STUDENT IN AN ORGANIZED HEALTH CARE EDUCATION/TRAINING PROGRAM

## 2024-05-30 PROCEDURE — G8420 CALC BMI NORM PARAMETERS: HCPCS | Performed by: STUDENT IN AN ORGANIZED HEALTH CARE EDUCATION/TRAINING PROGRAM

## 2024-05-30 PROCEDURE — 85025 COMPLETE CBC W/AUTO DIFF WBC: CPT

## 2024-05-30 PROCEDURE — 3078F DIAST BP <80 MM HG: CPT | Performed by: STUDENT IN AN ORGANIZED HEALTH CARE EDUCATION/TRAINING PROGRAM

## 2024-05-30 PROCEDURE — 1123F ACP DISCUSS/DSCN MKR DOCD: CPT | Performed by: STUDENT IN AN ORGANIZED HEALTH CARE EDUCATION/TRAINING PROGRAM

## 2024-05-30 PROCEDURE — 36415 COLL VENOUS BLD VENIPUNCTURE: CPT

## 2024-05-30 PROCEDURE — 82607 VITAMIN B-12: CPT

## 2024-05-30 RX ORDER — MIRTAZAPINE 7.5 MG/1
7.5 TABLET, FILM COATED ORAL NIGHTLY
Status: ON HOLD | COMMUNITY
Start: 2023-10-09

## 2024-05-30 RX ORDER — PHENOL 1.4 %
1 AEROSOL, SPRAY (ML) MUCOUS MEMBRANE
Qty: 177 ML | Refills: 0 | Status: ON HOLD | OUTPATIENT
Start: 2024-05-30

## 2024-05-30 RX ORDER — TRAZODONE HYDROCHLORIDE 50 MG/1
50 TABLET ORAL NIGHTLY
Status: ON HOLD | COMMUNITY
Start: 2024-04-05

## 2024-05-30 ASSESSMENT — PATIENT HEALTH QUESTIONNAIRE - PHQ9
3. TROUBLE FALLING OR STAYING ASLEEP: NOT AT ALL
1. LITTLE INTEREST OR PLEASURE IN DOING THINGS: NOT AT ALL
SUM OF ALL RESPONSES TO PHQ QUESTIONS 1-9: 0
7. TROUBLE CONCENTRATING ON THINGS, SUCH AS READING THE NEWSPAPER OR WATCHING TELEVISION: NOT AT ALL
10. IF YOU CHECKED OFF ANY PROBLEMS, HOW DIFFICULT HAVE THESE PROBLEMS MADE IT FOR YOU TO DO YOUR WORK, TAKE CARE OF THINGS AT HOME, OR GET ALONG WITH OTHER PEOPLE: NOT DIFFICULT AT ALL
SUM OF ALL RESPONSES TO PHQ9 QUESTIONS 1 & 2: 0
6. FEELING BAD ABOUT YOURSELF - OR THAT YOU ARE A FAILURE OR HAVE LET YOURSELF OR YOUR FAMILY DOWN: NOT AT ALL
SUM OF ALL RESPONSES TO PHQ QUESTIONS 1-9: 0
8. MOVING OR SPEAKING SO SLOWLY THAT OTHER PEOPLE COULD HAVE NOTICED. OR THE OPPOSITE, BEING SO FIGETY OR RESTLESS THAT YOU HAVE BEEN MOVING AROUND A LOT MORE THAN USUAL: NOT AT ALL
5. POOR APPETITE OR OVEREATING: NOT AT ALL
4. FEELING TIRED OR HAVING LITTLE ENERGY: NOT AT ALL
9. THOUGHTS THAT YOU WOULD BE BETTER OFF DEAD, OR OF HURTING YOURSELF: NOT AT ALL
SUM OF ALL RESPONSES TO PHQ QUESTIONS 1-9: 0
SUM OF ALL RESPONSES TO PHQ QUESTIONS 1-9: 0
2. FEELING DOWN, DEPRESSED OR HOPELESS: NOT AT ALL

## 2024-05-30 NOTE — PATIENT INSTRUCTIONS
Take Tylenol 1000 mg every 8 hours for pain  Because you take Plavix, I do not recommend ibuprofen, Motrin, Aleve, Advil  Because you have an allergy to codeine, I cannot prescribe you pain medications    Take vitamin C 1000 mg daily for 5 days to boost immune system    Use Chloraseptic spray as prescribed for throat pain    I am sending a throat culture to the lab, we will let you know if it is positive

## 2024-05-30 NOTE — PROGRESS NOTES
5/30/2024        Tanya Liz 1940 is a 84 y.o. female who presents today with:  Chief Complaint   Patient presents with    Pharyngitis    Otalgia     Let ear few days        HPI:   Patient presents today due to sickness for 3 days  Pertinent positives: Throat pain, left ear pain  Pertinent negatives: Fever, aches, nausea, vomiting, diarrhea, cough  Treatments tried: Tylenol  Sick Contacts: None    Assessment & Plan   1. Acute pharyngitis, unspecified etiology  -     phenol (CHLORASEPTIC) 1.4 % LIQD mouth spray; Take 1 spray by mouth every 2 hours as needed for Sore Throat, Disp-177 mL, R-0Normal  -     Culture, Throat; Future  2. Sore throat  -     POCT rapid strep A     There are no discontinued medications.  Return if symptoms worsen or fail to improve.    Strep negative, culture pending, differential diagnosis pharyngitis versus salivary gland abscess however no significant swelling in the salivary region, cervical adenopathy left-sided    Objective  Allergies   Allergen Reactions    Codeine Shortness Of Breath     tired     Current Outpatient Medications   Medication Sig Dispense Refill    traZODone (DESYREL) 50 MG tablet Take 1 tablet by mouth nightly      mirtazapine (REMERON) 7.5 MG tablet Take 1 tablet by mouth nightly      phenol (CHLORASEPTIC) 1.4 % LIQD mouth spray Take 1 spray by mouth every 2 hours as needed for Sore Throat 177 mL 0    famotidine (PEPCID) 20 MG tablet Take 1 tablet by mouth 2 times daily 30 tablet 0    albuterol sulfate HFA (VENTOLIN HFA) 108 (90 Base) MCG/ACT inhaler Inhale 2 puffs into the lungs 4 times daily as needed for Wheezing 18 g 0    acetaminophen (TYLENOL) 500 MG tablet Take 2 tablets by mouth every 6 hours as needed for Pain or Fever 60 tablet 0    Cholecalciferol (VITAMIN D3) 50 MCG (2000 UT) CAPS TAKE ONE CAPSULE BY MOUTH EVERY DAY 90 capsule 4    ondansetron (ZOFRAN-ODT) 4 MG disintegrating tablet Take 1 tablet by mouth 3 times daily as needed for Nausea or

## 2024-05-31 ENCOUNTER — APPOINTMENT (OUTPATIENT)
Dept: CT IMAGING | Age: 84
DRG: 137 | End: 2024-05-31
Payer: MEDICARE

## 2024-05-31 ENCOUNTER — HOSPITAL ENCOUNTER (INPATIENT)
Age: 84
LOS: 13 days | Discharge: NURSING, REHAB OR LONG-TERM CARE | DRG: 137 | End: 2024-06-13
Attending: STUDENT IN AN ORGANIZED HEALTH CARE EDUCATION/TRAINING PROGRAM | Admitting: STUDENT IN AN ORGANIZED HEALTH CARE EDUCATION/TRAINING PROGRAM
Payer: MEDICARE

## 2024-05-31 ENCOUNTER — ANESTHESIA (OUTPATIENT)
Dept: OPERATING ROOM | Age: 84
End: 2024-05-31
Payer: MEDICARE

## 2024-05-31 ENCOUNTER — ANESTHESIA EVENT (OUTPATIENT)
Dept: OPERATING ROOM | Age: 84
End: 2024-05-31
Payer: MEDICARE

## 2024-05-31 ENCOUNTER — APPOINTMENT (OUTPATIENT)
Dept: GENERAL RADIOLOGY | Age: 84
DRG: 137 | End: 2024-05-31
Payer: MEDICARE

## 2024-05-31 DIAGNOSIS — J36 PERITONSILLAR ABSCESS: Primary | ICD-10-CM

## 2024-05-31 DIAGNOSIS — J39.2 OROPHARYNGEAL MASS: ICD-10-CM

## 2024-05-31 PROBLEM — K12.2 ORAL ABSCESS: Status: ACTIVE | Noted: 2024-05-31

## 2024-05-31 LAB
ALBUMIN SERPL-MCNC: 4.2 G/DL (ref 3.5–4.6)
ALP SERPL-CCNC: 72 U/L (ref 40–130)
ALT SERPL-CCNC: 19 U/L (ref 0–33)
ANION GAP SERPL CALCULATED.3IONS-SCNC: 11 MEQ/L (ref 9–15)
AST SERPL-CCNC: 17 U/L (ref 0–35)
BASE EXCESS ARTERIAL: 3 (ref -3–3)
BASOPHILS # BLD: 0 K/UL (ref 0–0.2)
BASOPHILS NFR BLD: 0.1 %
BILIRUB SERPL-MCNC: 0.9 MG/DL (ref 0.2–0.7)
BUN SERPL-MCNC: 18 MG/DL (ref 8–23)
CALCIUM IONIZED: 1.29 MMOL/L (ref 1.12–1.32)
CALCIUM SERPL-MCNC: 9.8 MG/DL (ref 8.5–9.9)
CHLORIDE SERPL-SCNC: 99 MEQ/L (ref 95–107)
CO2 SERPL-SCNC: 27 MEQ/L (ref 20–31)
CREAT SERPL-MCNC: 0.59 MG/DL (ref 0.5–0.9)
EOSINOPHIL # BLD: 0 K/UL (ref 0–0.7)
EOSINOPHIL NFR BLD: 0 %
ERYTHROCYTE [DISTWIDTH] IN BLOOD BY AUTOMATED COUNT: 13.3 % (ref 11.5–14.5)
ESTIMATED AVERAGE GLUCOSE: 114 MG/DL
FOLATE: >20 NG/ML (ref 4.8–24.2)
GLOBULIN SER CALC-MCNC: 3.2 G/DL (ref 2.3–3.5)
GLUCOSE BLD-MCNC: 150 MG/DL (ref 70–99)
GLUCOSE BLD-MCNC: 167 MG/DL (ref 70–99)
GLUCOSE SERPL-MCNC: 112 MG/DL (ref 70–99)
HBA1C MFR BLD: 5.6 % (ref 4–6)
HCO3 ARTERIAL: 28.6 MMOL/L (ref 21–29)
HCT VFR BLD AUTO: 36 % (ref 36–48)
HCT VFR BLD AUTO: 38.8 % (ref 37–47)
HGB BLD CALC-MCNC: 12.4 GM/DL (ref 12–16)
HGB BLD-MCNC: 12.8 G/DL (ref 12–16)
LACTATE BLDV-SCNC: 1 MMOL/L (ref 0.5–2.2)
LACTATE: 0.73 MMOL/L (ref 0.4–2)
LYMPHOCYTES # BLD: 1.1 K/UL (ref 1–4.8)
LYMPHOCYTES NFR BLD: 8.7 %
MCH RBC QN AUTO: 30.1 PG (ref 27–31.3)
MCHC RBC AUTO-ENTMCNC: 33 % (ref 33–37)
MCV RBC AUTO: 91.3 FL (ref 79.4–94.8)
MONOCYTES # BLD: 1.2 K/UL (ref 0.2–0.8)
MONOCYTES NFR BLD: 9.7 %
NEUTROPHILS # BLD: 9.7 K/UL (ref 1.4–6.5)
NEUTS SEG NFR BLD: 81 %
O2 SAT, ARTERIAL: 99 % (ref 93–100)
PCO2 ARTERIAL: 53 MM HG (ref 35–45)
PERFORMED ON: ABNORMAL
PERFORMED ON: ABNORMAL
PH ARTERIAL: 7.34 (ref 7.35–7.45)
PLATELET # BLD AUTO: 109 K/UL (ref 130–400)
PO2 ARTERIAL: 149 MM HG (ref 75–108)
POC CHLORIDE: 103 MEQ/L (ref 99–110)
POC CREATININE: 0.6 MG/DL (ref 0.6–1.2)
POC FIO2: 60
POC SAMPLE TYPE: ABNORMAL
POTASSIUM SERPL-SCNC: 4.2 MEQ/L (ref 3.5–5.1)
POTASSIUM SERPL-SCNC: 4.4 MEQ/L (ref 3.4–4.9)
PROT SERPL-MCNC: 7.4 G/DL (ref 6.3–8)
RBC # BLD AUTO: 4.25 M/UL (ref 4.2–5.4)
SODIUM BLD-SCNC: 141 MEQ/L (ref 136–145)
SODIUM SERPL-SCNC: 137 MEQ/L (ref 135–144)
STREP GRP A PCR: NEGATIVE
TCO2 ARTERIAL: 30 MMOL/L (ref 21–32)
VITAMIN B-12: 1119 PG/ML (ref 232–1245)
WBC # BLD AUTO: 12 K/UL (ref 4.8–10.8)

## 2024-05-31 PROCEDURE — 2580000003 HC RX 258: Performed by: STUDENT IN AN ORGANIZED HEALTH CARE EDUCATION/TRAINING PROGRAM

## 2024-05-31 PROCEDURE — 2580000003 HC RX 258: Performed by: PHYSICIAN ASSISTANT

## 2024-05-31 PROCEDURE — 84132 ASSAY OF SERUM POTASSIUM: CPT

## 2024-05-31 PROCEDURE — 6360000002 HC RX W HCPCS: Performed by: STUDENT IN AN ORGANIZED HEALTH CARE EDUCATION/TRAINING PROGRAM

## 2024-05-31 PROCEDURE — 36415 COLL VENOUS BLD VENIPUNCTURE: CPT

## 2024-05-31 PROCEDURE — 82565 ASSAY OF CREATININE: CPT

## 2024-05-31 PROCEDURE — 82330 ASSAY OF CALCIUM: CPT

## 2024-05-31 PROCEDURE — 94002 VENT MGMT INPAT INIT DAY: CPT

## 2024-05-31 PROCEDURE — 87076 CULTURE ANAEROBE IDENT EACH: CPT

## 2024-05-31 PROCEDURE — 83605 ASSAY OF LACTIC ACID: CPT

## 2024-05-31 PROCEDURE — 2580000003 HC RX 258: Performed by: REGISTERED NURSE

## 2024-05-31 PROCEDURE — 2000000000 HC ICU R&B

## 2024-05-31 PROCEDURE — 6370000000 HC RX 637 (ALT 250 FOR IP): Performed by: STUDENT IN AN ORGANIZED HEALTH CARE EDUCATION/TRAINING PROGRAM

## 2024-05-31 PROCEDURE — 82435 ASSAY OF BLOOD CHLORIDE: CPT

## 2024-05-31 PROCEDURE — 96365 THER/PROPH/DIAG IV INF INIT: CPT

## 2024-05-31 PROCEDURE — 3600000013 HC SURGERY LEVEL 3 ADDTL 15MIN: Performed by: STUDENT IN AN ORGANIZED HEALTH CARE EDUCATION/TRAINING PROGRAM

## 2024-05-31 PROCEDURE — 85014 HEMATOCRIT: CPT

## 2024-05-31 PROCEDURE — 6360000002 HC RX W HCPCS: Performed by: PHYSICIAN ASSISTANT

## 2024-05-31 PROCEDURE — 31535 LARYNGOSCOPY W/BIOPSY: CPT | Performed by: STUDENT IN AN ORGANIZED HEALTH CARE EDUCATION/TRAINING PROGRAM

## 2024-05-31 PROCEDURE — 3600000003 HC SURGERY LEVEL 3 BASE: Performed by: STUDENT IN AN ORGANIZED HEALTH CARE EDUCATION/TRAINING PROGRAM

## 2024-05-31 PROCEDURE — 3700000001 HC ADD 15 MINUTES (ANESTHESIA): Performed by: STUDENT IN AN ORGANIZED HEALTH CARE EDUCATION/TRAINING PROGRAM

## 2024-05-31 PROCEDURE — 2500000003 HC RX 250 WO HCPCS: Performed by: STUDENT IN AN ORGANIZED HEALTH CARE EDUCATION/TRAINING PROGRAM

## 2024-05-31 PROCEDURE — 87040 BLOOD CULTURE FOR BACTERIA: CPT

## 2024-05-31 PROCEDURE — 3700000000 HC ANESTHESIA ATTENDED CARE: Performed by: STUDENT IN AN ORGANIZED HEALTH CARE EDUCATION/TRAINING PROGRAM

## 2024-05-31 PROCEDURE — 87651 STREP A DNA AMP PROBE: CPT

## 2024-05-31 PROCEDURE — 36600 WITHDRAWAL OF ARTERIAL BLOOD: CPT

## 2024-05-31 PROCEDURE — 0DJ08ZZ INSPECTION OF UPPER INTESTINAL TRACT, VIA NATURAL OR ARTIFICIAL OPENING ENDOSCOPIC: ICD-10-PCS | Performed by: STUDENT IN AN ORGANIZED HEALTH CARE EDUCATION/TRAINING PROGRAM

## 2024-05-31 PROCEDURE — 88305 TISSUE EXAM BY PATHOLOGIST: CPT

## 2024-05-31 PROCEDURE — 0W934ZZ DRAINAGE OF ORAL CAVITY AND THROAT, PERCUTANEOUS ENDOSCOPIC APPROACH: ICD-10-PCS | Performed by: STUDENT IN AN ORGANIZED HEALTH CARE EDUCATION/TRAINING PROGRAM

## 2024-05-31 PROCEDURE — 87150 DNA/RNA AMPLIFIED PROBE: CPT

## 2024-05-31 PROCEDURE — 99285 EMERGENCY DEPT VISIT HI MDM: CPT

## 2024-05-31 PROCEDURE — 0BH17EZ INSERTION OF ENDOTRACHEAL AIRWAY INTO TRACHEA, VIA NATURAL OR ARTIFICIAL OPENING: ICD-10-PCS | Performed by: STUDENT IN AN ORGANIZED HEALTH CARE EDUCATION/TRAINING PROGRAM

## 2024-05-31 PROCEDURE — 84295 ASSAY OF SERUM SODIUM: CPT

## 2024-05-31 PROCEDURE — 6360000002 HC RX W HCPCS: Performed by: REGISTERED NURSE

## 2024-05-31 PROCEDURE — 87075 CULTR BACTERIA EXCEPT BLOOD: CPT

## 2024-05-31 PROCEDURE — 80053 COMPREHEN METABOLIC PANEL: CPT

## 2024-05-31 PROCEDURE — 42720 I&D ABSC PHRNGL NTRAORL APPR: CPT | Performed by: STUDENT IN AN ORGANIZED HEALTH CARE EDUCATION/TRAINING PROGRAM

## 2024-05-31 PROCEDURE — 2720000010 HC SURG SUPPLY STERILE: Performed by: STUDENT IN AN ORGANIZED HEALTH CARE EDUCATION/TRAINING PROGRAM

## 2024-05-31 PROCEDURE — 93005 ELECTROCARDIOGRAM TRACING: CPT | Performed by: INTERNAL MEDICINE

## 2024-05-31 PROCEDURE — 99222 1ST HOSP IP/OBS MODERATE 55: CPT | Performed by: STUDENT IN AN ORGANIZED HEALTH CARE EDUCATION/TRAINING PROGRAM

## 2024-05-31 PROCEDURE — A4217 STERILE WATER/SALINE, 500 ML: HCPCS | Performed by: STUDENT IN AN ORGANIZED HEALTH CARE EDUCATION/TRAINING PROGRAM

## 2024-05-31 PROCEDURE — 7100000000 HC PACU RECOVERY - FIRST 15 MIN: Performed by: STUDENT IN AN ORGANIZED HEALTH CARE EDUCATION/TRAINING PROGRAM

## 2024-05-31 PROCEDURE — 2709999900 HC NON-CHARGEABLE SUPPLY: Performed by: STUDENT IN AN ORGANIZED HEALTH CARE EDUCATION/TRAINING PROGRAM

## 2024-05-31 PROCEDURE — 6360000004 HC RX CONTRAST MEDICATION: Performed by: PHYSICIAN ASSISTANT

## 2024-05-31 PROCEDURE — 43200 ESOPHAGOSCOPY FLEXIBLE BRUSH: CPT | Performed by: STUDENT IN AN ORGANIZED HEALTH CARE EDUCATION/TRAINING PROGRAM

## 2024-05-31 PROCEDURE — A4216 STERILE WATER/SALINE, 10 ML: HCPCS | Performed by: STUDENT IN AN ORGANIZED HEALTH CARE EDUCATION/TRAINING PROGRAM

## 2024-05-31 PROCEDURE — 87070 CULTURE OTHR SPECIMN AEROBIC: CPT

## 2024-05-31 PROCEDURE — 5A1945Z RESPIRATORY VENTILATION, 24-96 CONSECUTIVE HOURS: ICD-10-PCS | Performed by: STUDENT IN AN ORGANIZED HEALTH CARE EDUCATION/TRAINING PROGRAM

## 2024-05-31 PROCEDURE — 71045 X-RAY EXAM CHEST 1 VIEW: CPT

## 2024-05-31 PROCEDURE — 70491 CT SOFT TISSUE NECK W/DYE: CPT

## 2024-05-31 PROCEDURE — 7100000001 HC PACU RECOVERY - ADDTL 15 MIN: Performed by: STUDENT IN AN ORGANIZED HEALTH CARE EDUCATION/TRAINING PROGRAM

## 2024-05-31 PROCEDURE — 31500 INSERT EMERGENCY AIRWAY: CPT

## 2024-05-31 PROCEDURE — 0CBM8ZX EXCISION OF PHARYNX, VIA NATURAL OR ARTIFICIAL OPENING ENDOSCOPIC, DIAGNOSTIC: ICD-10-PCS | Performed by: STUDENT IN AN ORGANIZED HEALTH CARE EDUCATION/TRAINING PROGRAM

## 2024-05-31 PROCEDURE — 0C973ZX DRAINAGE OF TONGUE, PERCUTANEOUS APPROACH, DIAGNOSTIC: ICD-10-PCS | Performed by: STUDENT IN AN ORGANIZED HEALTH CARE EDUCATION/TRAINING PROGRAM

## 2024-05-31 PROCEDURE — 96375 TX/PRO/DX INJ NEW DRUG ADDON: CPT

## 2024-05-31 PROCEDURE — 96361 HYDRATE IV INFUSION ADD-ON: CPT

## 2024-05-31 PROCEDURE — 85025 COMPLETE CBC W/AUTO DIFF WBC: CPT

## 2024-05-31 PROCEDURE — 82803 BLOOD GASES ANY COMBINATION: CPT

## 2024-05-31 PROCEDURE — 2500000003 HC RX 250 WO HCPCS: Performed by: ANESTHESIOLOGY

## 2024-05-31 RX ORDER — ONDANSETRON 2 MG/ML
4 INJECTION INTRAMUSCULAR; INTRAVENOUS EVERY 6 HOURS PRN
Status: DISCONTINUED | OUTPATIENT
Start: 2024-05-31 | End: 2024-06-13 | Stop reason: HOSPADM

## 2024-05-31 RX ORDER — SODIUM CHLORIDE 0.9 % (FLUSH) 0.9 %
5-40 SYRINGE (ML) INJECTION EVERY 12 HOURS SCHEDULED
Status: DISCONTINUED | OUTPATIENT
Start: 2024-05-31 | End: 2024-06-13 | Stop reason: HOSPADM

## 2024-05-31 RX ORDER — CHLORHEXIDINE GLUCONATE ORAL RINSE 1.2 MG/ML
15 SOLUTION DENTAL 2 TIMES DAILY
Status: DISCONTINUED | OUTPATIENT
Start: 2024-05-31 | End: 2024-06-04

## 2024-05-31 RX ORDER — SODIUM CHLORIDE 0.9 % (FLUSH) 0.9 %
5-40 SYRINGE (ML) INJECTION PRN
Status: DISCONTINUED | OUTPATIENT
Start: 2024-05-31 | End: 2024-06-13 | Stop reason: HOSPADM

## 2024-05-31 RX ORDER — 0.9 % SODIUM CHLORIDE 0.9 %
1000 INTRAVENOUS SOLUTION INTRAVENOUS ONCE
Status: COMPLETED | OUTPATIENT
Start: 2024-05-31 | End: 2024-05-31

## 2024-05-31 RX ORDER — METOCLOPRAMIDE HYDROCHLORIDE 5 MG/ML
10 INJECTION INTRAMUSCULAR; INTRAVENOUS
Status: DISCONTINUED | OUTPATIENT
Start: 2024-05-31 | End: 2024-05-31 | Stop reason: HOSPADM

## 2024-05-31 RX ORDER — FENTANYL CITRATE-0.9 % NACL/PF 10 MCG/ML
25-200 PLASTIC BAG, INJECTION (ML) INTRAVENOUS CONTINUOUS
Status: DISCONTINUED | OUTPATIENT
Start: 2024-05-31 | End: 2024-06-04

## 2024-05-31 RX ORDER — MEPERIDINE HYDROCHLORIDE 25 MG/ML
12.5 INJECTION INTRAMUSCULAR; INTRAVENOUS; SUBCUTANEOUS
Status: DISCONTINUED | OUTPATIENT
Start: 2024-05-31 | End: 2024-05-31 | Stop reason: HOSPADM

## 2024-05-31 RX ORDER — NALOXONE HYDROCHLORIDE 0.4 MG/ML
INJECTION, SOLUTION INTRAMUSCULAR; INTRAVENOUS; SUBCUTANEOUS PRN
Status: DISCONTINUED | OUTPATIENT
Start: 2024-05-31 | End: 2024-05-31 | Stop reason: HOSPADM

## 2024-05-31 RX ORDER — MAGNESIUM HYDROXIDE 1200 MG/15ML
LIQUID ORAL CONTINUOUS PRN
Status: COMPLETED | OUTPATIENT
Start: 2024-05-31 | End: 2024-05-31

## 2024-05-31 RX ORDER — SODIUM CHLORIDE, SODIUM LACTATE, POTASSIUM CHLORIDE, CALCIUM CHLORIDE 600; 310; 30; 20 MG/100ML; MG/100ML; MG/100ML; MG/100ML
INJECTION, SOLUTION INTRAVENOUS
Status: COMPLETED
Start: 2024-05-31 | End: 2024-05-31

## 2024-05-31 RX ORDER — FENTANYL CITRATE-0.9 % NACL/PF 10 MCG/ML
25-200 PLASTIC BAG, INJECTION (ML) INTRAVENOUS CONTINUOUS
Status: DISCONTINUED | OUTPATIENT
Start: 2024-05-31 | End: 2024-06-03 | Stop reason: SDUPTHER

## 2024-05-31 RX ORDER — SODIUM CHLORIDE 9 MG/ML
INJECTION, SOLUTION INTRAVENOUS CONTINUOUS
Status: DISCONTINUED | OUTPATIENT
Start: 2024-05-31 | End: 2024-06-02

## 2024-05-31 RX ORDER — ROCURONIUM BROMIDE 10 MG/ML
INJECTION, SOLUTION INTRAVENOUS PRN
Status: DISCONTINUED | OUTPATIENT
Start: 2024-05-31 | End: 2024-05-31 | Stop reason: SDUPTHER

## 2024-05-31 RX ORDER — DEXTROSE MONOHYDRATE 100 MG/ML
INJECTION, SOLUTION INTRAVENOUS CONTINUOUS PRN
Status: DISCONTINUED | OUTPATIENT
Start: 2024-05-31 | End: 2024-06-13 | Stop reason: HOSPADM

## 2024-05-31 RX ORDER — DEXAMETHASONE SODIUM PHOSPHATE 4 MG/ML
10 INJECTION, SOLUTION INTRA-ARTICULAR; INTRALESIONAL; INTRAMUSCULAR; INTRAVENOUS; SOFT TISSUE EVERY 8 HOURS
Status: DISPENSED | OUTPATIENT
Start: 2024-05-31 | End: 2024-06-01

## 2024-05-31 RX ORDER — OXYCODONE HYDROCHLORIDE 5 MG/1
5 TABLET ORAL
Status: DISCONTINUED | OUTPATIENT
Start: 2024-05-31 | End: 2024-05-31 | Stop reason: HOSPADM

## 2024-05-31 RX ORDER — FENTANYL CITRATE 0.05 MG/ML
50 INJECTION, SOLUTION INTRAMUSCULAR; INTRAVENOUS EVERY 10 MIN PRN
Status: DISCONTINUED | OUTPATIENT
Start: 2024-05-31 | End: 2024-05-31 | Stop reason: HOSPADM

## 2024-05-31 RX ORDER — PROPOFOL 10 MG/ML
5-50 INJECTION, EMULSION INTRAVENOUS CONTINUOUS
Status: DISCONTINUED | OUTPATIENT
Start: 2024-05-31 | End: 2024-05-31 | Stop reason: CLARIF

## 2024-05-31 RX ORDER — KETOROLAC TROMETHAMINE 15 MG/ML
15 INJECTION, SOLUTION INTRAMUSCULAR; INTRAVENOUS ONCE
Status: COMPLETED | OUTPATIENT
Start: 2024-05-31 | End: 2024-05-31

## 2024-05-31 RX ORDER — SODIUM CHLORIDE, SODIUM LACTATE, POTASSIUM CHLORIDE, CALCIUM CHLORIDE 600; 310; 30; 20 MG/100ML; MG/100ML; MG/100ML; MG/100ML
INJECTION, SOLUTION INTRAVENOUS CONTINUOUS PRN
Status: DISCONTINUED | OUTPATIENT
Start: 2024-05-31 | End: 2024-05-31 | Stop reason: SDUPTHER

## 2024-05-31 RX ORDER — DIPHENHYDRAMINE HYDROCHLORIDE 50 MG/ML
12.5 INJECTION INTRAMUSCULAR; INTRAVENOUS
Status: DISCONTINUED | OUTPATIENT
Start: 2024-05-31 | End: 2024-05-31 | Stop reason: HOSPADM

## 2024-05-31 RX ORDER — SUCCINYLCHOLINE/SOD CL,ISO/PF 100 MG/5ML
SYRINGE (ML) INTRAVENOUS PRN
Status: DISCONTINUED | OUTPATIENT
Start: 2024-05-31 | End: 2024-05-31 | Stop reason: SDUPTHER

## 2024-05-31 RX ORDER — SODIUM CHLORIDE 0.9 % (FLUSH) 0.9 %
5-40 SYRINGE (ML) INJECTION EVERY 12 HOURS SCHEDULED
Status: DISCONTINUED | OUTPATIENT
Start: 2024-05-31 | End: 2024-05-31 | Stop reason: HOSPADM

## 2024-05-31 RX ORDER — ENOXAPARIN SODIUM 100 MG/ML
30 INJECTION SUBCUTANEOUS DAILY
Status: DISCONTINUED | OUTPATIENT
Start: 2024-06-01 | End: 2024-06-05

## 2024-05-31 RX ORDER — PROPOFOL 10 MG/ML
5-50 INJECTION, EMULSION INTRAVENOUS CONTINUOUS
Status: DISCONTINUED | OUTPATIENT
Start: 2024-05-31 | End: 2024-06-01

## 2024-05-31 RX ORDER — DEXAMETHASONE SODIUM PHOSPHATE 10 MG/ML
10 INJECTION, SOLUTION INTRAMUSCULAR; INTRAVENOUS ONCE
Status: COMPLETED | OUTPATIENT
Start: 2024-05-31 | End: 2024-05-31

## 2024-05-31 RX ORDER — PROPOFOL 10 MG/ML
INJECTION, EMULSION INTRAVENOUS PRN
Status: DISCONTINUED | OUTPATIENT
Start: 2024-05-31 | End: 2024-05-31 | Stop reason: SDUPTHER

## 2024-05-31 RX ORDER — ONDANSETRON 4 MG/1
4 TABLET, ORALLY DISINTEGRATING ORAL EVERY 8 HOURS PRN
Status: DISCONTINUED | OUTPATIENT
Start: 2024-05-31 | End: 2024-06-13 | Stop reason: HOSPADM

## 2024-05-31 RX ORDER — GLUCAGON 1 MG/ML
1 KIT INJECTION PRN
Status: DISCONTINUED | OUTPATIENT
Start: 2024-05-31 | End: 2024-06-13 | Stop reason: HOSPADM

## 2024-05-31 RX ORDER — FENTANYL CITRATE-0.9 % NACL/PF 20 MCG/2ML
50 SYRINGE (ML) INTRAVENOUS EVERY 30 MIN PRN
Status: DISCONTINUED | OUTPATIENT
Start: 2024-05-31 | End: 2024-06-04

## 2024-05-31 RX ORDER — SODIUM CHLORIDE 9 MG/ML
INJECTION, SOLUTION INTRAVENOUS PRN
Status: DISCONTINUED | OUTPATIENT
Start: 2024-05-31 | End: 2024-06-13 | Stop reason: HOSPADM

## 2024-05-31 RX ORDER — SODIUM CHLORIDE 9 MG/ML
INJECTION, SOLUTION INTRAVENOUS PRN
Status: DISCONTINUED | OUTPATIENT
Start: 2024-05-31 | End: 2024-05-31 | Stop reason: HOSPADM

## 2024-05-31 RX ORDER — FENTANYL CITRATE 50 UG/ML
INJECTION, SOLUTION INTRAMUSCULAR; INTRAVENOUS PRN
Status: DISCONTINUED | OUTPATIENT
Start: 2024-05-31 | End: 2024-05-31 | Stop reason: SDUPTHER

## 2024-05-31 RX ORDER — ONDANSETRON 2 MG/ML
4 INJECTION INTRAMUSCULAR; INTRAVENOUS
Status: DISCONTINUED | OUTPATIENT
Start: 2024-05-31 | End: 2024-05-31 | Stop reason: HOSPADM

## 2024-05-31 RX ORDER — FENTANYL CITRATE 0.05 MG/ML
50 INJECTION, SOLUTION INTRAMUSCULAR; INTRAVENOUS ONCE
Status: COMPLETED | OUTPATIENT
Start: 2024-05-31 | End: 2024-05-31

## 2024-05-31 RX ORDER — INSULIN LISPRO 100 [IU]/ML
0-8 INJECTION, SOLUTION INTRAVENOUS; SUBCUTANEOUS EVERY 4 HOURS
Status: DISCONTINUED | OUTPATIENT
Start: 2024-05-31 | End: 2024-06-11

## 2024-05-31 RX ORDER — SODIUM CHLORIDE 0.9 % (FLUSH) 0.9 %
5-40 SYRINGE (ML) INJECTION PRN
Status: DISCONTINUED | OUTPATIENT
Start: 2024-05-31 | End: 2024-05-31 | Stop reason: HOSPADM

## 2024-05-31 RX ORDER — OXYMETAZOLINE HYDROCHLORIDE 0.05 G/100ML
SPRAY NASAL PRN
Status: DISCONTINUED | OUTPATIENT
Start: 2024-05-31 | End: 2024-05-31 | Stop reason: ALTCHOICE

## 2024-05-31 RX ADMIN — KETOROLAC TROMETHAMINE 15 MG: 15 INJECTION, SOLUTION INTRAMUSCULAR; INTRAVENOUS at 11:59

## 2024-05-31 RX ADMIN — ROCURONIUM BROMIDE 20 MG: 10 INJECTION, SOLUTION INTRAVENOUS at 16:56

## 2024-05-31 RX ADMIN — DEXAMETHASONE SODIUM PHOSPHATE 10 MG: 10 INJECTION INTRAMUSCULAR; INTRAVENOUS at 12:00

## 2024-05-31 RX ADMIN — Medication 10 ML: at 22:43

## 2024-05-31 RX ADMIN — Medication 50 MCG/HR: at 21:02

## 2024-05-31 RX ADMIN — DEXAMETHASONE SODIUM PHOSPHATE 10 MG: 4 INJECTION, SOLUTION INTRAMUSCULAR; INTRAVENOUS at 22:24

## 2024-05-31 RX ADMIN — SODIUM CHLORIDE 1000 ML: 9 INJECTION, SOLUTION INTRAVENOUS at 11:59

## 2024-05-31 RX ADMIN — PROPOFOL 50 MG: 10 INJECTION, EMULSION INTRAVENOUS at 16:50

## 2024-05-31 RX ADMIN — FENTANYL CITRATE 50 MCG: 50 INJECTION, SOLUTION INTRAMUSCULAR; INTRAVENOUS at 16:58

## 2024-05-31 RX ADMIN — SODIUM CHLORIDE, PRESERVATIVE FREE 20 MG: 5 INJECTION INTRAVENOUS at 22:25

## 2024-05-31 RX ADMIN — Medication 60 MG: at 16:49

## 2024-05-31 RX ADMIN — ROCURONIUM BROMIDE 50 MG: 10 INJECTION, SOLUTION INTRAVENOUS at 17:17

## 2024-05-31 RX ADMIN — ROCURONIUM BROMIDE 30 MG: 10 INJECTION, SOLUTION INTRAVENOUS at 16:53

## 2024-05-31 RX ADMIN — IOPAMIDOL 75 ML: 755 INJECTION, SOLUTION INTRAVENOUS at 12:44

## 2024-05-31 RX ADMIN — SODIUM CHLORIDE 3000 MG: 900 INJECTION INTRAVENOUS at 14:22

## 2024-05-31 RX ADMIN — FENTANYL CITRATE 50 MCG: 50 INJECTION, SOLUTION INTRAMUSCULAR; INTRAVENOUS at 17:19

## 2024-05-31 RX ADMIN — PROPOFOL 50 MG: 10 INJECTION, EMULSION INTRAVENOUS at 16:49

## 2024-05-31 RX ADMIN — CHLORHEXIDINE GLUCONATE, 0.12% ORAL RINSE 15 ML: 1.2 SOLUTION DENTAL at 22:25

## 2024-05-31 RX ADMIN — AMPICILLIN SODIUM AND SULBACTAM SODIUM 3000 MG: 2; 1 INJECTION, POWDER, FOR SOLUTION INTRAMUSCULAR; INTRAVENOUS at 22:45

## 2024-05-31 RX ADMIN — FENTANYL CITRATE 50 MCG: 0.05 INJECTION, SOLUTION INTRAMUSCULAR; INTRAVENOUS at 12:29

## 2024-05-31 RX ADMIN — FENTANYL CITRATE 50 MCG: 50 INJECTION, SOLUTION INTRAMUSCULAR; INTRAVENOUS at 17:03

## 2024-05-31 RX ADMIN — SODIUM CHLORIDE: 9 INJECTION, SOLUTION INTRAVENOUS at 22:38

## 2024-05-31 RX ADMIN — SODIUM CHLORIDE, POTASSIUM CHLORIDE, SODIUM LACTATE AND CALCIUM CHLORIDE: 600; 310; 30; 20 INJECTION, SOLUTION INTRAVENOUS at 16:46

## 2024-05-31 RX ADMIN — PROPOFOL 50 MCG/KG/MIN: 10 INJECTION, EMULSION INTRAVENOUS at 17:33

## 2024-05-31 RX ADMIN — PROPOFOL 50 MG: 10 INJECTION, EMULSION INTRAVENOUS at 16:48

## 2024-05-31 RX ADMIN — PROPOFOL 50 MCG/KG/MIN: 10 INJECTION, EMULSION INTRAVENOUS at 19:26

## 2024-05-31 RX ADMIN — FENTANYL CITRATE 50 MCG: 50 INJECTION, SOLUTION INTRAMUSCULAR; INTRAVENOUS at 17:23

## 2024-05-31 RX ADMIN — PROPOFOL 40 MCG/KG/MIN: 10 INJECTION, EMULSION INTRAVENOUS at 22:37

## 2024-05-31 ASSESSMENT — PAIN SCALES - GENERAL
PAINLEVEL_OUTOF10: 10
PAINLEVEL_OUTOF10: 0
PAINLEVEL_OUTOF10: 10

## 2024-05-31 ASSESSMENT — PULMONARY FUNCTION TESTS
PIF_VALUE: 30
PIF_VALUE: 20

## 2024-05-31 ASSESSMENT — PAIN - FUNCTIONAL ASSESSMENT: PAIN_FUNCTIONAL_ASSESSMENT: 0-10

## 2024-05-31 ASSESSMENT — PAIN DESCRIPTION - DESCRIPTORS: DESCRIPTORS: SHARP;SORE

## 2024-05-31 ASSESSMENT — LIFESTYLE VARIABLES
HOW OFTEN DO YOU HAVE A DRINK CONTAINING ALCOHOL: NEVER
HOW MANY STANDARD DRINKS CONTAINING ALCOHOL DO YOU HAVE ON A TYPICAL DAY: PATIENT DOES NOT DRINK

## 2024-05-31 ASSESSMENT — PAIN DESCRIPTION - LOCATION: LOCATION: THROAT

## 2024-05-31 NOTE — OP NOTE
Operative Note      Patient: Tanya Liz  YOB: 1940  MRN: [id number]    Date of Procedure: 5/31/2024    Pre-Op Diagnosis Codes:  Oropharyngeal abscess, Respiratory distress    Post-Op Diagnosis: Same       Procedure(s):  direct laryngoscopy with biopsy, with I&D of oropharyngeal abscess, flexible esophagoscopy    Surgeon(s):  Diana Suh MD    Assistant:   * No surgical staff found *    Anesthesia: General    Estimated Blood Loss (mL): less than 50     Complications: None    Specimens:   ID Type Source Tests Collected by Time Destination   1 : Left Left oropharynx for culture Swab Nasopharynx/Oropharynx CULTURE, SURGICAL Diana Suh MD 5/31/2024 1721    A : Left oropharynx Tissue Nasopharynx/Oropharynx SURGICAL PATHOLOGY Diana Suh MD 5/31/2024 1700        Implants:  * No implants in log *      Drains:   NG/OG/NJ/NE Tube Orogastric Right mouth (Active)       Findings:  Infection Present At Time Of Surgery (PATOS) (choose all levels that have infection present):  - Superficial Infection (skin/subcutaneous) present as evidenced by pus  Other Findings: diffuse oropharyngeal swelling, firm left BOT/left pharyngeal wall-- biopsies and purulent fluid culture obtained    Detailed Description of Procedure:   The patient was brought back to the operating room emergently by the anesthesia team.  General anesthesia was induced and the patient was orotracheally intubated by the anesthesia team using the glide scope.  Patient was then turned 90 degrees towards the ENT team.  A preoperative timeout was performed.  A dedo laryngoscope was passed into the mouth and down the throat until the larynx was visualized.  The following sites were examined: bilateral tonsillar fossa, base of tongue vallecula, epiglottis, piriform sinuses, and larynx. There was significant oropharyngeal swelling left greater than right, arytenoid edema, firmness along the left base of tongue and lateral pharyngeal wall.  This area was

## 2024-05-31 NOTE — PROGRESS NOTES
Patient arrived from [facility] to OR for emergent procedure.No consent  was obtained due to the emergent nature of procedure.

## 2024-05-31 NOTE — CONSULTS
DEPARTMENT OF HOSPITAL MEDICINE    CONSULT NOTE    PATIENT NAME:  Tanya Liz    MRN:  [id number]  SERVICE DATE:  5/31/2024   SERVICE TIME:  7:44 PM    Primary Care Physician: Madi Kelly DO     ASSESSMENT AND PLAN  Principal Problem:    Oropharyngeal mass    Oral abscess  Plan: Care per primary. On Unasyn and decadron    Requiring Mechanical Ventilation  - pt left intubated after procedure in the setting of throat swelling and concern for airway compromise if extubated  - admit to the ICU for vent management and close hemodynamic monitoring  - critical care consulted, appreciate their assistance   - continue sedation with propofol and fentanyl as needed to tolerate ETT, goal RASS 0 to -1, wean as able     Chronic Conditions:  HTN:continue home lisinopril  HLD: continue home statin, hold aspirin in perioperative period  T2DM: FSBG per protocol with SSI  GERD: continue home PPI and famotidine  Cerebrovascular disease: hold home DAPT in perioperative period  Depression: continue home sertraline, trazodone, and mirtazapine    SUBJECTIVE  CHIEF COMPLAINT:    Chief Complaint   Patient presents with    Pharyngitis     x4days    Nausea    Diarrhea     HPI:  This is a 84 y.o. female with PMH of HTN, HLD, T2DM, GERD, cerebrovascular disease, and depression who presented for sore throat and difficulty swallowing. CT neck demonstrated a possible abscess and pt was noted to be drooling and having difficulty controlling her secretions and so she was taking to the OR emergently for I&D of a likely oropharyngeal abscess by ENT. Pt remained intubated after the procedure and is being admitted to the ICU for close monitoring. Medicine was consulted to help with management of chronic co-morbidities including pt's T2DM.    PAST MEDICAL HISTORY:    Past Medical History:   Diagnosis Date    Arthritis     Basilar artery stenosis/occlusion 12/8/2021    Chronic bronchitis (HCC)     Chronic pruritus 1/15/2021    COVID-19 virus infection

## 2024-05-31 NOTE — ANESTHESIA PRE PROCEDURE
12/11/20   Rema Keith MD   Multiple Vitamins-Minerals (PRESERVISION AREDS) CAPS Take by mouth    Provider, MD Candy   Blood Glucose Monitoring Suppl LONG Use as directed up to TID Type 2 diabetes mellitus without complication, without long-term current use of insulin (HCC) (E11.9) 7/2/18   Rema Keith MD   Alcohol Swabs PADS Use as directed 6/28/18   Rema Keith MD       Current medications:    No current facility-administered medications for this encounter.     Current Outpatient Medications   Medication Sig Dispense Refill   • traZODone (DESYREL) 50 MG tablet Take 1 tablet by mouth nightly     • mirtazapine (REMERON) 7.5 MG tablet Take 1 tablet by mouth nightly     • phenol (CHLORASEPTIC) 1.4 % LIQD mouth spray Take 1 spray by mouth every 2 hours as needed for Sore Throat 177 mL 0   • famotidine (PEPCID) 20 MG tablet Take 1 tablet by mouth 2 times daily 30 tablet 0   • albuterol sulfate HFA (VENTOLIN HFA) 108 (90 Base) MCG/ACT inhaler Inhale 2 puffs into the lungs 4 times daily as needed for Wheezing 18 g 0   • acetaminophen (TYLENOL) 500 MG tablet Take 2 tablets by mouth every 6 hours as needed for Pain or Fever 60 tablet 0   • Cholecalciferol (VITAMIN D3) 50 MCG (2000 UT) CAPS TAKE ONE CAPSULE BY MOUTH EVERY DAY 90 capsule 4   • ondansetron (ZOFRAN-ODT) 4 MG disintegrating tablet Take 1 tablet by mouth 3 times daily as needed for Nausea or Vomiting 21 tablet 0   • sertraline (ZOLOFT) 100 MG tablet TAKE ONE TABLET BY MOUTH EVERY DAY 90 tablet 1   • atorvastatin (LIPITOR) 40 MG tablet Take 1 tablet by mouth daily 90 tablet 4   • blood glucose monitor strips Test up to daily    Type 2 diabetes mellitus without complication, without long-term current use of insulin (HCC) (E11.9) 100 strip 12   • pramox-PE-glycerin-petrolatum 1-0.25-14.4-15 % cream AAA TID prn itching, irritation, hemorrhoids 51 g 1   • HYDROCORTISONE ACE, RECTAL, (PROCTOCORT) 30 MG SUPP Place

## 2024-05-31 NOTE — PROGRESS NOTES
Patient arrived in PACU via inpatient transport from the ED. This RN was unable to pre-op the patient or obtain consents, due to the emergent nature of the case. Patient was expedited into OR.

## 2024-05-31 NOTE — CONSULTS
Otolaryngology Consult Note    Reason for Consult:  BOT abscess     Requesting Physician:  ED    HISTORY OF PRESENT ILLNESS:    The patient is a 84 y.o. female who presents with 3-4 days of worsening sore throat.   Was seen at urgent care - throat culture obtained- dc-ed home with chloraspetic   Presented to ED today with worsening dysphagia, drooling. CT neck performed reveals oropharyngeal fluid collection  WBC 12.9   Treated with IV abx, steroids in ED     History obtained from the patient and her daughter at bedside who is translating.   Endorses dysphagia x 3 yrs  Hasn't been eating much lately due to \"low appetite\"  Feels like things are getting stuck   Endorses significant neck pain as well as \"phlegm\" in throat          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    Type 2 diabetes mellitus without complication (HCC) 4/10/2018    no medication per patient     Past Surgical History:   Procedure Laterality Date    CHOLECYSTECTOMY  1988    COLONOSCOPY  05/04/2009    COLONOSCOPY N/A 10/24/2019    COLORECTAL CANCER SCREENING, NOT HIGH RISK performed by Gunner Beaulieu MD at [facility]    HYSTERECTOMY (CERVIX STATUS UNKNOWN)  1978    OTHER SURGICAL HISTORY Left 04/27/15     [facility] EYE VITRECTOMY W MACULAR EPIRETINAL MEMBRANE    OVARY REMOVAL      UPPER GASTROINTESTINAL ENDOSCOPY  04/16/2013    UPPER GASTROINTESTINAL ENDOSCOPY  10/29/15    ANTOINETTE ANGELO MD     Prior to Admission medications  Min: 98 °F (36.7 °C)  Max: 98 °F (36.7 °C)    RESPIRATIONS RANGE: Resp  Av  Min: 18  Max: 18    PULSE RANGE: Pulse  Av  Min: 93  Max: 93    BLOOD PRESSURE RANGE:  Systolic (24hrs), Av , Min:136 , Max:136   ; Diastolic (24hrs), Av, Min:100, Max:100      PULSE OXIMETRY RANGE: SpO2  Av %  Min: 98 %  Max: 98 %    No intake/output data recorded.    General- ill appearing   Resp- no stridor, on 3L NC, spitting out secretions   Head/Face- NCAT   Oral cavity- tongue mobile, no visible OP swelling, uvula midline   Neck- tender to palpation left neck    Flexible Nasolaryngoscopy     Verbal consent was obtained from the patient. Afrin and 4% topical lidocaine was applied to bilateral nares. After sufficient time for anesthesia, a flexible fiberoptic nasolaryngoscope was inserted into the patient's right naris. The scope was advanced through the nasal cavity fully visualizing the nasal cavity, nasopharynx, oropharynx, larynx, and hypopharynx. All structures were visualized and normal with significant findings as detailed below:     Fullness of left BOT - concern for mass extending to piriform sinus  No laryngeal involvement. Possible hypomobile left VC   Pooling of secretions   Left arytenoid edema        IMPRESSION/RECOMMENDATIONS:    84y female with acute worsening of sore throat and dysphagia presenting to ED with evidence of oropharyngeal abscess vs superinfected mass of oropharynx/hypopharynx     Plan for urgent OR for DL biopsy/I&D, possible esophagoscopy  R/B/A were discussed with the patient and her daughter.   Discussed possibility of remaining intubated following the procedure. Discussed possibility of temporary feeding tube placement    Electronically signed by Diana Suh MD on 24 at 3:07 PM EDT

## 2024-05-31 NOTE — ANESTHESIA POSTPROCEDURE EVALUATION
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    Jose Phase I: Jose Score: 4    Jose Phase II:      Anesthesia Post Evaluation    Patient location during evaluation: bedside  Patient participation: complete - patient cannot participate  Level of consciousness: sedated and ventilated  Pain score: 0  Airway patency: patent  Nausea & Vomiting: no nausea and no vomiting  Cardiovascular status: blood pressure returned to baseline and hemodynamically stable  Respiratory status: ventilator  Hydration status: euvolemic  Multimodal analgesia pain management approach  Pain management: adequate        No notable events documented.

## 2024-05-31 NOTE — ED PROVIDER NOTES
[facility] ED  EMERGENCY DEPARTMENT ENCOUNTER      Pt Name: Tanya Liz  MRN: [id number]  Birthdate 1940  Date of evaluation: 5/31/2024  Provider: Ishmael Olivarez PA-C  11:51 AM EDT    CHIEF COMPLAINT       Chief Complaint   Patient presents with    Pharyngitis     x4days    Nausea    Diarrhea         HISTORY OF PRESENT ILLNESS   (Location/Symptom, Timing/Onset, Context/Setting, Quality, Duration, Modifying Factors, Severity)  Note limiting factors.   Tanya Liz is a 84 y.o. female who presents to the emergency department with complaint of pharyngitis, difficulty swallowing, diarrhea, nausea, which he states been ongoing for last 4 days, patient was seen urgent care center yesterday for the same, she was diagnosed as a viral pharyngitis and sent home on the Cepacol spray.  Patient states worsening pain, increasing difficulty to swallow, decreased appetite due to nausea.  She denies any fevers.  Past medical history significant for esophagitis, arthritis, gastric reflux, hyperlipidemia, chronic migraines, type 2 diabetes, depression, hypertension.     HPI    Nursing Notes were reviewed.    REVIEW OF SYSTEMS    (2-9 systems for level 4, 10 or more for level 5)     Review of Systems   Constitutional:  Negative for activity change, appetite change and fever.   HENT:  Positive for sore throat. Negative for congestion, ear discharge, ear pain, nosebleeds and rhinorrhea.    Eyes:  Negative for discharge.   Respiratory:  Negative for cough and shortness of breath.    Cardiovascular:  Negative for chest pain, palpitations and leg swelling.   Gastrointestinal:  Positive for nausea. Negative for abdominal distention, abdominal pain, constipation and vomiting.   Genitourinary:  Negative for difficulty urinating and dysuria.   Musculoskeletal:  Negative for arthralgias.   Skin:  Negative for color change.   Neurological:  Negative for dizziness, tremors, syncope, weakness, numbness and headaches.  °F (36.7 °C)    TempSrc: Temporal    SpO2: 98% 100%   Weight: 49.9 kg (110 lb)          Medical Decision Making  Patient presented to the ED with complaint of sore throat, difficulty swallowing, which she states been ongoing for approximate last 3 days, she was seen urgent care center yesterday with diagnosis of viral pharyngitis.  She states worsening pain, difficulty swallowing, and is drooling arrival to the ED, she is not wanting to swallow her saliva due to pain.  She displays no acute respiratory distress, saturations are 98% on room air, and lung sounds are clear.  CT scan of the neck with IV contrast shows abscess involving the left posterior base of the tongue, extending posteriorly approximately 4 cm into the left oropharyngeal mucosa with edema extending into the left piriform sinuses.  Patient was given IV fluids, she was given Decadron, and IV Unasyn.  She was medicated for pain, she is more comfortable, she is able to swallow saliva at this time.  I did speak with Dr. Suh of ENT, she will come to the ED to evaluate patient for further management care.    Dr. Suh did come to the ER department to visit with this patient, she did a scope  her in the ED, and feels this is something that will require more than bedside procedure in the ED, patient will be taken to the OR for further evaluation for the potential of abscess versus mass.    Amount and/or Complexity of Data Reviewed  Labs: ordered.  Radiology: ordered.    Risk  Prescription drug management.  Decision regarding hospitalization.            REASSESSMENT          CRITICAL CARE TIME   Total Critical Care time was  minutes, excluding separately reportable procedures.  There was a high probability of clinically significant/life threatening deterioration in the patient's condition which required my urgent intervention.      CONSULTS:  IP CONSULT TO OTOLARYNGOLOGY    PROCEDURES:  Unless otherwise noted below, none     Procedures        FINAL

## 2024-05-31 NOTE — PROGRESS NOTES
To PACU @ 1732, ETT put in from OR to ventilator by Resp tech, lei,  ADONAY to low int sx, SCD applied, MP SR, Dr Suh at bedside upon arrival to pacu, propofol drip initiated by Kervin FERRARI. Small amount blood sx from mouth.  @ 2519 Hospitalist [facility] Kadie Hung at bedside

## 2024-06-01 LAB
ALBUMIN SERPL-MCNC: 3.1 G/DL (ref 3.5–4.6)
ALP SERPL-CCNC: 54 U/L (ref 40–130)
ALT SERPL-CCNC: 13 U/L (ref 0–33)
ANION GAP SERPL CALCULATED.3IONS-SCNC: 11 MEQ/L (ref 9–15)
AST SERPL-CCNC: 14 U/L (ref 0–35)
BACTERIA THROAT AEROBE CULT: NORMAL
BASE EXCESS ARTERIAL: 1 (ref -3–3)
BILIRUB SERPL-MCNC: 0.3 MG/DL (ref 0.2–0.7)
BUN SERPL-MCNC: 21 MG/DL (ref 8–23)
CALCIUM IONIZED: 1.21 MMOL/L (ref 1.12–1.32)
CALCIUM SERPL-MCNC: 8.7 MG/DL (ref 8.5–9.9)
CHLORIDE SERPL-SCNC: 107 MEQ/L (ref 95–107)
CO2 SERPL-SCNC: 23 MEQ/L (ref 20–31)
CREAT SERPL-MCNC: 0.49 MG/DL (ref 0.5–0.9)
ERYTHROCYTE [DISTWIDTH] IN BLOOD BY AUTOMATED COUNT: 13.4 % (ref 11.5–14.5)
GLOBULIN SER CALC-MCNC: 2.7 G/DL (ref 2.3–3.5)
GLUCOSE BLD-MCNC: 125 MG/DL (ref 70–99)
GLUCOSE BLD-MCNC: 136 MG/DL (ref 70–99)
GLUCOSE BLD-MCNC: 136 MG/DL (ref 70–99)
GLUCOSE BLD-MCNC: 138 MG/DL (ref 70–99)
GLUCOSE BLD-MCNC: 141 MG/DL (ref 70–99)
GLUCOSE BLD-MCNC: 146 MG/DL (ref 70–99)
GLUCOSE BLD-MCNC: 162 MG/DL (ref 70–99)
GLUCOSE SERPL-MCNC: 140 MG/DL (ref 70–99)
HCO3 ARTERIAL: 24.6 MMOL/L (ref 21–29)
HCT VFR BLD AUTO: 33 % (ref 36–48)
HCT VFR BLD AUTO: 34.7 % (ref 37–47)
HGB BLD CALC-MCNC: 11.2 GM/DL (ref 12–16)
HGB BLD-MCNC: 11.5 G/DL (ref 12–16)
LACTATE: 1.06 MMOL/L (ref 0.4–2)
MAGNESIUM SERPL-MCNC: 2 MG/DL (ref 1.7–2.4)
MCH RBC QN AUTO: 30.1 PG (ref 27–31.3)
MCHC RBC AUTO-ENTMCNC: 33.1 % (ref 33–37)
MCV RBC AUTO: 90.8 FL (ref 79.4–94.8)
O2 SAT, ARTERIAL: 96 % (ref 93–100)
PCO2 ARTERIAL: 34 MM HG (ref 35–45)
PERFORMED ON: ABNORMAL
PH ARTERIAL: 7.46 (ref 7.35–7.45)
PLATELET # BLD AUTO: 95 K/UL (ref 130–400)
PO2 ARTERIAL: 78 MM HG (ref 75–108)
POC CHLORIDE: 109 MEQ/L (ref 99–110)
POC CREATININE: 0.5 MG/DL (ref 0.6–1.2)
POC FIO2: 40
POC SAMPLE TYPE: ABNORMAL
POTASSIUM SERPL-SCNC: 3.5 MEQ/L (ref 3.5–5.1)
POTASSIUM SERPL-SCNC: 4 MEQ/L (ref 3.4–4.9)
PROT SERPL-MCNC: 5.8 G/DL (ref 6.3–8)
RBC # BLD AUTO: 3.82 M/UL (ref 4.2–5.4)
SODIUM BLD-SCNC: 143 MEQ/L (ref 136–145)
SODIUM SERPL-SCNC: 141 MEQ/L (ref 135–144)
TCO2 ARTERIAL: 26 MMOL/L (ref 21–32)
WBC # BLD AUTO: 10.2 K/UL (ref 4.8–10.8)

## 2024-06-01 PROCEDURE — 51798 US URINE CAPACITY MEASURE: CPT

## 2024-06-01 PROCEDURE — 82435 ASSAY OF BLOOD CHLORIDE: CPT

## 2024-06-01 PROCEDURE — 99291 CRITICAL CARE FIRST HOUR: CPT | Performed by: INTERNAL MEDICINE

## 2024-06-01 PROCEDURE — 82948 REAGENT STRIP/BLOOD GLUCOSE: CPT

## 2024-06-01 PROCEDURE — 2700000000 HC OXYGEN THERAPY PER DAY

## 2024-06-01 PROCEDURE — 6370000000 HC RX 637 (ALT 250 FOR IP): Performed by: STUDENT IN AN ORGANIZED HEALTH CARE EDUCATION/TRAINING PROGRAM

## 2024-06-01 PROCEDURE — 2000000000 HC ICU R&B

## 2024-06-01 PROCEDURE — 94003 VENT MGMT INPAT SUBQ DAY: CPT

## 2024-06-01 PROCEDURE — 82330 ASSAY OF CALCIUM: CPT

## 2024-06-01 PROCEDURE — 83735 ASSAY OF MAGNESIUM: CPT

## 2024-06-01 PROCEDURE — 36600 WITHDRAWAL OF ARTERIAL BLOOD: CPT

## 2024-06-01 PROCEDURE — 93005 ELECTROCARDIOGRAM TRACING: CPT | Performed by: INTERNAL MEDICINE

## 2024-06-01 PROCEDURE — 83605 ASSAY OF LACTIC ACID: CPT

## 2024-06-01 PROCEDURE — 82803 BLOOD GASES ANY COMBINATION: CPT

## 2024-06-01 PROCEDURE — A4216 STERILE WATER/SALINE, 10 ML: HCPCS | Performed by: STUDENT IN AN ORGANIZED HEALTH CARE EDUCATION/TRAINING PROGRAM

## 2024-06-01 PROCEDURE — 84295 ASSAY OF SERUM SODIUM: CPT

## 2024-06-01 PROCEDURE — 80053 COMPREHEN METABOLIC PANEL: CPT

## 2024-06-01 PROCEDURE — 84132 ASSAY OF SERUM POTASSIUM: CPT

## 2024-06-01 PROCEDURE — 99024 POSTOP FOLLOW-UP VISIT: CPT | Performed by: STUDENT IN AN ORGANIZED HEALTH CARE EDUCATION/TRAINING PROGRAM

## 2024-06-01 PROCEDURE — 6360000002 HC RX W HCPCS: Performed by: STUDENT IN AN ORGANIZED HEALTH CARE EDUCATION/TRAINING PROGRAM

## 2024-06-01 PROCEDURE — 2580000003 HC RX 258: Performed by: STUDENT IN AN ORGANIZED HEALTH CARE EDUCATION/TRAINING PROGRAM

## 2024-06-01 PROCEDURE — 51701 INSERT BLADDER CATHETER: CPT

## 2024-06-01 PROCEDURE — 85027 COMPLETE CBC AUTOMATED: CPT

## 2024-06-01 PROCEDURE — 85014 HEMATOCRIT: CPT

## 2024-06-01 PROCEDURE — 6360000002 HC RX W HCPCS: Performed by: INTERNAL MEDICINE

## 2024-06-01 PROCEDURE — 82565 ASSAY OF CREATININE: CPT

## 2024-06-01 PROCEDURE — 36415 COLL VENOUS BLD VENIPUNCTURE: CPT

## 2024-06-01 PROCEDURE — 2500000003 HC RX 250 WO HCPCS: Performed by: STUDENT IN AN ORGANIZED HEALTH CARE EDUCATION/TRAINING PROGRAM

## 2024-06-01 RX ORDER — SERTRALINE HYDROCHLORIDE 100 MG/1
100 TABLET, FILM COATED ORAL DAILY
Status: DISCONTINUED | OUTPATIENT
Start: 2024-06-01 | End: 2024-06-13 | Stop reason: HOSPADM

## 2024-06-01 RX ORDER — VERAPAMIL HYDROCHLORIDE 100 MG/1
100 CAPSULE, EXTENDED RELEASE ORAL DAILY
Status: DISCONTINUED | OUTPATIENT
Start: 2024-06-01 | End: 2024-06-03 | Stop reason: RX

## 2024-06-01 RX ORDER — PROPOFOL 10 MG/ML
5-50 INJECTION, EMULSION INTRAVENOUS CONTINUOUS
Status: DISCONTINUED | OUTPATIENT
Start: 2024-06-01 | End: 2024-06-04

## 2024-06-01 RX ORDER — ATORVASTATIN CALCIUM 40 MG/1
40 TABLET, FILM COATED ORAL NIGHTLY
Status: DISCONTINUED | OUTPATIENT
Start: 2024-06-01 | End: 2024-06-13 | Stop reason: HOSPADM

## 2024-06-01 RX ORDER — LISINOPRIL 20 MG/1
20 TABLET ORAL DAILY
Status: DISCONTINUED | OUTPATIENT
Start: 2024-06-01 | End: 2024-06-07

## 2024-06-01 RX ORDER — HYDRALAZINE HYDROCHLORIDE 20 MG/ML
10 INJECTION INTRAMUSCULAR; INTRAVENOUS EVERY 4 HOURS PRN
Status: DISCONTINUED | OUTPATIENT
Start: 2024-06-01 | End: 2024-06-04

## 2024-06-01 RX ORDER — TRAZODONE HYDROCHLORIDE 50 MG/1
50 TABLET ORAL NIGHTLY
Status: DISCONTINUED | OUTPATIENT
Start: 2024-06-01 | End: 2024-06-13 | Stop reason: HOSPADM

## 2024-06-01 RX ORDER — MIRTAZAPINE 15 MG/1
7.5 TABLET, FILM COATED ORAL NIGHTLY
Status: DISCONTINUED | OUTPATIENT
Start: 2024-06-01 | End: 2024-06-13 | Stop reason: HOSPADM

## 2024-06-01 RX ADMIN — ATORVASTATIN CALCIUM 40 MG: 40 TABLET, FILM COATED ORAL at 21:22

## 2024-06-01 RX ADMIN — Medication 125 MCG/HR: at 07:37

## 2024-06-01 RX ADMIN — Medication 125 MCG/HR: at 16:01

## 2024-06-01 RX ADMIN — SODIUM CHLORIDE: 9 INJECTION, SOLUTION INTRAVENOUS at 17:43

## 2024-06-01 RX ADMIN — SODIUM CHLORIDE, PRESERVATIVE FREE 20 MG: 5 INJECTION INTRAVENOUS at 10:06

## 2024-06-01 RX ADMIN — ENOXAPARIN SODIUM 30 MG: 100 INJECTION SUBCUTANEOUS at 10:06

## 2024-06-01 RX ADMIN — TRAZODONE HYDROCHLORIDE 50 MG: 50 TABLET ORAL at 21:24

## 2024-06-01 RX ADMIN — SERTRALINE 100 MG: 100 TABLET, FILM COATED ORAL at 10:56

## 2024-06-01 RX ADMIN — SODIUM CHLORIDE, PRESERVATIVE FREE 20 MG: 5 INJECTION INTRAVENOUS at 21:22

## 2024-06-01 RX ADMIN — Medication 10 ML: at 10:36

## 2024-06-01 RX ADMIN — CHLORHEXIDINE GLUCONATE, 0.12% ORAL RINSE 15 ML: 1.2 SOLUTION DENTAL at 10:06

## 2024-06-01 RX ADMIN — PROPOFOL 25 MCG/KG/MIN: 10 INJECTION, EMULSION INTRAVENOUS at 06:55

## 2024-06-01 RX ADMIN — Medication 10 ML: at 21:23

## 2024-06-01 RX ADMIN — Medication 125 MCG/HR: at 23:55

## 2024-06-01 RX ADMIN — CHLORHEXIDINE GLUCONATE, 0.12% ORAL RINSE 15 ML: 1.2 SOLUTION DENTAL at 21:22

## 2024-06-01 RX ADMIN — PROPOFOL 30 MCG/KG/MIN: 10 INJECTION, EMULSION INTRAVENOUS at 17:46

## 2024-06-01 RX ADMIN — AMPICILLIN SODIUM AND SULBACTAM SODIUM 3000 MG: 2; 1 INJECTION, POWDER, FOR SOLUTION INTRAMUSCULAR; INTRAVENOUS at 10:47

## 2024-06-01 RX ADMIN — LISINOPRIL 20 MG: 20 TABLET ORAL at 10:06

## 2024-06-01 RX ADMIN — AMPICILLIN SODIUM AND SULBACTAM SODIUM 3000 MG: 2; 1 INJECTION, POWDER, FOR SOLUTION INTRAMUSCULAR; INTRAVENOUS at 23:04

## 2024-06-01 RX ADMIN — HYDRALAZINE HYDROCHLORIDE 10 MG: 20 INJECTION INTRAMUSCULAR; INTRAVENOUS at 00:41

## 2024-06-01 RX ADMIN — MIRTAZAPINE 7.5 MG: 15 TABLET, FILM COATED ORAL at 21:22

## 2024-06-01 RX ADMIN — PROPOFOL 20 MCG/KG/MIN: 10 INJECTION, EMULSION INTRAVENOUS at 23:53

## 2024-06-01 RX ADMIN — AMPICILLIN SODIUM AND SULBACTAM SODIUM 3000 MG: 2; 1 INJECTION, POWDER, FOR SOLUTION INTRAMUSCULAR; INTRAVENOUS at 17:38

## 2024-06-01 RX ADMIN — DEXAMETHASONE SODIUM PHOSPHATE 10 MG: 4 INJECTION, SOLUTION INTRAMUSCULAR; INTRAVENOUS at 10:20

## 2024-06-01 RX ADMIN — SODIUM CHLORIDE: 9 INJECTION, SOLUTION INTRAVENOUS at 09:48

## 2024-06-01 RX ADMIN — AMPICILLIN SODIUM AND SULBACTAM SODIUM 3000 MG: 2; 1 INJECTION, POWDER, FOR SOLUTION INTRAMUSCULAR; INTRAVENOUS at 04:58

## 2024-06-01 ASSESSMENT — PAIN SCALES - GENERAL
PAINLEVEL_OUTOF10: 0

## 2024-06-01 ASSESSMENT — PULMONARY FUNCTION TESTS
PIF_VALUE: 20
PIF_VALUE: 21
PIF_VALUE: 20
PIF_VALUE: 27
PIF_VALUE: 20
PIF_VALUE: 20
PIF_VALUE: 19
PIF_VALUE: 20
PIF_VALUE: 21

## 2024-06-01 NOTE — PROGRESS NOTES
ENT Progress Note    Subjective   No acute events overnight   Remains sedated and intubated in ICU     Objective:   Vitals:    06/01/24 1006   BP: (!) 121/45   Pulse:    Resp:    Temp:    SpO2:      Lab Results   Component Value Date    WBC 10.2 06/01/2024    HGB 11.5 (L) 06/01/2024    HCT 34.7 (L) 06/01/2024    MCV 90.8 06/01/2024    PLT 95 (L) 06/01/2024     Lab Results   Component Value Date     06/01/2024    K 4.0 06/01/2024     06/01/2024    CO2 23 06/01/2024    BUN 21 06/01/2024    CREATININE 0.49 (L) 06/01/2024    GLUCOSE 140 (H) 06/01/2024    CALCIUM 8.7 06/01/2024    PROT 6.5 09/14/2015    BILITOT 0.3 06/01/2024    ALKPHOS 54 06/01/2024    AST 14 06/01/2024    ALT 13 06/01/2024    LABGLOM >90.0 06/01/2024    GFRAA >60.0 08/07/2022    GLOB 2.7 06/01/2024       Exam   General- intubated/sedated   Oral cavity- minimal bloody/purulent secretions suctioned from mouth, base of tongue palpated with no areas of firmness or fluctuance. ETT and OG appropriately secured   Neck- soft, no areas of erythema, induration, or fluctuance   Resp- intubated on vent   Neuro- sedated     A/P   84y female taken emergently to OR 5/31 for airway compromise due to base of tongue abscess. Biopsies taken in OR of left oropharynx. Purulent drainage from BOT sent for culture.  Significant laryngeal swelling     Continue IV abx - Unasyn   Follow up cultures- NGTD   Decadron 10 mg q 8 for 3 doses   Will plan for SBT tomorrow and extubate if passing   Continue to trend WBC (downtrending 12 --> 10)   Appreciate ICU and hospitalist co management     Diana Suh MD

## 2024-06-01 NOTE — PROGRESS NOTES
Spiritual Care Services     Summary of Visit:    Encounter Summary  Encounter Overview/Reason: Initial Encounter  Service Provided For: Patient  Referral/Consult From: [facility]  Support System: Children, Family members  Complexity of Encounter: Low  Begin Time: 0940  End Time : 0945  Total Time Calculated: 5 min                               Spiritual Assessment/Intervention/Outcomes:    Assessment: Unable to assess    Intervention: Sustaining Presence/Ministry of presence    Outcome: Did not respond      Care Plan:    Plan and Referrals  Plan/Referrals: Continue to visit, (comment)          Spiritual Care Services   Electronically signed by Chaplain Yue on 6/1/2024 at 1:00 PM.    To reach a  for emotional and spiritual support, place an EPIC consult request.   If a  is needed immediately, dial “0” and ask to page the on-call .

## 2024-06-01 NOTE — PLAN OF CARE
Nutrition Problem #1: Inadequate oral intake  Intervention: Food and/or Nutrient Delivery: Continue NPO, Start Tube Feeding    Problem: Nutrition Deficit:  Goal: Optimize nutritional status  Flowsheets (Taken 6/1/2024 1207)  Nutrient intake appropriate for improving, restoring, or maintaining nutritional needs:   Assess nutritional status and recommend course of action   Recommend appropriate diets, oral nutritional supplements, and vitamin/mineral supplements   Recommend, monitor, and adjust tube feedings and TPN/PPN based on assessed needs   Provide specific nutrition education to patient or family as appropriate   Monitor oral intake, labs, and treatment plans

## 2024-06-01 NOTE — PROGRESS NOTES
Comprehensive Nutrition Assessment    Type and Reason for Visit:  Initial, Consult (TF order and manage)    Nutrition Recommendations/Plan:   Peptide Based formula (Vital AF 1.2) @ 20m/hr x 24 hrs  50ml water flush q 4 hrs   Monitor ability to start PO diet s/p extubation, noted dysphagia PTA      Malnutrition Assessment:  Malnutrition Status:  At risk for malnutrition (Comment) (06/01/24 1204)    Context:  Social/Environmental Circumstances     Findings of the 6 clinical characteristics of malnutrition:  Energy Intake:  Mild decrease in energy intake (Comment)  Weight Loss:  No significant weight loss     Body Fat Loss:  No significant body fat loss     Muscle Mass Loss:  No significant muscle mass loss    Fluid Accumulation:  No significant fluid accumulation     Strength:  Not Performed    Nutrition Assessment:    Pt nutritionally compromised with inability to swallow PTA with oropharyngeal mass. Currently intubated. Start trophic feeds and monitor tolerance/ability to advance to goal rate. ENT following. Plan for SBT tomorrow per notes.    Nutrition Related Findings:    Pt presents with pharyngitis x 4 days PTA with dysphagia and decreased appetite d/t nausea. CT showed Oropharyngeal mass. 5/31: Laryngoscopy with biopsy, with I&D of oropharyngeal abscess, flexible esophagoscopy. Pt left intubated s/p procedure d/t throat swelling and concern for airway compromise if extubated. PMHx: HTN, HLD, T2DM, GERD, cerebrovascular disease. Labs/meds noted. on steroids. (Gluc<160).Propofol. +loose stools. Wound Type: None       Current Nutrition Intake & Therapies:    Average Meal Intake: NPO  Average Supplements Intake: NPO  Diet NPO  ADULT TUBE FEEDING; Orogastric; Peptide Based; Continuous; 20; No; 50; Q 4 hours  Current Tube Feeding (TF) Orders:  Feeding Route: Orogastric  Formula: Peptide Based  Schedule: Continuous  Feeding Regimen: @20ml/hr x 24 hrs  Water Flushes: 50 q 4 hrs  Current TF & Flush Orders Provides:

## 2024-06-01 NOTE — PROGRESS NOTES
Pt arrived from PACU. Pt on LR drip and Propofol. Pt intubated and sedated. Pt id not arrived with restraints, order acquired. Pt Vent settings called to Dr. Presley. New orders placed for ABG's and Vent settings updated. EKG completed. Dr. David reviewed EKG, New lab orders placed. Dr. David notified of of patient labile HR and that the pt HR was consistently. No new orders. Pt straight catheterized. See Flowsheets for details. Delano applied for low temperature. Handoff given to Belkis LUTHER

## 2024-06-01 NOTE — PROGRESS NOTES
Pt heart rate 39 to 52. Propofol decreased. Pt moves arms when stimulated and heart rate increases to 60's when stimulated also. Oral care given small amount of thick dark red noted. Repositioned .

## 2024-06-01 NOTE — CONSULTS
Pulmonary and Critical Care Medicine  Consult Note  Encounter Date: 2024 9:26 AM    Ms. Tanya Liz is a 84 y.o. female  : 1940  Requesting Provider: Diana Suh MD    Reason for request: ICU management            HISTORY OF PRESENT ILLNESS:    Patient is 84 y.o. presents with post I&D of oropharyngeal abscess, she is currently intubated sedated on vent,  She was admitted on May 31 with 4 days history of throat pain, drooling and dysphagia, CT showed oropharyngeal fluid collection and I&D was done yesterday.  She is currently on vent, on 40% FiO2 saturation 99%, no fever, no apparent pain distress.  Urine output 600 cc.  She is not on pressors          Past Medical History:        Diagnosis Date    Arthritis     Basilar artery stenosis/occlusion 2021    Chronic bronchitis (HCC)     Chronic pruritus 1/15/2021    COVID-19 virus infection 2021    Degenerative disc disease, cervical     Depression     Depression with anxiety     Easy bruising 2021    Esophagitis     Fibromyositis     GERD (gastroesophageal reflux disease)     Headache(784.0)     Hyperlipidemia     Hypertension     Migraines     Pleural effusion     Right-sided chest wall pain     Subcortical microvascular ischemic occlusive disease 2021    Type 2 diabetes mellitus without complication (HCC) 4/10/2018    no medication per patient       Past Surgical History:        Procedure Laterality Date    CHOLECYSTECTOMY      COLONOSCOPY  2009    COLONOSCOPY N/A 10/24/2019    COLORECTAL CANCER SCREENING, NOT HIGH RISK performed by Gunner Beaulieu MD at [facility]    HYSTERECTOMY (CERVIX STATUS UNKNOWN)      OTHER SURGICAL HISTORY Left 04/27/15     [facility] EYE VITRECTOMY W MACULAR EPIRETINAL MEMBRANE    OVARY REMOVAL      UPPER GASTROINTESTINAL ENDOSCOPY  2013    UPPER GASTROINTESTINAL ENDOSCOPY  10/29/15    ANTOINETTE ANGELO MD       Social History:     reports that she has never smoked. She has never used

## 2024-06-02 LAB
A BAUMANNII DNA BLD POS QL NAA+NON-PROBE: NOT DETECTED
ALBUMIN SERPL-MCNC: 2.8 G/DL (ref 3.5–4.6)
ANION GAP SERPL CALCULATED.3IONS-SCNC: 10 MEQ/L (ref 9–15)
BACTERIA BLD CULT: ABNORMAL
BASOPHILS # BLD: 0 K/UL (ref 0–0.2)
BASOPHILS NFR BLD: 0 %
BUN SERPL-MCNC: 23 MG/DL (ref 8–23)
C ALBICANS DNA BLD POS QL NAA+NON-PROBE: NOT DETECTED
C AURIS DNA BLD POS QL NAA+PROBE: NOT DETECTED
C GLABRATA DNA BLD POS QL NAA+NON-PROBE: NOT DETECTED
C KRUSEI DNA BLD POS QL NAA+NON-PROBE: NOT DETECTED
C PARAP DNA BLD POS QL NAA+NON-PROBE: NOT DETECTED
C TROPICLS DNA BLD POS QL NAA+NON-PROBE: NOT DETECTED
CALCIUM SERPL-MCNC: 7.1 MG/DL (ref 8.5–9.9)
CHLORIDE SERPL-SCNC: 113 MEQ/L (ref 95–107)
CO2 SERPL-SCNC: 21 MEQ/L (ref 20–31)
CREAT SERPL-MCNC: 0.52 MG/DL (ref 0.5–0.9)
CRYPTOCOCCUS NEOFORMANS/GATTII BY PCR: NOT DETECTED
E CLOAC COMP DNA BLD POS NAA+NON-PROBE: NOT DETECTED
E COLI DNA BLD POS QL NAA+NON-PROBE: NOT DETECTED
E FAECALIS DNA BLD POS QL NAA+PROBE: NOT DETECTED
E FAECIUM DNA BLD POS QL NAA+PROBE: NOT DETECTED
ENTEROBACT DNA BLD POS QL NAA+NON-PROBE: NOT DETECTED
ENTEROCOC DNA BLD POS QL NAA+NON-PROBE: NOT DETECTED
EOSINOPHIL # BLD: 0 K/UL (ref 0–0.7)
EOSINOPHIL NFR BLD: 0 %
ERYTHROCYTE [DISTWIDTH] IN BLOOD BY AUTOMATED COUNT: 14 % (ref 11.5–14.5)
GLUCOSE BLD-MCNC: 109 MG/DL (ref 70–99)
GLUCOSE BLD-MCNC: 113 MG/DL (ref 70–99)
GLUCOSE BLD-MCNC: 117 MG/DL (ref 70–99)
GLUCOSE BLD-MCNC: 127 MG/DL (ref 70–99)
GLUCOSE BLD-MCNC: 96 MG/DL (ref 70–99)
GLUCOSE SERPL-MCNC: 139 MG/DL (ref 70–99)
GN BLD CULTURE PNL BLD POS NAA+PROBE: NOT DETECTED
GP B STREP DNA BLD POS QL NAA+NON-PROBE: NOT DETECTED
HCT VFR BLD AUTO: 33.4 % (ref 37–47)
HGB BLD-MCNC: 10.7 G/DL (ref 12–16)
K OXYTOCA DNA BLD POS QL NAA+NON-PROBE: NOT DETECTED
K PNEUMON DNA SPEC QL NAA+PROBE: NOT DETECTED
K. AEROGENES DNA SPEC QL NAA+PROBE: NOT DETECTED
L MONOCYTOG DNA BLD POS QL NAA+NON-PROBE: NOT DETECTED
LYMPHOCYTES # BLD: 1.2 K/UL (ref 1–4.8)
LYMPHOCYTES NFR BLD: 13.5 %
MCH RBC QN AUTO: 29.5 PG (ref 27–31.3)
MCHC RBC AUTO-ENTMCNC: 32 % (ref 33–37)
MCV RBC AUTO: 92 FL (ref 79.4–94.8)
MONOCYTES # BLD: 1 K/UL (ref 0.2–0.8)
MONOCYTES NFR BLD: 10.5 %
N MEN DNA BLD POS QL NAA+NON-PROBE: NOT DETECTED
NEUTROPHILS # BLD: 6.8 K/UL (ref 1.4–6.5)
NEUTS SEG NFR BLD: 75.3 %
P AERUGINOSA DNA BLD POS NAA+NON-PROBE: NOT DETECTED
PERFORMED ON: ABNORMAL
PERFORMED ON: NORMAL
PHOSPHATE SERPL-MCNC: 2.3 MG/DL (ref 2.3–4.8)
PLATELET # BLD AUTO: 98 K/UL (ref 130–400)
POTASSIUM SERPL-SCNC: 4.5 MEQ/L (ref 3.4–4.9)
PROTEUS SP DNA BLD POS QL NAA+NON-PROBE: NOT DETECTED
RBC # BLD AUTO: 3.63 M/UL (ref 4.2–5.4)
S AUREUS DNA BLD POS QL NAA+NON-PROBE: NOT DETECTED
S AUREUS+CONS DNA BLD POS NAA+NON-PROBE: DETECTED
S EPIDERMIDIS DNA BLD POS QL NAA+PROBE: NOT DETECTED
S LUGDUNENSIS DNA BLD POS QL NAA+PROBE: NOT DETECTED
S MALTOPH DNA BLD POS QL NAA+PROBE: NOT DETECTED
S MARCESCENS DNA BLD POS NAA+NON-PROBE: NOT DETECTED
S PNEUM DNA BLD POS QL NAA+NON-PROBE: NOT DETECTED
S PYO DNA BLD POS QL NAA+NON-PROBE: NOT DETECTED
SALMONELLA DNA BLD POS QL NAA+PROBE: NOT DETECTED
SODIUM SERPL-SCNC: 144 MEQ/L (ref 135–144)
STREPTOCOCCUS DNA BLD POS NAA+NON-PROBE: NOT DETECTED
WBC # BLD AUTO: 9.1 K/UL (ref 4.8–10.8)

## 2024-06-02 PROCEDURE — 94003 VENT MGMT INPAT SUBQ DAY: CPT

## 2024-06-02 PROCEDURE — 6370000000 HC RX 637 (ALT 250 FOR IP): Performed by: STUDENT IN AN ORGANIZED HEALTH CARE EDUCATION/TRAINING PROGRAM

## 2024-06-02 PROCEDURE — 2580000003 HC RX 258: Performed by: INTERNAL MEDICINE

## 2024-06-02 PROCEDURE — 85025 COMPLETE CBC W/AUTO DIFF WBC: CPT

## 2024-06-02 PROCEDURE — 36415 COLL VENOUS BLD VENIPUNCTURE: CPT

## 2024-06-02 PROCEDURE — 2500000003 HC RX 250 WO HCPCS: Performed by: STUDENT IN AN ORGANIZED HEALTH CARE EDUCATION/TRAINING PROGRAM

## 2024-06-02 PROCEDURE — 6360000002 HC RX W HCPCS

## 2024-06-02 PROCEDURE — 6360000002 HC RX W HCPCS: Performed by: INTERNAL MEDICINE

## 2024-06-02 PROCEDURE — 80069 RENAL FUNCTION PANEL: CPT

## 2024-06-02 PROCEDURE — 86403 PARTICLE AGGLUT ANTBDY SCRN: CPT

## 2024-06-02 PROCEDURE — 99291 CRITICAL CARE FIRST HOUR: CPT | Performed by: INTERNAL MEDICINE

## 2024-06-02 PROCEDURE — 6360000002 HC RX W HCPCS: Performed by: STUDENT IN AN ORGANIZED HEALTH CARE EDUCATION/TRAINING PROGRAM

## 2024-06-02 PROCEDURE — 2580000003 HC RX 258: Performed by: STUDENT IN AN ORGANIZED HEALTH CARE EDUCATION/TRAINING PROGRAM

## 2024-06-02 PROCEDURE — 2500000003 HC RX 250 WO HCPCS: Performed by: INTERNAL MEDICINE

## 2024-06-02 PROCEDURE — A4216 STERILE WATER/SALINE, 10 ML: HCPCS | Performed by: STUDENT IN AN ORGANIZED HEALTH CARE EDUCATION/TRAINING PROGRAM

## 2024-06-02 PROCEDURE — 2000000000 HC ICU R&B

## 2024-06-02 PROCEDURE — 2700000000 HC OXYGEN THERAPY PER DAY

## 2024-06-02 PROCEDURE — 51798 US URINE CAPACITY MEASURE: CPT

## 2024-06-02 RX ORDER — MIDAZOLAM HYDROCHLORIDE 1 MG/ML
INJECTION INTRAMUSCULAR; INTRAVENOUS
Status: COMPLETED
Start: 2024-06-02 | End: 2024-06-02

## 2024-06-02 RX ADMIN — AMPICILLIN SODIUM AND SULBACTAM SODIUM 3000 MG: 2; 1 INJECTION, POWDER, FOR SOLUTION INTRAMUSCULAR; INTRAVENOUS at 11:42

## 2024-06-02 RX ADMIN — LISINOPRIL 20 MG: 20 TABLET ORAL at 08:25

## 2024-06-02 RX ADMIN — MIRTAZAPINE 7.5 MG: 15 TABLET, FILM COATED ORAL at 20:05

## 2024-06-02 RX ADMIN — AMPICILLIN SODIUM AND SULBACTAM SODIUM 3000 MG: 2; 1 INJECTION, POWDER, FOR SOLUTION INTRAMUSCULAR; INTRAVENOUS at 17:55

## 2024-06-02 RX ADMIN — HYDRALAZINE HYDROCHLORIDE 10 MG: 20 INJECTION INTRAMUSCULAR; INTRAVENOUS at 13:12

## 2024-06-02 RX ADMIN — Medication 10 ML: at 11:42

## 2024-06-02 RX ADMIN — DEXMEDETOMIDINE 0.2 MCG/KG/HR: 100 INJECTION, SOLUTION INTRAVENOUS at 09:32

## 2024-06-02 RX ADMIN — CHLORHEXIDINE GLUCONATE, 0.12% ORAL RINSE 15 ML: 1.2 SOLUTION DENTAL at 20:04

## 2024-06-02 RX ADMIN — Medication 10 ML: at 20:10

## 2024-06-02 RX ADMIN — TRAZODONE HYDROCHLORIDE 50 MG: 50 TABLET ORAL at 20:05

## 2024-06-02 RX ADMIN — SODIUM CHLORIDE 100 ML/HR: 9 INJECTION, SOLUTION INTRAVENOUS at 04:21

## 2024-06-02 RX ADMIN — SERTRALINE 100 MG: 100 TABLET, FILM COATED ORAL at 08:25

## 2024-06-02 RX ADMIN — CHLORHEXIDINE GLUCONATE, 0.12% ORAL RINSE 15 ML: 1.2 SOLUTION DENTAL at 08:25

## 2024-06-02 RX ADMIN — SODIUM CHLORIDE, PRESERVATIVE FREE 20 MG: 5 INJECTION INTRAVENOUS at 20:04

## 2024-06-02 RX ADMIN — ATORVASTATIN CALCIUM 40 MG: 40 TABLET, FILM COATED ORAL at 20:05

## 2024-06-02 RX ADMIN — HYDRALAZINE HYDROCHLORIDE 10 MG: 20 INJECTION INTRAMUSCULAR; INTRAVENOUS at 23:00

## 2024-06-02 RX ADMIN — SODIUM CHLORIDE, PRESERVATIVE FREE 20 MG: 5 INJECTION INTRAVENOUS at 08:25

## 2024-06-02 RX ADMIN — MIDAZOLAM HYDROCHLORIDE: 1 INJECTION, SOLUTION INTRAMUSCULAR; INTRAVENOUS at 09:47

## 2024-06-02 RX ADMIN — AMPICILLIN SODIUM AND SULBACTAM SODIUM 3000 MG: 2; 1 INJECTION, POWDER, FOR SOLUTION INTRAMUSCULAR; INTRAVENOUS at 05:19

## 2024-06-02 RX ADMIN — AMPICILLIN SODIUM AND SULBACTAM SODIUM 3000 MG: 2; 1 INJECTION, POWDER, FOR SOLUTION INTRAMUSCULAR; INTRAVENOUS at 23:31

## 2024-06-02 ASSESSMENT — PULMONARY FUNCTION TESTS
PIF_VALUE: 21
PIF_VALUE: 22
PIF_VALUE: 9
PIF_VALUE: 21
PIF_VALUE: 26
PIF_VALUE: 27
PIF_VALUE: 20
PIF_VALUE: 22
PIF_VALUE: 28
PIF_VALUE: 25
PIF_VALUE: 26
PIF_VALUE: 21
PIF_VALUE: 22
PIF_VALUE: 22
PIF_VALUE: 19
PIF_VALUE: 20
PIF_VALUE: 22
PIF_VALUE: 22

## 2024-06-02 ASSESSMENT — PAIN SCALES - GENERAL
PAINLEVEL_OUTOF10: 0

## 2024-06-02 NOTE — PROGRESS NOTES
Hospitalist Progress Note      PCP: Madi Kelly DO    Date of Admission: 5/31/2024    Chief Complaint:    Chief Complaint   Patient presents with    Pharyngitis     x4days    Nausea    Diarrhea     Subjective:  No acute event overnight. Pt remains intubated and sedated.    Medications:  Reviewed    Infusion Medications    dexmedeTOMIDine (PRECEDEX) 1,000 mcg in sodium chloride 0.9 % 250 mL infusion 0.2 mcg/kg/hr (06/02/24 0932)    propofol 20 mcg/kg/min (06/02/24 0755)    sodium chloride      fentaNYL 125 mcg/hr (06/02/24 0755)    fentaNYL      dextrose       Scheduled Medications    atorvastatin  40 mg Oral Nightly    lisinopril  20 mg Oral Daily    mirtazapine  7.5 mg Oral Nightly    sertraline  100 mg Oral Daily    traZODone  50 mg Oral Nightly    verapamil  100 mg Oral Daily    sodium chloride flush  5-40 mL IntraVENous 2 times per day    enoxaparin  30 mg SubCUTAneous Daily    ampicillin-sulbactam  3,000 mg IntraVENous Q6H    chlorhexidine  15 mL Mouth/Throat BID    famotidine (PEPCID) injection  20 mg IntraVENous BID    insulin lispro  0-8 Units SubCUTAneous Q4H     PRN Meds: hydrALAZINE, sodium chloride flush, sodium chloride, ondansetron **OR** ondansetron, fentaNYL **AND** fentaNYL, glucose, dextrose bolus **OR** dextrose bolus, glucagon (rDNA), dextrose      Intake/Output Summary (Last 24 hours) at 6/2/2024 1615  Last data filed at 6/2/2024 1215  Gross per 24 hour   Intake 4064.08 ml   Output 1700 ml   Net 2364.08 ml     Exam:    BP (!) 133/37   Pulse 58   Temp (!) 96 °F (35.6 °C)   Resp 18   Ht 1.575 m (5' 2.01\")   Wt 60.9 kg (134 lb 4.2 oz)   LMP  (LMP Unknown)   SpO2 98%   BMI 24.55 kg/m²   Physical Exam  Constitutional:       Comments: Intubated and sedated.   Cardiovascular:      Rate and Rhythm: Normal rate and regular rhythm.   Pulmonary:      Comments: ETT in place. Rhonchi throughout.  Abdominal:      General: There is no distension.      Palpations: Abdomen is soft.   Musculoskeletal:  collections.           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 On Unasyn. Completed course of decadron     Requiring Mechanical Ventilation  - pt left intubated after procedure in the setting of throat swelling and concern for airway compromise if extubated  - continue ICU level of care for vent management and close hemodynamic monitoring  - critical care following, appreciate their assistance   - continue sedation as needed to tolerate ETT, goal RASS 0 to -1, wean as able      Chronic Conditions:  HTN:continue home lisinopril, holding home verapamil due to not being able to be crushed, IV hydralazine prn for SBP >160  HLD: continue home statin, hold aspirin in perioperative period  T2DM: FSBG per protocol with SSI  GERD: continue home PPI and famotidine  Cerebrovascular disease: hold home DAPT in perioperative period  Depression: continue home sertraline, trazodone, and mirtazapine    Additional work up or/and treatment plan may be added today or then after based on clinical progression. I am managing a portion of pt care. Some medical issues are handled by other specialists. Additional work up and treatment should be done in out pt setting by pt PCP and other out pt providers.     In addition to examining and evaluating pt, I spent additional time explaining care, normal and abnormal findings, and treatment plan. All of pt questions were answered. Counseling, diet and education were  provided. Case will be discussed with nursing staff when appropriate. Family will be updated if and when appropriate.      Diet: Diet NPO  ADULT TUBE FEEDING; Orogastric; Peptide Based; Continuous; 20; No; 50; Q 4 hours    Code Status: Full Code    Electronically signed by Aurelia Hung MD on 6/2/2024 at 4:15 PM

## 2024-06-02 NOTE — PROGRESS NOTES
Spiritual Care Services     Summary of Visit:    Pt still on vent, pt to be weaned off sedation for SVT trial, pt has no HCPOA,     Review HCPOA with patient after extubation,       Encounter Summary  Encounter Overview/Reason: Spiritual/Emotional Needs, Follow-up  Service Provided For: Patient  Referral/Consult From: [facility]  Support System: Children, Family members  Complexity of Encounter: Low  Begin Time: 0940  End Time : 0945  Total Time Calculated: 5 min                               Spiritual Assessment/Intervention/Outcomes:    Assessment: Unable to assess    Intervention: Sustaining Presence/Ministry of presence    Outcome: Did not respond      Care Plan:    Plan and Referrals  Plan/Referrals: Continue to visit, (comment)          Spiritual Care Services   Electronically signed by Chaplain Yue on 6/2/2024 at 11:40 AM.    To reach a  for emotional and spiritual support, place an EPIC consult request.   If a  is needed immediately, dial “0” and ask to page the on-call .

## 2024-06-02 NOTE — PROGRESS NOTES
Pulmonary & Critical Care Medicine ICU Progress Note  Chief complaint : Respiratory insufficiency    Subjunctive/24 hour events :   Patient seen and examined during multidisciplinary rounds with RN, charge nurse, RT, pharmacy, dietitian, and social service.   On vent, sedated, no issues overnight, she is on 40% FiO2 saturation 90%, no fever, no nausea no vomiting, urine output 1 L, +3 L.        New information updated in the note today, rest of the examination did not change compared to yesterday.  Social History     Tobacco Use    Smoking status: Never    Smokeless tobacco: Never   Substance Use Topics    Alcohol use: No         Problem Relation Age of Onset    Other Mother         Neurological Disease    Cancer Sister     Diabetes Brother     Cancer Brother         leukemia       Recent Labs     05/31/24  2113 06/01/24  0501   PHART 7.339* 7.464*   [id number] 53* 34*   PO2ART 149* 78*       MV Settings:  Vent Mode: AC/VC Resp Rate (Set): 18 bpm/Vt (Set, mL): 330 mL/ /FiO2 : 40 %           IV:   propofol 20 mcg/kg/min (06/02/24 0755)    sodium chloride      sodium chloride 100 mL/hr at 06/02/24 0755    fentaNYL 125 mcg/hr (06/02/24 0755)    fentaNYL      dextrose         Vitals:  BP (!) 157/48   Pulse (!) 40   Temp (!) 96 °F (35.6 °C)   Resp 18   Ht 1.575 m (5' 2.01\")   Wt 60.9 kg (134 lb 4.2 oz)   LMP  (LMP Unknown)   SpO2 100%   BMI 24.55 kg/m²    Tmax:        Intake/Output Summary (Last 24 hours) at 6/2/2024 0829  Last data filed at 6/2/2024 0755  Gross per 24 hour   Intake 4064.08 ml   Output 1050 ml   Net 3014.08 ml       EXAM:  General: Sedated on vent  Head: normocephalic, atraumatic  Eyes:No gross abnormalities.  ENT:  MMM no lesions  Neck:  supple and no masses  Chest : Good air movement, no wheezing, no rales, nontender, tympanic  Heart:: Heart sounds are normal.  Regular rate and rhythm without murmur, gallop or rub.  ABD:  symmetric, soft, non-tender, no guarding or rebound  Musculoskeletal : no  \"MUCUS\", \"TRICHOMONAS\", \"YEAST\", \"BACTERIA\", \"CLARITYU\", \"SPECGRAV\", \"LEUKOCYTESUR\", \"UROBILINOGEN\", \"BILIRUBINUR\", \"BLOODU\", \"GLUCOSEU\", \"AMORPHOUS\" in the last 72 hours.    Invalid input(s): \"KETONESU\"    Cultures:  Negative so far  Films:  CXR films reviewed by me and it showed no acute infiltrate      Assessment:  This is a critically ill patient at risk of deterioration / death , needing close ICU monitoring and intervention due to below noted problems     Oropharyngeal abscess, status post I&D  Acute respiratory insufficiency, not related to surgery, mainly due to upper airway compromise due to abscess  Diabetes  Hypertension     Recommendation  Vent support lung protective strategy  head of the bed 30°  Sedation holiday  SBT  Watch for ICU delirium: TV on, natural light, avoid benzos, pain control, early mobility, and family engagement  PUD prophylaxis  DVT prophylaxis   tube feed  DC IV fluid  Target blood sugar 140-180  Monitor and replace electrolyte as needed  Continue dexamethasone  Watch volume status and maintain euvolemic  Patient has no airleak  Will extubate over Cook airway exchange catheter    Due to the immediate potential for life-threatening deterioration due to acute respiratory insufficiency I spent 35  minutes providing critical care.  This time is excluding time spent performing procedures.          Electronically signed by Thompson Presley MD,  [facility] ,on 6/2/2024 at 8:29 AM

## 2024-06-03 LAB
ALBUMIN SERPL-MCNC: 2.8 G/DL (ref 3.5–4.6)
ANION GAP SERPL CALCULATED.3IONS-SCNC: 10 MEQ/L (ref 9–15)
BASOPHILS # BLD: 0 K/UL (ref 0–0.2)
BASOPHILS NFR BLD: 0 %
BUN SERPL-MCNC: 26 MG/DL (ref 8–23)
CALCIUM SERPL-MCNC: 7.9 MG/DL (ref 8.5–9.9)
CHLORIDE SERPL-SCNC: 113 MEQ/L (ref 95–107)
CO2 SERPL-SCNC: 22 MEQ/L (ref 20–31)
CREAT SERPL-MCNC: 0.58 MG/DL (ref 0.5–0.9)
EKG ATRIAL RATE: 63 BPM
EKG P AXIS: 49 DEGREES
EKG P-R INTERVAL: 142 MS
EKG Q-T INTERVAL: 456 MS
EKG QRS DURATION: 96 MS
EKG QTC CALCULATION (BAZETT): 466 MS
EKG R AXIS: 0 DEGREES
EKG T AXIS: 13 DEGREES
EKG VENTRICULAR RATE: 63 BPM
EOSINOPHIL # BLD: 0 K/UL (ref 0–0.7)
EOSINOPHIL NFR BLD: 0 %
ERYTHROCYTE [DISTWIDTH] IN BLOOD BY AUTOMATED COUNT: 14.2 % (ref 11.5–14.5)
GLUCOSE BLD-MCNC: 127 MG/DL (ref 70–99)
GLUCOSE BLD-MCNC: 131 MG/DL (ref 70–99)
GLUCOSE BLD-MCNC: 138 MG/DL (ref 70–99)
GLUCOSE BLD-MCNC: 149 MG/DL (ref 70–99)
GLUCOSE BLD-MCNC: 97 MG/DL (ref 70–99)
GLUCOSE SERPL-MCNC: 119 MG/DL (ref 70–99)
HCT VFR BLD AUTO: 36.6 % (ref 37–47)
HGB BLD-MCNC: 11.5 G/DL (ref 12–16)
LYMPHOCYTES # BLD: 0.9 K/UL (ref 1–4.8)
LYMPHOCYTES NFR BLD: 12.8 %
MCH RBC QN AUTO: 29.6 PG (ref 27–31.3)
MCHC RBC AUTO-ENTMCNC: 31.4 % (ref 33–37)
MCV RBC AUTO: 94.1 FL (ref 79.4–94.8)
MONOCYTES # BLD: 1 K/UL (ref 0.2–0.8)
MONOCYTES NFR BLD: 14.3 %
NEUTROPHILS # BLD: 5 K/UL (ref 1.4–6.5)
NEUTS SEG NFR BLD: 72.6 %
PERFORMED ON: ABNORMAL
PERFORMED ON: NORMAL
PHOSPHATE SERPL-MCNC: 1.8 MG/DL (ref 2.3–4.8)
PLATELET # BLD AUTO: 100 K/UL (ref 130–400)
POTASSIUM SERPL-SCNC: 3.7 MEQ/L (ref 3.4–4.9)
RBC # BLD AUTO: 3.89 M/UL (ref 4.2–5.4)
SODIUM SERPL-SCNC: 145 MEQ/L (ref 135–144)
WBC # BLD AUTO: 6.9 K/UL (ref 4.8–10.8)

## 2024-06-03 PROCEDURE — 6360000002 HC RX W HCPCS: Performed by: STUDENT IN AN ORGANIZED HEALTH CARE EDUCATION/TRAINING PROGRAM

## 2024-06-03 PROCEDURE — 99291 CRITICAL CARE FIRST HOUR: CPT | Performed by: INTERNAL MEDICINE

## 2024-06-03 PROCEDURE — 2580000003 HC RX 258: Performed by: INTERNAL MEDICINE

## 2024-06-03 PROCEDURE — 2000000000 HC ICU R&B

## 2024-06-03 PROCEDURE — 6370000000 HC RX 637 (ALT 250 FOR IP): Performed by: STUDENT IN AN ORGANIZED HEALTH CARE EDUCATION/TRAINING PROGRAM

## 2024-06-03 PROCEDURE — 2500000003 HC RX 250 WO HCPCS: Performed by: NURSE PRACTITIONER

## 2024-06-03 PROCEDURE — 2580000003 HC RX 258: Performed by: NURSE PRACTITIONER

## 2024-06-03 PROCEDURE — 36415 COLL VENOUS BLD VENIPUNCTURE: CPT

## 2024-06-03 PROCEDURE — 2580000003 HC RX 258: Performed by: STUDENT IN AN ORGANIZED HEALTH CARE EDUCATION/TRAINING PROGRAM

## 2024-06-03 PROCEDURE — 2500000003 HC RX 250 WO HCPCS: Performed by: STUDENT IN AN ORGANIZED HEALTH CARE EDUCATION/TRAINING PROGRAM

## 2024-06-03 PROCEDURE — 85025 COMPLETE CBC W/AUTO DIFF WBC: CPT

## 2024-06-03 PROCEDURE — A4216 STERILE WATER/SALINE, 10 ML: HCPCS | Performed by: STUDENT IN AN ORGANIZED HEALTH CARE EDUCATION/TRAINING PROGRAM

## 2024-06-03 PROCEDURE — 2500000003 HC RX 250 WO HCPCS: Performed by: INTERNAL MEDICINE

## 2024-06-03 PROCEDURE — 6360000002 HC RX W HCPCS: Performed by: INTERNAL MEDICINE

## 2024-06-03 PROCEDURE — 99024 POSTOP FOLLOW-UP VISIT: CPT | Performed by: STUDENT IN AN ORGANIZED HEALTH CARE EDUCATION/TRAINING PROGRAM

## 2024-06-03 PROCEDURE — 6370000000 HC RX 637 (ALT 250 FOR IP): Performed by: NURSE PRACTITIONER

## 2024-06-03 PROCEDURE — 94003 VENT MGMT INPAT SUBQ DAY: CPT

## 2024-06-03 PROCEDURE — 51798 US URINE CAPACITY MEASURE: CPT

## 2024-06-03 PROCEDURE — 2700000000 HC OXYGEN THERAPY PER DAY

## 2024-06-03 PROCEDURE — 80069 RENAL FUNCTION PANEL: CPT

## 2024-06-03 PROCEDURE — 6370000000 HC RX 637 (ALT 250 FOR IP): Performed by: INTERNAL MEDICINE

## 2024-06-03 PROCEDURE — 51701 INSERT BLADDER CATHETER: CPT

## 2024-06-03 RX ORDER — FENTANYL CITRATE 50 UG/ML
100 INJECTION, SOLUTION INTRAMUSCULAR; INTRAVENOUS ONCE
Status: COMPLETED | OUTPATIENT
Start: 2024-06-03 | End: 2024-06-04

## 2024-06-03 RX ORDER — FUROSEMIDE 10 MG/ML
20 INJECTION INTRAMUSCULAR; INTRAVENOUS ONCE
Status: COMPLETED | OUTPATIENT
Start: 2024-06-03 | End: 2024-06-03

## 2024-06-03 RX ORDER — VERAPAMIL HYDROCHLORIDE 40 MG/1
40 TABLET ORAL EVERY 8 HOURS SCHEDULED
Status: DISCONTINUED | OUTPATIENT
Start: 2024-06-03 | End: 2024-06-13 | Stop reason: HOSPADM

## 2024-06-03 RX ORDER — FENTANYL CITRATE 50 UG/ML
100 INJECTION, SOLUTION INTRAMUSCULAR; INTRAVENOUS ONCE
Status: COMPLETED | OUTPATIENT
Start: 2024-06-03 | End: 2024-06-03

## 2024-06-03 RX ORDER — POLYETHYLENE GLYCOL 3350 17 G/17G
17 POWDER, FOR SOLUTION ORAL DAILY
Status: DISCONTINUED | OUTPATIENT
Start: 2024-06-03 | End: 2024-06-13 | Stop reason: HOSPADM

## 2024-06-03 RX ADMIN — TRAZODONE HYDROCHLORIDE 50 MG: 50 TABLET ORAL at 19:59

## 2024-06-03 RX ADMIN — MIRTAZAPINE 7.5 MG: 15 TABLET, FILM COATED ORAL at 19:59

## 2024-06-03 RX ADMIN — DOCUSATE SODIUM 100 MG: 50 LIQUID ORAL at 19:59

## 2024-06-03 RX ADMIN — CHLORHEXIDINE GLUCONATE, 0.12% ORAL RINSE 15 ML: 1.2 SOLUTION DENTAL at 07:23

## 2024-06-03 RX ADMIN — POLYETHYLENE GLYCOL 3350 17 G: 17 POWDER, FOR SOLUTION ORAL at 13:24

## 2024-06-03 RX ADMIN — AMPICILLIN SODIUM AND SULBACTAM SODIUM 3000 MG: 2; 1 INJECTION, POWDER, FOR SOLUTION INTRAMUSCULAR; INTRAVENOUS at 18:30

## 2024-06-03 RX ADMIN — VERAPAMIL HYDROCHLORIDE 40 MG: 40 TABLET ORAL at 23:05

## 2024-06-03 RX ADMIN — SODIUM CHLORIDE, PRESERVATIVE FREE 20 MG: 5 INJECTION INTRAVENOUS at 19:59

## 2024-06-03 RX ADMIN — FUROSEMIDE 20 MG: 10 INJECTION, SOLUTION INTRAMUSCULAR; INTRAVENOUS at 10:49

## 2024-06-03 RX ADMIN — DOCUSATE SODIUM 100 MG: 50 LIQUID ORAL at 10:49

## 2024-06-03 RX ADMIN — SODIUM CHLORIDE, PRESERVATIVE FREE 20 MG: 5 INJECTION INTRAVENOUS at 08:18

## 2024-06-03 RX ADMIN — ENOXAPARIN SODIUM 30 MG: 100 INJECTION SUBCUTANEOUS at 10:49

## 2024-06-03 RX ADMIN — CHLORHEXIDINE GLUCONATE, 0.12% ORAL RINSE 15 ML: 1.2 SOLUTION DENTAL at 19:59

## 2024-06-03 RX ADMIN — HYDRALAZINE HYDROCHLORIDE 10 MG: 20 INJECTION INTRAMUSCULAR; INTRAVENOUS at 13:14

## 2024-06-03 RX ADMIN — SERTRALINE 100 MG: 100 TABLET, FILM COATED ORAL at 08:17

## 2024-06-03 RX ADMIN — DEXMEDETOMIDINE 0.8 MCG/KG/HR: 100 INJECTION, SOLUTION INTRAVENOUS at 10:59

## 2024-06-03 RX ADMIN — ATORVASTATIN CALCIUM 40 MG: 40 TABLET, FILM COATED ORAL at 19:59

## 2024-06-03 RX ADMIN — Medication 10 ML: at 20:04

## 2024-06-03 RX ADMIN — FENTANYL CITRATE 100 MCG: 50 INJECTION INTRAMUSCULAR; INTRAVENOUS at 00:41

## 2024-06-03 RX ADMIN — POTASSIUM PHOSPHATE, MONOBASIC POTASSIUM PHOSPHATE, DIBASIC 15 MMOL: 224; 236 INJECTION, SOLUTION, CONCENTRATE INTRAVENOUS at 15:29

## 2024-06-03 RX ADMIN — VERAPAMIL HYDROCHLORIDE 40 MG: 40 TABLET ORAL at 13:24

## 2024-06-03 RX ADMIN — AMPICILLIN SODIUM AND SULBACTAM SODIUM 3000 MG: 2; 1 INJECTION, POWDER, FOR SOLUTION INTRAMUSCULAR; INTRAVENOUS at 23:59

## 2024-06-03 RX ADMIN — AMPICILLIN SODIUM AND SULBACTAM SODIUM 3000 MG: 2; 1 INJECTION, POWDER, FOR SOLUTION INTRAMUSCULAR; INTRAVENOUS at 05:38

## 2024-06-03 RX ADMIN — AMPICILLIN SODIUM AND SULBACTAM SODIUM 3000 MG: 2; 1 INJECTION, POWDER, FOR SOLUTION INTRAMUSCULAR; INTRAVENOUS at 13:22

## 2024-06-03 RX ADMIN — LISINOPRIL 20 MG: 20 TABLET ORAL at 08:17

## 2024-06-03 ASSESSMENT — PULMONARY FUNCTION TESTS
PIF_VALUE: 30
PIF_VALUE: 21
PIF_VALUE: 26
PIF_VALUE: 24
PIF_VALUE: 24
PIF_VALUE: 23
PIF_VALUE: 21
PIF_VALUE: 24
PIF_VALUE: 15
PIF_VALUE: 9
PIF_VALUE: 21
PIF_VALUE: 25
PIF_VALUE: 31
PIF_VALUE: 9
PIF_VALUE: 21
PIF_VALUE: 20
PIF_VALUE: 22

## 2024-06-03 ASSESSMENT — PAIN SCALES - GENERAL
PAINLEVEL_OUTOF10: 0
PAINLEVEL_OUTOF10: 7

## 2024-06-03 NOTE — PROGRESS NOTES
0700am Report from Eunice LUTHER. Vented. Precedex @ 0.6. Able to follow commands. Swelling of neck and tongue. No resp distress noted @ this time.  VSS MP-SR. 4 eyes skin check done with Eunice LUTHER. No break down noted.  Meplex on heels and coccyx  07:40am Dr Presley in and placed patient on CPAP. Farzaneh RT in room and aware  08:10am Placed back on assist control due to respers 44 minute per Santosh RT  09:50am Critical care rounds with Dr Presley and team  11:00am Daughter called and updated on status  12 noon Remains able to follow commands and nod head yes and no. 800cc kala urine from 20mg lasix. No distress  1500pm Mary Beth NP notified of Phos of 1.8  15:30pm Dr Suh in to see patient and updated on status. Tender on L side of neck. No swelling noted of neck at this time. Remains alert and able to follow commands

## 2024-06-03 NOTE — ACP (ADVANCE CARE PLANNING)
Advance Care Planning   Healthcare Decision Maker:    Primary Decision Maker: Bhargavi Parada - Child  [phone]    Secondary Decision Maker: Ama Parada - Child - [phone]    Click here to complete Healthcare Decision Makers including selection of the Healthcare Decision Maker Relationship (ie \"Primary\").

## 2024-06-03 NOTE — PROGRESS NOTES
Pulmonary & Critical Care Medicine ICU Progress Note  Chief complaint : Respiratory insufficiency    Subjunctive/24 hour events :   Patient seen and examined during multidisciplinary rounds with RN, charge nurse, RT, pharmacy, dietitian, and social service.   On vent, calm, follows simple commands, failed breathing trial this morning.  No fever, she is on 40% FiO2, saturation 95%, she is on Precedex.  Urine output 925 cc, +4 L. No Bowel movement      New information updated in the note today, rest of the examination did not change compared to yesterday.  Social History     Tobacco Use    Smoking status: Never    Smokeless tobacco: Never   Substance Use Topics    Alcohol use: No         Problem Relation Age of Onset    Other Mother         Neurological Disease    Cancer Sister     Diabetes Brother     Cancer Brother         leukemia       Recent Labs     05/31/24  2113 06/01/24  0501   PHART 7.339* 7.464*   [id number] 53* 34*   PO2ART 149* 78*         MV Settings:  Vent Mode: (S) CPAP Resp Rate (Set): 18 bpm/Vt (Set, mL): 330 mL/ /FiO2 : 40 %           IV:   dexmedeTOMIDine (PRECEDEX) 1,000 mcg in sodium chloride 0.9 % 250 mL infusion 0.6 mcg/kg/hr (06/03/24 0827)    propofol Stopped (06/02/24 0802)    sodium chloride      fentaNYL Stopped (06/02/24 0802)    dextrose         Vitals:  BP (!) 134/52   Pulse 62   Temp 99.8 °F (37.7 °C) (Oral)   Resp 22   Ht 1.575 m (5' 2.01\")   Wt 57.5 kg (126 lb 12.2 oz)   LMP  (LMP Unknown)   SpO2 95%   BMI 23.18 kg/m²    Tmax:        Intake/Output Summary (Last 24 hours) at 6/3/2024 0837  Last data filed at 6/3/2024 0827  Gross per 24 hour   Intake 1313.39 ml   Output 925 ml   Net 388.39 ml         EXAM:  General: Sedated on vent  Head: normocephalic, atraumatic  Eyes:No gross abnormalities.  ENT:  MMM no lesions  Neck:  supple and no masses  Chest : Good air movement, no wheezing, no rales, nontender, tympanic  Heart:: Heart sounds are normal.  Regular rate and rhythm without

## 2024-06-03 NOTE — PROGRESS NOTES
Internal Medicine   Hospitalist   Progress Note    6/3/2024   10:13 AM    Name:  Tanya Liz  MRN:    [id number]     IP Day: 3     Admit Date: 2024 11:11 AM  PCP: Madi Kelly DO    Code Status:  Full Code    Assessment and Plan:        Active Problems/ diagnosis:     Oropharyngeal abscess  Diabetes  Acute respiratory insufficiency due to abscess  Hypertension    Plan  Patient remained intubated and sedated.  She is on Precedex  Resume steroids and antibiotics  Critical care and ENT are following  Resume current medications otherwise    DVT PPx     7 pm- 7 am, please contact on call Hospitalist for any needs     Subjective:      no new events.  Remained intubated and sedated    Physical Examination:      Vitals:  BP (!) 134/52   Pulse 62   Temp 99.8 °F (37.7 °C) (Oral)   Resp 22   Ht 1.575 m (5' 2.01\")   Wt 57.5 kg (126 lb 12.2 oz)   LMP  (LMP Unknown)   SpO2 95%   BMI 23.18 kg/m²   Temp (24hrs), Av.8 °F (37.1 °C), Min:98.4 °F (36.9 °C), Max:99.8 °F (37.7 °C)      General appearance: Intubated and sedated  Mental Status: Deferred  Lungs: Mechanical sound present bilaterally  Heart: regular rate and rhythm, no murmur  Abdomen: soft, nondistended, bowel sounds present, no masses  Extremities: no edema, redness  Skin: no gross lesions, rashes    Data:     Labs:  Recent Labs     24  0506 24  0457   WBC 9.1 6.9   HGB 10.7* 11.5*   PLT 98* 100*     Recent Labs     24  0506 24  0457    145*   K 4.5 3.7   * 113*   CO2 21 22   BUN 23 26*   CREATININE 0.52 0.58   GLUCOSE 139* 119*     Recent Labs     24  1209 24  0845   AST 17 14   ALT 19 13   BILITOT 0.9* 0.3   ALKPHOS 72 54       Current Facility-Administered Medications   Medication Dose Route Frequency Provider Last Rate Last Admin    furosemide (LASIX) injection 20 mg  20 mg IntraVENous Once Thompson Presley MD        docusate (COLACE) 50 MG/5ML liquid 100 mg  100 mg Oral BID Thompson Presley MD         15 mL Mouth/Throat BID Diana Suh MD   15 mL at 06/03/24 0723    famotidine (PEPCID) 20 mg in sodium chloride (PF) 0.9 % 10 mL injection  20 mg IntraVENous BID Diana Suh MD   20 mg at 06/03/24 0818    fentaNYL (SUBLIMAZE) 1,000 mcg in sodium chloride 0.9% 100 mL infusion   mcg/hr IntraVENous Continuous Diana Suh MD   Stopped at 06/02/24 0802    And    fentaNYL (SUBLIMAZE) bolus from bag  50 mcg IntraVENous Q30 Min PRN Diana Suh MD        glucose chewable tablet 16 g  4 tablet Oral PRN Aurelia Hung MD        dextrose bolus 10% 125 mL  125 mL IntraVENous PRN Aurelia Hung MD        Or    dextrose bolus 10% 250 mL  250 mL IntraVENous PRN Aurelia Hung MD        glucagon injection 1 mg  1 mg SubCUTAneous PRN Aurelia Hung MD        dextrose 10 % infusion   IntraVENous Continuous PRN Aurelia Hung MD        insulin lispro (HUMALOG,ADMELOG) injection vial 0-8 Units  0-8 Units SubCUTAneous Q4H Aurelia Hung MD           New information updated in the note today, rest of the examination did not change compared to yesterday.    Additional work up or/and treatment plan may be added today or then after based on clinical progression. I am managing a portion of pt care. Some medical issues are handled by other specialists. Additional work up and treatment should be done in out pt setting by pt PCP and other out pt providers.      In addition to examining and evaluating pt, I spent additional time explaining care, normaland abnormal findings, and treatment plan. All of pt questions were answered. Counseling, diet and education were provided. Case will be discussed with nursing staff when appropriate. Family will be updated if and when appropriate.       Electronically signed by Colton Daly DO on 6/3/2024 at 10:13 AM

## 2024-06-03 NOTE — PROGRESS NOTES
ENT Progress Note    Subjective   No acute events overnight   Remains sedated and intubated in ICU   Per RN - SBT earlier this AM- air leak but \"took a long time and still sounded swollen\" so decision not to extubate    Objective:   Vitals:    06/03/24 0741   BP:    Pulse: 68   Resp: 22   Temp:    SpO2: 94%               Component  Ref Range & Units 6/3/24 0457 6/2/24 0506 6/1/24 0845 6/1/24 0501 5/31/24 2113 5/31/24 1200 5/30/24 1306   WBC  4.8 - 10.8 K/uL 6.9 9.1 10.2   12.0 High  8.6   RBC  4.20 - 5.40 M/uL 3.89 Low  3.63 Low  3.82 Low    4.25 4.35   Hemoglobin  12.0 - 16.0 g/dL 11.5 Low  10.7 Low  11.5 Low  11.2 Low  R 12.4 R 12.8 12.8   Hematocrit  37.0 - 47.0 % 36.6 Low  33.4 Low  34.7 Low    38.8 39.6   MCV  79.4 - 94.8 fL 94.1 92.0 90.8   91.3 91.0   MCH  27.0 - 31.3 pg 29.6 29.5 30.1   30.1 29.4   MCHC  33.0 - 37.0 % 31.4 Low  32.0 Low  33.1   33.0 32.3 Low    RDW  11.5 - 14.5 % 14.2 14.0 13.4   13.3 13.4   Platelets  130 - 400 K/uL 100 Low  98 Low  95 Low    109 Low  120 Low    Neutrophils %  % 72.6 75.3    81.0 79.2   Lymphocytes %  % 12.8 13.5    8.7 11.2   Monocytes %  % 14.3 10.5    9.7 8.6   Eosinophils %  % 0.0 0.0    0.0 0.5   Basophils %  % 0.0 0.0    0.1 0.1   Neutrophils Absolute  1.4 - 6.5 K/uL 5.0 6.8 High     9.7 High  6.8 High    Lymphocytes Absolute  1.0 - 4.8 K/uL 0.9 Low  1.2    1.1 1.0   Monocytes Absolute  0.2 - 0.8 K/uL 1.0 High  1.0 High     1.2 High  0.7   Eosinophils Absolute  0.0 - 0.7 K/uL 0.0 0.0    0.0 0.0        Lab Results   Component Value Date     (H) 06/03/2024    K 3.7 06/03/2024     (H) 06/03/2024    CO2 22 06/03/2024    BUN 26 (H) 06/03/2024    CREATININE 0.58 06/03/2024    GLUCOSE 119 (H) 06/03/2024    CALCIUM 7.9 (L) 06/03/2024    PROT 6.5 09/14/2015    BILITOT 0.3 06/01/2024    ALKPHOS 54 06/01/2024    AST 14 06/01/2024    ALT 13 06/01/2024    LABGLOM 88.4 06/03/2024    GFRAA >60.0 08/07/2022    GLOB 2.7 06/01/2024       Exam   General- intubated/sedated    Oral cavity- minimal bloody/purulent secretions suctioned from mouth, base of tongue palpated with no areas of firmness or fluctuance. ETT and OG appropriately secured   Neck- soft, mild erythema of anterior neck but no induration, or fluctuance   Resp- intubated on vent   Neuro- sedated     A/P   84y female taken emergently to OR 5/31 for airway compromise due to base of tongue abscess. Biopsies taken in OR of left oropharynx. Purulent drainage from BOT sent for culture.  Significant laryngeal swelling     Continue IV abx - Unasyn   Follow up cultures- NGTD --> oral mi  Continue IV steroids  Daily SBT and extubate when appropriate  Continue to trend WBC- downtrending  Appreciate ICU and hospitalist co management     Diana Suh MD

## 2024-06-03 NOTE — CARE COORDINATION
IV BENEFIT REQUEST FORM    FAX FROM: [facility]                        [address] [place] [id number]    REQUESTED BY: Electronically signed by Alba Silva on 6/3/2024 at 11:26 AM                                               RN/C3: PHONE: [phone])     DATE:/TIME OF REQUEST: 24  TIME: 11:26 AM      TO: [facility]      FAX TO: [phone]    PHONE: [phone]     THIS PATIENT HAS BEEN IDENTIFIED TO POSSIBLY NEED LONG TERM IV'S.    PLEASE CHECK INSURANCE COVERAGE FOR THE FOLLOWING PT/DRUGS.    PATIENT'S NAME: BRIDGETTE MO                              [facility]   PATIENT'S : 1940  PATIENT ADDRESS: [address] [place] [address].  [address] [place], [place] [id number]  SSN:    ([id number])     PAYOR NAME:  Payor: MEDICARE / Plan: MEDICARE PART A AND B / Product Type: *No Product type* /      UNASYN 3,000MG IV Q6HR     __________ CHECK HERE IF PT HAS NO INSURANCE AND REQUESTING SELF PAY COST.    *IF [facility] IS NOT A PROVIDER FOR THIS PATIENT, PLEASE FORWARD INFO VIA FAX TO [facility] @ [phone],(PHONE NUMBER: [phone]) TO RUN BENEFIT VERIFICATION AND NOTIFY THE ABOVE C3 OF THIS PLAN.    (FAX FACE SHEET WITH DEMOGRAPHICS AND INSURANCE INFO WITH THIS FORM.)  PLEASE FAX BENEFIT INFO TO: THE [facility] -[phone]    This message is intended only for the use of the individual or entity to which it is addressed and may contain information that is privileged, confidential, and exempt from disclosure under applicable law. If the reader of the notice is not intended recipient of the employee/agent responsible for delivering the message to the intended recipient, you are hereby notified than any dissemination, distribution or copying of this communication is strictly prohibited. Please contact the sender for further instructions on handling the information.

## 2024-06-03 NOTE — PROGRESS NOTES
Handoff report received from Belkis LUTHER. Pt intubated and sedated. Pt neck and tongue appear swollen. Pt on Precedex, titrated per orders. Pt IV removed per protocol, as it was leaking. PT OG replaced, placement verified. Pt follows some commands. Pt became tearful after midnight assessment with complaints of pain. Dr. Mckeon notified, New orders acquired. Handoff report given to Lanny LUTHER. 4 eyes skin check completed.

## 2024-06-03 NOTE — PROGRESS NOTES
Spiritual Care Services     Summary of Visit:    Participated in ICU multidisciplinary team rounding, pt on vent, pt has no HCPOA,       Encounter Summary  Encounter Overview/Reason: Interdisciplinary rounds  Service Provided For: Patient  Referral/Consult From: Multi-disciplinary team, Rounding  Support System: Children, Family members  Complexity of Encounter: Low  Begin Time: 0940  End Time : 0945  Total Time Calculated: 5 min                               Spiritual Assessment/Intervention/Outcomes:    Assessment: Unable to assess    Intervention: Sustaining Presence/Ministry of presence    Outcome: Did not respond      Care Plan:    Plan and Referrals  Plan/Referrals: Continue to visit, (comment)          Spiritual Care Services   Electronically signed by Chaplain Yue on 6/3/2024 at 11:07 AM.    To reach a  for emotional and spiritual support, place an EPIC consult request.   If a  is needed immediately, dial “0” and ask to page the on-call .

## 2024-06-03 NOTE — PROGRESS NOTES
Nutrition Note    Pt tolerating trophic TF. Unclear when last bowel movement was, abdomen noted to be soft. Will begin increasing TF to goal rate,currently off of propofol     Current Tube Feeding (TF) Orders:   Feeding Route: Nasogastric  Formula: Peptide Based (Vital AF 1.2)  Schedule: Continuous  Additives/Modulars: None  Water Flushes: 50 q 4 hrs  Current TF & Flush Orders Provides: 576 kcal, 36 gm protein ~689 ml free water  Goal TF & Flush Orders Provides: @ 45 ml/hr = 1296 kcal, 81 gm protein ~ 1175 ml free water     Electronically signed by Rita Moser RD, LD on 6/3/24 at 3:06 PM EDT

## 2024-06-03 NOTE — PLAN OF CARE
Problem: Pain  Goal: Verbalizes/displays adequate comfort level or baseline comfort level  Outcome: Progressing  Flowsheets (Taken 6/2/2024 2000)  Verbalizes/displays adequate comfort level or baseline comfort level:   Assess pain using appropriate pain scale   Administer analgesics based on type and severity of pain and evaluate response   Consider cultural and social influences on pain and pain management   Implement non-pharmacological measures as appropriate and evaluate response   Notify Licensed Independent Practitioner if interventions unsuccessful or patient reports new pain     Problem: Discharge Planning  Goal: Discharge to home or other facility with appropriate resources  Outcome: Progressing  Flowsheets (Taken 6/2/2024 2000)  Discharge to home or other facility with appropriate resources:   Identify barriers to discharge with patient and caregiver   Arrange for needed discharge resources and transportation as appropriate   Identify discharge learning needs (meds, wound care, etc)   Arrange for interpreters to assist at discharge as needed   Refer to discharge planning if patient needs post-hospital services based on physician order or complex needs related to functional status, cognitive ability or social support system     Problem: Safety - Medical Restraint  Goal: Remains free of injury from restraints (Restraint for Interference with Medical Device)  Description: INTERVENTIONS:  1. Determine that other, less restrictive measures have been tried or would not be effective before applying the restraint  2. Evaluate the patient's condition at the time of restraint application  3. Inform patient/family regarding the reason for restraint  4. Q2H: Monitor safety, psychosocial status, comfort, nutrition and hydration  Outcome: Progressing  Flowsheets  Taken 6/3/2024 0600  Remains free of injury from restraints (restraint for interference with medical device):   Determine that other, less restrictive  would not be effective before applying the restraint   Every 2 hours: Monitor safety, psychosocial status, comfort, nutrition and hydration     Problem: Safety - Adult  Goal: Free from fall injury  Outcome: Progressing     Problem: Skin/Tissue Integrity  Goal: Absence of new skin breakdown  Description: 1.  Monitor for areas of redness and/or skin breakdown  2.  Assess vascular access sites hourly  3.  Every 4-6 hours minimum:  Change oxygen saturation probe site  4.  Every 4-6 hours:  If on nasal continuous positive airway pressure, respiratory therapy assess nares and determine need for appliance change or resting period.  Outcome: Progressing     Problem: Chronic Conditions and Co-morbidities  Goal: Patient's chronic conditions and co-morbidity symptoms are monitored and maintained or improved  Outcome: Progressing  Flowsheets (Taken 6/2/2024 2000)  Care Plan - Patient's Chronic Conditions and Co-Morbidity Symptoms are Monitored and Maintained or Improved:   Monitor and assess patient's chronic conditions and comorbid symptoms for stability, deterioration, or improvement   Collaborate with multidisciplinary team to address chronic and comorbid conditions and prevent exacerbation or deterioration   Update acute care plan with appropriate goals if chronic or comorbid symptoms are exacerbated and prevent overall improvement and discharge     Problem: Nutrition Deficit:  Goal: Optimize nutritional status  Outcome: Progressing

## 2024-06-03 NOTE — CARE COORDINATION
Case Management Assessment  Initial Evaluation    Date/Time of Evaluation: 6/3/2024 11:20 AM  Assessment Completed by: Alba Silva    If patient is discharged prior to next notation, then this note serves as note for discharge by case management.    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 Yes    Chart Reviewed: Yes      History Provided by: Child/Family  Patient Orientation: Other (see comment) (Info from Dtr Bhargavi)    Patient Cognition: Other (see comment) (Info from dtr Bhargavi)    Hospitalization in the last 30 days (Readmission):  No    If yes, Readmission Assessment in CM Navigator will be completed.    Advance Directives:      Code Status: Full Code   Patient's Primary Decision Maker is: Legal Next of Kin    Primary Decision Maker: Bhargavi Parada  Child - [phone]    Secondary Decision Maker: Ama Parada  Child - [phone]    Discharge Planning:    Patient lives with:   Type of Home:    Primary Care Giver: Self  Patient Support Systems include: Children, Family Members   Current Financial resources:    Current community resources:    Current services prior to admission:              Current DME:              Type of Home Care services:       ADLS  Prior functional level: Independent in ADLs/IADLs, Assistance with the following:, Shopping  Current functional level: Other (see comment) (Pt on vent at present and will eval once off vent)    PT AM-PAC:   /24  OT AM-PAC:   /24    Family can provide assistance at DC: Yes  Would you like Case Management to discuss the discharge plan with any other family members/significant others, and if so, who? Yes (Daughters Bhargavi or  supports the patient's individualized plan of care/goals and shares the quality data associated with the providers was provided to:     Patient Representative Name:       The Patient and/or Patient Representative Agree with the Discharge Plan?      Alba Silva  Case Management Department

## 2024-06-04 LAB
ALBUMIN SERPL-MCNC: 2.7 G/DL (ref 3.5–4.6)
ANION GAP SERPL CALCULATED.3IONS-SCNC: 8 MEQ/L (ref 9–15)
ANION GAP SERPL CALCULATED.3IONS-SCNC: 8 MEQ/L (ref 9–15)
BASE EXCESS ARTERIAL: 4 (ref -3–3)
BASE EXCESS ARTERIAL: 8 (ref -3–3)
BASOPHILS # BLD: 0 K/UL (ref 0–0.2)
BASOPHILS NFR BLD: 0.2 %
BUN SERPL-MCNC: 28 MG/DL (ref 8–23)
BUN SERPL-MCNC: 29 MG/DL (ref 8–23)
CALCIUM IONIZED: 1.21 MMOL/L (ref 1.12–1.32)
CALCIUM IONIZED: 1.26 MMOL/L (ref 1.12–1.32)
CALCIUM SERPL-MCNC: 7.9 MG/DL (ref 8.5–9.9)
CALCIUM SERPL-MCNC: 8.1 MG/DL (ref 8.5–9.9)
CHLORIDE SERPL-SCNC: 112 MEQ/L (ref 95–107)
CHLORIDE SERPL-SCNC: 112 MEQ/L (ref 95–107)
CO2 SERPL-SCNC: 24 MEQ/L (ref 20–31)
CO2 SERPL-SCNC: 25 MEQ/L (ref 20–31)
CREAT SERPL-MCNC: 0.63 MG/DL (ref 0.5–0.9)
CREAT SERPL-MCNC: 0.66 MG/DL (ref 0.5–0.9)
CULTURE, BLOOD ID SENSITIVITY: ABNORMAL
CULTURE, BLOOD ID SENSITIVITY: ABNORMAL
EOSINOPHIL # BLD: 0 K/UL (ref 0–0.7)
EOSINOPHIL NFR BLD: 0.6 %
ERYTHROCYTE [DISTWIDTH] IN BLOOD BY AUTOMATED COUNT: 14.2 % (ref 11.5–14.5)
GLUCOSE BLD-MCNC: 117 MG/DL (ref 70–99)
GLUCOSE BLD-MCNC: 125 MG/DL (ref 70–99)
GLUCOSE BLD-MCNC: 151 MG/DL (ref 70–99)
GLUCOSE BLD-MCNC: 159 MG/DL (ref 70–99)
GLUCOSE SERPL-MCNC: 145 MG/DL (ref 70–99)
GLUCOSE SERPL-MCNC: 148 MG/DL (ref 70–99)
HCO3 ARTERIAL: 29.4 MMOL/L (ref 21–29)
HCO3 ARTERIAL: 32.1 MMOL/L (ref 21–29)
HCT VFR BLD AUTO: 28 % (ref 36–48)
HCT VFR BLD AUTO: 32.6 % (ref 37–47)
HCT VFR BLD AUTO: 37 % (ref 36–48)
HGB BLD CALC-MCNC: 12.5 GM/DL (ref 12–16)
HGB BLD CALC-MCNC: 9.7 GM/DL (ref 12–16)
HGB BLD-MCNC: 10.5 G/DL (ref 12–16)
LACTATE: 0.62 MMOL/L (ref 0.4–2)
LACTATE: 0.71 MMOL/L (ref 0.4–2)
LYMPHOCYTES # BLD: 1.1 K/UL (ref 1–4.8)
LYMPHOCYTES NFR BLD: 20.8 %
MAGNESIUM SERPL-MCNC: 2.3 MG/DL (ref 1.7–2.4)
MCH RBC QN AUTO: 30 PG (ref 27–31.3)
MCHC RBC AUTO-ENTMCNC: 32.2 % (ref 33–37)
MCV RBC AUTO: 93.1 FL (ref 79.4–94.8)
MONOCYTES # BLD: 0.8 K/UL (ref 0.2–0.8)
MONOCYTES NFR BLD: 14.3 %
NEUTROPHILS # BLD: 3.3 K/UL (ref 1.4–6.5)
NEUTS SEG NFR BLD: 63.3 %
O2 SAT, ARTERIAL: 97 % (ref 93–100)
O2 SAT, ARTERIAL: 97 % (ref 93–100)
ORGANISM: ABNORMAL
PCO2 ARTERIAL: 47 MM HG (ref 35–45)
PCO2 ARTERIAL: 50 MM HG (ref 35–45)
PERFORMED ON: ABNORMAL
PH ARTERIAL: 7.37 (ref 7.35–7.45)
PH ARTERIAL: 7.44 (ref 7.35–7.45)
PHOSPHATE SERPL-MCNC: 2.3 MG/DL (ref 2.3–4.8)
PLATELET # BLD AUTO: 96 K/UL (ref 130–400)
PLATELET BLD QL SMEAR: ABNORMAL
PO2 ARTERIAL: 92 MM HG (ref 75–108)
PO2 ARTERIAL: 95 MM HG (ref 75–108)
POC CHLORIDE: 109 MEQ/L (ref 99–110)
POC CHLORIDE: 111 MEQ/L (ref 99–110)
POC CREATININE: 0.7 MG/DL (ref 0.6–1.2)
POC CREATININE: 0.7 MG/DL (ref 0.6–1.2)
POC FIO2: 40
POC SAMPLE TYPE: ABNORMAL
POC SAMPLE TYPE: ABNORMAL
POTASSIUM SERPL-SCNC: 3.3 MEQ/L (ref 3.5–5.1)
POTASSIUM SERPL-SCNC: 3.4 MEQ/L (ref 3.4–4.9)
POTASSIUM SERPL-SCNC: 3.5 MEQ/L (ref 3.4–4.9)
POTASSIUM SERPL-SCNC: 4 MEQ/L (ref 3.5–5.1)
RBC # BLD AUTO: 3.5 M/UL (ref 4.2–5.4)
RBC MORPH BLD: NORMAL
SLIDE REVIEW: ABNORMAL
SODIUM BLD-SCNC: 146 MEQ/L (ref 136–145)
SODIUM BLD-SCNC: 147 MEQ/L (ref 136–145)
SODIUM SERPL-SCNC: 144 MEQ/L (ref 135–144)
SODIUM SERPL-SCNC: 145 MEQ/L (ref 135–144)
TCO2 ARTERIAL: 31 MMOL/L (ref 21–32)
TCO2 ARTERIAL: 34 MMOL/L (ref 21–32)
WBC # BLD AUTO: 5.3 K/UL (ref 4.8–10.8)

## 2024-06-04 PROCEDURE — A4216 STERILE WATER/SALINE, 10 ML: HCPCS | Performed by: STUDENT IN AN ORGANIZED HEALTH CARE EDUCATION/TRAINING PROGRAM

## 2024-06-04 PROCEDURE — 6360000002 HC RX W HCPCS: Performed by: INTERNAL MEDICINE

## 2024-06-04 PROCEDURE — 83605 ASSAY OF LACTIC ACID: CPT

## 2024-06-04 PROCEDURE — 51798 US URINE CAPACITY MEASURE: CPT

## 2024-06-04 PROCEDURE — 6360000002 HC RX W HCPCS: Performed by: NURSE PRACTITIONER

## 2024-06-04 PROCEDURE — 83735 ASSAY OF MAGNESIUM: CPT

## 2024-06-04 PROCEDURE — 85025 COMPLETE CBC W/AUTO DIFF WBC: CPT

## 2024-06-04 PROCEDURE — 82330 ASSAY OF CALCIUM: CPT

## 2024-06-04 PROCEDURE — 36415 COLL VENOUS BLD VENIPUNCTURE: CPT

## 2024-06-04 PROCEDURE — 84132 ASSAY OF SERUM POTASSIUM: CPT

## 2024-06-04 PROCEDURE — 93005 ELECTROCARDIOGRAM TRACING: CPT | Performed by: INTERNAL MEDICINE

## 2024-06-04 PROCEDURE — 94761 N-INVAS EAR/PLS OXIMETRY MLT: CPT

## 2024-06-04 PROCEDURE — 6360000002 HC RX W HCPCS: Performed by: STUDENT IN AN ORGANIZED HEALTH CARE EDUCATION/TRAINING PROGRAM

## 2024-06-04 PROCEDURE — 2500000003 HC RX 250 WO HCPCS

## 2024-06-04 PROCEDURE — 2580000003 HC RX 258: Performed by: INTERNAL MEDICINE

## 2024-06-04 PROCEDURE — 94003 VENT MGMT INPAT SUBQ DAY: CPT

## 2024-06-04 PROCEDURE — 2580000003 HC RX 258: Performed by: STUDENT IN AN ORGANIZED HEALTH CARE EDUCATION/TRAINING PROGRAM

## 2024-06-04 PROCEDURE — 36600 WITHDRAWAL OF ARTERIAL BLOOD: CPT

## 2024-06-04 PROCEDURE — 2500000003 HC RX 250 WO HCPCS: Performed by: INTERNAL MEDICINE

## 2024-06-04 PROCEDURE — 82803 BLOOD GASES ANY COMBINATION: CPT

## 2024-06-04 PROCEDURE — 82565 ASSAY OF CREATININE: CPT

## 2024-06-04 PROCEDURE — 99291 CRITICAL CARE FIRST HOUR: CPT | Performed by: INTERNAL MEDICINE

## 2024-06-04 PROCEDURE — 6360000002 HC RX W HCPCS

## 2024-06-04 PROCEDURE — 85014 HEMATOCRIT: CPT

## 2024-06-04 PROCEDURE — 6370000000 HC RX 637 (ALT 250 FOR IP)

## 2024-06-04 PROCEDURE — 94640 AIRWAY INHALATION TREATMENT: CPT

## 2024-06-04 PROCEDURE — 51701 INSERT BLADDER CATHETER: CPT

## 2024-06-04 PROCEDURE — 6370000000 HC RX 637 (ALT 250 FOR IP): Performed by: STUDENT IN AN ORGANIZED HEALTH CARE EDUCATION/TRAINING PROGRAM

## 2024-06-04 PROCEDURE — 82435 ASSAY OF BLOOD CHLORIDE: CPT

## 2024-06-04 PROCEDURE — 84295 ASSAY OF SERUM SODIUM: CPT

## 2024-06-04 PROCEDURE — 6370000000 HC RX 637 (ALT 250 FOR IP): Performed by: NURSE PRACTITIONER

## 2024-06-04 PROCEDURE — 6370000000 HC RX 637 (ALT 250 FOR IP): Performed by: INTERNAL MEDICINE

## 2024-06-04 PROCEDURE — 2000000000 HC ICU R&B

## 2024-06-04 PROCEDURE — 94660 CPAP INITIATION&MGMT: CPT

## 2024-06-04 PROCEDURE — 80069 RENAL FUNCTION PANEL: CPT

## 2024-06-04 PROCEDURE — 2500000003 HC RX 250 WO HCPCS: Performed by: STUDENT IN AN ORGANIZED HEALTH CARE EDUCATION/TRAINING PROGRAM

## 2024-06-04 RX ORDER — BACTERIOSTATIC WATER 1 ML/ML
INJECTION, SOLUTION INTRAMUSCULAR; INTRAVENOUS; SUBCUTANEOUS
Status: COMPLETED
Start: 2024-06-04 | End: 2024-06-04

## 2024-06-04 RX ORDER — HYDRALAZINE HYDROCHLORIDE 20 MG/ML
20 INJECTION INTRAMUSCULAR; INTRAVENOUS EVERY 4 HOURS PRN
Status: DISCONTINUED | OUTPATIENT
Start: 2024-06-04 | End: 2024-06-13 | Stop reason: HOSPADM

## 2024-06-04 RX ORDER — IPRATROPIUM BROMIDE AND ALBUTEROL SULFATE 2.5; .5 MG/3ML; MG/3ML
SOLUTION RESPIRATORY (INHALATION)
Status: COMPLETED
Start: 2024-06-04 | End: 2024-06-04

## 2024-06-04 RX ORDER — LORAZEPAM 1 MG/1
1 TABLET ORAL EVERY 4 HOURS PRN
Status: DISCONTINUED | OUTPATIENT
Start: 2024-06-04 | End: 2024-06-04

## 2024-06-04 RX ORDER — LIDOCAINE 4 G/G
1 PATCH TOPICAL DAILY
Status: DISCONTINUED | OUTPATIENT
Start: 2024-06-04 | End: 2024-06-13 | Stop reason: HOSPADM

## 2024-06-04 RX ORDER — METHYLPREDNISOLONE SODIUM SUCCINATE 40 MG/ML
INJECTION, POWDER, LYOPHILIZED, FOR SOLUTION INTRAMUSCULAR; INTRAVENOUS
Status: COMPLETED
Start: 2024-06-04 | End: 2024-06-04

## 2024-06-04 RX ORDER — IPRATROPIUM BROMIDE AND ALBUTEROL SULFATE 2.5; .5 MG/3ML; MG/3ML
1 SOLUTION RESPIRATORY (INHALATION) EVERY 4 HOURS PRN
Status: DISCONTINUED | OUTPATIENT
Start: 2024-06-04 | End: 2024-06-13 | Stop reason: HOSPADM

## 2024-06-04 RX ORDER — LORAZEPAM 2 MG/ML
INJECTION INTRAMUSCULAR
Status: COMPLETED
Start: 2024-06-04 | End: 2024-06-04

## 2024-06-04 RX ORDER — FUROSEMIDE 10 MG/ML
20 INJECTION INTRAMUSCULAR; INTRAVENOUS ONCE
Status: COMPLETED | OUTPATIENT
Start: 2024-06-04 | End: 2024-06-04

## 2024-06-04 RX ORDER — LORAZEPAM 2 MG/ML
1 INJECTION INTRAMUSCULAR ONCE
Status: COMPLETED | OUTPATIENT
Start: 2024-06-04 | End: 2024-06-04

## 2024-06-04 RX ADMIN — Medication 10 ML: at 09:57

## 2024-06-04 RX ADMIN — HYDRALAZINE HYDROCHLORIDE 20 MG: 20 INJECTION INTRAMUSCULAR; INTRAVENOUS at 14:33

## 2024-06-04 RX ADMIN — Medication 10 ML: at 20:15

## 2024-06-04 RX ADMIN — DEXMEDETOMIDINE 0.2 MCG/KG/HR: 100 INJECTION, SOLUTION INTRAVENOUS at 21:22

## 2024-06-04 RX ADMIN — VERAPAMIL HYDROCHLORIDE 40 MG: 40 TABLET ORAL at 05:47

## 2024-06-04 RX ADMIN — SODIUM CHLORIDE, PRESERVATIVE FREE 20 MG: 5 INJECTION INTRAVENOUS at 09:56

## 2024-06-04 RX ADMIN — AMPICILLIN SODIUM AND SULBACTAM SODIUM 3000 MG: 2; 1 INJECTION, POWDER, FOR SOLUTION INTRAMUSCULAR; INTRAVENOUS at 18:01

## 2024-06-04 RX ADMIN — HYDRALAZINE HYDROCHLORIDE 10 MG: 20 INJECTION INTRAMUSCULAR; INTRAVENOUS at 10:41

## 2024-06-04 RX ADMIN — METHYLPREDNISOLONE SODIUM SUCCINATE 40 MG: 40 INJECTION, POWDER, LYOPHILIZED, FOR SOLUTION INTRAMUSCULAR; INTRAVENOUS at 19:15

## 2024-06-04 RX ADMIN — AMPICILLIN SODIUM AND SULBACTAM SODIUM 3000 MG: 2; 1 INJECTION, POWDER, FOR SOLUTION INTRAMUSCULAR; INTRAVENOUS at 05:55

## 2024-06-04 RX ADMIN — POTASSIUM BICARBONATE 40 MEQ: 782 TABLET, EFFERVESCENT ORAL at 09:56

## 2024-06-04 RX ADMIN — SODIUM CHLORIDE, PRESERVATIVE FREE 20 MG: 5 INJECTION INTRAVENOUS at 20:03

## 2024-06-04 RX ADMIN — SERTRALINE 100 MG: 100 TABLET, FILM COATED ORAL at 09:56

## 2024-06-04 RX ADMIN — AMPICILLIN SODIUM AND SULBACTAM SODIUM 3000 MG: 2; 1 INJECTION, POWDER, FOR SOLUTION INTRAMUSCULAR; INTRAVENOUS at 23:47

## 2024-06-04 RX ADMIN — METHYLPREDNISOLONE SODIUM SUCCINATE 40 MG: 40 INJECTION INTRAMUSCULAR; INTRAVENOUS at 19:14

## 2024-06-04 RX ADMIN — ENOXAPARIN SODIUM 30 MG: 100 INJECTION SUBCUTANEOUS at 09:57

## 2024-06-04 RX ADMIN — IPRATROPIUM BROMIDE AND ALBUTEROL SULFATE 1 DOSE: 2.5; .5 SOLUTION RESPIRATORY (INHALATION) at 19:12

## 2024-06-04 RX ADMIN — IPRATROPIUM BROMIDE AND ALBUTEROL SULFATE 1 DOSE: 2.5; .5 SOLUTION RESPIRATORY (INHALATION) at 20:42

## 2024-06-04 RX ADMIN — BACTERIOSTATIC WATER: 1 INJECTION, SOLUTION INTRAMUSCULAR; INTRAVENOUS; SUBCUTANEOUS at 19:15

## 2024-06-04 RX ADMIN — Medication 1 MG: at 21:42

## 2024-06-04 RX ADMIN — HYDROMORPHONE HYDROCHLORIDE 0.25 MG: 1 INJECTION, SOLUTION INTRAMUSCULAR; INTRAVENOUS; SUBCUTANEOUS at 18:35

## 2024-06-04 RX ADMIN — AMPICILLIN SODIUM AND SULBACTAM SODIUM 3000 MG: 2; 1 INJECTION, POWDER, FOR SOLUTION INTRAMUSCULAR; INTRAVENOUS at 13:32

## 2024-06-04 RX ADMIN — LORAZEPAM 1 MG: 2 INJECTION INTRAMUSCULAR at 21:42

## 2024-06-04 RX ADMIN — FUROSEMIDE 20 MG: 10 INJECTION, SOLUTION INTRAMUSCULAR; INTRAVENOUS at 09:56

## 2024-06-04 RX ADMIN — HYDRALAZINE HYDROCHLORIDE 20 MG: 20 INJECTION INTRAMUSCULAR; INTRAVENOUS at 18:36

## 2024-06-04 RX ADMIN — LISINOPRIL 20 MG: 20 TABLET ORAL at 09:56

## 2024-06-04 ASSESSMENT — PAIN SCALES - GENERAL
PAINLEVEL_OUTOF10: 8
PAINLEVEL_OUTOF10: 0
PAINLEVEL_OUTOF10: 10
PAINLEVEL_OUTOF10: 0

## 2024-06-04 ASSESSMENT — PULMONARY FUNCTION TESTS
PIF_VALUE: 9
PIF_VALUE: 21
PIF_VALUE: 9
PIF_VALUE: 9

## 2024-06-04 NOTE — INTERDISCIPLINARY ROUNDS
Spiritual Care Services     Summary of Visit:   participated in ICU rounds. Patient to be extubated today.    Encounter Summary  Encounter Overview/Reason: Interdisciplinary rounds  Service Provided For: Patient  Referral/Consult From: Multi-disciplinary team, Rounding  Support System: Children, Family members  Complexity of Encounter: Low  Begin Time: 0940  End Time : 0945  Total Time Calculated: 5 min                               Spiritual Assessment/Intervention/Outcomes:    Assessment: Unable to assess    Intervention: Sustaining Presence/Ministry of presence    Outcome: Did not respond      Care Plan:    Plan and Referrals  Plan/Referrals: Continue to visit, (comment)    Provide continued support as needed or requested      Spiritual Care Services   Electronically signed by Chaplain Elias on 6/4/2024 at 11:17 AM.    To reach a  for emotional and spiritual support, place an EPIC consult request.   If a  is needed immediately, dial “0” and ask to page the on-call .

## 2024-06-04 NOTE — PROGRESS NOTES
Pt report received from Lanny LUTHER. Pt skin assessment completed. Pt intubated and on Precedex. Pt turned well, follows commands. Pt bathed with chlorhexidine wash and bedding changed. Swelling in neck appears less edematous than yesterday.Dr. David informed of pauses on monitor so order acquired to do an EKG. Pt  EKG complete, Dr. David notified, no new orders. Pt report handoff to Lanny LUTHER. Skin check completed at handoff

## 2024-06-04 NOTE — PROGRESS NOTES
Internal Medicine   Hospitalist   Progress Note    2024   3:08 PM    Name:  Tanya Liz  MRN:    [id number]     IP Day: 4     Admit Date: 2024 11:11 AM  PCP: Madi Kelly DO    Code Status:  Full Code    Assessment and Plan:        Active Problems/ diagnosis:     Oropharyngeal abscess  Diabetes  Acute respiratory insufficiency due to abscess  Hypertension    Plan  Extubated this morning  Resume lisinopril, monitor blood pressure  Resume steroids and antibiotics  Critical care and ENT are following  Resume current medications otherwise    DVT PPx     7 pm- 7 am, please contact on call Hospitalist for any needs     Subjective:      no new events.     Physical Examination:      Vitals:  BP (!) 182/70   Pulse 63   Temp 98.3 °F (36.8 °C) (Oral)   Resp 22   Ht 1.575 m (5' 2.01\")   Wt 56.4 kg (124 lb 5.4 oz)   LMP  (LMP Unknown)   SpO2 98%   BMI 22.74 kg/m²   Temp (24hrs), Av.9 °F (37.2 °C), Min:98.2 °F (36.8 °C), Max:99.9 °F (37.7 °C)      General appearance: No acute distress, sleepy  Mental Status: Deferred  Lungs: Mechanical sound present bilaterally  Heart: regular rate and rhythm, no murmur  Abdomen: soft, nondistended, bowel sounds present, no masses  Extremities: no edema, redness  Skin: no gross lesions, rashes    Data:     Labs:  Recent Labs     24  04524  0454 24  0937   WBC 6.9 5.3  --    HGB 11.5* 10.5* 9.7*   * 96*  --      Recent Labs     24  04524  0454 24  0937   * 145*  144  --    K 3.7 3.4  3.5  --    * 112*  112*  --    CO2 22 25  24  --    BUN 26* 28*  29*  --    CREATININE 0.58 0.63  0.66 0.7   GLUCOSE 119* 148*  145*  --      No results for input(s): \"AST\", \"ALT\", \"BILITOT\", \"ALKPHOS\" in the last 72 hours.    Invalid input(s): \"ALB\"      Current Facility-Administered Medications   Medication Dose Route Frequency Provider Last Rate Last Admin    hydrALAZINE (APRESOLINE) injection 20 mg  20 mg IntraVENous Q4H PRN  Mary Beth Yepez APRN - CNP   20 mg at 06/04/24 1433    docusate (COLACE) 50 MG/5ML liquid 100 mg  100 mg Oral BID Thompson Presley MD   100 mg at 06/03/24 1959    polyethylene glycol (GLYCOLAX) packet 17 g  17 g Oral Daily Thompson Presley MD   17 g at 06/03/24 1324    verapamil (CALAN) tablet 40 mg  40 mg Oral 3 times per day Mary Beth Yepez APRN - CNP   40 mg at 06/04/24 0547    atorvastatin (LIPITOR) tablet 40 mg  40 mg Oral Nightly Aurelia Hung MD   40 mg at 06/03/24 1959    lisinopril (PRINIVIL;ZESTRIL) tablet 20 mg  20 mg Oral Daily Aurelia Hung MD   20 mg at 06/04/24 0956    mirtazapine (REMERON) tablet 7.5 mg  7.5 mg Oral Nightly Aurelia Hung MD   7.5 mg at 06/03/24 1959    sertraline (ZOLOFT) tablet 100 mg  100 mg Oral Daily Aurelia Hung MD   100 mg at 06/04/24 0956    traZODone (DESYREL) tablet 50 mg  50 mg Oral Nightly Aurelia Hung MD   50 mg at 06/03/24 1959    sodium chloride flush 0.9 % injection 5-40 mL  5-40 mL IntraVENous 2 times per day Diana Suh MD   10 mL at 06/04/24 0957    sodium chloride flush 0.9 % injection 5-40 mL  5-40 mL IntraVENous PRN Diana Suh MD        0.9 % sodium chloride infusion   IntraVENous PRN Diana Suh MD        ondansetron (ZOFRAN-ODT) disintegrating tablet 4 mg  4 mg Oral Q8H PRN Diana Suh MD        Or    ondansetron (ZOFRAN) injection 4 mg  4 mg IntraVENous Q6H PRN Diana Suh MD        enoxaparin Sodium (LOVENOX) injection 30 mg  30 mg SubCUTAneous Daily Diana Suh MD   30 mg at 06/04/24 0957    ampicillin-sulbactam (UNASYN) 3,000 mg in sodium chloride 0.9 % 100 mL IVPB (mini-bag)  3,000 mg IntraVENous Q6H Diana Suh MD   Stopped at 06/04/24 1400    famotidine (PEPCID) 20 mg in sodium chloride (PF) 0.9 % 10 mL injection  20 mg IntraVENous BID Diana Suh MD   20 mg at 06/04/24 0956    glucose chewable tablet 16 g  4 tablet Oral Aurelia Rodriguez MD        dextrose bolus 10% 125 mL  125 mL IntraVENous PRN Smith,

## 2024-06-04 NOTE — PLAN OF CARE
Assess the need for suctioning and aspirate as needed   Assess and instruct to report shortness of breath or any respiratory difficulty   Respiratory therapy support as indicated     Problem: Cardiovascular - Adult  Goal: Maintains optimal cardiac output and hemodynamic stability  Outcome: Progressing  Flowsheets (Taken 6/3/2024 2000)  Maintains optimal cardiac output and hemodynamic stability:   Monitor blood pressure and heart rate   Monitor urine output and notify Licensed Independent Practitioner for values outside of normal range   Assess for signs of decreased cardiac output   Administer fluid and/or volume expanders as ordered   Administer vasoactive medications as ordered  Goal: Absence of cardiac dysrhythmias or at baseline  Outcome: Progressing  Flowsheets (Taken 6/3/2024 2000)  Absence of cardiac dysrhythmias or at baseline:   Monitor cardiac rate and rhythm   Administer antiarrhythmia medication and electrolyte replacement as ordered   Assess for signs of decreased cardiac output     Problem: Skin/Tissue Integrity - Adult  Goal: Skin integrity remains intact  Outcome: Progressing  Flowsheets (Taken 6/3/2024 2000)  Skin Integrity Remains Intact:   Monitor for areas of redness and/or skin breakdown   Every 4-6 hours: If on nasal continuous positive airway pressure, respiratory therapy assesses nares and determine need for appliance change or resting period     Problem: Musculoskeletal - Adult  Goal: Return mobility to safest level of function  Outcome: Progressing  Flowsheets (Taken 6/3/2024 2000)  Return Mobility to Safest Level of Function:   Assess patient stability and activity tolerance for standing, transferring and ambulating with or without assistive devices   Assist with transfers and ambulation using safe patient handling equipment as needed   Ensure adequate protection for wounds/incisions during mobilization   Obtain physical therapy/occupational therapy consults as needed   Apply continuous  family identify pros/cons of alternative solutions   Provide information as requested by patient/family   Respect patient/family right to receive or not to receive information   Serve as a liaison between patient and family and health care team   Initiate Consults from Ethics, Palliative Care or initiate Family Care Conference as is appropriate

## 2024-06-04 NOTE — PROGRESS NOTES
Pulmonary & Critical Care Medicine ICU Progress Note  Chief complaint : Respiratory insufficiency    Subjunctive/24 hour events :   Patient seen and examined during multidisciplinary rounds with RN, charge nurse, RT, pharmacy, dietitian, and social service.   Awake and calm, on vent, follows commands, no fever, she is on Precedex at 0.2 mcg/kg/h, on 40% FiO2 saturation 97%, urine output 1400 cc      New information updated in the note today, rest of the examination did not change compared to yesterday.  Social History     Tobacco Use    Smoking status: Never    Smokeless tobacco: Never   Substance Use Topics    Alcohol use: No         Problem Relation Age of Onset    Other Mother         Neurological Disease    Cancer Sister     Diabetes Brother     Cancer Brother         leukemia       No results for input(s): \"PHART\", \"GJY4ZFR\", \"PO2ART\" in the last 72 hours.      MV Settings:  Vent Mode: (S) CPAP/PS Resp Rate (Set): 18 bpm/Vt (Set, mL): 330 mL/ /FiO2 : 40 %           IV:   dexmedeTOMIDine (PRECEDEX) 1,000 mcg in sodium chloride 0.9 % 250 mL infusion 0.2 mcg/kg/hr (06/04/24 0645)    propofol Stopped (06/02/24 0802)    sodium chloride      fentaNYL Stopped (06/02/24 0802)    dextrose         Vitals:  BP (!) 126/50   Pulse 64   Temp 99.2 °F (37.3 °C) (Axillary)   Resp 22   Ht 1.575 m (5' 2.01\")   Wt 56.4 kg (124 lb 5.4 oz)   LMP  (LMP Unknown)   SpO2 97%   BMI 22.74 kg/m²    Tmax:        Intake/Output Summary (Last 24 hours) at 6/4/2024 0837  Last data filed at 6/4/2024 0645  Gross per 24 hour   Intake 1918.03 ml   Output 1450 ml   Net 468.03 ml         EXAM:  General: Awake on vent, follows commands, no distress  Head: normocephalic, atraumatic  Eyes:No gross abnormalities.  ENT:  MMM no lesions  Neck:  supple and no masses  Chest : Good air movement, no wheezing, no rales, nontender, tympanic  Heart:: Heart sounds are normal.  Regular rate and rhythm without murmur, gallop or rub.  ABD:  symmetric, soft,

## 2024-06-04 NOTE — PROGRESS NOTES
1905 Rapid response called. Pt lung sounds coarse and stridorous and respirations as high as 40. Pt appears uncomfortable in bed, easy to rouse but complains of pain and difficulty breathing, ABG's drawn and solumedrol given. Pt lung sounds improved to rhonchi. Pt also received a lidocaine patch for back pain. Pt reports improvement in back pain.    Reassessment of pt, lung sounds again coarse.Dr. David called, ordered to give duoneb early. Pt lung sounds improved and RT placed Pt on heated high flow.    Second rapid response called. Pt in distress, breathing fast. This RN observed RR at 46 and monitor concurred. Pt started on BiPAP. Pt RR still in the 40's Precedex drip reordered. One time dose of ativan ordered and given, Pt respirations decreased to a safe range after ativan was given. RN one to one at bedside.     pt is lethargic, awakes briefly and follows commands.RT at the bedside. Dr. David notified of lethargy and low volumes on bipap. SpO2 is 98%.  Dr. David to bedside. Precedex titrated down early per Dr. David and is aware of low volumes. No new orders.     ABG's ordered and drawn, Pt tolerating the BiPAP.     Pt incontinent of urine, full linens changed. External catheter replaced. Only one pad under pt.     Pt requested to come off the Bipap, placed on heated highflow.Pt tolerating well. PRN medications given for SBP>160. See MAR for details. PIV removed for redness and pain at insertion site. See LDA for details. PT weight entered. Handoff report given to Olga LUTHER. Skin check completed.

## 2024-06-04 NOTE — PROGRESS NOTES
0700am Report from Eunice LUTHER. Intubated. Precedex @ 0.2 meqs. Able to follow commands. No distress noted. VSS MP_SR  0900am Critical care rounds with Dr Presley and team  10:12am Extubated with Cook tube placed per Mary Beth NP. 02 2l/nc  10:30am Sisters here and updated on status  Dr Suh messaged to speak with Sisters  11:35am Dr Suh called and spoke with Sisters  12 noon Lethargic arouses easily  14:33pm 20mg Hydralazine given for BP in 190's  1800pm /73. Remains Lethargic but aoruses easily

## 2024-06-04 NOTE — PROGRESS NOTES
Spiritual Care Services     Summary of Visit:   responded to Rapid response, no family present, pt was extubated earlier today,       Encounter Summary  Encounter Overview/Reason: Crisis  Service Provided For: Patient  Referral/Consult From: Multi-disciplinary team  Support System: Children, Family members  Complexity of Encounter: Moderate  Begin Time: 0940  End Time : 0945  Total Time Calculated: 5 min     Crisis  Type: Rapid Response                         Spiritual Assessment/Intervention/Outcomes:    Assessment: Peaceful, Unable to assess    Intervention: Sustaining Presence/Ministry of presence    Outcome: Did not respond      Care Plan:    Plan and Referrals  Plan/Referrals: Continue to visit, (comment)          Spiritual Care Services   Electronically signed by Chaplain Yue on 6/4/2024 at 7:12 PM.    To reach a  for emotional and spiritual support, place an EPIC consult request.   If a  is needed immediately, dial “0” and ask to page the on-call .

## 2024-06-04 NOTE — CARE COORDINATION
Quality round completed with care management team. Pt remain on vent. Pt is from home alone. Independent at baseline.     Will need PT/OT order when off vent and once medically stable.     LSW will continue to follow.     Electronically signed by GOOD Moreira on 6/4/2024 at 9:55 AM    Pt off vent. Need PT/OT eval when able.   Pt is from Home alone. Independent at baseline.     LSW will follow.     Electronically signed by GOOD Moreira on 6/4/2024 at 2:55 PM

## 2024-06-04 NOTE — CARE COORDINATION
Spoke to Lanny bedside nurse and pt was extubated to 3LNC and is off drips. We will ask for PT/OT eval to help determine plan.

## 2024-06-05 ENCOUNTER — APPOINTMENT (OUTPATIENT)
Dept: GENERAL RADIOLOGY | Age: 84
DRG: 137 | End: 2024-06-05
Payer: MEDICARE

## 2024-06-05 PROBLEM — Z51.5 PALLIATIVE CARE ENCOUNTER: Status: ACTIVE | Noted: 2024-06-05

## 2024-06-05 PROBLEM — Z71.89 GOALS OF CARE, COUNSELING/DISCUSSION: Status: ACTIVE | Noted: 2024-06-05

## 2024-06-05 PROBLEM — Z71.89 ADVANCED CARE PLANNING/COUNSELING DISCUSSION: Status: ACTIVE | Noted: 2024-06-05

## 2024-06-05 LAB
ALBUMIN SERPL-MCNC: 3 G/DL (ref 3.5–4.6)
ANION GAP SERPL CALCULATED.3IONS-SCNC: 6 MEQ/L (ref 9–15)
BACTERIA BLD CULT ORG #2: NORMAL
BASE EXCESS ARTERIAL: 6 (ref -3–3)
BASOPHILS # BLD: 0 K/UL (ref 0–0.2)
BASOPHILS NFR BLD: 0.2 %
BUN SERPL-MCNC: 30 MG/DL (ref 8–23)
CALCIUM IONIZED: 1.24 MMOL/L (ref 1.12–1.32)
CALCIUM SERPL-MCNC: 9.1 MG/DL (ref 8.5–9.9)
CHLORIDE SERPL-SCNC: 108 MEQ/L (ref 95–107)
CO2 SERPL-SCNC: 30 MEQ/L (ref 20–31)
CREAT SERPL-MCNC: 0.65 MG/DL (ref 0.5–0.9)
EKG ATRIAL RATE: 41 BPM
EKG ATRIAL RATE: 51 BPM
EKG P AXIS: 47 DEGREES
EKG P AXIS: 60 DEGREES
EKG P-R INTERVAL: 142 MS
EKG P-R INTERVAL: 154 MS
EKG Q-T INTERVAL: 474 MS
EKG Q-T INTERVAL: 476 MS
EKG QRS DURATION: 100 MS
EKG QRS DURATION: 96 MS
EKG QTC CALCULATION (BAZETT): 391 MS
EKG QTC CALCULATION (BAZETT): 438 MS
EKG R AXIS: -4 DEGREES
EKG R AXIS: -5 DEGREES
EKG T AXIS: 15 DEGREES
EKG T AXIS: 17 DEGREES
EKG VENTRICULAR RATE: 41 BPM
EKG VENTRICULAR RATE: 51 BPM
EOSINOPHIL # BLD: 0 K/UL (ref 0–0.7)
EOSINOPHIL NFR BLD: 0 %
ERYTHROCYTE [DISTWIDTH] IN BLOOD BY AUTOMATED COUNT: 13.8 % (ref 11.5–14.5)
GLUCOSE BLD-MCNC: 126 MG/DL (ref 70–99)
GLUCOSE BLD-MCNC: 129 MG/DL (ref 70–99)
GLUCOSE BLD-MCNC: 138 MG/DL (ref 70–99)
GLUCOSE BLD-MCNC: 139 MG/DL (ref 70–99)
GLUCOSE BLD-MCNC: 142 MG/DL (ref 70–99)
GLUCOSE BLD-MCNC: 160 MG/DL (ref 70–99)
GLUCOSE SERPL-MCNC: 135 MG/DL (ref 70–99)
HCO3 ARTERIAL: 30.3 MMOL/L (ref 21–29)
HCT VFR BLD AUTO: 34 % (ref 36–48)
HCT VFR BLD AUTO: 34.9 % (ref 37–47)
HGB BLD CALC-MCNC: 11.5 GM/DL (ref 12–16)
HGB BLD-MCNC: 11 G/DL (ref 12–16)
LACTATE: 0.98 MMOL/L (ref 0.4–2)
LYMPHOCYTES # BLD: 0.6 K/UL (ref 1–4.8)
LYMPHOCYTES NFR BLD: 11.5 %
MCH RBC QN AUTO: 29.5 PG (ref 27–31.3)
MCHC RBC AUTO-ENTMCNC: 31.5 % (ref 33–37)
MCV RBC AUTO: 93.6 FL (ref 79.4–94.8)
MONOCYTES # BLD: 0.3 K/UL (ref 0.2–0.8)
MONOCYTES NFR BLD: 4.8 %
NEUTROPHILS # BLD: 4.4 K/UL (ref 1.4–6.5)
NEUTS SEG NFR BLD: 82.2 %
O2 SAT, ARTERIAL: 98 % (ref 93–100)
PCO2 ARTERIAL: 49 MM HG (ref 35–45)
PERFORMED ON: ABNORMAL
PH ARTERIAL: 7.4 (ref 7.35–7.45)
PHOSPHATE SERPL-MCNC: 4 MG/DL (ref 2.3–4.8)
PLATELET # BLD AUTO: 121 K/UL (ref 130–400)
PO2 ARTERIAL: 111 MM HG (ref 75–108)
POC CHLORIDE: 108 MEQ/L (ref 99–110)
POC CREATININE: 0.7 MG/DL (ref 0.6–1.2)
POC FIO2: 50
POC SAMPLE TYPE: ABNORMAL
POTASSIUM SERPL-SCNC: 4.4 MEQ/L (ref 3.5–5.1)
POTASSIUM SERPL-SCNC: 4.8 MEQ/L (ref 3.4–4.9)
RBC # BLD AUTO: 3.73 M/UL (ref 4.2–5.4)
SODIUM BLD-SCNC: 146 MEQ/L (ref 136–145)
SODIUM SERPL-SCNC: 144 MEQ/L (ref 135–144)
TCO2 ARTERIAL: 32 MMOL/L (ref 21–32)
WBC # BLD AUTO: 5.4 K/UL (ref 4.8–10.8)

## 2024-06-05 PROCEDURE — 84295 ASSAY OF SERUM SODIUM: CPT

## 2024-06-05 PROCEDURE — 85014 HEMATOCRIT: CPT

## 2024-06-05 PROCEDURE — 82565 ASSAY OF CREATININE: CPT

## 2024-06-05 PROCEDURE — 6370000000 HC RX 637 (ALT 250 FOR IP): Performed by: INTERNAL MEDICINE

## 2024-06-05 PROCEDURE — 83605 ASSAY OF LACTIC ACID: CPT

## 2024-06-05 PROCEDURE — 2700000000 HC OXYGEN THERAPY PER DAY

## 2024-06-05 PROCEDURE — 2500000003 HC RX 250 WO HCPCS: Performed by: STUDENT IN AN ORGANIZED HEALTH CARE EDUCATION/TRAINING PROGRAM

## 2024-06-05 PROCEDURE — 71045 X-RAY EXAM CHEST 1 VIEW: CPT

## 2024-06-05 PROCEDURE — 6360000002 HC RX W HCPCS: Performed by: STUDENT IN AN ORGANIZED HEALTH CARE EDUCATION/TRAINING PROGRAM

## 2024-06-05 PROCEDURE — 82435 ASSAY OF BLOOD CHLORIDE: CPT

## 2024-06-05 PROCEDURE — 2000000000 HC ICU R&B

## 2024-06-05 PROCEDURE — 82803 BLOOD GASES ANY COMBINATION: CPT

## 2024-06-05 PROCEDURE — 6360000002 HC RX W HCPCS: Performed by: NURSE PRACTITIONER

## 2024-06-05 PROCEDURE — 6370000000 HC RX 637 (ALT 250 FOR IP): Performed by: STUDENT IN AN ORGANIZED HEALTH CARE EDUCATION/TRAINING PROGRAM

## 2024-06-05 PROCEDURE — 99222 1ST HOSP IP/OBS MODERATE 55: CPT

## 2024-06-05 PROCEDURE — 84132 ASSAY OF SERUM POTASSIUM: CPT

## 2024-06-05 PROCEDURE — 99291 CRITICAL CARE FIRST HOUR: CPT | Performed by: INTERNAL MEDICINE

## 2024-06-05 PROCEDURE — 2500000003 HC RX 250 WO HCPCS: Performed by: INTERNAL MEDICINE

## 2024-06-05 PROCEDURE — 36600 WITHDRAWAL OF ARTERIAL BLOOD: CPT

## 2024-06-05 PROCEDURE — 85025 COMPLETE CBC W/AUTO DIFF WBC: CPT

## 2024-06-05 PROCEDURE — 94761 N-INVAS EAR/PLS OXIMETRY MLT: CPT

## 2024-06-05 PROCEDURE — A4216 STERILE WATER/SALINE, 10 ML: HCPCS | Performed by: STUDENT IN AN ORGANIZED HEALTH CARE EDUCATION/TRAINING PROGRAM

## 2024-06-05 PROCEDURE — 94660 CPAP INITIATION&MGMT: CPT

## 2024-06-05 PROCEDURE — 80069 RENAL FUNCTION PANEL: CPT

## 2024-06-05 PROCEDURE — 6360000002 HC RX W HCPCS: Performed by: INTERNAL MEDICINE

## 2024-06-05 PROCEDURE — 2580000003 HC RX 258: Performed by: STUDENT IN AN ORGANIZED HEALTH CARE EDUCATION/TRAINING PROGRAM

## 2024-06-05 PROCEDURE — 36415 COLL VENOUS BLD VENIPUNCTURE: CPT

## 2024-06-05 PROCEDURE — 82948 REAGENT STRIP/BLOOD GLUCOSE: CPT

## 2024-06-05 PROCEDURE — 6370000000 HC RX 637 (ALT 250 FOR IP): Performed by: NURSE PRACTITIONER

## 2024-06-05 PROCEDURE — 82330 ASSAY OF CALCIUM: CPT

## 2024-06-05 RX ORDER — DEXAMETHASONE SODIUM PHOSPHATE 4 MG/ML
4 INJECTION, SOLUTION INTRA-ARTICULAR; INTRALESIONAL; INTRAMUSCULAR; INTRAVENOUS; SOFT TISSUE EVERY 6 HOURS
Status: COMPLETED | OUTPATIENT
Start: 2024-06-05 | End: 2024-06-06

## 2024-06-05 RX ORDER — HALOPERIDOL 5 MG/ML
2 INJECTION INTRAMUSCULAR EVERY 4 HOURS PRN
Status: DISCONTINUED | OUTPATIENT
Start: 2024-06-05 | End: 2024-06-12

## 2024-06-05 RX ORDER — FUROSEMIDE 10 MG/ML
20 INJECTION INTRAMUSCULAR; INTRAVENOUS ONCE
Status: COMPLETED | OUTPATIENT
Start: 2024-06-05 | End: 2024-06-05

## 2024-06-05 RX ORDER — ENOXAPARIN SODIUM 100 MG/ML
40 INJECTION SUBCUTANEOUS DAILY
Status: DISCONTINUED | OUTPATIENT
Start: 2024-06-06 | End: 2024-06-13 | Stop reason: HOSPADM

## 2024-06-05 RX ORDER — ENALAPRILAT 1.25 MG/ML
1.25 INJECTION INTRAVENOUS EVERY 6 HOURS SCHEDULED
Status: DISCONTINUED | OUTPATIENT
Start: 2024-06-05 | End: 2024-06-06

## 2024-06-05 RX ADMIN — SERTRALINE 100 MG: 100 TABLET, FILM COATED ORAL at 14:14

## 2024-06-05 RX ADMIN — HYDRALAZINE HYDROCHLORIDE 20 MG: 20 INJECTION INTRAMUSCULAR; INTRAVENOUS at 09:41

## 2024-06-05 RX ADMIN — DEXAMETHASONE SODIUM PHOSPHATE 4 MG: 4 INJECTION, SOLUTION INTRAMUSCULAR; INTRAVENOUS at 20:13

## 2024-06-05 RX ADMIN — MIRTAZAPINE 7.5 MG: 15 TABLET, FILM COATED ORAL at 20:12

## 2024-06-05 RX ADMIN — SODIUM CHLORIDE, PRESERVATIVE FREE 20 MG: 5 INJECTION INTRAVENOUS at 20:13

## 2024-06-05 RX ADMIN — ENALAPRILAT 1.25 MG: 1.25 INJECTION INTRAVENOUS at 09:45

## 2024-06-05 RX ADMIN — Medication 10 ML: at 08:08

## 2024-06-05 RX ADMIN — DOCUSATE SODIUM 100 MG: 50 LIQUID ORAL at 20:13

## 2024-06-05 RX ADMIN — AMPICILLIN SODIUM AND SULBACTAM SODIUM 3000 MG: 2; 1 INJECTION, POWDER, FOR SOLUTION INTRAMUSCULAR; INTRAVENOUS at 23:58

## 2024-06-05 RX ADMIN — AMPICILLIN SODIUM AND SULBACTAM SODIUM 3000 MG: 2; 1 INJECTION, POWDER, FOR SOLUTION INTRAMUSCULAR; INTRAVENOUS at 13:05

## 2024-06-05 RX ADMIN — AMPICILLIN SODIUM AND SULBACTAM SODIUM 3000 MG: 2; 1 INJECTION, POWDER, FOR SOLUTION INTRAMUSCULAR; INTRAVENOUS at 17:38

## 2024-06-05 RX ADMIN — DEXAMETHASONE SODIUM PHOSPHATE 4 MG: 4 INJECTION, SOLUTION INTRAMUSCULAR; INTRAVENOUS at 14:14

## 2024-06-05 RX ADMIN — SODIUM CHLORIDE, PRESERVATIVE FREE 20 MG: 5 INJECTION INTRAVENOUS at 08:07

## 2024-06-05 RX ADMIN — ENOXAPARIN SODIUM 30 MG: 100 INJECTION SUBCUTANEOUS at 08:07

## 2024-06-05 RX ADMIN — AMPICILLIN SODIUM AND SULBACTAM SODIUM 3000 MG: 2; 1 INJECTION, POWDER, FOR SOLUTION INTRAMUSCULAR; INTRAVENOUS at 05:31

## 2024-06-05 RX ADMIN — VERAPAMIL HYDROCHLORIDE 40 MG: 40 TABLET ORAL at 20:12

## 2024-06-05 RX ADMIN — ATORVASTATIN CALCIUM 40 MG: 40 TABLET, FILM COATED ORAL at 20:12

## 2024-06-05 RX ADMIN — HYDRALAZINE HYDROCHLORIDE 20 MG: 20 INJECTION INTRAMUSCULAR; INTRAVENOUS at 06:12

## 2024-06-05 RX ADMIN — DEXAMETHASONE SODIUM PHOSPHATE 4 MG: 4 INJECTION, SOLUTION INTRAMUSCULAR; INTRAVENOUS at 09:40

## 2024-06-05 RX ADMIN — Medication 10 ML: at 20:14

## 2024-06-05 RX ADMIN — ENALAPRILAT 1.25 MG: 1.25 INJECTION INTRAVENOUS at 23:52

## 2024-06-05 RX ADMIN — VERAPAMIL HYDROCHLORIDE 40 MG: 40 TABLET ORAL at 14:14

## 2024-06-05 RX ADMIN — HALOPERIDOL LACTATE 2 MG: 5 INJECTION, SOLUTION INTRAMUSCULAR at 10:53

## 2024-06-05 RX ADMIN — FUROSEMIDE 20 MG: 10 INJECTION, SOLUTION INTRAMUSCULAR; INTRAVENOUS at 11:02

## 2024-06-05 RX ADMIN — ENALAPRILAT 1.25 MG: 1.25 INJECTION INTRAVENOUS at 17:43

## 2024-06-05 RX ADMIN — TRAZODONE HYDROCHLORIDE 50 MG: 50 TABLET ORAL at 20:13

## 2024-06-05 ASSESSMENT — PAIN SCALES - GENERAL
PAINLEVEL_OUTOF10: 0

## 2024-06-05 NOTE — PROGRESS NOTES
ENT Progress Note    Subjective   RRT called x2 overnight due to tachypnea and resp distress. She was placed on BIPAP and ativan was given with improvement. Now on precedex drip with high flow O2   Remains lethargic but arousable and cooperative    Objective:   Vitals:    06/05/24 1100   BP: (!) 159/48   Pulse: 68   Resp: 24   Temp:    SpO2: 96%     Lab Results   Component Value Date    WBC 5.4 06/05/2024    HGB 11.0 (L) 06/05/2024    HCT 34.9 (L) 06/05/2024    MCV 93.6 06/05/2024     (L) 06/05/2024       Lab Results   Component Value Date     06/05/2024    K 4.8 06/05/2024     (H) 06/05/2024    CO2 30 06/05/2024    BUN 30 (H) 06/05/2024    CREATININE 0.65 06/05/2024    GLUCOSE 135 (H) 06/05/2024    CALCIUM 9.1 06/05/2024    PROT 6.5 09/14/2015    BILITOT 0.3 06/01/2024    ALKPHOS 54 06/01/2024    AST 14 06/01/2024    ALT 13 06/01/2024    LABGLOM 86.0 06/05/2024    GFRAA >60.0 08/07/2022    GLOB 2.7 06/01/2024       Exam   General- laying in bed, resting comfortably in no distress   Eyes- closed  Neuro- arousable and cooperative  Oral cavity- no trismus, no appreciable tongue swelling or firmness  Neck- soft, no induration, or fluctuance, no erythema  Resp- on high flow O2, no stridor    A/P   84y female taken emergently to OR 5/31 for airway compromise due to base of tongue abscess. Biopsies taken in OR of left oropharynx- negative for malignancy . Purulent drainage from BOT sent for culture.  Significant laryngeal swelling requiring intubation for 4 days, now extubated but requiring high flow O2       Continue IV abx - Unasyn - until transition to oral abx (Augmentin) for total 10 days course. WBC normalized  Follow up cultures- NGTD --> oral mi  Biopsy results negative for malignancy  OK for SLP to eval and trial PO when mental status improves, OK for NG   Will follow with the medical teams     Diana Suh MD

## 2024-06-05 NOTE — PLAN OF CARE
Nutrition Problem #1: Inadequate oral intake  Intervention: Food and/or Nutrient Delivery: Start Tube Feeding (Begin standard with fiber TF (Jevity 1.5) @ 20 ml/hr  Increase rate by 10 ml q 4 hrs as tolerated to a goal rate of m40 ml/hr  100 ml water flush every 4 hrs)

## 2024-06-05 NOTE — PLAN OF CARE
Problem: Pain  Goal: Verbalizes/displays adequate comfort level or baseline comfort level  Outcome: Progressing  Flowsheets (Taken 6/4/2024 2000)  Verbalizes/displays adequate comfort level or baseline comfort level:   Assess pain using appropriate pain scale   Administer analgesics based on type and severity of pain and evaluate response   Implement non-pharmacological measures as appropriate and evaluate response   Consider cultural and social influences on pain and pain management   Notify Licensed Independent Practitioner if interventions unsuccessful or patient reports new pain     Problem: Discharge Planning  Goal: Discharge to home or other facility with appropriate resources  Outcome: Progressing  Flowsheets (Taken 6/4/2024 2000)  Discharge to home or other facility with appropriate resources:   Identify barriers to discharge with patient and caregiver   Arrange for needed discharge resources and transportation as appropriate   Identify discharge learning needs (meds, wound care, etc)   Arrange for interpreters to assist at discharge as needed   Refer to discharge planning if patient needs post-hospital services based on physician order or complex needs related to functional status, cognitive ability or social support system     Problem: Safety - Medical Restraint  Goal: Remains free of injury from restraints (Restraint for Interference with Medical Device)  Description: INTERVENTIONS:  1. Determine that other, less restrictive measures have been tried or would not be effective before applying the restraint  2. Evaluate the patient's condition at the time of restraint application  3. Inform patient/family regarding the reason for restraint  4. Q2H: Monitor safety, psychosocial status, comfort, nutrition and hydration  Outcome: Progressing     Problem: Safety - Adult  Goal: Free from fall injury  Outcome: Progressing     Problem: Skin/Tissue Integrity  Goal: Absence of new skin breakdown  Description: 1.

## 2024-06-05 NOTE — PROGRESS NOTES
[facility]   [place]ility/Department[place] [facility] [place] [facility]  Speech Language Pathology    Tanya ORTIZ Liz  1940  [id number]    Date: 6/5/2024      Speech Therapy attempted to see Tanya oRdasez on this date for a/an:    Clinical Bedside Swallow Evaluation    Pt was unable to be seen due to:   Nursing deferred: ASHKAN Espinoza deferred swallow evaluation this date due to pts confusion. Will re-attempt at next opportunity. RN to page if pt becomes appropriate later this date.         Electronically signed by THUY Adam on 6/5/24 at 11:31 AM EDT

## 2024-06-05 NOTE — CARE COORDINATION
TEAM ICU ROUNDS COMPLETE. PER REPORT PATIENT EXTUBATED YESTERDAY AND RRT X 2 OVERNIGHT D/T WOB/STRIDOR. CURRENTLY ON HIGH FLOW 40%. PLAN FOR NG FOR NUTRITION.

## 2024-06-05 NOTE — CARE COORDINATION
Quality round completed with care management team. Pt off vent. Need PT/OT eval when able. Pt confuse at this time. No family at bedside.   Pt is from Home alone. Independent at baseline.      LSW will follow.     Electronically signed by GOOD Moreira on 6/5/2024 at 10:05 AM

## 2024-06-05 NOTE — PROGRESS NOTES
Spiritual Care Services     Summary of Visit:      Participated in ICU multidisciplinary team rounding, pt off the vent, no family present, pt was a rapid last night and intubation not needed, pt appears anxious, pt has no HCPOA,     Encounter Summary  Encounter Overview/Reason: Interdisciplinary rounds  Service Provided For: Patient  Referral/Consult From: Multi-disciplinary team  Support System: Children, Family members  Complexity of Encounter: Moderate  Begin Time: 0940  End Time : 0945  Total Time Calculated: 5 min     Crisis  Type: Rapid Response                         Spiritual Assessment/Intervention/Outcomes:    Assessment: Peaceful, Unable to assess    Intervention: Sustaining Presence/Ministry of presence    Outcome: Did not respond      Care Plan:    Plan and Referrals  Plan/Referrals: Continue to visit, (comment)          Spiritual Care Services   Electronically signed by Chaplain Yue on 6/5/2024 at 12:53 PM.    To reach a  for emotional and spiritual support, place an EPIC consult request.   If a  is needed immediately, dial “0” and ask to page the on-call .

## 2024-06-05 NOTE — PROGRESS NOTES
DVT / VTE PROPHYLAXIS EVALUATION    Recent Labs     06/03/24  0457 06/04/24  0454 06/04/24  0937 06/05/24  0510   BUN 26* 28*  29*  --  30*   CREATININE 0.58 0.63  0.66   < > 0.65   * 96*  --  121*   HGB 11.5* 10.5*   < > 11.0*   HCT 36.6* 32.6*  --  34.9*    < > = values in this interval not displayed.       Current order:  Enoxaparin 30 mg SUBQ once daily      Height: 157.5 cm (5' 2.01\"), Weight - Scale: 55.2 kg (121 lb 11.1 oz)  Estimated Creatinine Clearance: 50 mL/min (based on SCr of 0.65 mg/dL).    Plan:  Pharmacologic VTE prophylaxis modified based on patient weight per [facility]/P&T approved protocol     Patient Weight (kg)      50.9 and below .9 101-150.9 151-174.9 175 or greater   Estimated   CrCl  (ml/min) 30 or greater []   30 mg   SUBQ daily   [x]   40 mg   SUBQ daily (or 30 mg BID for orthopedic cases) []  30 mg SUBQ   BID  []  40 mg   SUBQ   BID []  60mg SUBQ BID    15-29.9 []  UFH 5000   units SUBQ BID []  30 mg   SUBQ daily [] 30 mg SUBQ   daily []  40 mg SUBQ   daily [] 60 mg SUBQ   daily    Less than 15 or dialysis []  UFH 5000   units SUBQ BID [] UFH 5000 units SUBQ TID []  UFH 7500   units   SUBQ TID     Monitor platelets

## 2024-06-05 NOTE — PROGRESS NOTES
ENT Progress Note    Subjective   No acute events overnight   Extubated to NC earlier today without complication      Objective:   Vitals:    06/04/24 2000   BP:    Pulse:    Resp:    Temp: 99.4 °F (37.4 °C)   SpO2:      Lab Results   Component Value Date    WBC 5.3 06/04/2024    HGB 12.5 06/04/2024    HCT 32.6 (L) 06/04/2024    MCV 93.1 06/04/2024    PLT 96 (L) 06/04/2024       Lab Results   Component Value Date     (H) 06/04/2024     06/04/2024    K 3.4 06/04/2024    K 3.5 06/04/2024     (H) 06/04/2024     (H) 06/04/2024    CO2 25 06/04/2024    CO2 24 06/04/2024    BUN 28 (H) 06/04/2024    BUN 29 (H) 06/04/2024    CREATININE 0.7 06/04/2024    GLUCOSE 148 (H) 06/04/2024    GLUCOSE 145 (H) 06/04/2024    CALCIUM 7.9 (L) 06/04/2024    CALCIUM 8.1 (L) 06/04/2024    PROT 6.5 09/14/2015    BILITOT 0.3 06/01/2024    ALKPHOS 54 06/01/2024    AST 14 06/01/2024    ALT 13 06/01/2024    LABGLOM 85 06/04/2024    GFRAA >60.0 08/07/2022    GLOB 2.7 06/01/2024       Exam   General- laying in bed  Oral cavity- no trismus, no appreciable tongue swelling or firmness  Neck- soft, no induration, or fluctuance, no erythema  Resp- nonlabored breathing, no stridor, no excessive secretions    A/P   84y female taken emergently to OR 5/31 for airway compromise due to base of tongue abscess. Biopsies taken in OR of left oropharynx- pending . Purulent drainage from BOT sent for culture.  Significant laryngeal swelling requiring intubation, now extubated    Continue IV abx - Unasyn - until transition to oral abx (Augmentin). WBC normalized  Follow up cultures- NGTD --> oral mi  Follow up biopsy results- still pending  OK for SLP to eval and trial PO   Transition to hospitalist primary with ENT on consult    Diana Suh MD

## 2024-06-05 NOTE — PROGRESS NOTES
Internal Medicine   Hospitalist   Progress Note    2024   11:16 AM    Name:  Tanya Liz  MRN:    [id number]     IP Day: 5     Admit Date: 2024 11:11 AM  PCP: Madi Kelly DO    Code Status:  Full Code    Assessment and Plan:        Active Problems/ diagnosis:     Oropharyngeal abscess  Diabetes  Acute respiratory insufficiency due to abscess  Hypertension    Plan  Rapid response was called last evening for respiratory distress and restlessness, she was put on BiPAP and stayed on it all night and now she is on high flow nasal cannula this morning.  She is on Precedex infusion  Resume lisinopril, monitor blood pressure  Resume steroids and antibiotics  Critical care and ENT are following  Resume current medications otherwise    DVT PPx     7 pm- 7 am, please contact on call Hospitalist for any needs     Subjective:     On high flow nasal cannula.  Discussed with RN.  Patient is sleepy.  She is on Precedex.    Physical Examination:      Vitals:  BP (!) 159/48   Pulse 68   Temp 98.3 °F (36.8 °C) (Oral)   Resp 24   Ht 1.575 m (5' 2.01\")   Wt 55.2 kg (121 lb 11.1 oz)   LMP  (LMP Unknown)   SpO2 96%   BMI 22.25 kg/m²   Temp (24hrs), Av.4 °F (36.9 °C), Min:97.5 °F (36.4 °C), Max:99.4 °F (37.4 °C)      General appearance: Sleepy.  Mental Status: Deferred  Lungs: Clear bilaterally  Heart: regular rate and rhythm, no murmur  Abdomen: soft, nondistended, bowel sounds present, no masses  Extremities: no edema, redness  Skin: no gross lesions, rashes    Data:     Labs:  Recent Labs     24  0454 24  0937 24  0239 24  0510   WBC 5.3  --   --  5.4   HGB 10.5*   < > 11.5* 11.0*   PLT 96*  --   --  121*    < > = values in this interval not displayed.     Recent Labs     24  0454 24  0937 24  0239 24  0510   *  144  --   --  144   K 3.4  3.5  --   --  4.8   *  112*  --   --  108*   CO2 25  24  --   --  30   BUN 28*  29*  --   --  30*   CREATININE  0.63  0.66   < > 0.7 0.65   GLUCOSE 148*  145*  --   --  135*    < > = values in this interval not displayed.     No results for input(s): \"AST\", \"ALT\", \"BILITOT\", \"ALKPHOS\" in the last 72 hours.    Invalid input(s): \"ALB\"      Current Facility-Administered Medications   Medication Dose Route Frequency Provider Last Rate Last Admin    dexAMETHasone (DECADRON) injection 4 mg  4 mg IntraVENous Q6H Thompson Presley MD   4 mg at 06/05/24 0940    enalaprilat (VASOTEC) injection 1.25 mg  1.25 mg IntraVENous 4 times per day Thompson Presley MD   1.25 mg at 06/05/24 0945    haloperidol lactate (HALDOL) injection 2 mg  2 mg IntraVENous Q4H PRN Thompson Presley MD   2 mg at 06/05/24 1053    [START ON 6/6/2024] enoxaparin (LOVENOX) injection 40 mg  40 mg SubCUTAneous Daily Diana Suh MD        hydrALAZINE (APRESOLINE) injection 20 mg  20 mg IntraVENous Q4H PRN Mary Beth Yepez APRN - CNP   20 mg at 06/05/24 0941    HYDROmorphone (DILAUDID) injection 0.25 mg  0.25 mg IntraVENous Q4H PRN Colton Daly R, DO   0.25 mg at 06/04/24 1835    ipratropium 0.5 mg-albuterol 2.5 mg (DUONEB) nebulizer solution 1 Dose  1 Dose Inhalation Q4H PRN Uri David MD   1 Dose at 06/04/24 2042    lidocaine 4 % external patch 1 patch  1 patch TransDERmal Daily Uri David MD   1 patch at 06/05/24 0807    dexmedeTOMIDine (PRECEDEX) 1,000 mcg in sodium chloride 0.9 % 250 mL infusion  0.1-1.5 mcg/kg/hr IntraVENous Continuous Uri David MD 2.82 mL/hr at 06/05/24 1028 0.2 mcg/kg/hr at 06/05/24 1028    docusate (COLACE) 50 MG/5ML liquid 100 mg  100 mg Oral BID Thompson Presley MD   100 mg at 06/03/24 1959    polyethylene glycol (GLYCOLAX) packet 17 g  17 g Oral Daily Thompson Presley MD   17 g at 06/03/24 1324    verapamil (CALAN) tablet 40 mg  40 mg Oral 3 times per day Mary Beth Yepez APRN - CNP   40 mg at 06/04/24 0547    atorvastatin (LIPITOR) tablet 40 mg  40 mg Oral Nightly Aurelia Hung MD   40 mg at 06/03/24 1959    [Held

## 2024-06-05 NOTE — SIGNIFICANT EVENT
Rapid response note  Rapid response was called because patient was tachypneic with worsening shortness of breath.  She was having bilateral wheezing.  DuoNeb and Solu-Medrol given.  No significant abnormality in her vitals.  She was not hypoxic.  ABG was okay.  She continued to be short of breath and restless and another rapid response was called.  Her wheezing improved significantly after treatment.  She was placed on BiPAP and started on Precedex to help with her restlessness.  Additional Ativan was given as well.  Her restlessness resolved and patient improved.  Will continue monitoring patient closely.    40 minutes of CC time total      Electronically signed by Uri David MD on 6/4/2024 at 11:42 PM

## 2024-06-05 NOTE — FLOWSHEET NOTE
This am patient yelling out and crying. Patient confused and inconsolable. Patient started on precedex and given ivp haldol.

## 2024-06-05 NOTE — CONSULTS
Palliative Care Consult Note  Patient: Tanya Liz  Gender: female  YOB: 1940  Unit/Bed: IC09/IC09-01  CodeStatus: Full Code  Inpatient Treatment Team: Treatment Team: Attending Provider: Colton Daly DO; Consulting Physician: Diana Suh MD; Surgeon: Diana Suh MD; Consulting Physician: Thompson Presley MD; Advanced Practice Nurse: Mary Beth Yepez APRN - CNP; Consulting Physician: Colton Daly DO; Utilization Reviewer: Lorena Haider RN; Registered Nurse: Olga Ovalle RN; : Crow Lebron LSW  Admit Date:  5/31/2024    Chief Complaint: Oropharyngeal abscess     History of Presenting Illness:      Tanya Liz is a 84 y.o. female on hospital day 5 with a history of HTN, HLD, T2DM, GERD, cerebrovascular disease, and depression.    Patient was brought to the emergency room with pharyngitis, difficulty swallowing, diarrhea, nausea. CT findings show Abscess involving left posterior base of the tongue extending posteriorly.  Patient was admitted in the setting of Oropharyngeal abscess .    Palliative care was consulted by Critical care team  for goals of care.     Upon entering the room I find the patient alert and oriented x 1, on high flow nasal cannula, NG tube in place. She is moaning and restless. She is arousable.  Precedex gtt started. Haldol given PRN. No family at bedside.     Most recent notable labs:   Sodium: 144 Potassium: 4.8 BUN: 30 Creat: 0.65 Albumin: 3.0 WBC: 5.4 Hemoglobin: 11.0 Hematocrit: 34.9      Review of Systems:       Review of Systems   Unable to perform ROS: Mental status change       Physical Examination:       BP (!) 139/51   Pulse 66   Temp 98.3 °F (36.8 °C) (Oral)   Resp 15   Ht 1.575 m (5' 2.01\")   Wt 55.2 kg (121 lb 11.1 oz)   LMP  (LMP Unknown)   SpO2 99%   BMI 22.25 kg/m²      Physical Exam  Constitutional:       Comments: High flow NC   Cardiovascular:      Rate and Rhythm: Normal rate.   Pulmonary:      Effort: Pulmonary  Onset    Other Mother         Neurological Disease    Cancer Sister     Diabetes Brother     Cancer Brother         leukemia       LABS: Reviewed     CBC:  Lab Results   Component Value Date/Time    WBC 5.4 06/05/2024 05:10 AM    RBC 3.73 06/05/2024 05:10 AM    RBC 4.33 09/22/2011 08:33 AM    HGB 11.0 06/05/2024 05:10 AM    HCT 34.9 06/05/2024 05:10 AM    MCV 93.6 06/05/2024 05:10 AM    MCH 29.5 06/05/2024 05:10 AM    MCHC 31.5 06/05/2024 05:10 AM    RDW 13.8 06/05/2024 05:10 AM     06/05/2024 05:10 AM    MPV 9.5 10/10/2013 08:59 AM     CBC with Differential:   Lab Results   Component Value Date/Time    WBC 5.4 06/05/2024 05:10 AM    RBC 3.73 06/05/2024 05:10 AM    RBC 4.33 09/22/2011 08:33 AM    HGB 11.0 06/05/2024 05:10 AM    HCT 34.9 06/05/2024 05:10 AM     06/05/2024 05:10 AM    MCV 93.6 06/05/2024 05:10 AM    MCH 29.5 06/05/2024 05:10 AM    MCHC 31.5 06/05/2024 05:10 AM    RDW 13.8 06/05/2024 05:10 AM    LYMPHOPCT 11.5 06/05/2024 05:10 AM    MONOPCT 4.8 06/05/2024 05:10 AM    EOSPCT 0.0 06/05/2024 05:10 AM    BASOPCT 0.2 06/05/2024 05:10 AM    MONOSABS 0.3 06/05/2024 05:10 AM    EOSABS 0.0 06/05/2024 05:10 AM    BASOSABS 0.0 06/05/2024 05:10 AM     CMP:    Lab Results   Component Value Date/Time     06/05/2024 05:10 AM    K 4.8 06/05/2024 05:10 AM     06/05/2024 05:10 AM    CO2 30 06/05/2024 05:10 AM    BUN 30 06/05/2024 05:10 AM    CREATININE 0.65 06/05/2024 05:10 AM    CREATININE 0.7 06/05/2024 02:39 AM    GFRAA >60.0 08/07/2022 01:00 PM    LABGLOM 86.0 06/05/2024 05:10 AM    LABGLOM >60.0 02/19/2024 01:37 PM    GLUCOSE 135 06/05/2024 05:10 AM    GLUCOSE 99 09/22/2011 08:33 AM    PROT 6.5 09/14/2015 12:00 AM    CALCIUM 9.1 06/05/2024 05:10 AM    BILITOT 0.3 06/01/2024 08:45 AM    ALKPHOS 54 06/01/2024 08:45 AM    AST 14 06/01/2024 08:45 AM    ALT 13 06/01/2024 08:45 AM     BMP:    Lab Results   Component Value Date/Time     06/05/2024 05:10 AM    K 4.8 06/05/2024 05:10 AM    CL

## 2024-06-05 NOTE — PROGRESS NOTES
Comprehensive Nutrition Assessment    Type and Reason for Visit:  Reassess    Nutrition Recommendations/Plan:   Begin standard with fiber TF (Jevity 1.5) @ 20 ml/hr   Increase rate by 10 ml q 4 hrs as tolerated to a goal rate of 40 ml/hr   100 ml water flush every 4 hrs      Malnutrition Assessment:  Malnutrition Status:  At risk for malnutrition (Comment) (06/01/24 1204)    Context:  Social/Environmental Circumstances     Findings of the 6 clinical characteristics of malnutrition:  Energy Intake:  Mild decrease in energy intake (Comment)  Weight Loss:  No significant weight loss     Body Fat Loss:  No significant body fat loss     Muscle Mass Loss:  No significant muscle mass loss    Fluid Accumulation:  No significant fluid accumulation     Strength:  Not Performed    Nutrition Assessment:    Pt was extubated yesterday, unable to participate in swallowing evaluation due to confusion.  NGT placed, TF order placed.    Nutrition Related Findings:    PMHx: HTN, HLD, T2DM, GERD, cerebrovascular disease.  Pt presents with pharyngitis x 4 days PTA with dysphagia and decreased appetite d/t nausea. CT showed Oropharyngeal mass. 5/31: Laryngoscopy with biopsy, with I&D of oropharyngeal abscess, flexible esophagoscopy. Pt left intubated s/p procedure d/t throat swelling and concern for airway compromise if extubated. Labs/meds noted. on steroids. (Gluc<160).+loose stools.  Extubated 6/4, NGT replaced 6/5, unable to complete BSE due to confusion (contantly crying), requiring sedation Wound Type: None       Current Nutrition Intake & Therapies:    Average Meal Intake: NPO  Average Supplements Intake: NPO  Diet NPO  ADULT TUBE FEEDING; Nasogastric; Peptide Based; Continuous; 20; Yes; 20; Q 4 hours; 40; 100; Q 4 hours  Current Tube Feeding (TF) Orders:  Feeding Route: Nasogastric  Formula: Standard with Fiber (Jevity 1.5)  Schedule: Continuous  Feeding Regimen: 20 ml/hr, increase to goal rate of 40 ml/hr (960  Swallowing    Discharge Planning:    Too soon to determine     Rita Moser RD, LD

## 2024-06-05 NOTE — PROGRESS NOTES
Pulmonary & Critical Care Medicine ICU Progress Note  Chief complaint : Respiratory insufficiency    Subjunctive/24 hour events :   Patient seen and examined during multidisciplinary rounds with RN, charge nurse, RT, pharmacy, dietitian, and social service.   Awake extubated yesterday, had a rough night, requiring BiPAP overnight, reported stridor, she required Precedex drip, she received 1 dose of Solu-Medrol 40 mg, urine output 1500 cc, +3.7 L,+ bowel movement  She is currently on high flow oxygen 50 L 40%    New information updated in the note today, rest of the examination did not change compared to yesterday.  Social History     Tobacco Use    Smoking status: Never    Smokeless tobacco: Never   Substance Use Topics    Alcohol use: No         Problem Relation Age of Onset    Other Mother         Neurological Disease    Cancer Sister     Diabetes Brother     Cancer Brother         leukemia       Recent Labs     06/04/24  1913 06/05/24  0239   PHART 7.442 7.396   [id number] 47* 49*   PO2ART 92* 111*         MV Settings:  Vent Mode: (S) CPAP/PS Resp Rate (Set): 28 bpm/Vt (Set, mL): 330 mL/ /FiO2 : (S) 40 %           IV:   dexmedeTOMIDine (PRECEDEX) 1,000 mcg in sodium chloride 0.9 % 250 mL infusion Stopped (06/04/24 2324)    sodium chloride      dextrose         Vitals:  BP (!) 139/51   Pulse 66   Temp 98.3 °F (36.8 °C) (Oral)   Resp 15   Ht 1.575 m (5' 2.01\")   Wt 55.2 kg (121 lb 11.1 oz)   LMP  (LMP Unknown)   SpO2 99%   BMI 22.25 kg/m²    Tmax:        Intake/Output Summary (Last 24 hours) at 6/5/2024 0837  Last data filed at 6/5/2024 0637  Gross per 24 hour   Intake 628.96 ml   Output 1550 ml   Net -921.04 ml         EXAM:  General: Awake slightly anxious  Head: normocephalic, atraumatic  Eyes:No gross abnormalities.  ENT:  MMM no lesions  Neck:  supple and no masses, no stridor  Chest : Good air movement, no wheezing, no rales, nontender, tympanic  Heart:: Heart sounds are normal.  Regular rate and rhythm

## 2024-06-06 LAB
ALBUMIN SERPL-MCNC: 3 G/DL (ref 3.5–4.6)
ANION GAP SERPL CALCULATED.3IONS-SCNC: 8 MEQ/L (ref 9–15)
BASOPHILS # BLD: 0 K/UL (ref 0–0.2)
BASOPHILS NFR BLD: 0.1 %
BUN SERPL-MCNC: 40 MG/DL (ref 8–23)
CALCIUM SERPL-MCNC: 9.2 MG/DL (ref 8.5–9.9)
CHLORIDE SERPL-SCNC: 105 MEQ/L (ref 95–107)
CO2 SERPL-SCNC: 29 MEQ/L (ref 20–31)
CREAT SERPL-MCNC: 0.63 MG/DL (ref 0.5–0.9)
CULTURE SURGICAL: NORMAL
EKG ATRIAL RATE: 54 BPM
EKG P AXIS: 48 DEGREES
EKG P-R INTERVAL: 136 MS
EKG Q-T INTERVAL: 420 MS
EKG QRS DURATION: 90 MS
EKG QTC CALCULATION (BAZETT): 398 MS
EKG R AXIS: -4 DEGREES
EKG T AXIS: 10 DEGREES
EKG VENTRICULAR RATE: 54 BPM
EOSINOPHIL # BLD: 0 K/UL (ref 0–0.7)
EOSINOPHIL NFR BLD: 0 %
ERYTHROCYTE [DISTWIDTH] IN BLOOD BY AUTOMATED COUNT: 13.7 % (ref 11.5–14.5)
GLUCOSE BLD-MCNC: 110 MG/DL (ref 70–99)
GLUCOSE BLD-MCNC: 134 MG/DL (ref 70–99)
GLUCOSE BLD-MCNC: 151 MG/DL (ref 70–99)
GLUCOSE BLD-MCNC: 158 MG/DL (ref 70–99)
GLUCOSE BLD-MCNC: 168 MG/DL (ref 70–99)
GLUCOSE SERPL-MCNC: 157 MG/DL (ref 70–99)
HCT VFR BLD AUTO: 35.2 % (ref 37–47)
HGB BLD-MCNC: 11.3 G/DL (ref 12–16)
LYMPHOCYTES # BLD: 0.7 K/UL (ref 1–4.8)
LYMPHOCYTES NFR BLD: 9.2 %
MCH RBC QN AUTO: 29.5 PG (ref 27–31.3)
MCHC RBC AUTO-ENTMCNC: 32.1 % (ref 33–37)
MCV RBC AUTO: 91.9 FL (ref 79.4–94.8)
MONOCYTES # BLD: 0.6 K/UL (ref 0.2–0.8)
MONOCYTES NFR BLD: 7.6 %
NEUTROPHILS # BLD: 6.6 K/UL (ref 1.4–6.5)
NEUTS SEG NFR BLD: 82.1 %
PERFORMED ON: ABNORMAL
PHOSPHATE SERPL-MCNC: 3.6 MG/DL (ref 2.3–4.8)
PLATELET # BLD AUTO: 151 K/UL (ref 130–400)
POTASSIUM SERPL-SCNC: 4.5 MEQ/L (ref 3.4–4.9)
RBC # BLD AUTO: 3.83 M/UL (ref 4.2–5.4)
SODIUM SERPL-SCNC: 142 MEQ/L (ref 135–144)
WBC # BLD AUTO: 8 K/UL (ref 4.8–10.8)

## 2024-06-06 PROCEDURE — 94660 CPAP INITIATION&MGMT: CPT

## 2024-06-06 PROCEDURE — 2580000003 HC RX 258: Performed by: INTERNAL MEDICINE

## 2024-06-06 PROCEDURE — 6360000002 HC RX W HCPCS: Performed by: INTERNAL MEDICINE

## 2024-06-06 PROCEDURE — 2700000000 HC OXYGEN THERAPY PER DAY

## 2024-06-06 PROCEDURE — 92610 EVALUATE SWALLOWING FUNCTION: CPT

## 2024-06-06 PROCEDURE — A4216 STERILE WATER/SALINE, 10 ML: HCPCS | Performed by: STUDENT IN AN ORGANIZED HEALTH CARE EDUCATION/TRAINING PROGRAM

## 2024-06-06 PROCEDURE — 6360000002 HC RX W HCPCS: Performed by: NURSE PRACTITIONER

## 2024-06-06 PROCEDURE — 94761 N-INVAS EAR/PLS OXIMETRY MLT: CPT

## 2024-06-06 PROCEDURE — 36415 COLL VENOUS BLD VENIPUNCTURE: CPT

## 2024-06-06 PROCEDURE — 6360000002 HC RX W HCPCS: Performed by: STUDENT IN AN ORGANIZED HEALTH CARE EDUCATION/TRAINING PROGRAM

## 2024-06-06 PROCEDURE — 6370000000 HC RX 637 (ALT 250 FOR IP): Performed by: INTERNAL MEDICINE

## 2024-06-06 PROCEDURE — 2000000000 HC ICU R&B

## 2024-06-06 PROCEDURE — 99291 CRITICAL CARE FIRST HOUR: CPT | Performed by: INTERNAL MEDICINE

## 2024-06-06 PROCEDURE — 80069 RENAL FUNCTION PANEL: CPT

## 2024-06-06 PROCEDURE — 2580000003 HC RX 258: Performed by: STUDENT IN AN ORGANIZED HEALTH CARE EDUCATION/TRAINING PROGRAM

## 2024-06-06 PROCEDURE — 2500000003 HC RX 250 WO HCPCS: Performed by: INTERNAL MEDICINE

## 2024-06-06 PROCEDURE — 6370000000 HC RX 637 (ALT 250 FOR IP): Performed by: NURSE PRACTITIONER

## 2024-06-06 PROCEDURE — 2500000003 HC RX 250 WO HCPCS: Performed by: STUDENT IN AN ORGANIZED HEALTH CARE EDUCATION/TRAINING PROGRAM

## 2024-06-06 PROCEDURE — 6370000000 HC RX 637 (ALT 250 FOR IP): Performed by: STUDENT IN AN ORGANIZED HEALTH CARE EDUCATION/TRAINING PROGRAM

## 2024-06-06 PROCEDURE — 85025 COMPLETE CBC W/AUTO DIFF WBC: CPT

## 2024-06-06 PROCEDURE — 99232 SBSQ HOSP IP/OBS MODERATE 35: CPT

## 2024-06-06 RX ORDER — ENALAPRILAT 1.25 MG/ML
2.5 INJECTION INTRAVENOUS EVERY 6 HOURS SCHEDULED
Status: DISCONTINUED | OUTPATIENT
Start: 2024-06-06 | End: 2024-06-07

## 2024-06-06 RX ORDER — QUETIAPINE FUMARATE 25 MG/1
12.5 TABLET, FILM COATED ORAL 2 TIMES DAILY
Status: DISCONTINUED | OUTPATIENT
Start: 2024-06-06 | End: 2024-06-06

## 2024-06-06 RX ORDER — FUROSEMIDE 10 MG/ML
20 INJECTION INTRAMUSCULAR; INTRAVENOUS ONCE
Status: COMPLETED | OUTPATIENT
Start: 2024-06-06 | End: 2024-06-06

## 2024-06-06 RX ADMIN — AMPICILLIN SODIUM AND SULBACTAM SODIUM 3000 MG: 2; 1 INJECTION, POWDER, FOR SOLUTION INTRAMUSCULAR; INTRAVENOUS at 06:33

## 2024-06-06 RX ADMIN — VERAPAMIL HYDROCHLORIDE 40 MG: 40 TABLET ORAL at 04:25

## 2024-06-06 RX ADMIN — POLYETHYLENE GLYCOL 3350 17 G: 17 POWDER, FOR SOLUTION ORAL at 08:59

## 2024-06-06 RX ADMIN — DEXAMETHASONE SODIUM PHOSPHATE 4 MG: 4 INJECTION, SOLUTION INTRAMUSCULAR; INTRAVENOUS at 04:25

## 2024-06-06 RX ADMIN — AMPICILLIN SODIUM AND SULBACTAM SODIUM 3000 MG: 2; 1 INJECTION, POWDER, FOR SOLUTION INTRAMUSCULAR; INTRAVENOUS at 12:20

## 2024-06-06 RX ADMIN — Medication 10 ML: at 09:00

## 2024-06-06 RX ADMIN — MIRTAZAPINE 7.5 MG: 15 TABLET, FILM COATED ORAL at 20:17

## 2024-06-06 RX ADMIN — HYDROMORPHONE HYDROCHLORIDE 0.25 MG: 1 INJECTION, SOLUTION INTRAMUSCULAR; INTRAVENOUS; SUBCUTANEOUS at 18:04

## 2024-06-06 RX ADMIN — ENALAPRILAT 1.25 MG: 1.25 INJECTION INTRAVENOUS at 04:25

## 2024-06-06 RX ADMIN — VERAPAMIL HYDROCHLORIDE 40 MG: 40 TABLET ORAL at 15:54

## 2024-06-06 RX ADMIN — HYDRALAZINE HYDROCHLORIDE 20 MG: 20 INJECTION INTRAMUSCULAR; INTRAVENOUS at 01:16

## 2024-06-06 RX ADMIN — DOCUSATE SODIUM 100 MG: 50 LIQUID ORAL at 08:58

## 2024-06-06 RX ADMIN — ENOXAPARIN SODIUM 40 MG: 100 INJECTION SUBCUTANEOUS at 08:59

## 2024-06-06 RX ADMIN — SODIUM CHLORIDE, PRESERVATIVE FREE 20 MG: 5 INJECTION INTRAVENOUS at 20:17

## 2024-06-06 RX ADMIN — TRAZODONE HYDROCHLORIDE 50 MG: 50 TABLET ORAL at 20:17

## 2024-06-06 RX ADMIN — HYDRALAZINE HYDROCHLORIDE 20 MG: 20 INJECTION INTRAMUSCULAR; INTRAVENOUS at 15:46

## 2024-06-06 RX ADMIN — AMPICILLIN SODIUM AND SULBACTAM SODIUM 3000 MG: 2; 1 INJECTION, POWDER, FOR SOLUTION INTRAMUSCULAR; INTRAVENOUS at 18:09

## 2024-06-06 RX ADMIN — ENALAPRILAT 2.5 MG: 1.25 INJECTION INTRAVENOUS at 12:21

## 2024-06-06 RX ADMIN — VERAPAMIL HYDROCHLORIDE 40 MG: 40 TABLET ORAL at 20:17

## 2024-06-06 RX ADMIN — DEXMEDETOMIDINE 0.4 MCG/KG/HR: 100 INJECTION, SOLUTION INTRAVENOUS at 20:27

## 2024-06-06 RX ADMIN — SODIUM CHLORIDE, PRESERVATIVE FREE 20 MG: 5 INJECTION INTRAVENOUS at 08:58

## 2024-06-06 RX ADMIN — SERTRALINE 100 MG: 100 TABLET, FILM COATED ORAL at 08:59

## 2024-06-06 RX ADMIN — DOCUSATE SODIUM 100 MG: 50 LIQUID ORAL at 20:17

## 2024-06-06 RX ADMIN — ATORVASTATIN CALCIUM 40 MG: 40 TABLET, FILM COATED ORAL at 20:17

## 2024-06-06 RX ADMIN — HYDRALAZINE HYDROCHLORIDE 20 MG: 20 INJECTION INTRAMUSCULAR; INTRAVENOUS at 09:03

## 2024-06-06 RX ADMIN — ENALAPRILAT 2.5 MG: 1.25 INJECTION INTRAVENOUS at 18:12

## 2024-06-06 RX ADMIN — FUROSEMIDE 20 MG: 10 INJECTION, SOLUTION INTRAMUSCULAR; INTRAVENOUS at 08:59

## 2024-06-06 ASSESSMENT — PAIN SCALES - GENERAL
PAINLEVEL_OUTOF10: 8
PAINLEVEL_OUTOF10: 0

## 2024-06-06 NOTE — PROGRESS NOTES
Palliative Care Progress Note  Patient: Tanya Liz  Gender: female  YOB: 1940  Unit/Bed: IC09/IC09-01  CodeStatus: Full Code  Inpatient Treatment Team: Treatment Team: Attending Provider: Colton Daly DO; Consulting Physician: Diana Suh MD; Surgeon: Diana Suh MD; Consulting Physician: Thompson Presley MD; Advanced Practice Nurse: Mary Beth Yepez APRN - CNP; Consulting Physician: Colton Daly DO; Utilization Reviewer: Lorena Haider RN; : Alba Silva; Registered Nurse: Anita Severino RN ([place]); : Crow Lebron LSW  Admit Date:  5/31/2024    Chief Complaint: Oropharyngeal abscess     History of Presenting Illness:      Tanya Liz is a 84 y.o. female on hospital day 6 with a history of HTN, HLD, T2DM, GERD, cerebrovascular disease, and depression.    Patient was brought to the emergency room with pharyngitis, difficulty swallowing, diarrhea, nausea. CT findings show Abscess involving left posterior base of the tongue extending posteriorly.  Patient was admitted in the setting of Oropharyngeal abscess .    Palliative care was consulted by Critical care team  for goals of care.     Upon entering the room I find the patient alert and oriented x 1, on high flow nasal cannula, NG tube in place. She is moaning and restless. She is arousable.  Precedex gtt started. Haldol given PRN. No family at bedside.     Most recent notable labs:   Sodium: 144 Potassium: 4.8 BUN: 30 Creat: 0.65 Albumin: 3.0 WBC: 5.4 Hemoglobin: 11.0 Hematocrit: 34.9    6/6/24:  Patient on high flow, NG tube in place. Attempting to decrease precedex gtt. No Family at bedside.  Patient has long standing history of dysphagia, unable to tolerate PO intake.       Review of Systems:       Review of Systems   Unable to perform ROS: Mental status change       Physical Examination:       /65   Pulse 55   Temp 99.1 °F (37.3 °C) (Oral)   Resp 19   Ht 1.575 m (5' 2.01\")   Wt 55.2 kg

## 2024-06-06 NOTE — CARE COORDINATION
Team rounds done this am with Dr. Presley and family not present. Pt on HHF O2 and had speech eval. She needs MBS now.  Pt has Pall care following.  Pt has new order for PT/OT eval to help determine safe plan. I called [facility] and she has no coverage for IV Antibiotics at home through Medicare but her [place] secondary will cover 100% per [facility] pharmacist. Per Dr. Suh's note po Augmentin on dc.

## 2024-06-06 NOTE — PROGRESS NOTES
Pulmonary & Critical Care Medicine ICU Progress Note  Chief complaint : Respiratory insufficiency    Subjunctive/24 hour events :   Patient seen and examined during multidisciplinary rounds with RN, charge nurse, RT, pharmacy, dietitian, and social service.      Awake, anxious, requiring Precedex, currently on 40% FiO2 saturation 99%, no fever, no apparent pain, UO 1L , + 3 L     New information updated in the note today, rest of the examination did not change compared to yesterday.  Social History     Tobacco Use    Smoking status: Never    Smokeless tobacco: Never   Substance Use Topics    Alcohol use: No         Problem Relation Age of Onset    Other Mother         Neurological Disease    Cancer Sister     Diabetes Brother     Cancer Brother         leukemia       Recent Labs     06/04/24  1913 06/05/24  0239   PHART 7.442 7.396   [id number] 47* 49*   PO2ART 92* 111*         MV Settings:  Vent Mode: (S) CPAP/PS Resp Rate (Set): 28 bpm/Vt (Set, mL): 330 mL/ /FiO2 : 46 %           IV:   dexmedeTOMIDine (PRECEDEX) 1,000 mcg in sodium chloride 0.9 % 250 mL infusion 0.4 mcg/kg/hr (06/06/24 0633)    sodium chloride      dextrose         Vitals:  /65   Pulse 57   Temp 99.1 °F (37.3 °C) (Oral)   Resp 23   Ht 1.575 m (5' 2.01\")   Wt 55.2 kg (121 lb 11.1 oz)   LMP  (LMP Unknown)   SpO2 99%   BMI 22.25 kg/m²    Tmax:        Intake/Output Summary (Last 24 hours) at 6/6/2024 0830  Last data filed at 6/6/2024 0633  Gross per 24 hour   Intake 811.96 ml   Output 1050 ml   Net -238.04 ml         EXAM:  General: Awake , no distress   Head: normocephalic, atraumatic  Eyes:No gross abnormalities.  ENT:  MMM no lesions  Neck:  supple and no masses, no stridor  Chest : Good air movement, no wheezing, no rales, nontender, tympanic  Heart:: Heart sounds are normal.  Regular rate and rhythm without murmur, gallop or rub.  ABD:  symmetric, soft, non-tender, no guarding or rebound  Musculoskeletal : no cyanosis, no clubbing, and

## 2024-06-06 NOTE — PROGRESS NOTES
[facility]   Facility/Department: [facility]  Speech Language Pathology  Clinical Bedside Swallow Evaluation    NAME:Tanya Liz  : 1940 (84 y.o.)   [x]   confirmed    MRN: [id number]  ROOM: [place] [address]  ADMISSION DATE: 2024  PATIENT DIAGNOSIS(ES): Peritonsillar abscess [J36]  Oropharyngeal mass [J39.2]  Oral abscess [K12.2]  Chief Complaint   Patient presents with    Pharyngitis     x4days    Nausea    Diarrhea     Patient Active Problem List    Diagnosis Date Noted    Advanced care planning/counseling discussion 2024    Goals of care, counseling/discussion 2024    Palliative care encounter 2024    Oropharyngeal mass 2024    Oral abscess 2024    Right rotator cuff tendonitis 2022    Basilar artery stenosis/occlusion 2021    Subcortical microvascular ischemic occlusive disease 2021    Easy bruising 2021    Black stools 2021    Chronic pruritus 01/15/2021    Adenomatous polyp of descending colon     Chronic idiopathic constipation     Postnasal drip 2019    Memory loss of unknown cause 2018    Chronic right shoulder pain 07/10/2018    Type 2 diabetes mellitus without complication (HCC) 04/10/2018    Lymphocytopenia 2018    Multiple actinic keratoses 2018    Age-related osteoporosis without current pathological fracture 2018    Depression with anxiety 2018    Gastroesophageal reflux disease without esophagitis 2015    Essential hypertension 10/05/2015     Past Medical History:   Diagnosis Date    Arthritis     Basilar artery stenosis/occlusion 2021    Chronic bronchitis (HCC)     Chronic pruritus 1/15/2021    COVID-19 virus infection 2021    Degenerative disc disease, cervical     Depression     Depression with anxiety     Easy bruising 2021    Esophagitis     Fibromyositis     GERD (gastroesophageal reflux disease)     Headache(784.0)     Hyperlipidemia     Hypertension     Migraines

## 2024-06-06 NOTE — PROGRESS NOTES
Spiritual Care Services     Summary of Visit:    Participated in ICU multidisciplinary team rounding, pt is off the vent and resting, no family present, pt has no HCPOA, pt has children who are NOK       Encounter Summary  Encounter Overview/Reason: Interdisciplinary rounds  Service Provided For: Patient  Referral/Consult From: Multi-disciplinary team, Rounding  Support System: Children, Family members  Complexity of Encounter: Moderate  Begin Time: 0940  End Time : 0945  Total Time Calculated: 5 min     Crisis  Type: Rapid Response     Rituals, Rites and Sacraments  Type: Sacrament of Sick, Anointing                   Spiritual Assessment/Intervention/Outcomes:    Assessment: Peaceful, Unable to assess    Intervention: Sustaining Presence/Ministry of presence    Outcome: Did not respond      Care Plan:    Plan and Referrals  Plan/Referrals: Continue to visit, (comment)          Spiritual Care Services   Electronically signed by Chaplain Yue on 6/6/2024 at 1:45 PM.    To reach a  for emotional and spiritual support, place an EPIC consult request.   If a  is needed immediately, dial “0” and ask to page the on-call .

## 2024-06-06 NOTE — PROGRESS NOTES
0.9 % injection 5-40 mL  5-40 mL IntraVENous PRN Diana Suh MD        0.9 % sodium chloride infusion   IntraVENous PRN Diana Suh MD        ondansetron (ZOFRAN-ODT) disintegrating tablet 4 mg  4 mg Oral Q8H PRN Diana Suh MD        Or    ondansetron (ZOFRAN) injection 4 mg  4 mg IntraVENous Q6H PRN Diana Suh MD        ampicillin-sulbactam (UNASYN) 3,000 mg in sodium chloride 0.9 % 100 mL IVPB (mini-bag)  3,000 mg IntraVENous Q6H Diana Suh  mL/hr at 06/06/24 1220 3,000 mg at 06/06/24 1220    famotidine (PEPCID) 20 mg in sodium chloride (PF) 0.9 % 10 mL injection  20 mg IntraVENous BID Diana Suh MD   20 mg at 06/06/24 0858    glucose chewable tablet 16 g  4 tablet Oral PRN Aurelia Hung MD        dextrose bolus 10% 125 mL  125 mL IntraVENous PRN Aurelia Hung MD        Or    dextrose bolus 10% 250 mL  250 mL IntraVENous PRN Aurelia Hung MD        glucagon injection 1 mg  1 mg SubCUTAneous PRN Aurelia Hung MD        dextrose 10 % infusion   IntraVENous Continuous PRN Aurelia Hung MD        insulin lispro (HUMALOG,ADMELOG) injection vial 0-8 Units  0-8 Units SubCUTAneous Q4H Aurelia Hung MD           New information updated in the note today, rest of the examination did not change compared to yesterday.    Additional work up or/and treatment plan may be added today or then after based on clinical progression. I am managing a portion of pt care. Some medical issues are handled by other specialists. Additional work up and treatment should be done in out pt setting by pt PCP and other out pt providers.      In addition to examining and evaluating pt, I spent additional time explaining care, normaland abnormal findings, and treatment plan. All of pt questions were answered. Counseling, diet and education were provided. Case will be discussed with nursing staff when appropriate. Family will be updated if and when appropriate.       Electronically signed by Colton BERRY  DO Addy on 6/6/2024 at 1:08 PM

## 2024-06-06 NOTE — CARE COORDINATION
Quality round completed with care management team. Pt off vent. Need PT/OT eval when able. Pt confuse at this time. No family at bedside.   Pt is from Home alone. Independent at baseline.     LSW spoke with pt's daughter, Ama via phone.   Discussed DC plan with Ama. Per Ama, pt does not want to go to a SNF at DC.   LSW discussed Rehab and [facility].   Ama declined Rehab and [facility] at this time. Would like to take pt home with [facility].     DC plan: HOME WITH [facility]; PEND PT/OT EVAL AT THIS TIME.     LSW will follow.     Electronically signed by GOOD Moreira on 6/6/2024 at 10:12 AM

## 2024-06-07 ENCOUNTER — APPOINTMENT (OUTPATIENT)
Dept: GENERAL RADIOLOGY | Age: 84
DRG: 137 | End: 2024-06-07
Payer: MEDICARE

## 2024-06-07 LAB
ALBUMIN SERPL-MCNC: 3.2 G/DL (ref 3.5–4.6)
ANION GAP SERPL CALCULATED.3IONS-SCNC: 8 MEQ/L (ref 9–15)
BASOPHILS # BLD: 0 K/UL (ref 0–0.2)
BASOPHILS NFR BLD: 0.2 %
BUN SERPL-MCNC: 45 MG/DL (ref 8–23)
CALCIUM SERPL-MCNC: 8.6 MG/DL (ref 8.5–9.9)
CHLORIDE SERPL-SCNC: 107 MEQ/L (ref 95–107)
CO2 SERPL-SCNC: 33 MEQ/L (ref 20–31)
CREAT SERPL-MCNC: 0.78 MG/DL (ref 0.5–0.9)
EOSINOPHIL # BLD: 0 K/UL (ref 0–0.7)
EOSINOPHIL NFR BLD: 0.5 %
ERYTHROCYTE [DISTWIDTH] IN BLOOD BY AUTOMATED COUNT: 13.5 % (ref 11.5–14.5)
GLUCOSE BLD-MCNC: 107 MG/DL (ref 70–99)
GLUCOSE BLD-MCNC: 115 MG/DL (ref 70–99)
GLUCOSE BLD-MCNC: 116 MG/DL (ref 70–99)
GLUCOSE BLD-MCNC: 117 MG/DL (ref 70–99)
GLUCOSE BLD-MCNC: 140 MG/DL (ref 70–99)
GLUCOSE BLD-MCNC: 144 MG/DL (ref 70–99)
GLUCOSE SERPL-MCNC: 124 MG/DL (ref 70–99)
HCT VFR BLD AUTO: 35.9 % (ref 37–47)
HGB BLD-MCNC: 11.5 G/DL (ref 12–16)
LYMPHOCYTES # BLD: 1.6 K/UL (ref 1–4.8)
LYMPHOCYTES NFR BLD: 24.5 %
MCH RBC QN AUTO: 29.6 PG (ref 27–31.3)
MCHC RBC AUTO-ENTMCNC: 32 % (ref 33–37)
MCV RBC AUTO: 92.5 FL (ref 79.4–94.8)
MONOCYTES # BLD: 0.7 K/UL (ref 0.2–0.8)
MONOCYTES NFR BLD: 11.6 %
NEUTROPHILS # BLD: 4 K/UL (ref 1.4–6.5)
NEUTS SEG NFR BLD: 62.1 %
PERFORMED ON: ABNORMAL
PHOSPHATE SERPL-MCNC: 3.5 MG/DL (ref 2.3–4.8)
PLATELET # BLD AUTO: 152 K/UL (ref 130–400)
POTASSIUM SERPL-SCNC: 3.9 MEQ/L (ref 3.4–4.9)
RBC # BLD AUTO: 3.88 M/UL (ref 4.2–5.4)
SODIUM SERPL-SCNC: 148 MEQ/L (ref 135–144)
WBC # BLD AUTO: 6.4 K/UL (ref 4.8–10.8)

## 2024-06-07 PROCEDURE — 94761 N-INVAS EAR/PLS OXIMETRY MLT: CPT

## 2024-06-07 PROCEDURE — 6370000000 HC RX 637 (ALT 250 FOR IP): Performed by: INTERNAL MEDICINE

## 2024-06-07 PROCEDURE — 6370000000 HC RX 637 (ALT 250 FOR IP): Performed by: STUDENT IN AN ORGANIZED HEALTH CARE EDUCATION/TRAINING PROGRAM

## 2024-06-07 PROCEDURE — 71045 X-RAY EXAM CHEST 1 VIEW: CPT

## 2024-06-07 PROCEDURE — 36415 COLL VENOUS BLD VENIPUNCTURE: CPT

## 2024-06-07 PROCEDURE — 6370000000 HC RX 637 (ALT 250 FOR IP): Performed by: NURSE PRACTITIONER

## 2024-06-07 PROCEDURE — 2700000000 HC OXYGEN THERAPY PER DAY

## 2024-06-07 PROCEDURE — 97167 OT EVAL HIGH COMPLEX 60 MIN: CPT

## 2024-06-07 PROCEDURE — 6360000002 HC RX W HCPCS: Performed by: STUDENT IN AN ORGANIZED HEALTH CARE EDUCATION/TRAINING PROGRAM

## 2024-06-07 PROCEDURE — 2580000003 HC RX 258

## 2024-06-07 PROCEDURE — 94660 CPAP INITIATION&MGMT: CPT

## 2024-06-07 PROCEDURE — 2580000003 HC RX 258: Performed by: INTERNAL MEDICINE

## 2024-06-07 PROCEDURE — 2500000003 HC RX 250 WO HCPCS: Performed by: INTERNAL MEDICINE

## 2024-06-07 PROCEDURE — 85025 COMPLETE CBC W/AUTO DIFF WBC: CPT

## 2024-06-07 PROCEDURE — 6360000002 HC RX W HCPCS: Performed by: INTERNAL MEDICINE

## 2024-06-07 PROCEDURE — 92526 ORAL FUNCTION THERAPY: CPT

## 2024-06-07 PROCEDURE — 80069 RENAL FUNCTION PANEL: CPT

## 2024-06-07 PROCEDURE — 97162 PT EVAL MOD COMPLEX 30 MIN: CPT

## 2024-06-07 PROCEDURE — A4216 STERILE WATER/SALINE, 10 ML: HCPCS | Performed by: STUDENT IN AN ORGANIZED HEALTH CARE EDUCATION/TRAINING PROGRAM

## 2024-06-07 PROCEDURE — 2500000003 HC RX 250 WO HCPCS: Performed by: STUDENT IN AN ORGANIZED HEALTH CARE EDUCATION/TRAINING PROGRAM

## 2024-06-07 PROCEDURE — 2000000000 HC ICU R&B

## 2024-06-07 PROCEDURE — 6360000002 HC RX W HCPCS: Performed by: NURSE PRACTITIONER

## 2024-06-07 PROCEDURE — 99291 CRITICAL CARE FIRST HOUR: CPT | Performed by: INTERNAL MEDICINE

## 2024-06-07 PROCEDURE — 2580000003 HC RX 258: Performed by: STUDENT IN AN ORGANIZED HEALTH CARE EDUCATION/TRAINING PROGRAM

## 2024-06-07 RX ORDER — OXYCODONE HYDROCHLORIDE 5 MG/1
5 TABLET ORAL EVERY 4 HOURS PRN
Status: DISCONTINUED | OUTPATIENT
Start: 2024-06-07 | End: 2024-06-13 | Stop reason: HOSPADM

## 2024-06-07 RX ORDER — LISINOPRIL 20 MG/1
40 TABLET ORAL DAILY
Status: DISCONTINUED | OUTPATIENT
Start: 2024-06-07 | End: 2024-06-08

## 2024-06-07 RX ORDER — 0.9 % SODIUM CHLORIDE 0.9 %
500 INTRAVENOUS SOLUTION INTRAVENOUS ONCE
Status: COMPLETED | OUTPATIENT
Start: 2024-06-07 | End: 2024-06-07

## 2024-06-07 RX ORDER — HYDRALAZINE HYDROCHLORIDE 50 MG/1
50 TABLET, FILM COATED ORAL EVERY 8 HOURS SCHEDULED
Status: DISCONTINUED | OUTPATIENT
Start: 2024-06-07 | End: 2024-06-08

## 2024-06-07 RX ORDER — AMLODIPINE BESYLATE 10 MG/1
10 TABLET ORAL DAILY
Status: DISCONTINUED | OUTPATIENT
Start: 2024-06-07 | End: 2024-06-07

## 2024-06-07 RX ORDER — SODIUM CHLORIDE 9 MG/ML
INJECTION, SOLUTION INTRAVENOUS
Status: COMPLETED
Start: 2024-06-07 | End: 2024-06-07

## 2024-06-07 RX ORDER — HYDRALAZINE HYDROCHLORIDE 10 MG/1
10 TABLET, FILM COATED ORAL EVERY 8 HOURS SCHEDULED
Status: DISCONTINUED | OUTPATIENT
Start: 2024-06-07 | End: 2024-06-07

## 2024-06-07 RX ADMIN — Medication 10 ML: at 20:37

## 2024-06-07 RX ADMIN — HYDRALAZINE HYDROCHLORIDE 20 MG: 20 INJECTION INTRAMUSCULAR; INTRAVENOUS at 04:19

## 2024-06-07 RX ADMIN — SODIUM CHLORIDE, PRESERVATIVE FREE 20 MG: 5 INJECTION INTRAVENOUS at 08:35

## 2024-06-07 RX ADMIN — OXYCODONE 5 MG: 5 TABLET ORAL at 11:08

## 2024-06-07 RX ADMIN — AMPICILLIN SODIUM AND SULBACTAM SODIUM 3000 MG: 2; 1 INJECTION, POWDER, FOR SOLUTION INTRAMUSCULAR; INTRAVENOUS at 01:32

## 2024-06-07 RX ADMIN — Medication 500 ML: at 23:40

## 2024-06-07 RX ADMIN — OXYCODONE 5 MG: 5 TABLET ORAL at 17:28

## 2024-06-07 RX ADMIN — DEXMEDETOMIDINE 0.4 MCG/KG/HR: 100 INJECTION, SOLUTION INTRAVENOUS at 01:31

## 2024-06-07 RX ADMIN — SERTRALINE 100 MG: 100 TABLET, FILM COATED ORAL at 08:34

## 2024-06-07 RX ADMIN — HYDRALAZINE HYDROCHLORIDE 20 MG: 20 INJECTION INTRAMUSCULAR; INTRAVENOUS at 16:32

## 2024-06-07 RX ADMIN — LISINOPRIL 40 MG: 20 TABLET ORAL at 10:53

## 2024-06-07 RX ADMIN — DOCUSATE SODIUM 100 MG: 50 LIQUID ORAL at 08:34

## 2024-06-07 RX ADMIN — AMPICILLIN SODIUM AND SULBACTAM SODIUM 3000 MG: 2; 1 INJECTION, POWDER, FOR SOLUTION INTRAMUSCULAR; INTRAVENOUS at 17:35

## 2024-06-07 RX ADMIN — SODIUM CHLORIDE 500 ML: 9 INJECTION, SOLUTION INTRAVENOUS at 23:40

## 2024-06-07 RX ADMIN — AMPICILLIN SODIUM AND SULBACTAM SODIUM 3000 MG: 2; 1 INJECTION, POWDER, FOR SOLUTION INTRAMUSCULAR; INTRAVENOUS at 05:47

## 2024-06-07 RX ADMIN — VERAPAMIL HYDROCHLORIDE 40 MG: 40 TABLET ORAL at 05:46

## 2024-06-07 RX ADMIN — TRAZODONE HYDROCHLORIDE 50 MG: 50 TABLET ORAL at 20:36

## 2024-06-07 RX ADMIN — AMPICILLIN SODIUM AND SULBACTAM SODIUM 3000 MG: 2; 1 INJECTION, POWDER, FOR SOLUTION INTRAMUSCULAR; INTRAVENOUS at 12:35

## 2024-06-07 RX ADMIN — ENALAPRILAT 2.5 MG: 1.25 INJECTION INTRAVENOUS at 01:31

## 2024-06-07 RX ADMIN — MIRTAZAPINE 7.5 MG: 15 TABLET, FILM COATED ORAL at 20:36

## 2024-06-07 RX ADMIN — ENOXAPARIN SODIUM 40 MG: 100 INJECTION SUBCUTANEOUS at 08:34

## 2024-06-07 RX ADMIN — ATORVASTATIN CALCIUM 40 MG: 40 TABLET, FILM COATED ORAL at 20:36

## 2024-06-07 RX ADMIN — HYDROMORPHONE HYDROCHLORIDE 0.25 MG: 1 INJECTION, SOLUTION INTRAMUSCULAR; INTRAVENOUS; SUBCUTANEOUS at 20:33

## 2024-06-07 RX ADMIN — HYDRALAZINE HYDROCHLORIDE 10 MG: 10 TABLET ORAL at 13:17

## 2024-06-07 RX ADMIN — ENALAPRILAT 2.5 MG: 1.25 INJECTION INTRAVENOUS at 05:46

## 2024-06-07 RX ADMIN — HYDRALAZINE HYDROCHLORIDE 20 MG: 20 INJECTION INTRAMUSCULAR; INTRAVENOUS at 08:31

## 2024-06-07 RX ADMIN — VERAPAMIL HYDROCHLORIDE 40 MG: 40 TABLET ORAL at 14:59

## 2024-06-07 RX ADMIN — Medication 10 ML: at 08:36

## 2024-06-07 RX ADMIN — HYDRALAZINE HYDROCHLORIDE 50 MG: 50 TABLET ORAL at 18:29

## 2024-06-07 RX ADMIN — POLYETHYLENE GLYCOL 3350 17 G: 17 POWDER, FOR SOLUTION ORAL at 08:34

## 2024-06-07 RX ADMIN — DOCUSATE SODIUM 100 MG: 50 LIQUID ORAL at 20:36

## 2024-06-07 ASSESSMENT — PAIN SCALES - GENERAL
PAINLEVEL_OUTOF10: 10
PAINLEVEL_OUTOF10: 10
PAINLEVEL_OUTOF10: 6
PAINLEVEL_OUTOF10: 0
PAINLEVEL_OUTOF10: 4

## 2024-06-07 ASSESSMENT — PAIN DESCRIPTION - DESCRIPTORS: DESCRIPTORS: ACHING;THROBBING

## 2024-06-07 ASSESSMENT — PAIN DESCRIPTION - LOCATION: LOCATION: NECK

## 2024-06-07 NOTE — PROGRESS NOTES
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]  Oral abscess [K12.2]         Diet NPO  ADULT TUBE FEEDING; Nasogastric; Standard with Fiber; Bolus; 4 Times Daily; 250; Gravity; 200; Q 4 hours     Respiratory Status:O2 Flow Rate (L/min): 40 L/min (24 1110)   No active isolations      Subjective:  Cooperative, Pleasant, and Other: Emotional-wanted her daughter    Interventions used this date:  Dysphagia Treatment      Objective/Assessment:  Patient progressing towards goals:  Short-term Goals  Timeframe for Short-term Goals: 2-4x week  Goal 1: Pt will complete lingual/labial exercises x10/each to promote increased OM strength/coordination and improve oral phase of swallow with cues as needed. Pt completed ROM exercises to assess lingual and labial strength. Lingual and labial strength appeared to be weak characterized by weak lip seal, lingual elevation and a-p movement. Pt followed directions appropriately with minimal cueing.   Goal 2: Pt will tolerate PO trials deemed appropriate by treating SLP to assess readiness for diet initiation and need for further instrumental intervention. SLP presented ice chips x2, observed weak and delayed hyolaryngeal elevation, audible swallow and tongue pumping. Trialed thin via spoon with a delayed voice clear and delayed initiation.     Recommended to continue NPO diet with ice chips PRN after oral care.  Discussed with ASHKAN Garnett.     Pt coughed up large brown mucous and then pt appeared to have some pain relief. Reported information to the RN.     Treatment/Activity Tolerance:  Patient tolerated treatment well and Patient limited by fatigue    Plan:  Continue per POC    Pain Assessment:  Patient c/o pain: Location and pain level: Neck, 9    Pain Re-assessment:  Patient c/o pain: Location and

## 2024-06-07 NOTE — PROGRESS NOTES
Pulmonary & Critical Care Medicine ICU Progress Note  Chief complaint : Respiratory insufficiency    Subjunctive/24 hour events :   Patient seen and examined during multidisciplinary rounds with RN, charge nurse, RT, pharmacy, dietitian, and social service.      Awake on BiPAP, denies pain, no difficulty breathing, she is on Precedex at 0.4 mcg/kg/h, on 40% FiO2 high flow, saturation 96%, urine output 1600 cc.+ Bowel movement    New information updated in the note today, rest of the examination did not change compared to yesterday.  Social History     Tobacco Use    Smoking status: Never    Smokeless tobacco: Never   Substance Use Topics    Alcohol use: No         Problem Relation Age of Onset    Other Mother         Neurological Disease    Cancer Sister     Diabetes Brother     Cancer Brother         leukemia       Recent Labs     06/04/24  1913 06/05/24  0239   PHART 7.442 7.396   [id number] 47* 49*   PO2ART 92* 111*         MV Settings:  Vent Mode: (S) CPAP/PS Resp Rate (Set): 28 bpm/Vt (Set, mL): 330 mL/ /FiO2 : 40 %           IV:   dexmedeTOMIDine (PRECEDEX) 1,000 mcg in sodium chloride 0.9 % 250 mL infusion 0.4 mcg/kg/hr (06/07/24 0604)    sodium chloride      dextrose         Vitals:  BP (!) 158/53   Pulse 56   Temp 97.6 °F (36.4 °C) (Axillary)   Resp 22   Ht 1.575 m (5' 2.01\")   Wt 55.2 kg (121 lb 11.1 oz)   LMP  (LMP Unknown)   SpO2 96%   BMI 22.25 kg/m²    Tmax:        Intake/Output Summary (Last 24 hours) at 6/7/2024 0821  Last data filed at 6/7/2024 0604  Gross per 24 hour   Intake 661.64 ml   Output 1600 ml   Net -938.36 ml         EXAM:  General: Awake , no distress   Head: normocephalic, atraumatic  Eyes:No gross abnormalities.  ENT:  MMM no lesions  Neck:  supple and no masses, no stridor  Chest : Good air movement, no wheezing, no rales, nontender, tympanic  Heart:: Heart sounds are normal.  Regular rate and rhythm without murmur, gallop or rub.  ABD:  symmetric, soft, non-tender, no guarding

## 2024-06-07 NOTE — PROGRESS NOTES
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]  Oral abscess [K12.2]   Patient Active Problem List    Diagnosis Date Noted    Advanced care planning/counseling discussion 2024    Goals of care, counseling/discussion 2024    Palliative care encounter 2024    Oropharyngeal mass 2024    Oral abscess 2024    Right rotator cuff tendonitis 2022    Basilar artery stenosis/occlusion 2021    Subcortical microvascular ischemic occlusive disease 2021    Easy bruising 2021    Black stools 2021    Chronic pruritus 01/15/2021    Adenomatous polyp of descending colon     Chronic idiopathic constipation     Postnasal drip 2019    Memory loss of unknown cause 2018    Chronic right shoulder pain 07/10/2018    Type 2 diabetes mellitus without complication (HCC) 04/10/2018    Lymphocytopenia 2018    Multiple actinic keratoses 2018    Age-related osteoporosis without current pathological fracture 2018    Depression with anxiety 2018    Gastroesophageal reflux disease without esophagitis 2015    Essential hypertension 10/05/2015        Past Medical History:   Diagnosis Date    Arthritis     Basilar artery stenosis/occlusion 2021    Chronic bronchitis (HCC)     Chronic pruritus 1/15/2021    COVID-19 virus infection 2021    Degenerative disc disease, cervical     Depression     Depression with anxiety     Easy bruising 2021    Esophagitis     Fibromyositis     GERD (gastroesophageal reflux disease)     Headache(784.0)     Hyperlipidemia     Hypertension     Migraines     Pleural effusion     Right-sided chest wall pain     Subcortical microvascular ischemic occlusive disease 2021    Type 2 diabetes  (25%, 50%, 75% assist from helper) for task but is able to actively participate in task   Dependent = Pt. requires total assistance with task and is not able to actively participate with task completion     Plan:  Occupational Therapy Plan  Times Per Week: 1-4x  Therapy Duration:  (Length of acute stay)  Current Treatment Recommendations: Balance training, Functional mobility training, Strengthening, Endurance training, Pain management, Safety education & training, Patient/Caregiver education & training, Equipment evaluation, education, & procurement, Self-Care / ADL, Cognitive reorientation, Home management training, Cognitive/Perceptual training    Goals:   Patient will:    - Improve functional endurance to tolerate/complete 30 mins of ADL's  - Be Setup in UB ADLs   - Be Mod A in LB ADLs  - Be Mod A in ADL transfers without LOB  - Be Mod A in toileting tasks  - Improve B UE strength and endurance to 3+/5 in order to participate in self-care activities as projected.  - Access appropriate D/C site with as few architectural barriers as possible.  - Sequence self-care tasks with 25% verbal cues for safety    Patient Goal: Patient goals : \"To feel better.\"    Discussed and agreed upon: Yes Comments:       Therapy Time:   Individual   Time In 1346   Time Out 1402   Minutes 16     Eval: 16 minutes     Electronically signed by:    SHIRA Kaplan/L,   6/7/2024, 2:18 PM

## 2024-06-07 NOTE — CARE COORDINATION
Rounds done with nrsg team this am and SW,Spiritual care present. Family not here. Pt still on HHF and nrsg to check with resp re: weaning to lower O2. CM /SW following for dc plan. Pt still needs feeding plan and was on Bipap during night. She has IV meds and may need LTAC vs SNF. She will not need precert if LTAC /SNF needed she will meet guidelines per Medicare based on ICU stay and therapy evals. Pall care following.

## 2024-06-07 NOTE — PROGRESS NOTES
Comprehensive Nutrition Assessment    Type and Reason for Visit:  Reassess    Nutrition Recommendations/Plan:   Continue with Standard with Fiber tube feeding ( Jevity 1.5)  Change to bolus schedule to accommodate BiPaP : 250 ml, given 4 x daily by gravity via NG  Water flushes 200 ml every 4 hrs, per intensivist team for management of hypernatremia  Monitor for ability to have MBS, consider PEG placement If pt to remain NPO     Malnutrition Assessment:  Malnutrition Status:  At risk for malnutrition (Comment) (06/01/24 1204)    Context:  Social/Environmental Circumstances     Findings of the 6 clinical characteristics of malnutrition:  Energy Intake:  Mild decrease in energy intake (Comment)  Weight Loss:  No significant weight loss     Body Fat Loss:  No significant body fat loss     Muscle Mass Loss:  No significant muscle mass loss    Fluid Accumulation:  No significant fluid accumulation     Strength:  Not Performed    Nutrition Assessment:    Pt remains at risk for malnutrition, remains NPO, confused and on intermittent Bipap, Needs MBS when able, per Palliative Care notes, pt has a hx of dysphagia, if unable to begin  po diet, consider placment of PEG    Nutrition Related Findings:    Hx includes : T2DM, GERD, cerebrovascular disease. Pt presents with pharyngitis x 4 days PTA with dysphagia and decreased appetite d/t nausea. CT showed Oropharyngeal mass. 5/31: Laryngoscopy with biopsy, with I&D of oropharyngeal abscess, intubated s/p procedure, Extubated 6/4, NGT replaced 6/5, and tube feeding resumed  requires  Bipap at times, remains on dysphagia therapy, needs MBS when able, labs noted ( + hypernatremia),, meds reivewed, BM 6/4 Wound Type: None       Current Nutrition Intake & Therapies:    Average Meal Intake: NPO  Average Supplements Intake: NPO  Diet NPO  ADULT TUBE FEEDING; Nasogastric; Peptide Based; Continuous; 20; Yes; 20; Q 4 hours; 40; 200; Q 4 hours  Current Tube Feeding (TF) Orders:  Feeding  Route: Nasogastric  Formula: Standard with Fiber (Jevity 1.5)  Schedule: Bolus  Feeding Regimen: 250 ml, 4 x daily  Additives/Modulars: None  Water Flushes: 200 every 4 hrs ( 1200) for hypernatremia  Current TF & Flush Orders Provides: 1500 kcals, 64 g protein , 1960 ml free water  Goal TF & Flush Orders Provides: 1500 kcals, 64 g protein , 1960 ml free water    Anthropometric Measures:  Height: 157.5 cm (5' 2.01\")  Ideal Body Weight (IBW): 110 lbs (50 kg)    Admission Body Weight: 49.9 kg (110 lb) (stated)  Current Body Weight: 54.9 kg (121 lb) (6/5), 112.4 % IBW. Weight Source: Bed Scale  Current BMI (kg/m2): 22.1  Usual Body Weight: 54.6 kg (120 lb 6 oz) (6/2022 bed)  % Weight Change (Calculated): 2.8                    BMI Categories: Normal Weight (BMI 22.0 to 24.9) age over 65    Estimated Daily Nutrient Needs:  Energy Requirements Based On: Kcal/kg  Weight Used for Energy Requirements: Current  Energy (kcal/day): 5647-3964 (kg x 25-28)  Weight Used for Protein Requirements: Current  Protein (g/day): 55-66 gm (kg x 1.0-1.2)  Method Used for Fluid Requirements: 1 ml/kcal  Fluid (ml/day): ~1500 ml    Nutrition Diagnosis:   Inadequate oral intake related to cognitive or neurological impairment, swallowing difficulty as evidenced by NPO or clear liquid status due to medical condition      Nutrition Interventions:   Food and/or Nutrient Delivery: Modify Tube Feeding  Nutrition Education/Counseling: No recommendation at this time  Coordination of Nutrition Care: Continue to monitor while inpatient       Goals:  Previous Goal Met: Progressing toward Goal(s)  Goals: Meet at least 75% of estimated needs, Tolerate nutrition support at goal rate, by next RD assessment       Nutrition Monitoring and Evaluation:   Behavioral-Environmental Outcomes: None Identified  Food/Nutrient Intake Outcomes: Enteral Nutrition Intake/Tolerance  Physical Signs/Symptoms Outcomes: Biochemical Data, Weight, Chewing or

## 2024-06-07 NOTE — CARE COORDINATION
Quality round completed with care management team. Pt off vent. Need PT/OT eval when able. Pt confuse at this time. No family at bedside.   Pt is from Home alone. Independent at baseline.     LSW tried to call Ama and Bhargavi via phone. No answer. Left VM.     Pall care following.     DC plan: TBD: on HHF. Need PT/OT eval when able.     LSW will follow     Electronically signed by GOOD Moreira on 6/7/2024 at 10:10 AM

## 2024-06-07 NOTE — PLAN OF CARE
Problem: Pain  Goal: Verbalizes/displays adequate comfort level or baseline comfort level  Outcome: Progressing     Problem: Discharge Planning  Goal: Discharge to home or other facility with appropriate resources  Outcome: Progressing  Flowsheets (Taken 6/7/2024 0815)  Discharge to home or other facility with appropriate resources: Identify barriers to discharge with patient and caregiver     Problem: Safety - Medical Restraint  Goal: Remains free of injury from restraints (Restraint for Interference with Medical Device)  Description: INTERVENTIONS:  1. Determine that other, less restrictive measures have been tried or would not be effective before applying the restraint  2. Evaluate the patient's condition at the time of restraint application  3. Inform patient/family regarding the reason for restraint  4. Q2H: Monitor safety, psychosocial status, comfort, nutrition and hydration  Outcome: Completed     Problem: Safety - Adult  Goal: Free from fall injury  Outcome: Progressing     Problem: Skin/Tissue Integrity  Goal: Absence of new skin breakdown  Description: 1.  Monitor for areas of redness and/or skin breakdown  2.  Assess vascular access sites hourly  3.  Every 4-6 hours minimum:  Change oxygen saturation probe site  4.  Every 4-6 hours:  If on nasal continuous positive airway pressure, respiratory therapy assess nares and determine need for appliance change or resting period.  Outcome: Progressing     Problem: Chronic Conditions and Co-morbidities  Goal: Patient's chronic conditions and co-morbidity symptoms are monitored and maintained or improved  Outcome: Progressing  Flowsheets (Taken 6/7/2024 0815)  Care Plan - Patient's Chronic Conditions and Co-Morbidity Symptoms are Monitored and Maintained or Improved: Monitor and assess patient's chronic conditions and comorbid symptoms for stability, deterioration, or improvement     Problem: Nutrition Deficit:  Goal: Optimize nutritional status  6/7/2024 1526

## 2024-06-07 NOTE — PROGRESS NOTES
Shift summary:  Patient lethargic, Precedex weaned off and pt. Remains lethargic. Pt. Does easily wakes to voice, able to state her name and that she knows she is in the hospital, but unable to state year/month and is not quite sure why she is in the hospital, but did gesture towards her neck. Pt. Follows commands and helps turn self. Pt. On HHF, states her breathing feels good. Later weaned down eventually to 3L NC. Pt. Originally sinus rhythm/sinus efren on monitor, but HR increased throughout day after Precedex was off. No episodes of major bradycardia noted. Pt. Hypertensive, given PRN IV hydralazine and oral meds adjusted as pt. Has NG tube. BP improved with oral antihypertensives. Pt. C/o pain in neck, given PRN oxycodone. No fever. Pt. Had large, loose BM. Pt. Voiding per external catheter. NG in place, pt. Receiving TF, tolerating well with minimal residuals. Pt. States her stomach feels OK. Continuous feeds changed to boluses d/t bipap at night. Pt. Remains npo. Pt. Was seen by SLP today, who stated pt. Cannot have MBS swallow done on HHF, O2 demands need to be down. Oral care/swabs provided throughout shift.  Water flushes adjusted as well for elevated sodium. Family was at bedside earlier in day. Updated them and answered their questions. At times with stimulation/patient care, pt. Does become tearful/crying and anxious. When anxious, pt. Has rapid, shallow breathing, but quickly resolves. Mepilex to sacrum removed due to stool/urine incontinence. Skin assessed at handoff.

## 2024-06-07 NOTE — PROGRESS NOTES
Internal Medicine   Hospitalist   Progress Note    2024   1:06 PM    Name:  Tanya Liz  MRN:    [id number]     IP Day: 7     Admit Date: 2024 11:11 AM  PCP: Madi Kelly DO    Code Status:  Full Code    Assessment and Plan:        Active Problems/ diagnosis:     Oropharyngeal abscess  Diabetes  Acute respiratory insufficiency due to abscess  Hypertension    Plan  She is more alert today.  She is off of Precedex.  She is cooperative.  However, she remains on high flow  Resume Vasotec, monitor blood pressure  Resume steroids and antibiotics.  Currently on Unasyn  Critical care and ENT are following  Resume current medications otherwise  As needed oxycodone.  Dilaudid for breakthrough.  DVT PPx     7 pm- 7 am, please contact on call Hospitalist for any needs     Subjective:     On high flow nasal cannula.  Discussed with RN.  Patient is sleepy.  Discussed with RN.    Physical Examination:      Vitals:  BP (!) 158/53   Pulse 70   Temp 97.6 °F (36.4 °C) (Axillary)   Resp 19   Ht 1.575 m (5' 2.01\")   Wt 55.2 kg (121 lb 11.1 oz)   LMP  (LMP Unknown)   SpO2 95%   BMI 22.25 kg/m²   Temp (24hrs), Av.2 °F (36.8 °C), Min:97.6 °F (36.4 °C), Max:98.9 °F (37.2 °C)      General appearance: Sleepy.  Mental Status: Deferred  Lungs: Clear bilaterally  Heart: regular rate and rhythm, no murmur  Abdomen: soft, nondistended, bowel sounds present, no masses  Extremities: no edema, redness  Skin: no gross lesions, rashes    Data:     Labs:  Recent Labs     24  0515 24  0444   WBC 8.0 6.4   HGB 11.3* 11.5*    152     Recent Labs     24  0515 24  0444    148*   K 4.5 3.9    107   CO2 29 33*   BUN 40* 45*   CREATININE 0.63 0.78   GLUCOSE 157* 124*     No results for input(s): \"AST\", \"ALT\", \"BILITOT\", \"ALKPHOS\" in the last 72 hours.    Invalid input(s): \"ALB\"      Current Facility-Administered Medications   Medication Dose Route Frequency Provider Last Rate Last Admin

## 2024-06-07 NOTE — PROGRESS NOTES
Bed bath completed, linens changed, warm blanket provided, purwick repositioned, patient states that she is comfortable at this time. 1:1 sitter at bedside for BiPAP.

## 2024-06-07 NOTE — PLAN OF CARE
Nutrition Problem #1: Inadequate oral intake  Intervention: Food and/or Nutrient Delivery: Modify Tube Feeding  Nutritional

## 2024-06-07 NOTE — PROGRESS NOTES
Renal Adjustment Per Protocol: famotidine 20 mg IV BID changed to famotidine 20 mg IV daily based on    Recent Labs     06/07/24  0444   CREATININE 0.78   Estimated Creatinine Clearance: 42 mL/min (based on SCr of 0.78 mg/dL).  .

## 2024-06-07 NOTE — PROGRESS NOTES
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]  Oral abscess [K12.2]   Chief Complaint   Patient presents with    Pharyngitis     x4days    Nausea    Diarrhea     Patient Active Problem List    Diagnosis Date Noted    Advanced care planning/counseling discussion 2024    Goals of care, counseling/discussion 2024    Palliative care encounter 2024    Oropharyngeal mass 2024    Oral abscess 2024    Right rotator cuff tendonitis 2022    Basilar artery stenosis/occlusion 2021    Subcortical microvascular ischemic occlusive disease 2021    Easy bruising 2021    Black stools 2021    Chronic pruritus 01/15/2021    Adenomatous polyp of descending colon     Chronic idiopathic constipation     Postnasal drip 2019    Memory loss of unknown cause 2018    Chronic right shoulder pain 07/10/2018    Type 2 diabetes mellitus without complication (HCC) 04/10/2018    Lymphocytopenia 2018    Multiple actinic keratoses 2018    Age-related osteoporosis without current pathological fracture 2018    Depression with anxiety 2018    Gastroesophageal reflux disease without esophagitis 2015    Essential hypertension 10/05/2015        Past Medical History:   Diagnosis Date    Arthritis     Basilar artery stenosis/occlusion 2021    Chronic bronchitis (HCC)     Chronic pruritus 1/15/2021    COVID-19 virus infection 2021    Degenerative disc disease, cervical     Depression     Depression with anxiety     Easy bruising 2021    Esophagitis     Fibromyositis     GERD (gastroesophageal reflux disease)     Headache(784.0)     Hyperlipidemia     Hypertension     Migraines     Pleural effusion      to accomplish the task

## 2024-06-07 NOTE — PROGRESS NOTES
Spiritual Care Services     Summary of Visit:     returned to patient's room, post interdisciplinary rounds, to provide presence, spiritual support, and prayer.  Patient became anxious when first realizing  present.  Provided calm voice, gentle touch, and reassurance.  Patient calmed, closed eyes, and  offered prayer.  Patient's primary language is Mohawk.      Encounter Summary  Encounter Overview/Reason: Interdisciplinary rounds  Service Provided For: Patient  Referral/Consult From: Multi-disciplinary team, Rounding  Support System: Children  Complexity of Encounter: Moderate  Begin Time: 1000  End Time : 1010  Total Time Calculated: 10 min     Crisis  Type: Rapid Response  Spiritual/Emotional needs  Type: Spiritual Support, Spiritual Distress  Rituals, Rites and Sacraments  Type: Sacrament of Sick, Anointing                   Spiritual Assessment/Intervention/Outcomes:    Assessment: Anxious, Impaired social interaction    Intervention: Explored/Affirmed feelings, thoughts, concerns, Nurtured Hope, Prayer (assurance of)/[employer]    Outcome: Comfort, Other (comment)      Care Plan:    Plan and Referrals  Plan/Referrals: Continue Support (comment)          Spiritual Care Services   Electronically signed by SIVAN Mathis on 6/7/2024 at 3:28 PM.    To reach a  for emotional and spiritual support, place an EPIC consult request.   If a  is needed immediately, dial “0” and ask to page the on-call .

## 2024-06-07 NOTE — PLAN OF CARE
See OT evaluation for all goals and OT POC. Electronically signed by Dorothea Grey OTR/L on 6/7/2024 at 2:20 PM

## 2024-06-08 ENCOUNTER — APPOINTMENT (OUTPATIENT)
Dept: GENERAL RADIOLOGY | Age: 84
DRG: 137 | End: 2024-06-08
Payer: MEDICARE

## 2024-06-08 LAB
ALBUMIN SERPL-MCNC: 3 G/DL (ref 3.5–4.6)
ANION GAP SERPL CALCULATED.3IONS-SCNC: 6 MEQ/L (ref 9–15)
BASOPHILS # BLD: 0 K/UL (ref 0–0.2)
BASOPHILS NFR BLD: 0.4 %
BUN SERPL-MCNC: 37 MG/DL (ref 8–23)
CALCIUM SERPL-MCNC: 8.6 MG/DL (ref 8.5–9.9)
CHLORIDE SERPL-SCNC: 107 MEQ/L (ref 95–107)
CO2 SERPL-SCNC: 30 MEQ/L (ref 20–31)
CREAT SERPL-MCNC: 0.62 MG/DL (ref 0.5–0.9)
EOSINOPHIL # BLD: 0.1 K/UL (ref 0–0.7)
EOSINOPHIL NFR BLD: 1.2 %
ERYTHROCYTE [DISTWIDTH] IN BLOOD BY AUTOMATED COUNT: 13.8 % (ref 11.5–14.5)
GLUCOSE BLD-MCNC: 142 MG/DL (ref 70–99)
GLUCOSE BLD-MCNC: 164 MG/DL (ref 70–99)
GLUCOSE BLD-MCNC: 83 MG/DL (ref 70–99)
GLUCOSE BLD-MCNC: 96 MG/DL (ref 70–99)
GLUCOSE SERPL-MCNC: 127 MG/DL (ref 70–99)
HCT VFR BLD AUTO: 35.2 % (ref 37–47)
HGB BLD-MCNC: 11.2 G/DL (ref 12–16)
LYMPHOCYTES # BLD: 2.1 K/UL (ref 1–4.8)
LYMPHOCYTES NFR BLD: 36.3 %
MCH RBC QN AUTO: 29.2 PG (ref 27–31.3)
MCHC RBC AUTO-ENTMCNC: 31.8 % (ref 33–37)
MCV RBC AUTO: 91.7 FL (ref 79.4–94.8)
MONOCYTES # BLD: 0.8 K/UL (ref 0.2–0.8)
MONOCYTES NFR BLD: 13.2 %
NEUTROPHILS # BLD: 2.6 K/UL (ref 1.4–6.5)
NEUTS SEG NFR BLD: 46.4 %
PERFORMED ON: ABNORMAL
PERFORMED ON: ABNORMAL
PERFORMED ON: NORMAL
PERFORMED ON: NORMAL
PLATELET # BLD AUTO: 148 K/UL (ref 130–400)
POTASSIUM SERPL-SCNC: 3.7 MEQ/L (ref 3.4–4.9)
RBC # BLD AUTO: 3.84 M/UL (ref 4.2–5.4)
SODIUM SERPL-SCNC: 143 MEQ/L (ref 135–144)
WBC # BLD AUTO: 5.7 K/UL (ref 4.8–10.8)

## 2024-06-08 PROCEDURE — 6370000000 HC RX 637 (ALT 250 FOR IP): Performed by: INTERNAL MEDICINE

## 2024-06-08 PROCEDURE — 6360000002 HC RX W HCPCS: Performed by: STUDENT IN AN ORGANIZED HEALTH CARE EDUCATION/TRAINING PROGRAM

## 2024-06-08 PROCEDURE — 2700000000 HC OXYGEN THERAPY PER DAY

## 2024-06-08 PROCEDURE — 2500000003 HC RX 250 WO HCPCS: Performed by: STUDENT IN AN ORGANIZED HEALTH CARE EDUCATION/TRAINING PROGRAM

## 2024-06-08 PROCEDURE — 6370000000 HC RX 637 (ALT 250 FOR IP): Performed by: NURSE PRACTITIONER

## 2024-06-08 PROCEDURE — 2580000003 HC RX 258: Performed by: STUDENT IN AN ORGANIZED HEALTH CARE EDUCATION/TRAINING PROGRAM

## 2024-06-08 PROCEDURE — 71045 X-RAY EXAM CHEST 1 VIEW: CPT

## 2024-06-08 PROCEDURE — 80069 RENAL FUNCTION PANEL: CPT

## 2024-06-08 PROCEDURE — A4216 STERILE WATER/SALINE, 10 ML: HCPCS | Performed by: STUDENT IN AN ORGANIZED HEALTH CARE EDUCATION/TRAINING PROGRAM

## 2024-06-08 PROCEDURE — 51798 US URINE CAPACITY MEASURE: CPT

## 2024-06-08 PROCEDURE — 36415 COLL VENOUS BLD VENIPUNCTURE: CPT

## 2024-06-08 PROCEDURE — 99233 SBSQ HOSP IP/OBS HIGH 50: CPT | Performed by: INTERNAL MEDICINE

## 2024-06-08 PROCEDURE — 6360000002 HC RX W HCPCS: Performed by: NURSE PRACTITIONER

## 2024-06-08 PROCEDURE — 85025 COMPLETE CBC W/AUTO DIFF WBC: CPT

## 2024-06-08 PROCEDURE — 6370000000 HC RX 637 (ALT 250 FOR IP): Performed by: STUDENT IN AN ORGANIZED HEALTH CARE EDUCATION/TRAINING PROGRAM

## 2024-06-08 PROCEDURE — 92526 ORAL FUNCTION THERAPY: CPT

## 2024-06-08 PROCEDURE — 6360000002 HC RX W HCPCS: Performed by: INTERNAL MEDICINE

## 2024-06-08 PROCEDURE — 2000000000 HC ICU R&B

## 2024-06-08 PROCEDURE — 51701 INSERT BLADDER CATHETER: CPT

## 2024-06-08 RX ORDER — HYDRALAZINE HYDROCHLORIDE 100 MG/1
100 TABLET, FILM COATED ORAL EVERY 8 HOURS SCHEDULED
Status: DISCONTINUED | OUTPATIENT
Start: 2024-06-08 | End: 2024-06-13 | Stop reason: HOSPADM

## 2024-06-08 RX ORDER — LISINOPRIL 20 MG/1
20 TABLET ORAL DAILY
Status: DISCONTINUED | OUTPATIENT
Start: 2024-06-09 | End: 2024-06-13 | Stop reason: HOSPADM

## 2024-06-08 RX ORDER — AMOXICILLIN AND CLAVULANATE POTASSIUM 875; 125 MG/1; MG/1
1 TABLET, FILM COATED ORAL EVERY 12 HOURS SCHEDULED
Status: DISCONTINUED | OUTPATIENT
Start: 2024-06-08 | End: 2024-06-13 | Stop reason: HOSPADM

## 2024-06-08 RX ORDER — LISINOPRIL 20 MG/1
40 TABLET ORAL DAILY
Status: DISCONTINUED | OUTPATIENT
Start: 2024-06-09 | End: 2024-06-08

## 2024-06-08 RX ORDER — LISINOPRIL 20 MG/1
20 TABLET ORAL DAILY
Status: DISCONTINUED | OUTPATIENT
Start: 2024-06-09 | End: 2024-06-08

## 2024-06-08 RX ADMIN — OXYCODONE 5 MG: 5 TABLET ORAL at 01:13

## 2024-06-08 RX ADMIN — HYDRALAZINE HYDROCHLORIDE 100 MG: 50 TABLET ORAL at 22:31

## 2024-06-08 RX ADMIN — TRAZODONE HYDROCHLORIDE 50 MG: 50 TABLET ORAL at 20:55

## 2024-06-08 RX ADMIN — HYDRALAZINE HYDROCHLORIDE 20 MG: 20 INJECTION INTRAMUSCULAR; INTRAVENOUS at 16:47

## 2024-06-08 RX ADMIN — AMPICILLIN SODIUM AND SULBACTAM SODIUM 3000 MG: 2; 1 INJECTION, POWDER, FOR SOLUTION INTRAMUSCULAR; INTRAVENOUS at 05:45

## 2024-06-08 RX ADMIN — MIRTAZAPINE 7.5 MG: 15 TABLET, FILM COATED ORAL at 20:55

## 2024-06-08 RX ADMIN — DOCUSATE SODIUM 100 MG: 50 LIQUID ORAL at 08:10

## 2024-06-08 RX ADMIN — Medication 10 ML: at 21:00

## 2024-06-08 RX ADMIN — ENOXAPARIN SODIUM 40 MG: 100 INJECTION SUBCUTANEOUS at 08:10

## 2024-06-08 RX ADMIN — FAMOTIDINE 20 MG: 10 INJECTION, SOLUTION INTRAVENOUS at 08:10

## 2024-06-08 RX ADMIN — AMOXICILLIN AND CLAVULANATE POTASSIUM 1 TABLET: 875; 125 TABLET, COATED ORAL at 21:00

## 2024-06-08 RX ADMIN — VERAPAMIL HYDROCHLORIDE 40 MG: 40 TABLET ORAL at 20:55

## 2024-06-08 RX ADMIN — HYDRALAZINE HYDROCHLORIDE 50 MG: 50 TABLET ORAL at 05:46

## 2024-06-08 RX ADMIN — HYDRALAZINE HYDROCHLORIDE 20 MG: 20 INJECTION INTRAMUSCULAR; INTRAVENOUS at 02:35

## 2024-06-08 RX ADMIN — VERAPAMIL HYDROCHLORIDE 40 MG: 40 TABLET ORAL at 05:46

## 2024-06-08 RX ADMIN — Medication 10 ML: at 08:13

## 2024-06-08 RX ADMIN — POLYETHYLENE GLYCOL 3350 17 G: 17 POWDER, FOR SOLUTION ORAL at 08:10

## 2024-06-08 RX ADMIN — AMPICILLIN SODIUM AND SULBACTAM SODIUM 3000 MG: 2; 1 INJECTION, POWDER, FOR SOLUTION INTRAMUSCULAR; INTRAVENOUS at 11:34

## 2024-06-08 RX ADMIN — AMPICILLIN SODIUM AND SULBACTAM SODIUM 3000 MG: 2; 1 INJECTION, POWDER, FOR SOLUTION INTRAMUSCULAR; INTRAVENOUS at 01:22

## 2024-06-08 RX ADMIN — HYDRALAZINE HYDROCHLORIDE 50 MG: 50 TABLET ORAL at 12:11

## 2024-06-08 RX ADMIN — SERTRALINE 100 MG: 100 TABLET, FILM COATED ORAL at 08:11

## 2024-06-08 RX ADMIN — HYDROMORPHONE HYDROCHLORIDE 0.25 MG: 1 INJECTION, SOLUTION INTRAMUSCULAR; INTRAVENOUS; SUBCUTANEOUS at 19:02

## 2024-06-08 RX ADMIN — ATORVASTATIN CALCIUM 40 MG: 40 TABLET, FILM COATED ORAL at 20:55

## 2024-06-08 RX ADMIN — VERAPAMIL HYDROCHLORIDE 40 MG: 40 TABLET ORAL at 12:11

## 2024-06-08 ASSESSMENT — PAIN DESCRIPTION - DESCRIPTORS
DESCRIPTORS: TENDER
DESCRIPTORS: ACHING;SHOOTING;SORE
DESCRIPTORS: ACHING;TENDER

## 2024-06-08 ASSESSMENT — PAIN SCALES - GENERAL
PAINLEVEL_OUTOF10: 8
PAINLEVEL_OUTOF10: 9
PAINLEVEL_OUTOF10: 7
PAINLEVEL_OUTOF10: 0
PAINLEVEL_OUTOF10: 8
PAINLEVEL_OUTOF10: 0

## 2024-06-08 ASSESSMENT — PAIN DESCRIPTION - LOCATION
LOCATION: NECK
LOCATION: NECK;THROAT
LOCATION: THROAT

## 2024-06-08 NOTE — PROGRESS NOTES
Internal Medicine   Hospitalist   Progress Note    2024   1:07 PM    Name:  Tanya Liz  MRN:    [id number]     IP Day: 8     Admit Date: 2024 11:11 AM  PCP: Madi Kelly DO    Code Status:  Full Code    Assessment and Plan:        Active Problems/ diagnosis:     Oropharyngeal abscess  Diabetes  Acute respiratory insufficiency due to abscess  Hypertension    Plan  Blood pressure is fluctuating.  She was hypotensive last night but hypertensive this morning.  Will adjust antihypertensive.  Monitor.  Discussed with RN.  She is off Precedex.   Resume steroids and antibiotics.  DC Unasyn, start Augmentin for  Critical care and ENT are following  Resume current medications otherwise  As needed oxycodone.  Dilaudid for breakthrough.  DVT PPx     7 pm- 7 am, please contact on call Hospitalist for any needs     Subjective:     On 3 L/min nasal cannula.  Discussed with RN.  Patient is sleepy.     Physical Examination:      Vitals:  BP (!) 177/57   Pulse 77   Temp 98.3 °F (36.8 °C) (Oral)   Resp (!) 38   Ht 1.575 m (5' 2.01\")   Wt 55.6 kg (122 lb 9.2 oz)   LMP  (LMP Unknown)   SpO2 95%   BMI 22.41 kg/m²   Temp (24hrs), Av.2 °F (36.8 °C), Min:97.8 °F (36.6 °C), Max:98.6 °F (37 °C)      General appearance: Sleepy.  Mental Status: Deferred  Lungs: Clear bilaterally  Heart: regular rate and rhythm, no murmur  Abdomen: soft, nondistended, bowel sounds present, no masses  Extremities: no edema, redness  Skin: no gross lesions, rashes    Data:     Labs:  Recent Labs     24  0444 24  0455   WBC 6.4 5.7   HGB 11.5* 11.2*    148     Recent Labs     24  0444 24  0455   * 143   K 3.9 3.7    107   CO2 33* 30   BUN 45* 37*   CREATININE 0.78 0.62   GLUCOSE 124* 127*     No results for input(s): \"AST\", \"ALT\", \"BILITOT\", \"ALKPHOS\" in the last 72 hours.    Invalid input(s): \"ALB\"      Current Facility-Administered Medications   Medication Dose Route Frequency Provider Last Rate  Last Admin    hydrALAZINE (APRESOLINE) tablet 100 mg  100 mg Oral 3 times per day Colton Daly DO        [START ON 6/9/2024] lisinopril (PRINIVIL;ZESTRIL) tablet 40 mg  40 mg Oral Daily Colton Daly DO        oxyCODONE (ROXICODONE) immediate release tablet 5 mg  5 mg Oral Q4H PRN Colton Daly DO   5 mg at 06/08/24 0113    haloperidol lactate (HALDOL) injection 2 mg  2 mg IntraVENous Q4H PRN Thompson Presley MD   2 mg at 06/05/24 1053    enoxaparin (LOVENOX) injection 40 mg  40 mg SubCUTAneous Daily Diana Suh MD   40 mg at 06/08/24 0810    hydrALAZINE (APRESOLINE) injection 20 mg  20 mg IntraVENous Q4H PRN Mary Beth Yepez APRN - CNP   20 mg at 06/08/24 0235    HYDROmorphone (DILAUDID) injection 0.25 mg  0.25 mg IntraVENous Q4H PRN Colton Daly DO   0.25 mg at 06/07/24 2033    ipratropium 0.5 mg-albuterol 2.5 mg (DUONEB) nebulizer solution 1 Dose  1 Dose Inhalation Q4H PRN Uri David MD   1 Dose at 06/04/24 2042    lidocaine 4 % external patch 1 patch  1 patch TransDERmal Daily Uri David MD   1 patch at 06/05/24 0807    docusate (COLACE) 50 MG/5ML liquid 100 mg  100 mg Oral BID Thompson Presley MD   100 mg at 06/08/24 0810    polyethylene glycol (GLYCOLAX) packet 17 g  17 g Oral Daily Thompson Presley MD   17 g at 06/08/24 0810    verapamil (CALAN) tablet 40 mg  40 mg Oral 3 times per day Mary Beth Yepez APRN - CNP   40 mg at 06/08/24 1211    atorvastatin (LIPITOR) tablet 40 mg  40 mg Oral Nightly Aurelia Hung MD   40 mg at 06/07/24 2036    mirtazapine (REMERON) tablet 7.5 mg  7.5 mg Oral Nightly Aurelia Hung MD   7.5 mg at 06/07/24 2036    sertraline (ZOLOFT) tablet 100 mg  100 mg Oral Daily Aurelia Hung MD   100 mg at 06/08/24 0811    traZODone (DESYREL) tablet 50 mg  50 mg Oral Nightly Aurelia Hung MD   50 mg at 06/07/24 2036    sodium chloride flush 0.9 % injection 5-40 mL  5-40 mL IntraVENous 2 times per day Diana Suh MD   10 mL at 06/08/24 0813

## 2024-06-08 NOTE — PROGRESS NOTES
[facility]  Facility/Department: [facility] [place] [facility]  Speech Language Pathology   Treatment Note      Tanya Liz  1940  IC09/IC09-01  [x]   confirmed      Date: 2024    Peritonsillar abscess [J36]  Oropharyngeal mass [J39.2]  Oral abscess [K12.2]    Restrictions/Precautions: Fall Risk, Up as Tolerated    Diet NPO  ADULT TUBE FEEDING; Nasogastric; Standard with Fiber; Bolus; 4 Times Daily; 250; Gravity; 200; Q 4 hours     Respiratory Status:O2 Flow Rate (L/min): (S) 3 L/min (24 180)   No active isolations      Subjective:  Cooperative and Lethargic      Daughter present for treatment    Interventions used this date:  Dysphagia Treatment and Caregiver education      Objective/Assessment:  Patient progressing towards goals:  Short-term Goals  Timeframe for Short-term Goals: 2-4x week  Goal 1: Pt will complete lingual/labial exercises x10/each to promote increased OM strength/coordination and improve oral phase of swallow with cues as needed.  Goal 2: Pt will tolerate PO trials deemed appropriate by treating SLP to assess readiness for diet initiation and need for further instrumental intervention.  Pt tolerated thin by spoon x2 with no overt s/s of aspiration but reported odynophagia with each trial  Pt put in top dentures and tolerated x4 ice chip trials, pt had throat clearing in 2/4 trials, reported decreased odynophagia with ice chip trials    Recommend continued NPO with ice chips only after oral care, ASHKAN Johnson verbalized understanding    Pt has been taken off HHF and is now 3L NC    Treatment/Activity Tolerance:  Patient limited by pain    Plan:  Continue per POC    Pain Assessment:  Patient does not c/o pain.    Pain Re-assessment:  Patient does not c/o pain.    Patient/Caregiver Education:  Patient educated on session and progression towards goals.  Caregiver education on session and progress towards goals.  Caregiver stated verbal understanding of directions.  Education needs

## 2024-06-08 NOTE — PROGRESS NOTES
1930: Assumed care of pt at RN shift change after receiving bedside report from ASHKAN Medrano. Pt turned and skin checked with RN at this time as well.   2030: assessment completed (see flow sheet). Pt groaning in pain and reports it a 10/10 in neck, medicated per PRN pain medication orders.   2230: pt Verapamil held due to /40  2330: message to provider regarding continued hypotension with MAPS in 50's  Received call back from provider and orders placed for 500 ml bolus over 30 min   0000: pt with increased MAPs > 65 at this time. BiPap held off for now due to BP. RT to re-evaluate again later.   0400:   0700: rep[ort given to RN

## 2024-06-08 NOTE — PROGRESS NOTES
Patient oriented and disoriented at times. Has moments where she yells out. Family at bedside and was updated on patients care.     -Patients BP low this am, PO BP medication was held and Dr. Presley was notified.

## 2024-06-08 NOTE — PLAN OF CARE
metabolic abnormalities, dehydration, psychiatric diagnoses, notify LIP  6/7/2024 1522 by Mariola Sifuentes, RN  Outcome: Progressing  Flowsheets (Taken 6/7/2024 0815)  Effect of thought disturbance (confusion, delirium, dementia, or psychosis) are managed with adequate functional status: Assess for contributors to thought disturbance, including medications, impaired vision or hearing, underlying metabolic abnormalities, dehydration, psychiatric diagnoses, notify LIP     Problem: Occupational Therapy - Adult  Goal: By Discharge: Performs self-care activities at highest level of function for planned discharge setting.  See evaluation for individualized goals.  6/7/2024 1420 by Dorothea Grey, OTR/L  Outcome: Progressing

## 2024-06-08 NOTE — PROGRESS NOTES
Pulmonary & Critical Care Medicine ICU Progress Note  Chief complaint : Respiratory insufficiency    Subjunctive/24 hour events :   Patient seen and examined during multidisciplinary rounds with RN, charge nurse, RT, pharmacy, dietitian, and social service.      Awake, no distress, blood pressure dropped overnight received 250 cc bolus, no fever, she is on Precedex drip, she is on 3 L O2 saturation 99%, urine output 1 L, +4 L    New information updated in the note today, rest of the examination did not change compared to yesterday.  Social History     Tobacco Use    Smoking status: Never    Smokeless tobacco: Never   Substance Use Topics    Alcohol use: No         Problem Relation Age of Onset    Other Mother         Neurological Disease    Cancer Sister     Diabetes Brother     Cancer Brother         leukemia       No results for input(s): \"PHART\", \"BQI5OSW\", \"PO2ART\" in the last 72 hours.      MV Settings:  Vent Mode: (S) CPAP/PS Resp Rate (Set): 28 bpm/Vt (Set, mL): 330 mL/ /FiO2 : 40 %           IV:   dexmedeTOMIDine (PRECEDEX) 1,000 mcg in sodium chloride 0.9 % 250 mL infusion 0.2 mcg/kg/hr (06/08/24 0156)    sodium chloride      dextrose         Vitals:  BP (!) 105/51   Pulse 61   Temp 98.6 °F (37 °C) (Oral)   Resp (!) 35   Ht 1.575 m (5' 2.01\")   Wt 55.6 kg (122 lb 9.2 oz)   LMP  (LMP Unknown)   SpO2 98%   BMI 22.41 kg/m²    Tmax:        Intake/Output Summary (Last 24 hours) at 6/8/2024 0827  Last data filed at 6/8/2024 0312  Gross per 24 hour   Intake 2646.77 ml   Output 1095 ml   Net 1551.77 ml         EXAM:  General: Awake , no distress   Head: normocephalic, atraumatic  Eyes:No gross abnormalities.  ENT:  MMM no lesions  Neck:  supple and no masses, no stridor  Chest : Good air movement, no wheezing, no rales, nontender, tympanic  Heart:: Heart sounds are normal.  Regular rate and rhythm without murmur, gallop or rub.  ABD:  symmetric, soft, non-tender, no guarding or rebound  Musculoskeletal : no  \"LEUKOCYTESUR\", \"UROBILINOGEN\", \"BILIRUBINUR\", \"BLOODU\", \"GLUCOSEU\", \"AMORPHOUS\" in the last 72 hours.    Invalid input(s): \"KETONESU\"    Cultures:  Blood culture coag negative Staphylococcus 1 out of 2  Films:  CXR films reviewed by me and it showed congestion      Assessment:  This is a critically ill patient     Oropharyngeal abscess, status post I&D  Acute hypoxic respiratory failure,  Pulmonary edema  Diabetes  Hypertension  Coag negative Staphylococcus in blood culture 1 out of 2,?  Contamination     Recommendation  O2 to keep sat 90 to 92%  On Precedex, monitor closely, intubate if unable to protect her airways   TV on, natural light, avoid benzos, pain control, early mobility, and family engagement  PUD prophylaxis  DVT prophylaxis  If does not pass speech eval today will start tube feed  Target blood sugar 140-180  Monitor and replace electrolyte as needed  Pulmonary hygiene  Lasix when blood pressure stabilizes        I spent 50 min with this patient, greater the 80% of this time was spent in counseling and/or coordinating of care.                   Electronically signed by Thompson Presley MD,  [facility] ,on 6/8/2024 at 8:27 AM

## 2024-06-08 NOTE — PROGRESS NOTES
Physician Progress Note      PATIENT:               BRIDGETTE MO  CSN #:                  [id number]  :                       1940  ADMIT DATE:       2024 11:11 AM  DISCH DATE:  RESPONDING  PROVIDER #:        Thompson Caicedo MD          QUERY TEXT:    Patient admitted with oropharyngeal abscess.  Noted documentation of \"acute   hypoxic respiratory failure\" in the 24 intensivist PN. However, in the   rapid response 24 PN by attending \"She was not hypoxic.  ABG was okay.    She continued to be short of breath and restless and another rapid response   was called. Her wheezing improved significantly after treatment. She was   placed on BiPAP and started on Precedex to help with her restlessness.\" Also,   per 24 attending PN \"Rapid response was called last evening for   respiratory distress and restlessness, she was put on BiPAP    The medical record reflects the following:  Risk Factors: female age 84  HTN  T2DM  Clinical Indicators: lowest O2 sat 91%   Dr David: \"Rapid response was called because patient was tachypneic with   worsening shortness of breath.  She was having bilateral wheezing.  DuoNeb and   Solu-Medrol given.  No significant abnormality in her vitals. She was not   hypoxic.  ABG was okay.....\"   Dr Presley: \"Awake extubated yesterday, had a rough night, requiring   BiPAP overnight, reported stridor, she required Precedex drip, she received 1   dose of Solu-Medrol 40 mg, urine output 1500 cc, +3.7 L,+ bowel movement She   is currently on high flow oxygen 50 L 40%   Acute hypoxic respiratory failure\"  Treatment: ABGs  MV  BiPAP  high flow NC  Precedex drip   otolaryngology/ENT    critical care    Da Diaz RN CCDS  [phone]  Options provided:  -- Respiratory distress is confirmed and acute hypoxic respiratory failure is   ruled out  -- Acute hypoxic respiratory failure and respiratory distress confirmed  -- Other - I will add my own diagnosis  -- Disagree -  Not applicable / Not valid  -- Disagree - Clinically unable to determine / Unknown  -- Refer to Clinical Documentation Reviewer    PROVIDER RESPONSE TEXT:    After study, acute hypoxic respiratory failure and respiratory distress are   confirmed.    Query created by: Da Diaz on 6/5/2024 2:01 PM      Electronically signed by:  Thompson Caicedo MD 6/8/2024 9:53 AM

## 2024-06-09 LAB
ALBUMIN SERPL-MCNC: 3 G/DL (ref 3.5–4.6)
ANION GAP SERPL CALCULATED.3IONS-SCNC: 6 MEQ/L (ref 9–15)
BASOPHILS # BLD: 0 K/UL (ref 0–0.2)
BASOPHILS NFR BLD: 0.3 %
BUN SERPL-MCNC: 28 MG/DL (ref 8–23)
CALCIUM SERPL-MCNC: 9 MG/DL (ref 8.5–9.9)
CHLORIDE SERPL-SCNC: 106 MEQ/L (ref 95–107)
CO2 SERPL-SCNC: 29 MEQ/L (ref 20–31)
CREAT SERPL-MCNC: 0.59 MG/DL (ref 0.5–0.9)
EOSINOPHIL # BLD: 0.2 K/UL (ref 0–0.7)
EOSINOPHIL NFR BLD: 2.4 %
ERYTHROCYTE [DISTWIDTH] IN BLOOD BY AUTOMATED COUNT: 13.4 % (ref 11.5–14.5)
GLUCOSE BLD-MCNC: 150 MG/DL (ref 70–99)
GLUCOSE BLD-MCNC: 151 MG/DL (ref 70–99)
GLUCOSE BLD-MCNC: 153 MG/DL (ref 70–99)
GLUCOSE BLD-MCNC: 159 MG/DL (ref 70–99)
GLUCOSE BLD-MCNC: 93 MG/DL (ref 70–99)
GLUCOSE SERPL-MCNC: 190 MG/DL (ref 70–99)
HCT VFR BLD AUTO: 37.7 % (ref 37–47)
HGB BLD-MCNC: 11.9 G/DL (ref 12–16)
LYMPHOCYTES # BLD: 2.1 K/UL (ref 1–4.8)
LYMPHOCYTES NFR BLD: 26.4 %
MCH RBC QN AUTO: 29.3 PG (ref 27–31.3)
MCHC RBC AUTO-ENTMCNC: 31.6 % (ref 33–37)
MCV RBC AUTO: 92.9 FL (ref 79.4–94.8)
MONOCYTES # BLD: 0.8 K/UL (ref 0.2–0.8)
MONOCYTES NFR BLD: 9.8 %
NEUTROPHILS # BLD: 4.7 K/UL (ref 1.4–6.5)
NEUTS SEG NFR BLD: 59.6 %
PERFORMED ON: ABNORMAL
PERFORMED ON: NORMAL
PHOSPHATE SERPL-MCNC: 3.6 MG/DL (ref 2.3–4.8)
PLATELET # BLD AUTO: 154 K/UL (ref 130–400)
POTASSIUM SERPL-SCNC: 4.1 MEQ/L (ref 3.4–4.9)
RBC # BLD AUTO: 4.06 M/UL (ref 4.2–5.4)
SODIUM SERPL-SCNC: 141 MEQ/L (ref 135–144)
WBC # BLD AUTO: 7.9 K/UL (ref 4.8–10.8)

## 2024-06-09 PROCEDURE — 6370000000 HC RX 637 (ALT 250 FOR IP): Performed by: STUDENT IN AN ORGANIZED HEALTH CARE EDUCATION/TRAINING PROGRAM

## 2024-06-09 PROCEDURE — 80069 RENAL FUNCTION PANEL: CPT

## 2024-06-09 PROCEDURE — 6360000002 HC RX W HCPCS: Performed by: INTERNAL MEDICINE

## 2024-06-09 PROCEDURE — 6370000000 HC RX 637 (ALT 250 FOR IP): Performed by: NURSE PRACTITIONER

## 2024-06-09 PROCEDURE — 97535 SELF CARE MNGMENT TRAINING: CPT

## 2024-06-09 PROCEDURE — 6370000000 HC RX 637 (ALT 250 FOR IP): Performed by: INTERNAL MEDICINE

## 2024-06-09 PROCEDURE — 6360000002 HC RX W HCPCS: Performed by: STUDENT IN AN ORGANIZED HEALTH CARE EDUCATION/TRAINING PROGRAM

## 2024-06-09 PROCEDURE — 36415 COLL VENOUS BLD VENIPUNCTURE: CPT

## 2024-06-09 PROCEDURE — 99232 SBSQ HOSP IP/OBS MODERATE 35: CPT | Performed by: INTERNAL MEDICINE

## 2024-06-09 PROCEDURE — 2580000003 HC RX 258: Performed by: STUDENT IN AN ORGANIZED HEALTH CARE EDUCATION/TRAINING PROGRAM

## 2024-06-09 PROCEDURE — 51798 US URINE CAPACITY MEASURE: CPT

## 2024-06-09 PROCEDURE — 51701 INSERT BLADDER CATHETER: CPT

## 2024-06-09 PROCEDURE — 85025 COMPLETE CBC W/AUTO DIFF WBC: CPT

## 2024-06-09 PROCEDURE — 1210000000 HC MED SURG R&B

## 2024-06-09 RX ORDER — TAMSULOSIN HYDROCHLORIDE 0.4 MG/1
0.4 CAPSULE ORAL DAILY
Status: DISCONTINUED | OUTPATIENT
Start: 2024-06-09 | End: 2024-06-13 | Stop reason: HOSPADM

## 2024-06-09 RX ADMIN — AMOXICILLIN AND CLAVULANATE POTASSIUM 1 TABLET: 875; 125 TABLET, COATED ORAL at 07:38

## 2024-06-09 RX ADMIN — Medication 10 ML: at 21:33

## 2024-06-09 RX ADMIN — HYDROMORPHONE HYDROCHLORIDE 0.25 MG: 1 INJECTION, SOLUTION INTRAMUSCULAR; INTRAVENOUS; SUBCUTANEOUS at 22:57

## 2024-06-09 RX ADMIN — HYDRALAZINE HYDROCHLORIDE 100 MG: 50 TABLET ORAL at 13:28

## 2024-06-09 RX ADMIN — HYDROMORPHONE HYDROCHLORIDE 0.25 MG: 1 INJECTION, SOLUTION INTRAMUSCULAR; INTRAVENOUS; SUBCUTANEOUS at 01:32

## 2024-06-09 RX ADMIN — HYDRALAZINE HYDROCHLORIDE 100 MG: 50 TABLET ORAL at 05:39

## 2024-06-09 RX ADMIN — ENOXAPARIN SODIUM 40 MG: 100 INJECTION SUBCUTANEOUS at 07:38

## 2024-06-09 RX ADMIN — VERAPAMIL HYDROCHLORIDE 40 MG: 40 TABLET ORAL at 13:28

## 2024-06-09 RX ADMIN — MIRTAZAPINE 7.5 MG: 15 TABLET, FILM COATED ORAL at 21:23

## 2024-06-09 RX ADMIN — Medication 10 ML: at 07:39

## 2024-06-09 RX ADMIN — VERAPAMIL HYDROCHLORIDE 40 MG: 40 TABLET ORAL at 21:24

## 2024-06-09 RX ADMIN — LISINOPRIL 20 MG: 20 TABLET ORAL at 07:38

## 2024-06-09 RX ADMIN — HYDRALAZINE HYDROCHLORIDE 100 MG: 50 TABLET ORAL at 21:24

## 2024-06-09 RX ADMIN — VERAPAMIL HYDROCHLORIDE 40 MG: 40 TABLET ORAL at 05:39

## 2024-06-09 RX ADMIN — SERTRALINE 100 MG: 100 TABLET, FILM COATED ORAL at 07:38

## 2024-06-09 RX ADMIN — TRAZODONE HYDROCHLORIDE 50 MG: 50 TABLET ORAL at 21:24

## 2024-06-09 RX ADMIN — ATORVASTATIN CALCIUM 40 MG: 40 TABLET, FILM COATED ORAL at 21:24

## 2024-06-09 RX ADMIN — AMOXICILLIN AND CLAVULANATE POTASSIUM 1 TABLET: 875; 125 TABLET, COATED ORAL at 21:24

## 2024-06-09 ASSESSMENT — PAIN SCALES - GENERAL
PAINLEVEL_OUTOF10: 0
PAINLEVEL_OUTOF10: 0
PAINLEVEL_OUTOF10: 10
PAINLEVEL_OUTOF10: 0
PAINLEVEL_OUTOF10: 8

## 2024-06-09 ASSESSMENT — PAIN - FUNCTIONAL ASSESSMENT: PAIN_FUNCTIONAL_ASSESSMENT: ACTIVITIES ARE NOT PREVENTED

## 2024-06-09 ASSESSMENT — PAIN DESCRIPTION - LOCATION: LOCATION: NECK

## 2024-06-09 ASSESSMENT — PAIN DESCRIPTION - DESCRIPTORS: DESCRIPTORS: ACHING;DISCOMFORT

## 2024-06-09 NOTE — PROGRESS NOTES
Pulmonary & Critical Care Medicine ICU Progress Note  Chief complaint : Respiratory insufficiency    Subjunctive/24 hour events :   Patient seen and examined during multidisciplinary rounds with RN, charge nurse, RT, pharmacy, dietitian, and social service.      Doing good, no issues overnight, no fever, she is not on Precedex, she is on 3 L O2 saturation 97%, no fever.  Urine output 900 cc    New information updated in the note today, rest of the examination did not change compared to yesterday.  Social History     Tobacco Use    Smoking status: Never    Smokeless tobacco: Never   Substance Use Topics    Alcohol use: No         Problem Relation Age of Onset    Other Mother         Neurological Disease    Cancer Sister     Diabetes Brother     Cancer Brother         leukemia       No results for input(s): \"PHART\", \"EGH2DXD\", \"PO2ART\" in the last 72 hours.      MV Settings:  Vent Mode: (S) CPAP/PS Resp Rate (Set): 28 bpm/Vt (Set, mL): 330 mL/ /FiO2 : 40 %           IV:   sodium chloride      dextrose         Vitals:  BP (!) 149/57   Pulse 90   Temp 98.8 °F (37.1 °C) (Oral)   Resp 24   Ht 1.575 m (5' 2.01\")   Wt 55.6 kg (122 lb 9.2 oz)   LMP  (LMP Unknown)   SpO2 97%   BMI 22.41 kg/m²    Tmax:        Intake/Output Summary (Last 24 hours) at 6/9/2024 0944  Last data filed at 6/9/2024 0800  Gross per 24 hour   Intake 2146.85 ml   Output 900 ml   Net 1246.85 ml         EXAM:  General: Awake , no distress   Head: normocephalic, atraumatic  Eyes:No gross abnormalities.  ENT:  MMM no lesions  Neck:  supple and no masses, no stridor  Chest : Good air movement, no wheezing, no rales, nontender, tympanic  Heart:: Heart sounds are normal.  Regular rate and rhythm without murmur, gallop or rub.  ABD:  symmetric, soft, non-tender, no guarding or rebound  Musculoskeletal : no cyanosis, no clubbing, and no edema  Neuro: No focal neurodeficit  Skin: No rashes or nodules noted.  Lymph node:  no cervical nodes  Urology: No  of 2  Films:  CXR films reviewed by me and it showed congestion      Assessment:  This is a critically ill patient     Oropharyngeal abscess, status post I&D  Acute hypoxic respiratory failure,  Pulmonary edema  Diabetes  Hypertension  Coag negative Staphylococcus in blood culture 1 out of 2,?  Contamination     Recommendation  O2 to keep sat 90 to 92%   TV on, natural light, avoid benzos, pain control, early mobility, and family engagement  PUD prophylaxis  DVT prophylaxis  Continue tube feed, patient scheduled for barium study tomorrow  Target blood sugar 140-180  Monitor and replace electrolyte as needed  Pulmonary hygiene  Lasix as needed      Transfer to floor             Electronically signed by Thompson Presley MD,  [facility] ,on 6/9/2024 at 9:44 AM

## 2024-06-09 NOTE — PROGRESS NOTES
Received report from Diana LUTHER, skin assessment at this time. Pt oriented at times and disoriented at others. On nasal cannula and NG.

## 2024-06-09 NOTE — PROGRESS NOTES
Internal Medicine   Hospitalist   Progress Note    2024   3:01 PM    Name:  Tanya Liz  MRN:    [id number]     IP Day: 9     Admit Date: 2024 11:11 AM  PCP: Madi Kelly DO    Code Status:  Full Code    Assessment and Plan:        Active Problems/ diagnosis:     Oropharyngeal abscess  Diabetes  Acute respiratory insufficiency due to abscess  Hypertension    Plan  Blood pressure is fluctuating.  Will adjust antihypertensive.  Monitor.  Discussed with RN.  She is off Precedex.   Resume steroids and antibiotics.  DC Unasyn, restarted on Augmentin    Critical care and ENT are following  Resume current medications otherwise  As needed oxycodone.  Dilaudid for breakthrough.  DVT PPx     7 pm- 7 am, please contact on call Hospitalist for any needs     Subjective:     On nasal cannula.  Discussed with ASHKAN Doe.  Patient is sleepy.     Physical Examination:      Vitals:  BP (!) 152/55   Pulse 82   Temp 97.3 °F (36.3 °C) (Oral)   Resp 20   Ht 1.575 m (5' 2.01\")   Wt 55.6 kg (122 lb 9.2 oz)   LMP  (LMP Unknown)   SpO2 99%   BMI 22.41 kg/m²   Temp (24hrs), Av.5 °F (36.9 °C), Min:97.3 °F (36.3 °C), Max:99.2 °F (37.3 °C)      General appearance: Sleepy.  Mental Status: Deferred  Lungs: Clear bilaterally  Heart: regular rate and rhythm, no murmur  Abdomen: soft, nondistended, bowel sounds present, no masses  Extremities: no edema, redness  Skin: no gross lesions, rashes    Data:     Labs:  Recent Labs     24  0455 24  0510   WBC 5.7 7.9   HGB 11.2* 11.9*    154     Recent Labs     24  0455 24  0510    141   K 3.7 4.1    106   CO2 30 29   BUN 37* 28*   CREATININE 0.62 0.59   GLUCOSE 127* 190*     No results for input(s): \"AST\", \"ALT\", \"BILITOT\", \"ALKPHOS\" in the last 72 hours.    Invalid input(s): \"ALB\"      Current Facility-Administered Medications   Medication Dose Route Frequency Provider Last Rate Last Admin    hydrALAZINE (APRESOLINE) tablet 100 mg  100 mg  flush 0.9 % injection 5-40 mL  5-40 mL IntraVENous 2 times per day Diana Suh MD   10 mL at 06/09/24 0739    sodium chloride flush 0.9 % injection 5-40 mL  5-40 mL IntraVENous PRN Diana Suh MD        0.9 % sodium chloride infusion   IntraVENous PRN Diana Suh MD        ondansetron (ZOFRAN-ODT) disintegrating tablet 4 mg  4 mg Oral Q8H PRN Diana Suh MD        Or    ondansetron (ZOFRAN) injection 4 mg  4 mg IntraVENous Q6H PRN Diana Suh MD        glucose chewable tablet 16 g  4 tablet Oral PRN Aurelia Hung MD        dextrose bolus 10% 125 mL  125 mL IntraVENous PRN Aurelia Hung MD        Or    dextrose bolus 10% 250 mL  250 mL IntraVENous PRN Aurelia Hung MD        glucagon injection 1 mg  1 mg SubCUTAneous PRN Aurelia Hung MD        dextrose 10 % infusion   IntraVENous Continuous PRN Aurelia Hung MD        insulin lispro (HUMALOG,ADMELOG) injection vial 0-8 Units  0-8 Units SubCUTAneous Q4H Aurelia Hung MD           New information updated in the note today, rest of the examination did not change compared to yesterday.    Additional work up or/and treatment plan may be added today or then after based on clinical progression. I am managing a portion of pt care. Some medical issues are handled by other specialists. Additional work up and treatment should be done in out pt setting by pt PCP and other out pt providers.      In addition to examining and evaluating pt, I spent additional time explaining care, normaland abnormal findings, and treatment plan. All of pt questions were answered. Counseling, diet and education were provided. Case will be discussed with nursing staff when appropriate. Family will be updated if and when appropriate.       Electronically signed by Colton Daly DO on 6/9/2024 at 3:01 PM

## 2024-06-09 NOTE — PROGRESS NOTES
Physical Therapy Med Surg Daily Treatment Note  Facility/Department: [address] MED SURG UNIT  Room: [place] [id number]       NAME: Tanya Liz  : 1940 (84 y.o.)  MRN: [id number]  CODE STATUS: Full Code    Date of Service: 2024    Patient Diagnosis(es): Peritonsillar abscess [J36]  Oropharyngeal mass [J39.2]  Oral abscess [K12.2]   Chief Complaint   Patient presents with    Pharyngitis     x4days    Nausea    Diarrhea     Patient Active Problem List    Diagnosis Date Noted    Advanced care planning/counseling discussion 2024    Goals of care, counseling/discussion 2024    Palliative care encounter 2024    Oropharyngeal mass 2024    Oral abscess 2024    Right rotator cuff tendonitis 2022    Basilar artery stenosis/occlusion 2021    Subcortical microvascular ischemic occlusive disease 2021    Easy bruising 2021    Black stools 2021    Chronic pruritus 01/15/2021    Adenomatous polyp of descending colon     Chronic idiopathic constipation     Postnasal drip 2019    Memory loss of unknown cause 2018    Chronic right shoulder pain 07/10/2018    Type 2 diabetes mellitus without complication (HCC) 04/10/2018    Lymphocytopenia 2018    Multiple actinic keratoses 2018    Age-related osteoporosis without current pathological fracture 2018    Depression with anxiety 2018    Gastroesophageal reflux disease without esophagitis 2015    Essential hypertension 10/05/2015        Past Medical History:   Diagnosis Date    Arthritis     Basilar artery stenosis/occlusion 2021    Chronic bronchitis (HCC)     Chronic pruritus 1/15/2021    COVID-19 virus infection 2021    Degenerative disc disease, cervical     Depression     Depression with anxiety     Easy bruising 2021    Esophagitis     Fibromyositis     GERD (gastroesophageal reflux disease)     Headache(784.0)     Hyperlipidemia     Hypertension     Migraines      mobility requires less assist this date. Patient would benefit from cont skilled therapy to return to plof.     Discharge Recommendations:  Continue to assess pending progress         Goals  Short Term Goals  Short Term Goal 1: mod assist bed mobility  Short Term Goal 2: initiate standing and gait activities  Long Term Goals  Long Term Goal 1: min assist bed mobility  Long Term Goal 2: pt able to maintain sitting with no assistance and reach 2-4 inches  Long Term Goal 3: mod assist sit to stand  Long Term Goal 4: mod assist with gait with appropriate device 20 feet    PLAN    General Plan: 1 time a day 3-6 times a week        AMPAC (6 CLICK) BASIC MOBILITY  AM-PAC Inpatient Mobility Raw Score : 8     Therapy Time   Individual   Time In 1300   Time Out 1311   Minutes 11      Tf/bed mob 11 min       Lanny Limon PTA, 06/09/24 at 2:48 PM         Definitions for assistance levels  Independent = pt does not require any physical supervision or assistance from another person for activity completion. Device may be needed.  Stand by assistance = pt requires verbal cues or instructions from another person, close to but not touching, to perform the activity  Minimal assistance= pt performs 75% or more of the activity; assistance is required to complete the activity  Moderate assistance= pt performs 50% of the activity; assistance is required to complete the activity  Maximal assistance = pt performs 25% of the activity; assistance is required to complete the activity  Dependent = pt requires total physical assistance to accomplish the task

## 2024-06-09 NOTE — PROGRESS NOTES
1100 - Patient arrived to 4W from ICU. VSS. A&Ox4. Patient is cooperative, anxious. Azeri speaking but is able to speak English well. Denies having nausea or SOB. Complaining of throat pain and back pain. NGT at 55 to left nares. Bolus tube feedings set up via pump with q4 water flushes. Patient tolerated 2pm bolus/flush well. Blood pressure medications given via NGT (Crushed). Placement verified via auscultation. Tongue coated with white spots. Mouth suctioned upon arrival to 4W. NPO. Lung sounds are diminished. On 3L NC. SR on tele. Abdomen is soft and round. Bowel sounds present. External catheter placed for urinary incont. Currently in bed with call light in reach. No needs at this time. Care ongoing.     Electronically signed by Nader Camacho RN on 6/9/2024 at 2:33 PM     1500 - Bladderscan performed: 627 ml   - Order placed for straight cath.   - 700cc drained     Electronically signed by Nader Camacho RN on 6/9/2024 at 3:41 PM

## 2024-06-10 ENCOUNTER — APPOINTMENT (OUTPATIENT)
Dept: GENERAL RADIOLOGY | Age: 84
DRG: 137 | End: 2024-06-10
Payer: MEDICARE

## 2024-06-10 PROBLEM — R33.9 URINARY RETENTION: Status: ACTIVE | Noted: 2024-06-10

## 2024-06-10 PROBLEM — J36 PERITONSILLAR ABSCESS: Status: ACTIVE | Noted: 2024-05-31

## 2024-06-10 LAB
ALBUMIN SERPL-MCNC: 2.9 G/DL (ref 3.5–4.6)
ANION GAP SERPL CALCULATED.3IONS-SCNC: 9 MEQ/L (ref 9–15)
BASOPHILS # BLD: 0 K/UL (ref 0–0.2)
BASOPHILS NFR BLD: 0.2 %
BUN SERPL-MCNC: 22 MG/DL (ref 8–23)
CALCIUM SERPL-MCNC: 9.3 MG/DL (ref 8.5–9.9)
CHLORIDE SERPL-SCNC: 103 MEQ/L (ref 95–107)
CO2 SERPL-SCNC: 28 MEQ/L (ref 20–31)
CREAT SERPL-MCNC: 0.58 MG/DL (ref 0.5–0.9)
EOSINOPHIL # BLD: 0.3 K/UL (ref 0–0.7)
EOSINOPHIL NFR BLD: 2.4 %
ERYTHROCYTE [DISTWIDTH] IN BLOOD BY AUTOMATED COUNT: 13.3 % (ref 11.5–14.5)
GLUCOSE BLD-MCNC: 114 MG/DL (ref 70–99)
GLUCOSE BLD-MCNC: 131 MG/DL (ref 70–99)
GLUCOSE BLD-MCNC: 145 MG/DL (ref 70–99)
GLUCOSE BLD-MCNC: 190 MG/DL (ref 70–99)
GLUCOSE BLD-MCNC: 205 MG/DL (ref 70–99)
GLUCOSE SERPL-MCNC: 139 MG/DL (ref 70–99)
HCT VFR BLD AUTO: 34.5 % (ref 37–47)
HGB BLD-MCNC: 11.1 G/DL (ref 12–16)
LYMPHOCYTES # BLD: 1.7 K/UL (ref 1–4.8)
LYMPHOCYTES NFR BLD: 16.3 %
MCH RBC QN AUTO: 29.9 PG (ref 27–31.3)
MCHC RBC AUTO-ENTMCNC: 32.2 % (ref 33–37)
MCV RBC AUTO: 93 FL (ref 79.4–94.8)
MONOCYTES # BLD: 0.9 K/UL (ref 0.2–0.8)
MONOCYTES NFR BLD: 8.6 %
NEUTROPHILS # BLD: 7.4 K/UL (ref 1.4–6.5)
NEUTS SEG NFR BLD: 71.5 %
PERFORMED ON: ABNORMAL
PHOSPHATE SERPL-MCNC: 3.2 MG/DL (ref 2.3–4.8)
PLATELET # BLD AUTO: 146 K/UL (ref 130–400)
POTASSIUM SERPL-SCNC: 4.6 MEQ/L (ref 3.4–4.9)
RBC # BLD AUTO: 3.71 M/UL (ref 4.2–5.4)
SODIUM SERPL-SCNC: 140 MEQ/L (ref 135–144)
WBC # BLD AUTO: 10.4 K/UL (ref 4.8–10.8)

## 2024-06-10 PROCEDURE — 85025 COMPLETE CBC W/AUTO DIFF WBC: CPT

## 2024-06-10 PROCEDURE — 1210000000 HC MED SURG R&B

## 2024-06-10 PROCEDURE — 6360000002 HC RX W HCPCS: Performed by: STUDENT IN AN ORGANIZED HEALTH CARE EDUCATION/TRAINING PROGRAM

## 2024-06-10 PROCEDURE — 74230 X-RAY XM SWLNG FUNCJ C+: CPT

## 2024-06-10 PROCEDURE — 99221 1ST HOSP IP/OBS SF/LOW 40: CPT | Performed by: PHYSICIAN ASSISTANT

## 2024-06-10 PROCEDURE — 6370000000 HC RX 637 (ALT 250 FOR IP): Performed by: INTERNAL MEDICINE

## 2024-06-10 PROCEDURE — 6370000000 HC RX 637 (ALT 250 FOR IP)

## 2024-06-10 PROCEDURE — 6370000000 HC RX 637 (ALT 250 FOR IP): Performed by: NURSE PRACTITIONER

## 2024-06-10 PROCEDURE — 36415 COLL VENOUS BLD VENIPUNCTURE: CPT

## 2024-06-10 PROCEDURE — 6370000000 HC RX 637 (ALT 250 FOR IP): Performed by: STUDENT IN AN ORGANIZED HEALTH CARE EDUCATION/TRAINING PROGRAM

## 2024-06-10 PROCEDURE — 2500000003 HC RX 250 WO HCPCS: Performed by: STUDENT IN AN ORGANIZED HEALTH CARE EDUCATION/TRAINING PROGRAM

## 2024-06-10 PROCEDURE — 2700000000 HC OXYGEN THERAPY PER DAY

## 2024-06-10 PROCEDURE — 6360000002 HC RX W HCPCS: Performed by: INTERNAL MEDICINE

## 2024-06-10 PROCEDURE — 92611 MOTION FLUOROSCOPY/SWALLOW: CPT

## 2024-06-10 PROCEDURE — 97535 SELF CARE MNGMENT TRAINING: CPT

## 2024-06-10 PROCEDURE — 92526 ORAL FUNCTION THERAPY: CPT

## 2024-06-10 PROCEDURE — 80069 RENAL FUNCTION PANEL: CPT

## 2024-06-10 PROCEDURE — 51798 US URINE CAPACITY MEASURE: CPT

## 2024-06-10 PROCEDURE — 2580000003 HC RX 258: Performed by: STUDENT IN AN ORGANIZED HEALTH CARE EDUCATION/TRAINING PROGRAM

## 2024-06-10 PROCEDURE — 71045 X-RAY EXAM CHEST 1 VIEW: CPT

## 2024-06-10 RX ADMIN — Medication 10 ML: at 21:30

## 2024-06-10 RX ADMIN — POLYETHYLENE GLYCOL 3350 17 G: 17 POWDER, FOR SOLUTION ORAL at 11:43

## 2024-06-10 RX ADMIN — HYDROMORPHONE HYDROCHLORIDE 0.25 MG: 1 INJECTION, SOLUTION INTRAMUSCULAR; INTRAVENOUS; SUBCUTANEOUS at 06:24

## 2024-06-10 RX ADMIN — TAMSULOSIN HYDROCHLORIDE 0.4 MG: 0.4 CAPSULE ORAL at 11:43

## 2024-06-10 RX ADMIN — AMOXICILLIN AND CLAVULANATE POTASSIUM 1 TABLET: 875; 125 TABLET, COATED ORAL at 21:21

## 2024-06-10 RX ADMIN — VERAPAMIL HYDROCHLORIDE 40 MG: 40 TABLET ORAL at 16:04

## 2024-06-10 RX ADMIN — HYDRALAZINE HYDROCHLORIDE 100 MG: 50 TABLET ORAL at 21:21

## 2024-06-10 RX ADMIN — VERAPAMIL HYDROCHLORIDE 40 MG: 40 TABLET ORAL at 05:07

## 2024-06-10 RX ADMIN — BARIUM SULFATE 80 ML: 400 SUSPENSION ORAL at 14:04

## 2024-06-10 RX ADMIN — SERTRALINE 100 MG: 100 TABLET, FILM COATED ORAL at 11:43

## 2024-06-10 RX ADMIN — BARIUM SULFATE 50 ML: 0.81 POWDER, FOR SUSPENSION ORAL at 14:04

## 2024-06-10 RX ADMIN — LISINOPRIL 20 MG: 20 TABLET ORAL at 11:43

## 2024-06-10 RX ADMIN — VERAPAMIL HYDROCHLORIDE 40 MG: 40 TABLET ORAL at 21:21

## 2024-06-10 RX ADMIN — Medication 10 ML: at 11:44

## 2024-06-10 RX ADMIN — HYDROMORPHONE HYDROCHLORIDE 0.25 MG: 1 INJECTION, SOLUTION INTRAMUSCULAR; INTRAVENOUS; SUBCUTANEOUS at 11:43

## 2024-06-10 RX ADMIN — HYDRALAZINE HYDROCHLORIDE 100 MG: 50 TABLET ORAL at 16:04

## 2024-06-10 RX ADMIN — MIRTAZAPINE 7.5 MG: 15 TABLET, FILM COATED ORAL at 21:21

## 2024-06-10 RX ADMIN — DOCUSATE SODIUM 100 MG: 50 LIQUID ORAL at 11:43

## 2024-06-10 RX ADMIN — ENOXAPARIN SODIUM 40 MG: 100 INJECTION SUBCUTANEOUS at 11:43

## 2024-06-10 RX ADMIN — AMOXICILLIN AND CLAVULANATE POTASSIUM 1 TABLET: 875; 125 TABLET, COATED ORAL at 11:43

## 2024-06-10 RX ADMIN — DOCUSATE SODIUM 100 MG: 50 LIQUID ORAL at 21:29

## 2024-06-10 RX ADMIN — TRAZODONE HYDROCHLORIDE 50 MG: 50 TABLET ORAL at 21:21

## 2024-06-10 RX ADMIN — OXYCODONE 5 MG: 5 TABLET ORAL at 16:04

## 2024-06-10 RX ADMIN — HYDRALAZINE HYDROCHLORIDE 100 MG: 50 TABLET ORAL at 05:07

## 2024-06-10 RX ADMIN — ATORVASTATIN CALCIUM 40 MG: 40 TABLET, FILM COATED ORAL at 21:21

## 2024-06-10 ASSESSMENT — ENCOUNTER SYMPTOMS: APNEA: 0

## 2024-06-10 ASSESSMENT — PAIN SCALES - WONG BAKER: WONGBAKER_NUMERICALRESPONSE: 0;2

## 2024-06-10 ASSESSMENT — PAIN SCALES - GENERAL
PAINLEVEL_OUTOF10: 7
PAINLEVEL_OUTOF10: 4
PAINLEVEL_OUTOF10: 4

## 2024-06-10 NOTE — CONSULTS
Urology Consult      Tanya Liz  1940  [id number]    Date of Admission:  5/31/2024 11:11 AM  Date of Consultation:  6/10/2024    Consultant: Rei Swift PA-C  SupervisingPhysician: Dr. Lacy  PCP:  Madi Kelly DO       Reason for Consultation: urinary retention      C/C:   Chief Complaint   Patient presents with    Pharyngitis     x4days    Nausea    Diarrhea       History of Present Illness: Urinary Retention  Patient complains of urinary retention. Onset of retention was a few days ago and was sudden in onset. Patient currently does have a urinary catheter in place.   ml of urine were drained when catheter was placed. Prior to this event voiding symptoms consisted of slow stream. Prior treatments include n/a. Recent medications that may have affected his voiding include none.    Allergies:  Allergies   Allergen Reactions    Codeine Shortness Of Breath     tired       PMH: Patient has a past medical history of Arthritis, Basilar artery stenosis/occlusion, Chronic bronchitis (HCC), Chronic pruritus, COVID-19 virus infection, Degenerative disc disease, cervical, Depression, Depression with anxiety, Easy bruising, Esophagitis, Fibromyositis, GERD (gastroesophageal reflux disease), Headache(784.0), Hyperlipidemia, Hypertension, Migraines, Pleural effusion, Right-sided chest wall pain, Subcortical microvascular ischemic occlusive disease, and Type 2 diabetes mellitus without complication ([facility]).    PSH: Patient has a past surgical history that includes Upper gastrointestinal endoscopy (04/16/2013); Colonoscopy (05/04/2009); Hysterectomy (1978); Cholecystectomy (1988); other surgical history (Left, 04/27/15 ); Upper gastrointestinal endoscopy (10/29/15); Colonoscopy (N/A, 10/24/2019); Ovary removal; and laryngoscopy (Bilateral, 5/31/2024).    Social History: Patient reports that she has never smoked. She has never used smokeless tobacco. She reports that she does not drink alcohol and does not use

## 2024-06-10 NOTE — PROGRESS NOTES
Shift assessment complete. Pt is AxOx4. Sitting up eating small bites of her lunch. Tolerating. C/o pain, dilaudid given per mar. Meds given per mar, crushed in NG. Denies any needs at this time.Call light within reach.    Electronically signed by Rosalind Coto RN on 6/10/2024 at 12:28 PM

## 2024-06-10 NOTE — PROGRESS NOTES
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]  Oral abscess [K12.2]   Chief Complaint   Patient presents with    Pharyngitis     x4days    Nausea    Diarrhea     Patient Active Problem List    Diagnosis Date Noted    Urinary retention 06/10/2024    Advanced care planning/counseling discussion 2024    Goals of care, counseling/discussion 2024    Palliative care encounter 2024    Peritonsillar abscess 2024    Oral abscess 2024    Right rotator cuff tendonitis 2022    Basilar artery stenosis/occlusion 2021    Subcortical microvascular ischemic occlusive disease 2021    Easy bruising 2021    Black stools 2021    Chronic pruritus 01/15/2021    Adenomatous polyp of descending colon     Chronic idiopathic constipation     Postnasal drip 2019    Memory loss of unknown cause 2018    Chronic right shoulder pain 07/10/2018    Type 2 diabetes mellitus without complication (HCC) 04/10/2018    Lymphocytopenia 2018    Multiple actinic keratoses 2018    Age-related osteoporosis without current pathological fracture 2018    Depression with anxiety 2018    Gastroesophageal reflux disease without esophagitis 2015    Essential hypertension 10/05/2015        Past Medical History:   Diagnosis Date    Arthritis     Basilar artery stenosis/occlusion 2021    Chronic bronchitis (HCC)     Chronic pruritus 1/15/2021    COVID-19 virus infection 2021    Degenerative disc disease, cervical     Depression     Depression with anxiety     Easy bruising 2021    Esophagitis     Fibromyositis     GERD (gastroesophageal reflux disease)     Headache(784.0)     Hyperlipidemia      Hypertension     Migraines     Pleural effusion     Right-sided chest wall pain     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    Type 2 diabetes mellitus without complication (HCC) 4/10/2018    no medication per patient     Past Surgical History:   Procedure Laterality Date    CHOLECYSTECTOMY  1988    COLONOSCOPY  05/04/2009    COLONOSCOPY N/A 10/24/2019    COLORECTAL CANCER SCREENING, NOT HIGH RISK performed by Gunner Beaulieu MD at [facility]    HYSTERECTOMY (CERVIX STATUS UNKNOWN)  1978    LARYNGOSCOPY Bilateral 5/31/2024    direct laryngoscopy with biopsy, with I&D of oropharyngeal abscess, flexible esophagoscopy performed by Diana Suh MD at [facility]    OTHER SURGICAL HISTORY Left 04/27/15     [facility] EYE VITRECTOMY W MACULAR EPIRETINAL MEMBRANE    OVARY REMOVAL      UPPER GASTROINTESTINAL ENDOSCOPY  04/16/2013    UPPER GASTROINTESTINAL ENDOSCOPY  10/29/15    ANTOINETTE ANGELO MD       Chart Reviewed: Yes  Patient assessed for rehabilitation services?: Yes  Family / Caregiver Present: No    Restrictions:  Restrictions/Precautions: Fall Risk;Up as Tolerated    SUBJECTIVE: Patient resting in bed. Agreeable to tx.       Pain  Patient denies pain pre/post session      OBJECTIVE:        Bed mobility  Bridging: Minimal assistance  Rolling to Left: Minimal assistance  Rolling to Right: Minimal assistance  Supine to Sit: Moderate assistance  Sit to Supine: Minimal assistance  Scooting: Minimal assistance  Bed Mobility Comments: HOB flat    Transfers  Sit to Stand: Minimal Assistance;Moderate Assistance  Stand to Sit: Minimal Assistance;Moderate Assistance  Comment: Patient requires verbal cues for safety/sequencing. Min/mod A to stabilize due to LOB in all directions. Completes STS x4 from bed.    Ambulation  Surface: Level tile  Device: Rolling Walker  Assistance: Minimal assistance;Moderate assistance  Quality of Gait: unsteady, B knee/ pelvic instability, decreased bilateral step length and

## 2024-06-10 NOTE — PROCEDURES
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]  Oral abscess [K12.2]  Chief Complaint   Patient presents with    Pharyngitis     x4days    Nausea    Diarrhea     Patient Active Problem List    Diagnosis Date Noted    Urinary retention 06/10/2024    Advanced care planning/counseling discussion 2024    Goals of care, counseling/discussion 2024    Palliative care encounter 2024    Peritonsillar abscess 2024    Oral abscess 2024    Right rotator cuff tendonitis 2022    Basilar artery stenosis/occlusion 2021    Subcortical microvascular ischemic occlusive disease 2021    Easy bruising 2021    Black stools 2021    Chronic pruritus 01/15/2021    Adenomatous polyp of descending colon     Chronic idiopathic constipation     Postnasal drip 2019    Memory loss of unknown cause 2018    Chronic right shoulder pain 07/10/2018    Type 2 diabetes mellitus without complication (HCC) 04/10/2018    Lymphocytopenia 2018    Multiple actinic keratoses 2018    Age-related osteoporosis without current pathological fracture 2018    Depression with anxiety 2018    Gastroesophageal reflux disease without esophagitis 2015    Essential hypertension 10/05/2015     Past Medical History:   Diagnosis Date    Arthritis     Basilar artery stenosis/occlusion 2021    Chronic bronchitis (HCC)     Chronic pruritus 1/15/2021    COVID-19 virus infection 2021    Degenerative disc disease, cervical     Depression     Depression with anxiety     Easy bruising 2021    Esophagitis     Fibromyositis     GERD (gastroesophageal reflux disease)     Headache(784.0)      Hyperlipidemia     Hypertension     Migraines     Pleural effusion     Right-sided chest wall pain     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    Type 2 diabetes mellitus without complication (HCC) 4/10/2018    no medication per patient     Past Surgical History:   Procedure Laterality Date    CHOLECYSTECTOMY  1988    COLONOSCOPY  05/04/2009    COLONOSCOPY N/A 10/24/2019    COLORECTAL CANCER SCREENING, NOT HIGH RISK performed by Gunner Beaulieu MD at [facility]    HYSTERECTOMY (CERVIX STATUS UNKNOWN)  1978    LARYNGOSCOPY Bilateral 5/31/2024    direct laryngoscopy with biopsy, with I&D of oropharyngeal abscess, flexible esophagoscopy performed by Diana Suh MD at [facility] OR    OTHER SURGICAL HISTORY Left 04/27/15     [facility] EYE VITRECTOMY W MACULAR EPIRETINAL MEMBRANE    OVARY REMOVAL      UPPER GASTROINTESTINAL ENDOSCOPY  04/16/2013    UPPER GASTROINTESTINAL ENDOSCOPY  10/29/15    ANTOINETTE ANGELO MD     Allergies   Allergen Reactions    Codeine Shortness Of Breath     tired       Date of Onset:  5/31/24      Date of Evaluation: 6/10/2024   Evaluating Therapist: THUY Mccarty      DYSPHAGIA DIAGNOSIS:  Mild-moderate oropharyngeal dysphagia    DIET RECOMMENDATIONS:  Solid consistency: Pureed  Liquid consistency: Thin  Liquid administration via: Spoon or Cup  Medication administration: Crushed in puree  Supervision: Assistance with meals    Compensatory Swallowing Strategies:   Upright positioning  Alternate solids and liquids  Small bites/sips  Double swallow  Eat/Feed Slowly  Remain upright for 30-45 minutes   Assist feed     Recommend referral to: Dietician    Upon arrival to fluoro suite SLP noticed that NG tube was displaced and called radiology RN. RN arrived and re-inserted NG tube to 55 and then called ASHKAN Connolly to alert her to incident.     Reason for Referral  Tanya Liz was referred for a modified barium swallow evaluation to assess the efficiency of her swallow

## 2024-06-10 NOTE — PLAN OF CARE
Problem: Pain  Goal: Verbalizes/displays adequate comfort level or baseline comfort level  Outcome: Progressing     Problem: Discharge Planning  Goal: Discharge to home or other facility with appropriate resources  Outcome: Progressing     Problem: Safety - Adult  Goal: Free from fall injury  Outcome: Progressing     Problem: Skin/Tissue Integrity  Goal: Absence of new skin breakdown  Description: 1.  Monitor for areas of redness and/or skin breakdown  2.  Assess vascular access sites hourly  3.  Every 4-6 hours minimum:  Change oxygen saturation probe site  4.  Every 4-6 hours:  If on nasal continuous positive airway pressure, respiratory therapy assess nares and determine need for appliance change or resting period.  Outcome: Progressing     Problem: Chronic Conditions and Co-morbidities  Goal: Patient's chronic conditions and co-morbidity symptoms are monitored and maintained or improved  Outcome: Progressing     Problem: Nutrition Deficit:  Goal: Optimize nutritional status  Outcome: Progressing     Problem: Neurosensory - Adult  Goal: Achieves stable or improved neurological status  Outcome: Progressing     Problem: Respiratory - Adult  Goal: Achieves optimal ventilation and oxygenation  Outcome: Progressing     Problem: Cardiovascular - Adult  Goal: Maintains optimal cardiac output and hemodynamic stability  Outcome: Progressing  Goal: Absence of cardiac dysrhythmias or at baseline  Outcome: Progressing     Problem: Skin/Tissue Integrity - Adult  Goal: Skin integrity remains intact  Outcome: Progressing     Problem: Musculoskeletal - Adult  Goal: Return mobility to safest level of function  Outcome: Progressing     Problem: Gastrointestinal - Adult  Goal: Minimal or absence of nausea and vomiting  Outcome: Progressing  Goal: Maintains or returns to baseline bowel function  Outcome: Progressing  Goal: Maintains adequate nutritional intake  Outcome: Progressing     Problem: Genitourinary - Adult  Goal: Absence

## 2024-06-10 NOTE — PROGRESS NOTES
Hospitalist Progress Note      PCP: Madi Kelly DO    Date of Admission: 5/31/2024    Chief Complaint:    Chief Complaint   Patient presents with    Pharyngitis     x4days    Nausea    Diarrhea     Subjective:  No acute events overnight. Pt sitting up in bed feeding herself. Denies any acute complaints at this time. States her throat is still sore but she is happy to be eating. Denies chest pain or shortness of breath.    Medications:  Reviewed    Infusion Medications    sodium chloride      dextrose       Scheduled Medications    tamsulosin  0.4 mg Oral Daily    hydrALAZINE  100 mg Oral 3 times per day    amoxicillin-clavulanate  1 tablet Oral 2 times per day    lisinopril  20 mg Oral Daily    enoxaparin  40 mg SubCUTAneous Daily    lidocaine  1 patch TransDERmal Daily    docusate  100 mg Oral BID    polyethylene glycol  17 g Oral Daily    verapamil  40 mg Oral 3 times per day    atorvastatin  40 mg Oral Nightly    mirtazapine  7.5 mg Oral Nightly    sertraline  100 mg Oral Daily    traZODone  50 mg Oral Nightly    sodium chloride flush  5-40 mL IntraVENous 2 times per day    insulin lispro  0-8 Units SubCUTAneous Q4H     PRN Meds: oxyCODONE, haloperidol lactate, hydrALAZINE, HYDROmorphone, ipratropium 0.5 mg-albuterol 2.5 mg, sodium chloride flush, sodium chloride, ondansetron **OR** ondansetron, glucose, dextrose bolus **OR** dextrose bolus, glucagon (rDNA), dextrose      Intake/Output Summary (Last 24 hours) at 6/10/2024 1249  Last data filed at 6/10/2024 1224  Gross per 24 hour   Intake 1340 ml   Output 2275 ml   Net -935 ml     Exam:    BP (!) 118/55   Pulse 88   Temp 98.6 °F (37 °C) (Oral)   Resp 16   Ht 1.575 m (5' 2.01\")   Wt 55.6 kg (122 lb 9.2 oz)   LMP  (LMP Unknown)   SpO2 97%   BMI 22.41 kg/m²   Physical Exam  Cardiovascular:      Rate and Rhythm: Normal rate and regular rhythm.   Pulmonary:      Effort: Pulmonary effort is normal. No respiratory distress.   Abdominal:      Palpations:  medical issues are handled by other specialists. Additional work up and treatment should be done in out pt setting by pt PCP and other out pt providers.     In addition to examining and evaluating pt, I spent additional time explaining care, normal and abnormal findings, and treatment plan. All of pt questions were answered. Counseling, diet and education were  provided. Case will be discussed with nursing staff when appropriate. Family will be updated if and when appropriate.      Diet: ADULT TUBE FEEDING; Nasogastric; Standard with Fiber; Bolus; 4 Times Daily; 250; Gravity; 200; Q 4 hours  ADULT DIET; Dysphagia - Pureed; 4 carb choices (60 gm/meal)    Code Status: Full Code    Electronically signed by Aurelia Hung MD on 6/10/2024 at 12:49 PM

## 2024-06-10 NOTE — PROGRESS NOTES
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]  Oral abscess [K12.2]    Restrictions/Precautions: Fall Risk, Up as Tolerated    Diet NPO  ADULT TUBE FEEDING; Nasogastric; Standard with Fiber; Bolus; 4 Times Daily; 250; Gravity; 200; Q 4 hours     Respiratory Status:O2 Flow Rate (L/min): 3 L/min (06/10/24 0711)   No active isolations      Subjective:  Alert and Cooperative        Interventions used this date:  Dysphagia Treatment      Objective/Assessment:  Patient progressing towards goals:  Short-term Goals  Timeframe for Short-term Goals: 2-4x week  Goal 1: Pt will complete lingual/labial exercises x10/each to promote increased OM strength/coordination and improve oral phase of swallow with cues as needed.  Pt completed x10 labial and x10 lingual strengthening with mod cues and models   Goal 2: Pt will tolerate PO trials deemed appropriate by treating SLP to assess readiness for diet initiation and need for further instrumental intervention.  Pt tolerated 3/3 trials of ice chips, 4/4 trials of pureed solids, and 3/3 sips of thin liquids via cup with no clinical indicators of aspiration. Pt did not c/o of any pain or discomfort. SpO2 remained at 97% during session.     Recommend initiating a pureed solid and thin liquids diet with intermittent checks to assess for tolerance and any s/s of aspiration or discomfort. Rec MBS to assess oropharyngeal function due to related admission.   Discussed with ASHKAN Connolly.         Treatment/Activity Tolerance:  Patient tolerated treatment well    Plan:  Alter current POC (see above)    Pain Assessment:  Patient does not c/o pain.    Pain Re-assessment:  Patient does not c/o pain.    Patient/Caregiver Education:  Patient educated on session and progression towards goals.  Education needs  reinforcement.    Safety Devices:  Bed alarm in place, Call light within reach, and Nurse notified      Therapy Time  SLP Individual Minutes  Time In: 0925  Time Out: 0938  Minutes: 13            Signature: Electronically signed by THYU Adam on 6/10/2024 at 10:15 AM

## 2024-06-11 PROBLEM — J96.01 ACUTE RESPIRATORY FAILURE WITH HYPOXIA (HCC): Status: ACTIVE | Noted: 2024-06-11

## 2024-06-11 LAB
ANION GAP SERPL CALCULATED.3IONS-SCNC: 8 MEQ/L (ref 9–15)
BASOPHILS # BLD: 0 K/UL (ref 0–0.2)
BASOPHILS NFR BLD: 0.2 %
BUN SERPL-MCNC: 21 MG/DL (ref 8–23)
CALCIUM SERPL-MCNC: 9.8 MG/DL (ref 8.5–9.9)
CHLORIDE SERPL-SCNC: 103 MEQ/L (ref 95–107)
CO2 SERPL-SCNC: 31 MEQ/L (ref 20–31)
CREAT SERPL-MCNC: 0.62 MG/DL (ref 0.5–0.9)
EOSINOPHIL # BLD: 0.2 K/UL (ref 0–0.7)
EOSINOPHIL NFR BLD: 2.5 %
ERYTHROCYTE [DISTWIDTH] IN BLOOD BY AUTOMATED COUNT: 13.2 % (ref 11.5–14.5)
GLUCOSE BLD-MCNC: 125 MG/DL (ref 70–99)
GLUCOSE BLD-MCNC: 130 MG/DL (ref 70–99)
GLUCOSE BLD-MCNC: 141 MG/DL (ref 70–99)
GLUCOSE BLD-MCNC: 233 MG/DL (ref 70–99)
GLUCOSE BLD-MCNC: 248 MG/DL (ref 70–99)
GLUCOSE BLD-MCNC: 85 MG/DL (ref 70–99)
GLUCOSE SERPL-MCNC: 136 MG/DL (ref 70–99)
HCT VFR BLD AUTO: 34.1 % (ref 37–47)
HGB BLD-MCNC: 10.8 G/DL (ref 12–16)
LYMPHOCYTES # BLD: 1.3 K/UL (ref 1–4.8)
LYMPHOCYTES NFR BLD: 14.2 %
MCH RBC QN AUTO: 29.5 PG (ref 27–31.3)
MCHC RBC AUTO-ENTMCNC: 31.7 % (ref 33–37)
MCV RBC AUTO: 93.2 FL (ref 79.4–94.8)
MONOCYTES # BLD: 0.8 K/UL (ref 0.2–0.8)
MONOCYTES NFR BLD: 9.2 %
NEUTROPHILS # BLD: 6.5 K/UL (ref 1.4–6.5)
NEUTS SEG NFR BLD: 73.1 %
PERFORMED ON: ABNORMAL
PERFORMED ON: NORMAL
PLATELET # BLD AUTO: 141 K/UL (ref 130–400)
POTASSIUM SERPL-SCNC: 5.1 MEQ/L (ref 3.4–4.9)
RBC # BLD AUTO: 3.66 M/UL (ref 4.2–5.4)
SODIUM SERPL-SCNC: 142 MEQ/L (ref 135–144)
WBC # BLD AUTO: 8.9 K/UL (ref 4.8–10.8)

## 2024-06-11 PROCEDURE — 6370000000 HC RX 637 (ALT 250 FOR IP): Performed by: INTERNAL MEDICINE

## 2024-06-11 PROCEDURE — 80048 BASIC METABOLIC PNL TOTAL CA: CPT

## 2024-06-11 PROCEDURE — 6370000000 HC RX 637 (ALT 250 FOR IP): Performed by: NURSE PRACTITIONER

## 2024-06-11 PROCEDURE — 2580000003 HC RX 258: Performed by: STUDENT IN AN ORGANIZED HEALTH CARE EDUCATION/TRAINING PROGRAM

## 2024-06-11 PROCEDURE — 36415 COLL VENOUS BLD VENIPUNCTURE: CPT

## 2024-06-11 PROCEDURE — 97535 SELF CARE MNGMENT TRAINING: CPT

## 2024-06-11 PROCEDURE — 99231 SBSQ HOSP IP/OBS SF/LOW 25: CPT | Performed by: PHYSICIAN ASSISTANT

## 2024-06-11 PROCEDURE — 2700000000 HC OXYGEN THERAPY PER DAY

## 2024-06-11 PROCEDURE — 85025 COMPLETE CBC W/AUTO DIFF WBC: CPT

## 2024-06-11 PROCEDURE — 1210000000 HC MED SURG R&B

## 2024-06-11 PROCEDURE — 92526 ORAL FUNCTION THERAPY: CPT

## 2024-06-11 PROCEDURE — 99232 SBSQ HOSP IP/OBS MODERATE 35: CPT | Performed by: INTERNAL MEDICINE

## 2024-06-11 PROCEDURE — 6370000000 HC RX 637 (ALT 250 FOR IP): Performed by: STUDENT IN AN ORGANIZED HEALTH CARE EDUCATION/TRAINING PROGRAM

## 2024-06-11 PROCEDURE — 99232 SBSQ HOSP IP/OBS MODERATE 35: CPT | Performed by: NURSE PRACTITIONER

## 2024-06-11 PROCEDURE — 6360000002 HC RX W HCPCS: Performed by: STUDENT IN AN ORGANIZED HEALTH CARE EDUCATION/TRAINING PROGRAM

## 2024-06-11 RX ORDER — INSULIN LISPRO 100 [IU]/ML
0-8 INJECTION, SOLUTION INTRAVENOUS; SUBCUTANEOUS
Status: DISCONTINUED | OUTPATIENT
Start: 2024-06-11 | End: 2024-06-13 | Stop reason: HOSPADM

## 2024-06-11 RX ADMIN — ENOXAPARIN SODIUM 40 MG: 100 INJECTION SUBCUTANEOUS at 10:08

## 2024-06-11 RX ADMIN — HYDRALAZINE HYDROCHLORIDE 100 MG: 50 TABLET ORAL at 17:10

## 2024-06-11 RX ADMIN — DOCUSATE SODIUM 100 MG: 50 LIQUID ORAL at 10:08

## 2024-06-11 RX ADMIN — HYDRALAZINE HYDROCHLORIDE 100 MG: 50 TABLET ORAL at 05:52

## 2024-06-11 RX ADMIN — AMOXICILLIN AND CLAVULANATE POTASSIUM 1 TABLET: 875; 125 TABLET, COATED ORAL at 21:54

## 2024-06-11 RX ADMIN — TRAZODONE HYDROCHLORIDE 50 MG: 50 TABLET ORAL at 21:54

## 2024-06-11 RX ADMIN — ATORVASTATIN CALCIUM 40 MG: 40 TABLET, FILM COATED ORAL at 21:54

## 2024-06-11 RX ADMIN — Medication 10 ML: at 10:08

## 2024-06-11 RX ADMIN — VERAPAMIL HYDROCHLORIDE 40 MG: 40 TABLET ORAL at 21:56

## 2024-06-11 RX ADMIN — OXYCODONE 5 MG: 5 TABLET ORAL at 07:55

## 2024-06-11 RX ADMIN — LISINOPRIL 20 MG: 20 TABLET ORAL at 10:09

## 2024-06-11 RX ADMIN — INSULIN LISPRO 2 UNITS: 100 INJECTION, SOLUTION INTRAVENOUS; SUBCUTANEOUS at 10:08

## 2024-06-11 RX ADMIN — Medication 10 ML: at 22:07

## 2024-06-11 RX ADMIN — MIRTAZAPINE 7.5 MG: 15 TABLET, FILM COATED ORAL at 21:55

## 2024-06-11 RX ADMIN — SERTRALINE 100 MG: 100 TABLET, FILM COATED ORAL at 10:09

## 2024-06-11 RX ADMIN — VERAPAMIL HYDROCHLORIDE 40 MG: 40 TABLET ORAL at 05:53

## 2024-06-11 RX ADMIN — VERAPAMIL HYDROCHLORIDE 40 MG: 40 TABLET ORAL at 17:10

## 2024-06-11 RX ADMIN — HYDRALAZINE HYDROCHLORIDE 100 MG: 50 TABLET ORAL at 21:54

## 2024-06-11 RX ADMIN — POLYETHYLENE GLYCOL 3350 17 G: 17 POWDER, FOR SOLUTION ORAL at 10:09

## 2024-06-11 RX ADMIN — AMOXICILLIN AND CLAVULANATE POTASSIUM 1 TABLET: 875; 125 TABLET, COATED ORAL at 10:09

## 2024-06-11 ASSESSMENT — PAIN SCALES - GENERAL
PAINLEVEL_OUTOF10: 10
PAINLEVEL_OUTOF10: 2

## 2024-06-11 ASSESSMENT — PAIN DESCRIPTION - LOCATION: LOCATION: BACK

## 2024-06-11 ASSESSMENT — ENCOUNTER SYMPTOMS
APNEA: 0
SHORTNESS OF BREATH: 0
DIARRHEA: 0
TROUBLE SWALLOWING: 1
BACK PAIN: 1
CONSTIPATION: 0

## 2024-06-11 ASSESSMENT — PAIN DESCRIPTION - ORIENTATION: ORIENTATION: LOWER

## 2024-06-11 ASSESSMENT — PAIN DESCRIPTION - DESCRIPTORS: DESCRIPTORS: ACHING;SORE

## 2024-06-11 NOTE — PROGRESS NOTES
was performed. The images were reviewed as source images and maximum intensity projections.    FINDINGS:       Anterior circulation:    There is expected flow signal in bilateral intracranial internal carotid arteries, bilateral carotid terminals, bilateral proximal anterior and middle cerebral patent. Mild irregularity of the anterior circulation is likely secondary to atherosclerotic disease. There is mild narrowing within the distal M1 segments bilaterally, right greater than left.    Posterior circulation:    Bilateral intracranial vertebral arteries, vertebrobasilar junction, basilar artery and proximal posterior cerebral arteries demonstrate expected flow signal. There is moderate to severe narrowing within the proximal basilar artery measuring a proximally 0.5 cm in length. There is a fetal origin of the posterior cerebral artery on the right. There is multifocal mild-to-moderate narrowing within the posterior cerebral arteries.       1. There is moderate to severe narrowing within the proximal basilar artery measuring a proximally 0.5 cm in length. 2. There is multifocal mild-to-moderate narrowing of the posterior cerebral arteries. 3. There is mild narrowing within the distal M1 segments bilaterally, right greater than left.    MACRO: None    Signed by: Aurelia Torrez 5/13/2024 2:44 PM Dictation workstation:   [id number]    MRA NECK WO CONTRAST    Result Date: 5/13/2024  Interpreted By:  Aurelia Torrez, STUDY: MR ANGIO NECK WO IV CONTRAST;  5/13/2024 1:29 pm    INDICATION: Signs/Symptoms:..    COMPARISON: None.    ACCESSION NUMBER(S): [id number]    ORDERING CLINICIAN: LAURA ADAMS    TECHNIQUE: Time of flight MRA of the neck was performed. The images were reviewed as source images and maximum intensity projections.    FINDINGS: The source images are mildly degraded by artifact.    Right carotid vessels:  There is expected flow signal in the  visualized portion of the common carotid artery.  There is

## 2024-06-11 NOTE — PROGRESS NOTES
[facility]  [place] 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  RN stated pt's daughter was in room and was concerned because pt was coughing during lunch. SLP educated nurse about recent therapy session in the morning and verbally agreed to see pt again.       Objective/Assessment:  Patient progressing towards goals:  Short-term Goals  Timeframe for Short-term Goals: 2-3x week  Goal 1: Pt will complete lingual ROM and strengthening exercises (lingual press, lingual pull backs) x10 each, in order to promote anterior to posterior propulsion of bolus and improve tongue base retraction.  Goal 2: Pt will tolerate therapeutic trials of minced and moist with adequate mastication and oral clearance of bolus with no overt s/s of aspiration on 100% trials.  Goal 3: Pt will demonstrate small bites/sips, alternate solids/liquids, upright posture strategies for safe and efficient swallow of recommended diet in all given opportunities.  Goal 4: Pt will tolerate the recommended diet level with no s/s of aspiration.  Pt's daughter in room and was concerned because pt had a sip of thin liquids and was coughing prior to SLP arrival.  Pt was not coughing as pt entered room.  SLP assisted pt during lunch.  Pt consumed pureed solids 12 x with no overt s/s of aspiration.  Pt demonstrated adequate

## 2024-06-11 NOTE — PROGRESS NOTES
[facility] OCCUPATIONAL THERAP[facility] MED SURG TREATMENT NOTE     Date: 2024  Patient Name: Tanya Liz        MRN: [id number]  Account: [id number]   : 1940  (84 y.o.)  Room: [place] [address]    Chart Review:    Restrictions  Restrictions/Precautions  Restrictions/Precautions: Fall Risk, Up as Tolerated     Safety:  Safety Devices  Type of Devices: All fall risk precautions in place;Bed alarm in place;Call light within reach;Left in bed;Nurse notified    Patient's birthday verified: Yes    Subjective:  \"I don't have anything.\"    Pain at start of treatment: Yes: 8/10    Pain at end of treatment: Yes: 5/10    Location: back  Nursing notified: Yes  RN: Leanne  Intervention: Other: Rn checking if pt can have pain medication.    Objective:  Session completed with a  present.     ADL Status:  ADL  Grooming: Supervision;Increased time to complete  Grooming Skilled Clinical Factors: Wash face, oral care and hair care. Verbal cues for direction and thoroughness.  UE Bathing: Stand by assistance;Increased time to complete  UE Bathing Skilled Clinical Factors: Laying supine in bed. Verbal cues for direction.  LE Bathing: Moderate assistance  LE Bathing Skilled Clinical Factors: Assist to wash posterior yang area. Pt able to wash reamainder of lower extremities at bed level with verbal cues for direction.  UE Dressing: Unable to assess (Comment)  UE Dressing Skilled Clinical Factors: Hospital gown.  LE Dressing: Supervision  LE Dressing Skilled Clinical Factors: Hospital socks. Verbal cue for direction.  Skin Care: Soap and water    Therapy key for assistance levels -   Independent/Mod I = Pt. is able to perform task with no assistance but may require a device   Stand by assistance = Pt. does not perform task at an independent level but does not need physical assistance, requires verbal cues  Minimal, Moderate, Maximal Assistance = Pt. requires physical assistance (25%, 50%, 75% assist from helper) for

## 2024-06-11 NOTE — DISCHARGE INSTR - COC
Continuity of Care Form    Patient Name: Tanya Liz   :  1940  MRN:  [id number]    Admit date:  2024  Discharge date:  24    Code Status Order: Full Code   Advance Directives:     Admitting Physician:  Diana Suh MD  PCP: Madi Kelly DO    Discharging Nurse: 4w  Discharging Hospital Unit/Room#: W477/W477-01  Discharging Unit Phone Number: [phone]    Emergency Contact:   Extended Emergency Contact Information  Primary Emergency Contact: Bhargavi Parada   [facility]  Home Phone: [phone]  Mobile Phone: [phone]  Relation: Child  Secondary Emergency Contact: Ama Parada   [facility]  Home Phone: [phone]  Mobile Phone: [phone]  Relation: Child    Past Surgical History:  Past Surgical History:   Procedure Laterality Date    CHOLECYSTECTOMY      COLONOSCOPY  2009    COLONOSCOPY N/A 10/24/2019    COLORECTAL CANCER SCREENING, NOT HIGH RISK performed by Gunner Beaulieu MD at [facility]    HYSTERECTOMY (CERVIX STATUS UNKNOWN)      LARYNGOSCOPY Bilateral 2024    direct laryngoscopy with biopsy, with I&D of oropharyngeal abscess, flexible esophagoscopy performed by Diana Suh MD at [facility] [place]    OTHER SURGICAL HISTORY Left 04/27/15     [facility] EYE VITRECTOMY W MACULAR EPIRETINAL MEMBRANE    OVARY REMOVAL      UPPER GASTROINTESTINAL ENDOSCOPY  2013    UPPER GASTROINTESTINAL ENDOSCOPY  10/29/15    ANTOINETTE ANGELO MD       Immunization History:   Immunization History   Administered Date(s) Administered    COVID-19, MODERNA BLUE border, Primary or Immunocompromised, (age 12y+), IM, 100 mcg/0.5mL 2021, 2021, 2021    Influenza Virus Vaccine 10/24/2014    Influenza, FLUCELVAX, (age 6 mo+), MDCK, PF, 0.5mL 10/21/2021    Influenza, FLUZONE (age 65 y+), High Dose, 0.7mL 2020    Influenza, High Dose (Fluzone 65 yrs and older) 2017, 2018    Influenza, Triv, inactivated, subunit, adjuvanted, IM (Fluad 65 yrs and  Statement Selected

## 2024-06-11 NOTE — PLAN OF CARE
Nutrition Problem #1: Inadequate oral intake  Intervention: Food and/or Nutrient Delivery: Continue Current Tube Feeding, Continue Current Diet (Consider PEG placement)

## 2024-06-11 NOTE — PROGRESS NOTES
Subjective:      Patient ID: Tanya Liz is a 84 y.o. female    HPI  84 year old female who's alcala catheter is draining clear yellow urine. She voices no other urological complaints.     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    Type 2 diabetes mellitus without complication (HCC) 4/10/2018    no medication per patient     Past Surgical History:   Procedure Laterality Date    CHOLECYSTECTOMY  1988    COLONOSCOPY  05/04/2009    COLONOSCOPY N/A 10/24/2019    COLORECTAL CANCER SCREENING, NOT HIGH RISK performed by Gunner Beaulieu MD at [facility]    HYSTERECTOMY (CERVIX STATUS UNKNOWN)  1978    LARYNGOSCOPY Bilateral 5/31/2024    direct laryngoscopy with biopsy, with I&D of oropharyngeal abscess, flexible esophagoscopy performed by Diana Suh MD at [facility]    OTHER SURGICAL HISTORY Left 04/27/15     [facility] EYE VITRECTOMY W MACULAR EPIRETINAL MEMBRANE    OVARY REMOVAL      UPPER GASTROINTESTINAL ENDOSCOPY  04/16/2013    UPPER GASTROINTESTINAL ENDOSCOPY  10/29/15    ANTOINETTE ANGELO MD     Social History     Socioeconomic History    Marital status:      Spouse name: None    Number of children: None    Years of education: None    Highest education level: None   Tobacco Use    Smoking status: Never    Smokeless tobacco: Never   Vaping Use    Vaping Use: Never used   Substance and Sexual Activity    Alcohol use: No    Drug use: No     Social Determinants of Health     Financial Resource

## 2024-06-11 NOTE — PROGRESS NOTES
Comprehensive Nutrition Assessment    Type and Reason for Visit:  Reassess    Nutrition Recommendations/Plan:   RD recommends PEG placement for long term nutrition support.  Continue Bolus feeding 250ml QID- sliding scale   If pt eats >75% of meal hold bolus feed      Malnutrition Assessment:  Malnutrition Status:  At risk for malnutrition (Comment) (06/01/24 1204)    Context:  Social/Environmental Circumstances     Findings of the 6 clinical characteristics of malnutrition:  Energy Intake:  Mild decrease in energy intake (Comment)  Weight Loss:  No significant weight loss     Body Fat Loss:  No significant body fat loss     Muscle Mass Loss:  No significant muscle mass loss    Fluid Accumulation:  No significant fluid accumulation     Strength:  Not Performed    Nutrition Assessment:    Pt remains on Bolus feedings with c/o nausea (currently on hold per RN note). Advanced to pureed diet 6/10 with continued suboptimal po intake. Pt with extensive hx of dysphagia. RD recommends PEG placement for long term nutrition support.    Nutrition Related Findings:    Hx includes : T2DM, GERD, cerebrovascular disease. Pt presents with pharyngitis x 4 days PTA with dysphagia and decreased appetite d/t nausea. CT showed Oropharyngeal mass. 5/31: Laryngoscopy with biopsy, with I&D of oropharyngeal abscess, intubated s/p procedure, Extubated 6/4, NGT replaced 6/5, and tube feeding resumed. requires Bipap at times, remains on dysphagia therapy, MBS= purueed 6/10. labs noted (Na wnl now, hyperglycemia, hyperkalemia), meds include colace, glycolax, remeron. Loose, water BM 6/9, trace-1= gen edema per nsg Wound Type: None       Current Nutrition Intake & Therapies:    Average Meal Intake: 1-25%  Average Supplements Intake: None Ordered  ADULT TUBE FEEDING; Nasogastric; Standard with Fiber; Bolus; 4 Times Daily; 250; Gravity; 200; Q 4 hours  ADULT DIET; Dysphagia - Pureed; 4 carb choices (60 gm/meal)  Current Tube Feeding (TF)  Chewing or Swallowing    Discharge Planning:    Too soon to determine     Mirtha Moulton, MS, RD, LD

## 2024-06-11 NOTE — PROGRESS NOTES
will demonstrate small bites/sips, alternate solids/liquids, upright posture strategies for safe and efficient swallow of recommended diet in all given opportunities.--SLP reviewed swallowing strategies with pt including the following: sitting upright for all PO intake, alternating between solids and liquids, small bits/sips, swallowing 2x per bite/sip, slow rate of consumption, sitting upright for 30-45 min after PO, ans assist feed.  Pt demonstrated understanding.  Education may need reinforcement.      5.  Pt will tolerate the recommended diet level with no s/s of aspiration.  Pt tolerated puree 5x with no overt s/s of aspiration.  Pt declined all further trials stating \"I am going to throw up.  No more.\"  SLP brought emesis bin by bedside.  PO trails stopped.       Treatment/Activity Tolerance:  Patient limited by other medical complications    Plan:  Continue per POC    Pain Assessment:  Patient does not c/o pain.    Pain Re-assessment:  Patient does not c/o pain.    Patient/Caregiver Education:  Patient educated on session and progression towards goals.  Patient stated verbal understanding of directions.  Education needs reinforcement.    Safety Devices:  Bed alarm in place and Call light within reach      Therapy Time  SLP Individual Minutes  Time In: 1015  Time Out: 1025  Minutes: 10            Signature: Electronically signed by Jannette López SLP on 6/11/2024 at 11:26 AM

## 2024-06-11 NOTE — PROGRESS NOTES
Hospitalist Progress Note      PCP: Mdai Kelly DO    Date of Admission: 5/31/2024    Chief Complaint:    Chief Complaint   Patient presents with    Pharyngitis     x4days    Nausea    Diarrhea     Subjective:  No acute events overnight. Pt reports little appetite today with some nausea. NG tube still in place. Denies chest pain or shortness of breath.    Medications:  Reviewed    Infusion Medications    sodium chloride      dextrose       Scheduled Medications    insulin lispro  0-8 Units SubCUTAneous 4x Daily AC & HS    tamsulosin  0.4 mg Oral Daily    hydrALAZINE  100 mg Oral 3 times per day    amoxicillin-clavulanate  1 tablet Oral 2 times per day    lisinopril  20 mg Oral Daily    enoxaparin  40 mg SubCUTAneous Daily    lidocaine  1 patch TransDERmal Daily    docusate  100 mg Oral BID    polyethylene glycol  17 g Oral Daily    verapamil  40 mg Oral 3 times per day    atorvastatin  40 mg Oral Nightly    mirtazapine  7.5 mg Oral Nightly    sertraline  100 mg Oral Daily    traZODone  50 mg Oral Nightly    sodium chloride flush  5-40 mL IntraVENous 2 times per day     PRN Meds: oxyCODONE, haloperidol lactate, hydrALAZINE, HYDROmorphone, ipratropium 0.5 mg-albuterol 2.5 mg, sodium chloride flush, sodium chloride, ondansetron **OR** ondansetron, glucose, dextrose bolus **OR** dextrose bolus, glucagon (rDNA), dextrose      Intake/Output Summary (Last 24 hours) at 6/11/2024 1238  Last data filed at 6/11/2024 0755  Gross per 24 hour   Intake 1546 ml   Output 2550 ml   Net -1004 ml       Exam:    BP (!) 119/59   Pulse 91   Temp 99 °F (37.2 °C) (Oral)   Resp 18   Ht 1.575 m (5' 2.01\")   Wt 55.6 kg (122 lb 9.2 oz)   LMP  (LMP Unknown)   SpO2 99%   BMI 22.41 kg/m²   Physical Exam  HENT:      Nose:      Comments: NG tube in place.  Cardiovascular:      Rate and Rhythm: Normal rate and regular rhythm.   Pulmonary:      Effort: Pulmonary effort is normal. No respiratory distress.   Abdominal:      Palpations:  SSI  GERD: continue home PPI and famotidine  Cerebrovascular disease: hold home DAPT in perioperative period  Depression: continue home sertraline, trazodone, and mirtazapine    Additional work up or/and treatment plan may be added today or then after based on clinical progression. I am managing a portion of pt care. Some medical issues are handled by other specialists. Additional work up and treatment should be done in out pt setting by pt PCP and other out pt providers.     In addition to examining and evaluating pt, I spent additional time explaining care, normal and abnormal findings, and treatment plan. All of pt questions were answered. Counseling, diet and education were  provided. Case will be discussed with nursing staff when appropriate. Family will be updated if and when appropriate.      Diet: ADULT TUBE FEEDING; Nasogastric; Standard with Fiber; Bolus; 4 Times Daily; 250; Gravity; 200; Q 4 hours  ADULT DIET; Dysphagia - Pureed; 4 carb choices (60 gm/meal)    Code Status: Full Code    Electronically signed by Aurelia Hung MD on 6/11/2024 at 12:38 PM

## 2024-06-11 NOTE — PROGRESS NOTES
Patient assessment and vitals complete and per flowsheets. NG in place, patient ate approxiamelly 25% of breakfast, endorses no appetite. Meds crushed in applesauce. Patient has no other needs at this time.

## 2024-06-11 NOTE — PROGRESS NOTES
Patient ate more than 50% of lunch and dinner, okay to pull NG per Dr. Hung. NG removed, tolerated well.

## 2024-06-11 NOTE — CARE COORDINATION
LSW SPOKE WITH THE PT'S DAUGHTER, GUZMAN REGARDING THE DC PLAN.  THEY ARE OPEN TO THE IDEA OF A SNF FOR THE [place].  GUZMAN AGREES THAT HER MOTHER IS WEAK AND CANNOT GO HOME ALONE AS SHE IS NOW.  GUZMAN WILL CALL HER SISTER TO DISCUSS A TIME TO MEET WITH LSW TO DISCUSS OPTIONS.  GUZMAN WILL CALL BACK WITH A MEETING TIME.

## 2024-06-11 NOTE — PROGRESS NOTES
at 06/11/24 0552    amoxicillin-clavulanate (AUGMENTIN) 875-125 MG per tablet 1 tablet  1 tablet Oral 2 times per day Colton Daly DO   1 tablet at 06/10/24 2121    lisinopril (PRINIVIL;ZESTRIL) tablet 20 mg  20 mg Oral Daily Colton Daly DO   20 mg at 06/10/24 1143    oxyCODONE (ROXICODONE) immediate release tablet 5 mg  5 mg Oral Q4H PRN Colton Daly DO   5 mg at 06/11/24 0755    haloperidol lactate (HALDOL) injection 2 mg  2 mg IntraVENous Q4H PRN Thompson Presley MD   2 mg at 06/05/24 1053    enoxaparin (LOVENOX) injection 40 mg  40 mg SubCUTAneous Daily Diana Suh MD   40 mg at 06/10/24 1143    hydrALAZINE (APRESOLINE) injection 20 mg  20 mg IntraVENous Q4H PRN Mary Beth Yepez APRN - CNP   20 mg at 06/08/24 1647    HYDROmorphone (DILAUDID) injection 0.25 mg  0.25 mg IntraVENous Q4H PRN Colton Daly DO   0.25 mg at 06/10/24 1143    ipratropium 0.5 mg-albuterol 2.5 mg (DUONEB) nebulizer solution 1 Dose  1 Dose Inhalation Q4H PRN Uri David MD   1 Dose at 06/04/24 2042    lidocaine 4 % external patch 1 patch  1 patch TransDERmal Daily Uri David MD   1 patch at 06/10/24 1144    docusate (COLACE) 50 MG/5ML liquid 100 mg  100 mg Oral BID Thompson Presley MD   100 mg at 06/10/24 2129    polyethylene glycol (GLYCOLAX) packet 17 g  17 g Oral Daily Thompson Presley MD   17 g at 06/10/24 1143    verapamil (CALAN) tablet 40 mg  40 mg Oral 3 times per day Mary Beth Yepez APRN - CNP   40 mg at 06/11/24 0553    atorvastatin (LIPITOR) tablet 40 mg  40 mg Oral Nightly Aurelia Hung MD   40 mg at 06/10/24 2121    mirtazapine (REMERON) tablet 7.5 mg  7.5 mg Oral Nightly Aurelia Hung MD   7.5 mg at 06/10/24 2121    sertraline (ZOLOFT) tablet 100 mg  100 mg Oral Daily Aurelia Hung MD   100 mg at 06/10/24 1143    traZODone (DESYREL) tablet 50 mg  50 mg Oral Nightly Aurelai Hung MD   50 mg at 06/10/24 2121    sodium chloride flush 0.9 % injection 5-40 mL  5-40 mL IntraVENous 2  daughters.   Contacted Bhargavi via phone. Bhargavi lacks insight with patient's overall medical conditions, under the impression that patient will be able to go home in current condition, could be language barrier that is inhibiting understanding. Advised setting meeting with patient's daughters about overall goals of care.   Continue plan of care. Continue current goals of care of aggressive measures.   Bhargavi reports that both daughters are unavailable tomorrow for a meeting.   Palliative care will continue to follow.     6/11/24    Continue with current plan of care.   Updated patients daughter Ama over the phone. Discussed the possibility of snf on discharge. Family is unsure of snf at this time. I discussed the code statuses in depth. At this time family wants patient to remain a FULL CODE. Palliative care will sign off. Please reach out with any new questions or concerns.         -Patient is currently being treated for multiple medical conditions by other providers  Oropharyngeal abscess   Respiratory insufficiency due to abscess  Hypertension  Diabetes    MDM: Moderate    Electronically signed by LEIGH Ruiz CNP on 6/11/2024 at 9:47 AM    Collaborating physician: Dr. Carlin     Please note this report is partially produced by using speech recognition hardware.  It may contain errors related to the system, including grammar, punctuation and spelling as well as words and phrases that may seem inaccurate.  For any questions or concerns feel free to contact me for clarification.    Thanks for the opportunity you have allowed us to provide palliative care to Tanya Liz. We will be in touch as care progresses. Please feel free to reach out to us should you have any questions or requests.

## 2024-06-11 NOTE — PROGRESS NOTES
Physical Therapy Missed Treatment   Facility/Department: [facility] MED SURG W477/W477-01    NAME: Tanya Liz    : 1940 (84 y.o.)  MRN: [id number]    Account: [id number]  Gender: female    Chart reviewed, attempted PT at 14:22. Patient unavailable 2° to:    [] Hold per nsg request    [x] Pt declined d/t being too tired. \"I'm too tired today. Can you come back tomorrow.\" Educated pt that mobility is important and she agreed but continued to decline.     [] Nsg notified   [] Other notified    [] Pt.. off floor for test/procedure.     [] Pt. Unavailable        Will attempt PT treatment again at earliest convenience.      Electronically signed by Chichi Jay PTA on 24 at 2:27 PM EDT

## 2024-06-11 NOTE — PROGRESS NOTES
No tube feeds given to patient this shift per ok from Dr. Hung r/t patient persistent nausea and lack of appetite.

## 2024-06-11 NOTE — PLAN OF CARE
Problem: Pain  Goal: Verbalizes/displays adequate comfort level or baseline comfort level  Outcome: Progressing     Problem: Discharge Planning  Goal: Discharge to home or other facility with appropriate resources  Outcome: Progressing     Problem: Safety - Adult  Goal: Free from fall injury  Outcome: Progressing     Problem: Skin/Tissue Integrity  Goal: Absence of new skin breakdown  Description: 1.  Monitor for areas of redness and/or skin breakdown  2.  Assess vascular access sites hourly  3.  Every 4-6 hours minimum:  Change oxygen saturation probe site  4.  Every 4-6 hours:  If on nasal continuous positive airway pressure, respiratory therapy assess nares and determine need for appliance change or resting period.  Outcome: Progressing     Problem: Chronic Conditions and Co-morbidities  Goal: Patient's chronic conditions and co-morbidity symptoms are monitored and maintained or improved  Outcome: Progressing     Problem: Nutrition Deficit:  Goal: Optimize nutritional status  Outcome: Progressing     Problem: Neurosensory - Adult  Goal: Achieves stable or improved neurological status  Outcome: Progressing     Problem: Respiratory - Adult  Goal: Achieves optimal ventilation and oxygenation  Outcome: Progressing     Problem: Cardiovascular - Adult  Goal: Maintains optimal cardiac output and hemodynamic stability  Outcome: Progressing  Goal: Absence of cardiac dysrhythmias or at baseline  Outcome: Progressing     Problem: Skin/Tissue Integrity - Adult  Goal: Skin integrity remains intact  Outcome: Progressing     Problem: Musculoskeletal - Adult  Goal: Return mobility to safest level of function  Outcome: Progressing     Problem: Gastrointestinal - Adult  Goal: Minimal or absence of nausea and vomiting  Outcome: Progressing  Goal: Maintains or returns to baseline bowel function  Outcome: Progressing  Goal: Maintains adequate nutritional intake  Outcome: Progressing     Problem: Genitourinary - Adult  Goal: Absence  of urinary retention  Outcome: Progressing     Problem: Infection - Adult  Goal: Absence of infection at discharge  Outcome: Progressing     Problem: Metabolic/Fluid and Electrolytes - Adult  Goal: Electrolytes maintained within normal limits  Outcome: Progressing  Goal: Hemodynamic stability and optimal renal function maintained  Outcome: Progressing  Goal: Glucose maintained within prescribed range  Outcome: Progressing     Problem: Hematologic - Adult  Goal: Maintains hematologic stability  Outcome: Progressing     Problem: Decision Making  Goal: Pt/Family able to effectively weigh alternatives and participate in decision making related to treatment and care  Description: INTERVENTIONS:  1. Determine when there are differences between patient's view, family's view, and healthcare provider's view of condition  2. Facilitate patient and family articulation of goals for care  3. Help patient and family identify pros/cons of alternative solutions  4. Provide information as requested by patient/family  5. Respect patient/family right to receive or not to receive information  6. Serve as a liaison between patient and family and health care team  7. Initiate Consults from Ethics, Palliative Care or initiate Family Care Conference as is appropriate  Outcome: Progressing     Problem: ABCDS Injury Assessment  Goal: Absence of physical injury  Outcome: Progressing     Problem: Confusion  Goal: Confusion, delirium, dementia, or psychosis is improved or at baseline  Description: INTERVENTIONS:  1. Assess for possible contributors to thought disturbance, including medications, impaired vision or hearing, underlying metabolic abnormalities, dehydration, psychiatric diagnoses, and notify attending LIP  2. [facility] high risk fall precautions, as indicated  3. Provide frequent short contacts to provide reality reorientation, refocusing and direction  4. Decrease environmental stimuli, including noise as appropriate  5. Monitor

## 2024-06-12 LAB
ANION GAP SERPL CALCULATED.3IONS-SCNC: 10 MEQ/L (ref 9–15)
BUN SERPL-MCNC: 21 MG/DL (ref 8–23)
CALCIUM SERPL-MCNC: 9.6 MG/DL (ref 8.5–9.9)
CHLORIDE SERPL-SCNC: 102 MEQ/L (ref 95–107)
CO2 SERPL-SCNC: 27 MEQ/L (ref 20–31)
CREAT SERPL-MCNC: 0.7 MG/DL (ref 0.5–0.9)
GLUCOSE BLD-MCNC: 101 MG/DL (ref 70–99)
GLUCOSE BLD-MCNC: 105 MG/DL (ref 70–99)
GLUCOSE BLD-MCNC: 122 MG/DL (ref 70–99)
GLUCOSE BLD-MCNC: 123 MG/DL (ref 70–99)
GLUCOSE SERPL-MCNC: 104 MG/DL (ref 70–99)
PERFORMED ON: ABNORMAL
POTASSIUM SERPL-SCNC: 4.8 MEQ/L (ref 3.4–4.9)
SODIUM SERPL-SCNC: 139 MEQ/L (ref 135–144)

## 2024-06-12 PROCEDURE — 6360000002 HC RX W HCPCS: Performed by: STUDENT IN AN ORGANIZED HEALTH CARE EDUCATION/TRAINING PROGRAM

## 2024-06-12 PROCEDURE — 99024 POSTOP FOLLOW-UP VISIT: CPT | Performed by: STUDENT IN AN ORGANIZED HEALTH CARE EDUCATION/TRAINING PROGRAM

## 2024-06-12 PROCEDURE — 99231 SBSQ HOSP IP/OBS SF/LOW 25: CPT | Performed by: PHYSICIAN ASSISTANT

## 2024-06-12 PROCEDURE — 92526 ORAL FUNCTION THERAPY: CPT

## 2024-06-12 PROCEDURE — 80048 BASIC METABOLIC PNL TOTAL CA: CPT

## 2024-06-12 PROCEDURE — 97535 SELF CARE MNGMENT TRAINING: CPT

## 2024-06-12 PROCEDURE — 1210000000 HC MED SURG R&B

## 2024-06-12 PROCEDURE — 6370000000 HC RX 637 (ALT 250 FOR IP): Performed by: NURSE PRACTITIONER

## 2024-06-12 PROCEDURE — 36415 COLL VENOUS BLD VENIPUNCTURE: CPT

## 2024-06-12 PROCEDURE — 6370000000 HC RX 637 (ALT 250 FOR IP): Performed by: STUDENT IN AN ORGANIZED HEALTH CARE EDUCATION/TRAINING PROGRAM

## 2024-06-12 PROCEDURE — 6370000000 HC RX 637 (ALT 250 FOR IP): Performed by: INTERNAL MEDICINE

## 2024-06-12 PROCEDURE — 2580000003 HC RX 258: Performed by: STUDENT IN AN ORGANIZED HEALTH CARE EDUCATION/TRAINING PROGRAM

## 2024-06-12 RX ADMIN — MIRTAZAPINE 7.5 MG: 15 TABLET, FILM COATED ORAL at 20:16

## 2024-06-12 RX ADMIN — VERAPAMIL HYDROCHLORIDE 40 MG: 40 TABLET ORAL at 06:44

## 2024-06-12 RX ADMIN — Medication 5 ML: at 19:29

## 2024-06-12 RX ADMIN — ATORVASTATIN CALCIUM 40 MG: 40 TABLET, FILM COATED ORAL at 20:08

## 2024-06-12 RX ADMIN — HYDRALAZINE HYDROCHLORIDE 100 MG: 50 TABLET ORAL at 20:08

## 2024-06-12 RX ADMIN — TRAZODONE HYDROCHLORIDE 50 MG: 50 TABLET ORAL at 20:08

## 2024-06-12 RX ADMIN — AMOXICILLIN AND CLAVULANATE POTASSIUM 1 TABLET: 875; 125 TABLET, COATED ORAL at 20:08

## 2024-06-12 RX ADMIN — VERAPAMIL HYDROCHLORIDE 40 MG: 40 TABLET ORAL at 20:08

## 2024-06-12 RX ADMIN — HYDRALAZINE HYDROCHLORIDE 100 MG: 50 TABLET ORAL at 06:44

## 2024-06-12 ASSESSMENT — ENCOUNTER SYMPTOMS: APNEA: 0

## 2024-06-12 NOTE — PROGRESS NOTES
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]  Oral abscess [K12.2]   Chief Complaint   Patient presents with    Pharyngitis     x4days    Nausea    Diarrhea     Patient Active Problem List    Diagnosis Date Noted    Acute respiratory failure with hypoxia (HCC) 2024    Urinary retention 06/10/2024    Advanced care planning/counseling discussion 2024    Goals of care, counseling/discussion 2024    Palliative care encounter 2024    Peritonsillar abscess 2024    Oral abscess 2024    Right rotator cuff tendonitis 2022    Basilar artery stenosis/occlusion 2021    Subcortical microvascular ischemic occlusive disease 2021    Easy bruising 2021    Black stools 2021    Chronic pruritus 01/15/2021    Adenomatous polyp of descending colon     Chronic idiopathic constipation     Postnasal drip 2019    Memory loss of unknown cause 2018    Chronic right shoulder pain 07/10/2018    Type 2 diabetes mellitus without complication (HCC) 04/10/2018    Lymphocytopenia 2018    Multiple actinic keratoses 2018    Age-related osteoporosis without current pathological fracture 2018    Depression with anxiety 2018    Gastroesophageal reflux disease without esophagitis 2015    Essential hypertension 10/05/2015        Past Medical History:   Diagnosis Date    Arthritis     Basilar artery stenosis/occlusion 2021    Chronic bronchitis (HCC)     Chronic pruritus 1/15/2021    COVID-19 virus infection 2021    Degenerative disc disease, cervical     Depression     Depression with anxiety     Easy bruising 2021    Esophagitis     Fibromyositis     GERD (gastroesophageal

## 2024-06-12 NOTE — PROGRESS NOTES
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   SLP reviewed MBS results with family and rationale for recommendations. All questions answered and family demonstrated understanding. Plan to d/c to [facility] today or tomorrow. Continue ST recommended.     Objective/Assessment:  Patient progressing towards goals:  Short-term Goals  Timeframe for Short-term Goals: 2-3x week  Goal 1: Pt will complete lingual ROM and strengthening exercises (lingual press, lingual pull backs) x10 each, in order to promote anterior to posterior propulsion of bolus and improve tongue base retraction  Pt stating she \"too tired\"   2.  Pt will complete pharyngeal strengthening exercises (effortful swallow, Dinah) x10 each in order to strengthen and establish and more effective swallow.   See above  3.  Pt will tolerate therapeutic trials of minced and moist with adequate mastication and oral clearance of bolus with no overt s/s of aspiration on 100% trials.--pt declined following encouragement     4.  Pt will demonstrate small bites/sips, alternate solids/liquids, upright posture strategies for safe and efficient swallow of recommended diet in all given opportunities.--SLP reviewed swallowing strategies with pt and family. Bravo verbalized understanding. Pt will need ongoing

## 2024-06-12 NOTE — PLAN OF CARE
Problem: Pain  Goal: Verbalizes/displays adequate comfort level or baseline comfort level  Outcome: Progressing     Problem: Discharge Planning  Goal: Discharge to home or other facility with appropriate resources  Outcome: Progressing     Problem: Safety - Adult  Goal: Free from fall injury  Outcome: Progressing     Problem: Skin/Tissue Integrity  Goal: Absence of new skin breakdown  Description: 1.  Monitor for areas of redness and/or skin breakdown  2.  Assess vascular access sites hourly  3.  Every 4-6 hours minimum:  Change oxygen saturation probe site  4.  Every 4-6 hours:  If on nasal continuous positive airway pressure, respiratory therapy assess nares and determine need for appliance change or resting period.  Outcome: Progressing     Problem: Chronic Conditions and Co-morbidities  Goal: Patient's chronic conditions and co-morbidity symptoms are monitored and maintained or improved  Outcome: Progressing     Problem: Nutrition Deficit:  Goal: Optimize nutritional status  6/11/2024 2008 by Simi Bernstein, RN  Outcome: Progressing  6/11/2024 1554 by Mirtha Moulton, MS, RD, LD  Flowsheets (Taken 6/1/2024 1207)  Nutrient intake appropriate for improving, restoring, or maintaining nutritional needs:   Assess nutritional status and recommend course of action   Recommend appropriate diets, oral nutritional supplements, and vitamin/mineral supplements   Recommend, monitor, and adjust tube feedings and TPN/PPN based on assessed needs   Provide specific nutrition education to patient or family as appropriate   Monitor oral intake, labs, and treatment plans     Problem: Neurosensory - Adult  Goal: Achieves stable or improved neurological status  Outcome: Progressing     Problem: Respiratory - Adult  Goal: Achieves optimal ventilation and oxygenation  Outcome: Progressing     Problem: Cardiovascular - Adult  Goal: Maintains optimal cardiac output and hemodynamic stability  Outcome: Progressing  Goal: Absence of cardiac

## 2024-06-12 NOTE — PROGRESS NOTES
Subjective:      Patient ID: Tanya Liz is a 84 y.o. female    HPI84 year old female who's alcala catheter is draining clear yellow urine. She voices no other urological complaints.     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    Type 2 diabetes mellitus without complication (HCC) 4/10/2018    no medication per patient     Past Surgical History:   Procedure Laterality Date    CHOLECYSTECTOMY  1988    COLONOSCOPY  05/04/2009    COLONOSCOPY N/A 10/24/2019    COLORECTAL CANCER SCREENING, NOT HIGH RISK performed by Gunner Beaulieu MD at [facility]    HYSTERECTOMY (CERVIX STATUS UNKNOWN)  1978    LARYNGOSCOPY Bilateral 5/31/2024    direct laryngoscopy with biopsy, with I&D of oropharyngeal abscess, flexible esophagoscopy performed by Diana Suh MD at [facility] OR    OTHER SURGICAL HISTORY Left 04/27/15     [facility] EYE VITRECTOMY W MACULAR EPIRETINAL MEMBRANE    OVARY REMOVAL      UPPER GASTROINTESTINAL ENDOSCOPY  04/16/2013    UPPER GASTROINTESTINAL ENDOSCOPY  10/29/15    ANTOINETTE ANGELO MD     Social History     Socioeconomic History    Marital status:      Spouse name: None    Number of children: None    Years of education: None    Highest education level: None   Tobacco Use    Smoking status: Never    Smokeless tobacco: Never   Vaping Use    Vaping Use: Never used   Substance and Sexual Activity    Alcohol use: No    Drug use: No     Social Determinants of Health     Financial Resource    Genitourinary:  Negative for hematuria.   Neurological:  Negative for speech difficulty.         Objective:   Physical Exam  HENT:      Mouth/Throat:      Mouth: Mucous membranes are moist.   Pulmonary:      Effort: Pulmonary effort is normal.   Neurological:      Mental Status: She is alert and oriented to person, place, and time.          Assessment:      84 year old female who's alcala catheter is draining clear yellow urine. She voices no other urological complaints.       Plan:      Maintain alcala catheter  Continue flomax           Rei Swift PA-C

## 2024-06-12 NOTE — CARE COORDINATION
This LSW visited patient at bedside this am, I also called and spoke with patients daughter, Ama. Ama stated that she will contact me later this morning, after reviewing SNF list with patient and her sister Bhargavi.   LSW/CM to follow.  Electronically signed by GOOD Ramon on 6/12/24 at 10:05 AM EDT   This LSW met with patient and  daughter Ama at bedside. SNF list reviewed and patient and daughter are are requesting SNF referral be sent to : [facility]. I sent referral to aguila Mariee at [facility]. Awaiting a response at this time.  REXW/LASHAUN to follow.  Electronically signed by GOOD Ramon on 6/12/24 at 11:25 AM EDT     This LSW received message from aguila Mariee from [facility] that patients referral has been accepted. Potential discharge date of 6/13/24, pending bed availability. I updated patient and daughterAma via phone.  REXW/CM to follow.  Electronically signed by GOOD Ramon on 6/12/24 at 3:26 PM EDT

## 2024-06-12 NOTE — PROGRESS NOTES
ENT Progress Note    Subjective   Patient reports significant improvement in neck pain and swallowing.    Objective:   Vitals:    06/12/24 1432   BP: 125/63   Pulse: 95   Resp:    Temp: 97.9 °F (36.6 °C)   SpO2: 90%     Lab Results   Component Value Date    WBC 8.9 06/11/2024    HGB 10.8 (L) 06/11/2024    HCT 34.1 (L) 06/11/2024    MCV 93.2 06/11/2024     06/11/2024       Lab Results   Component Value Date     06/12/2024    K 4.8 06/12/2024     06/12/2024    CO2 27 06/12/2024    BUN 21 06/12/2024    CREATININE 0.70 06/12/2024    GLUCOSE 104 (H) 06/12/2024    CALCIUM 9.6 06/12/2024    PROT 6.5 09/14/2015    BILITOT 0.3 06/01/2024    ALKPHOS 54 06/01/2024    AST 14 06/01/2024    ALT 13 06/01/2024    LABGLOM 84.5 06/12/2024    GFRAA >60.0 08/07/2022    GLOB 2.7 06/01/2024       Exam   General- laying in bed, resting comfortably in no distress   Eyes- open EOMI  Neuro- alert, no focal deficits  Oral cavity- no trismus, no appreciable tongue swelling or firmness  Neck- soft, no induration, or fluctuance, no erythema  Resp- on RA     A/P   84y female taken emergently to OR 5/31 for airway compromise due to base of tongue abscess. Biopsies taken in OR of left oropharynx- negative for malignancy . Purulent drainage from BOT sent for culture.  Hospital course prolonged due to difficulty weaning from O2, needed temporary NG now eating PO    Completed duration of abx. No further signs of infection   Negative biopsies for malignancy  Diet per SLP   NO further ENT intervention planned. Please call with questions/concerns. She can follow up outpatient as needed     Diana Suh MD

## 2024-06-12 NOTE — PROGRESS NOTES
Patient assessment and vitals complete and per flowsheets. Patient has no needs att his time. Declined medications this morning, states she is too full from breakfast to take pills in applesauce. No other needs at this time.

## 2024-06-13 VITALS
RESPIRATION RATE: 16 BRPM | DIASTOLIC BLOOD PRESSURE: 57 MMHG | HEIGHT: 62 IN | HEART RATE: 91 BPM | SYSTOLIC BLOOD PRESSURE: 106 MMHG | OXYGEN SATURATION: 94 % | WEIGHT: 122.58 LBS | TEMPERATURE: 97.3 F | BODY MASS INDEX: 22.56 KG/M2

## 2024-06-13 PROBLEM — J36 PERITONSILLAR ABSCESS: Status: RESOLVED | Noted: 2024-05-31 | Resolved: 2024-06-13

## 2024-06-13 PROBLEM — Z71.89 ADVANCED CARE PLANNING/COUNSELING DISCUSSION: Status: RESOLVED | Noted: 2024-06-05 | Resolved: 2024-06-13

## 2024-06-13 PROBLEM — Z51.5 PALLIATIVE CARE ENCOUNTER: Status: RESOLVED | Noted: 2024-06-05 | Resolved: 2024-06-13

## 2024-06-13 PROBLEM — Z71.89 GOALS OF CARE, COUNSELING/DISCUSSION: Status: RESOLVED | Noted: 2024-06-05 | Resolved: 2024-06-13

## 2024-06-13 PROBLEM — J96.01 ACUTE RESPIRATORY FAILURE WITH HYPOXIA (HCC): Status: RESOLVED | Noted: 2024-06-11 | Resolved: 2024-06-13

## 2024-06-13 PROBLEM — K12.2 ORAL ABSCESS: Status: RESOLVED | Noted: 2024-05-31 | Resolved: 2024-06-13

## 2024-06-13 LAB
ANION GAP SERPL CALCULATED.3IONS-SCNC: 10 MEQ/L (ref 9–15)
BUN SERPL-MCNC: 25 MG/DL (ref 8–23)
CALCIUM SERPL-MCNC: 9.7 MG/DL (ref 8.5–9.9)
CHLORIDE SERPL-SCNC: 101 MEQ/L (ref 95–107)
CO2 SERPL-SCNC: 26 MEQ/L (ref 20–31)
CREAT SERPL-MCNC: 0.71 MG/DL (ref 0.5–0.9)
GLUCOSE BLD-MCNC: 131 MG/DL (ref 70–99)
GLUCOSE BLD-MCNC: 142 MG/DL (ref 70–99)
GLUCOSE BLD-MCNC: 95 MG/DL (ref 70–99)
GLUCOSE SERPL-MCNC: 110 MG/DL (ref 70–99)
PERFORMED ON: ABNORMAL
PERFORMED ON: ABNORMAL
PERFORMED ON: NORMAL
POTASSIUM SERPL-SCNC: 4.8 MEQ/L (ref 3.4–4.9)
SODIUM SERPL-SCNC: 137 MEQ/L (ref 135–144)

## 2024-06-13 PROCEDURE — 6370000000 HC RX 637 (ALT 250 FOR IP): Performed by: INTERNAL MEDICINE

## 2024-06-13 PROCEDURE — 2580000003 HC RX 258: Performed by: STUDENT IN AN ORGANIZED HEALTH CARE EDUCATION/TRAINING PROGRAM

## 2024-06-13 PROCEDURE — 6370000000 HC RX 637 (ALT 250 FOR IP): Performed by: STUDENT IN AN ORGANIZED HEALTH CARE EDUCATION/TRAINING PROGRAM

## 2024-06-13 PROCEDURE — 97116 GAIT TRAINING THERAPY: CPT

## 2024-06-13 PROCEDURE — 99231 SBSQ HOSP IP/OBS SF/LOW 25: CPT | Performed by: PHYSICIAN ASSISTANT

## 2024-06-13 PROCEDURE — 36415 COLL VENOUS BLD VENIPUNCTURE: CPT

## 2024-06-13 PROCEDURE — 80048 BASIC METABOLIC PNL TOTAL CA: CPT

## 2024-06-13 PROCEDURE — 97535 SELF CARE MNGMENT TRAINING: CPT

## 2024-06-13 PROCEDURE — 6370000000 HC RX 637 (ALT 250 FOR IP)

## 2024-06-13 PROCEDURE — 6370000000 HC RX 637 (ALT 250 FOR IP): Performed by: NURSE PRACTITIONER

## 2024-06-13 RX ORDER — HYDRALAZINE HYDROCHLORIDE 100 MG/1
100 TABLET, FILM COATED ORAL EVERY 8 HOURS SCHEDULED
Qty: 60 TABLET | Refills: 3 | Status: SHIPPED | OUTPATIENT
Start: 2024-06-13

## 2024-06-13 RX ORDER — LIDOCAINE 4 G/G
1 PATCH TOPICAL DAILY
DISCHARGE
Start: 2024-06-14 | End: 2024-06-14

## 2024-06-13 RX ORDER — TAMSULOSIN HYDROCHLORIDE 0.4 MG/1
0.4 CAPSULE ORAL DAILY
Qty: 30 CAPSULE | Refills: 3 | DISCHARGE
Start: 2024-06-14 | End: 2024-06-14

## 2024-06-13 RX ADMIN — POLYETHYLENE GLYCOL 3350 17 G: 17 POWDER, FOR SOLUTION ORAL at 10:00

## 2024-06-13 RX ADMIN — SERTRALINE 100 MG: 100 TABLET, FILM COATED ORAL at 10:00

## 2024-06-13 RX ADMIN — VERAPAMIL HYDROCHLORIDE 40 MG: 40 TABLET ORAL at 05:33

## 2024-06-13 RX ADMIN — LISINOPRIL 20 MG: 20 TABLET ORAL at 10:00

## 2024-06-13 RX ADMIN — AMOXICILLIN AND CLAVULANATE POTASSIUM 1 TABLET: 875; 125 TABLET, COATED ORAL at 10:00

## 2024-06-13 RX ADMIN — Medication 10 ML: at 10:04

## 2024-06-13 RX ADMIN — TAMSULOSIN HYDROCHLORIDE 0.4 MG: 0.4 CAPSULE ORAL at 10:00

## 2024-06-13 RX ADMIN — HYDRALAZINE HYDROCHLORIDE 100 MG: 50 TABLET ORAL at 05:33

## 2024-06-13 ASSESSMENT — ENCOUNTER SYMPTOMS: APNEA: 0

## 2024-06-13 NOTE — PROGRESS NOTES
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]  Oral abscess [K12.2]   Chief Complaint   Patient presents with    Pharyngitis     x4days    Nausea    Diarrhea     Patient Active Problem List    Diagnosis Date Noted    Urinary retention 06/10/2024    Right rotator cuff tendonitis 2022    Basilar artery stenosis/occlusion 2021    Subcortical microvascular ischemic occlusive disease 2021    Easy bruising 2021    Black stools 2021    Chronic pruritus 01/15/2021    Adenomatous polyp of descending colon     Chronic idiopathic constipation     Postnasal drip 2019    Memory loss of unknown cause 2018    Chronic right shoulder pain 07/10/2018    Type 2 diabetes mellitus without complication (HCC) 04/10/2018    Lymphocytopenia 2018    Multiple actinic keratoses 2018    Age-related osteoporosis without current pathological fracture 2018    Depression with anxiety 2018    Gastroesophageal reflux disease without esophagitis 2015    Essential hypertension 10/05/2015        Past Medical History:   Diagnosis Date    Arthritis     Basilar artery stenosis/occlusion 2021    Chronic bronchitis (HCC)     Chronic pruritus 1/15/2021    COVID-19 virus infection 2021    Degenerative disc disease, cervical     Depression     Depression with anxiety     Easy bruising 2021    Esophagitis     Fibromyositis     GERD (gastroesophageal reflux disease)     Headache(784.0)     Hyperlipidemia     Hypertension     Migraines     Pleural effusion     Right-sided chest wall pain     Subcortical microvascular ischemic occlusive disease 2021    Type 2 diabetes mellitus without complication (HCC) 4/10/2018     no medication per patient     Past Surgical History:   Procedure Laterality Date    CHOLECYSTECTOMY  1988    COLONOSCOPY  05/04/2009    COLONOSCOPY N/A 10/24/2019    COLORECTAL CANCER SCREENING, NOT HIGH RISK performed by Gunner Beaulieu MD at [facility]    HYSTERECTOMY (CERVIX STATUS UNKNOWN)  1978    LARYNGOSCOPY Bilateral 5/31/2024    direct laryngoscopy with biopsy, with I&D of oropharyngeal abscess, flexible esophagoscopy performed by Diana Suh MD at [facility] [place]    OTHER SURGICAL HISTORY Left 04/27/15     [facility] EYE VITRECTOMY W MACULAR EPIRETINAL MEMBRANE    OVARY REMOVAL      UPPER GASTROINTESTINAL ENDOSCOPY  04/16/2013    UPPER GASTROINTESTINAL ENDOSCOPY  10/29/15    ANTOINETTE ANGELO MD       Patient assessed for rehabilitation services?: Yes    Restrictions:  Restrictions/Precautions: Fall Risk;Up as Tolerated    SUBJECTIVE:   Subjective: I think I'm getting better.    Pain  Pain: 0/10 pre and post session.    OBJECTIVE:        Bed mobility  Rolling to Left: Supervision  Rolling to Right: Supervision  Supine to Sit: Supervision  Sit to Supine: Supervision  Scooting: Stand by assistance  Bed Mobility Comments: HOB flat and with rails.    Transfers  Sit to Stand: Stand by assistance  Stand to Sit: Minimal Assistance  Bed to Chair: Minimal assistance;Moderate assistance    Ambulation  Surface: Level tile  Device: Rolling Walker  Assistance: Minimal assistance;Moderate assistance  Quality of Gait: Steady with decreased pelvic stability.  Assist for steadying.  Gait Deviations: Slow Lyn;Decreased step length;Decreased step height;Staggers  Distance: 60' with turns                                         ASSESSMENT   Body Structures, Functions, Activity Limitations Requiring Skilled Therapeutic Intervention: Decreased functional mobility ;Decreased safe awareness;Decreased endurance;Decreased balance;Decreased strength;Decreased coordination  Assessment: Pt demonstrating improved functional mobility

## 2024-06-13 NOTE — PROGRESS NOTES
Shift assessment complete. VSS. A&Ox4. Icelandic/English speaking. Patient is alert and cooperative. Denies having nausea or SOB on exertion. On room air. Lung sounds are clear. SR on tele. Abdomen is soft and round. Bowel sounds are active. Per family, patient had one bowel movement yesterday. Alcala in place, yellow cloudy urine present. Pt states that she feels like she has to pee, this RN educated pt that she has alcala. Pt encouraged to ambulate at bedside with assist. Was able to tolerate sitting in chair x several hours. Up with cane or walker (Unsteady). Generalized bruising to BUE. Buttocks red. Patient able to turn self. Family updated on POC and discharge to [facility] around 4pm. In bed with call light in reach. No needs at this time. Care ongoing.     Electronically signed by Nader Camacho RN on 6/13/2024 at 1:27 PM     1520 - Report called to RN at [facility]. All questions answered. Patient to be picked up by Physicians around 6 pm

## 2024-06-13 NOTE — PROGRESS NOTES
Hospitalist Progress Note      PCP: Madi Kelly DO    Date of Admission: 5/31/2024    Chief Complaint:    Chief Complaint   Patient presents with    Pharyngitis     x4days    Nausea    Diarrhea     Subjective:  No acute events overnight. Denies chest pain or shortness of breath. Pt tearful about going to a SNF.    Medications:  Reviewed    Infusion Medications    sodium chloride      dextrose       Scheduled Medications    insulin lispro  0-8 Units SubCUTAneous 4x Daily AC & HS    tamsulosin  0.4 mg Oral Daily    hydrALAZINE  100 mg Oral 3 times per day    amoxicillin-clavulanate  1 tablet Oral 2 times per day    lisinopril  20 mg Oral Daily    enoxaparin  40 mg SubCUTAneous Daily    lidocaine  1 patch TransDERmal Daily    polyethylene glycol  17 g Oral Daily    verapamil  40 mg Oral 3 times per day    atorvastatin  40 mg Oral Nightly    mirtazapine  7.5 mg Oral Nightly    sertraline  100 mg Oral Daily    traZODone  50 mg Oral Nightly    sodium chloride flush  5-40 mL IntraVENous 2 times per day     PRN Meds: oxyCODONE, hydrALAZINE, ipratropium 0.5 mg-albuterol 2.5 mg, sodium chloride flush, sodium chloride, ondansetron **OR** ondansetron, glucose, dextrose bolus **OR** dextrose bolus, glucagon (rDNA), dextrose      Intake/Output Summary (Last 24 hours) at 6/13/2024 0535  Last data filed at 6/12/2024 2059  Gross per 24 hour   Intake 200 ml   Output 250 ml   Net -50 ml       Exam:    BP (!) 140/57   Pulse 92   Temp 98.1 °F (36.7 °C) (Axillary)   Resp 16   Ht 1.575 m (5' 2.01\")   Wt 55.6 kg (122 lb 9.2 oz)   LMP  (LMP Unknown)   SpO2 95%   BMI 22.41 kg/m²   Physical Exam  Cardiovascular:      Rate and Rhythm: Normal rate and regular rhythm.   Pulmonary:      Effort: Pulmonary effort is normal. No respiratory distress.   Abdominal:      Palpations: Abdomen is soft.      Tenderness: There is no abdominal tenderness.   Neurological:      Mental Status: She is alert and oriented to person, place, and time.  cm above the ember.   2. NG tube courses below the diaphragm.  Distal tip is not included on this   examination yet is likely located in the stomach.   3. Opacities are present in left lower lung field which may indicate   atelectasis or pleural effusion.         CT SOFT TISSUE NECK W CONTRAST   Final Result   1. Abscess involving left posterior base of the tongue extending posteriorly   approximately 4 cm into left oropharyngeal mucosa with edema extending to   level of left pyriform sinus.   2. No retropharyngeal fluid collections.           Assessment/Plan:    Active Hospital Problems    Diagnosis Date Noted    Acute respiratory failure with hypoxia (HCC) [J96.01] 06/11/2024    Urinary retention [R33.9] 06/10/2024    Advanced care planning/counseling discussion [Z71.89] 06/05/2024    Goals of care, counseling/discussion [Z71.89] 06/05/2024    Palliative care encounter [Z51.5] 06/05/2024    Peritonsillar abscess [J36] 05/31/2024    Oral abscess [K12.2] 05/31/2024     Oropharyngeal mass  Oral abscess  - s/p I&D with ENT, surgical culture with no significant growth  - s/p course of Unasyn and now switched to Augmentin  - completed course of decadron  - pt with continued dysphagia, SLP following, s/p MBS, NG removed, continue pureed diet as tolerated    Acute hypoxic respiratory failure (resolved)  - pt kept intubated after surgery for airway protection, now extubated and on RA  - pulmonology following    Positive blood culture with coag negative staph in 1/2 bottles, likely a contaminate  - will continue to monitor      Chronic Conditions:  HTN:continue home lisinopril and verapamil, continue new hydralazine 100mg TID, IV hydralazine prn for SBP >160  HLD: continue home statin, hold aspirin in perioperative period  T2DM: FSBG per protocol with SSI  GERD: continue home PPI and famotidine  Cerebrovascular disease: hold home DAPT in perioperative period  Depression: continue home sertraline, trazodone, and

## 2024-06-13 NOTE — PROGRESS NOTES
.shift assessment complete, see flowsheets. pt A&Ox4, meds given per mar, no other needs verbalized at this time, call light within reach.

## 2024-06-13 NOTE — PROGRESS NOTES
Resource Strain: Low Risk  (9/7/2023)    Overall Financial Resource Strain (CARDIA)     Difficulty of Paying Living Expenses: Not very hard   Transportation Needs: Unknown (9/7/2023)    PRAPARE - Transportation     Lack of Transportation (Non-Medical): No   Housing Stability: Unknown (9/7/2023)    Housing Stability Vital Sign     Unstable Housing in the Last Year: No     Family History   Problem Relation Age of Onset    Other Mother         Neurological Disease    Cancer Sister     Diabetes Brother     Cancer Brother         leukemia     Current Facility-Administered Medications   Medication Dose Route Frequency Provider Last Rate Last Admin    insulin lispro (HUMALOG,ADMELOG) injection vial 0-8 Units  0-8 Units SubCUTAneous 4x Daily AC & HS Aurelia Hung MD        tamsulosin (FLOMAX) capsule 0.4 mg  0.4 mg Oral Daily Brenda Scott APRN - CNP   0.4 mg at 06/13/24 1000    hydrALAZINE (APRESOLINE) tablet 100 mg  100 mg Oral 3 times per day Colton Daly R, DO   100 mg at 06/13/24 0533    amoxicillin-clavulanate (AUGMENTIN) 875-125 MG per tablet 1 tablet  1 tablet Oral 2 times per day Colton Daly R, DO   1 tablet at 06/13/24 1000    lisinopril (PRINIVIL;ZESTRIL) tablet 20 mg  20 mg Oral Daily Colton Daly R, DO   20 mg at 06/13/24 1000    oxyCODONE (ROXICODONE) immediate release tablet 5 mg  5 mg Oral Q4H PRN Colton Daly R, DO   5 mg at 06/11/24 0755    enoxaparin (LOVENOX) injection 40 mg  40 mg SubCUTAneous Daily Diana Suh MD   40 mg at 06/11/24 1008    hydrALAZINE (APRESOLINE) injection 20 mg  20 mg IntraVENous Q4H PRN Mary Beth Yepez APRN - CNP   20 mg at 06/08/24 1647    ipratropium 0.5 mg-albuterol 2.5 mg (DUONEB) nebulizer solution 1 Dose  1 Dose Inhalation Q4H PRN Uri David MD   1 Dose at 06/04/24 2042    lidocaine 4 % external patch 1 patch  1 patch TransDERmal Daily Uri David MD   1 patch at 06/11/24 1004    polyethylene glycol (GLYCOLAX) packet 17 g  17 g Oral Daily  oriented to person, place, and time.          Assessment:      84 year old female who's alcala catheter is draining clear yellow urine. She voices no other urological complaints.               Plan:      Maintain alcala catheter  Continue flomax                         Rei Swift PA-C

## 2024-06-13 NOTE — PROGRESS NOTES
[facility] MED SURG TREATMENT NOTE     Date: 2024  Patient Name: Tanya Liz        MRN: [id number]  Account: [id number]   : 1940  (84 y.o.)  Room: [place] [address]    Chart Review:    Restrictions  Restrictions/Precautions  Restrictions/Precautions: Fall Risk, Up as Tolerated     Safety:  Safety Devices  Type of Devices: All fall risk precautions in place;Call light within reach;Chair alarm in place;Left in chair    Patient's birthday verified: Yes    Subjective:    Subjective: \"oh, hi!\"       Pain at start of treatment: No    Pain at end of treatment: No      Objective:    ADL Status:  ADL  Feeding: Setup;Supervision  Feeding Skilled Clinical Factors: therapist opens containers for pt, present to supervise initial bites of modified diet food - pt able to complete without difficulty  Grooming: Supervision;Increased time to complete  Grooming Skilled Clinical Factors: pt completes thorough oral/denture care, washes face, brushes hair with cues for initiation/sequencing  Toilet Transfers  Toilet Transfer: Unable to assess    Therapist assists with positioning of pt in recliner chair as pt's hips had scooted forward in chair for increased safety. Therapist provides cues for repositioning and pt able to complete without physical A.    Therapy key for assistance levels -   Independent/Mod I = Pt. is able to perform task with no assistance but may require a device   Stand by assistance = Pt. does not perform task at an independent level but does not need physical assistance, requires verbal cues  Minimal, Moderate, Maximal Assistance = Pt. requires physical assistance (25%, 50%, 75% assist from helper) for task but is able to actively participate in task   Dependent = Pt. requires total assistance with task and is not able to actively participate with task completion    Orientation Status:  Orientation  Orientation Level: Disoriented to time;Oriented to person;Oriented to place;Oriented to  bedpan, or urinal)?: Total  How much help is needed for putting on and taking off regular upper body clothing?: A Lot  How much help is needed for taking care of personal grooming?: A Little  How much help for eating meals?: A Little  AM-PAC Inpatient Daily Activity Raw Score: 14  AM-PAC Inpatient ADL T-Scale Score : 33.39  ADL Inpatient CMS 0-100% Score: 59.67  ADL Inpatient CMS G-Code Modifier : CK    Plan:    Continue OT per POC    Patient Education:  Patient Education  Education Given To: Patient  Education Provided: Plan of Care;Role of Therapy  Education Method: Verbal  Barriers to Learning: None  Education Outcome: Continued education needed    Equipment recommendations:  OT Equipment Recommendations  Equipment Needed:  (TBD)  Other: Continue to assess    Goals/Plan of care addressed during this session:        Patient Goal: Patient goals : \"To feel better.\"    Improve Ora with ADLs, Improve Cognition/Safety awareness, and Improve Endurance    Therapy Time:   Individual Group Co-Treat   Time In 1133       Time Out 1146         Minutes 13                ADL/IADL trainin minutes    Electronically signed by:    Venita Cruz OT    2024, 11:59 AM

## 2024-06-13 NOTE — DISCHARGE SUMMARY
Hospital Medicine Discharge Summary    Tanya Liz  :  1940  MRN:  [id number]    Admit date:  2024  Discharge date:  2024    Admitting Physician:  Diana Suh MD  Primary Care Physician:  Madi Kelly,       Discharge Diagnoses:    As below    Chief Complaint   Patient presents with    Pharyngitis     x4days    Nausea    Diarrhea     Hospital Course:     Oropharyngeal abscess  - s/p I&D with ENT, surgical culture with no significant growth  - s/p course of Unasyn and Augmentin  - completed course of decadron  - pt with continued dysphagia, SLP following, s/p MBS, NG removed, continue pureed diet as tolerated, continue SLP treatment at SNF and advance diet as tolerated     Acute hypoxic respiratory failure (resolved)  - pt kept intubated after surgery for airway protection, now extubated and on RA  - pulmonology following     Positive blood culture with coag negative staph in 1/2 bottles, likely a contaminate      Chronic Conditions:  HTN:continue home lisinopril and verapamil, continue new hydralazine 100mg TID  HLD: continue home statin and aspirin  T2DM: FSBG per protocol with SSI  GERD: continue home PPI and famotidine  Cerebrovascular disease: continue home DAPT   Depression: continue home sertraline, trazodone, and mirtazapine    Exam on discharge:   BP (!) 114/55   Pulse 100   Temp 97.3 °F (36.3 °C) (Oral)   Resp 18   Ht 1.575 m (5' 2.01\")   Wt 55.6 kg (122 lb 9.2 oz)   LMP  (LMP Unknown)   SpO2 93%   BMI 22.41 kg/m²   Physical Exam  Cardiovascular:      Rate and Rhythm: Normal rate and regular rhythm.   Pulmonary:      Effort: Pulmonary effort is normal. No respiratory distress.   Abdominal:      Palpations: Abdomen is soft.      Tenderness: There is no abdominal tenderness.   Neurological:      Mental Status: She is alert and oriented to person, place, and time.   Psychiatric:         Mood and Affect: Mood normal.         Behavior: Behavior normal.       Patient was seen by the

## 2024-06-13 NOTE — CARE COORDINATION
This LSW visited patient at bedside this am. Patient will be transferred to [facility] upon discharge. Patient does not require insurance authorization prior to discharge.LSW/LASHAUN to follow.  Electronically signed by GOOD Ramon on 6/13/24 at 10:37 AM EDT   Discharge anticipated for today, June 13,2024.  I arranged transportation via [facility]. Transport is scheduled for 4:00pm.   Patient, patients daughter, AmaNader RN and Jayleneliaison for [facility] are all aware.  Electronically signed by GOOD Ramon on 6/13/24 at 11:05 AM EDT   [id number] completed.  Electronically signed by GOOD Ramon on 6/13/24 at 11:51 AM EDT

## 2024-06-14 ENCOUNTER — OFFICE VISIT (OUTPATIENT)
Dept: GERIATRIC MEDICINE | Age: 84
End: 2024-06-14

## 2024-06-14 DIAGNOSIS — K59.04 CHRONIC IDIOPATHIC CONSTIPATION: ICD-10-CM

## 2024-06-14 DIAGNOSIS — R33.9 URINARY RETENTION: ICD-10-CM

## 2024-06-14 DIAGNOSIS — E11.9 TYPE 2 DIABETES MELLITUS WITHOUT COMPLICATION, WITHOUT LONG-TERM CURRENT USE OF INSULIN (HCC): ICD-10-CM

## 2024-06-14 DIAGNOSIS — I10 ESSENTIAL HYPERTENSION: Primary | ICD-10-CM

## 2024-06-14 NOTE — PROGRESS NOTES
Physical Therapy  Facility/Department: [facility] MED SURG W477/[id number]  Physical Therapy Discharge      NAME: Tanya Liz    : 1940 (84 y.o.)  MRN: [id number]    Account: [id number]  Gender: female      Patient has been discharged from acute care hospital. DC patient from current PT program.      Electronically signed by Diana Barbosa PT on 24 at 2:03 PM EDT

## 2024-06-17 ENCOUNTER — OFFICE VISIT (OUTPATIENT)
Dept: GERIATRIC MEDICINE | Age: 84
End: 2024-06-17
Payer: MEDICARE

## 2024-06-17 DIAGNOSIS — R39.9 UTI SYMPTOMS: Primary | ICD-10-CM

## 2024-06-17 DIAGNOSIS — I95.9 HYPOTENSION, UNSPECIFIED HYPOTENSION TYPE: ICD-10-CM

## 2024-06-17 DIAGNOSIS — R10.84 GENERALIZED ABDOMINAL PAIN: ICD-10-CM

## 2024-06-17 PROCEDURE — 99309 SBSQ NF CARE MODERATE MDM 30: CPT | Performed by: PHYSICIAN ASSISTANT

## 2024-06-17 PROCEDURE — 1123F ACP DISCUSS/DSCN MKR DOCD: CPT | Performed by: PHYSICIAN ASSISTANT

## 2024-06-18 ENCOUNTER — OFFICE VISIT (OUTPATIENT)
Dept: GERIATRIC MEDICINE | Age: 84
End: 2024-06-18

## 2024-06-18 DIAGNOSIS — I10 ESSENTIAL HYPERTENSION: Primary | ICD-10-CM

## 2024-06-18 DIAGNOSIS — R33.9 URINARY RETENTION: ICD-10-CM

## 2024-06-18 DIAGNOSIS — E11.9 TYPE 2 DIABETES MELLITUS WITHOUT COMPLICATION, WITHOUT LONG-TERM CURRENT USE OF INSULIN (HCC): Chronic | ICD-10-CM

## 2024-06-19 ENCOUNTER — OFFICE VISIT (OUTPATIENT)
Dept: GERIATRIC MEDICINE | Age: 84
End: 2024-06-19
Payer: MEDICARE

## 2024-06-19 DIAGNOSIS — N30.00 ACUTE CYSTITIS WITHOUT HEMATURIA: Primary | ICD-10-CM

## 2024-06-19 PROCEDURE — 1123F ACP DISCUSS/DSCN MKR DOCD: CPT | Performed by: PHYSICIAN ASSISTANT

## 2024-06-19 PROCEDURE — 99308 SBSQ NF CARE LOW MDM 20: CPT | Performed by: PHYSICIAN ASSISTANT

## 2024-06-24 ENCOUNTER — OFFICE VISIT (OUTPATIENT)
Dept: GERIATRIC MEDICINE | Age: 84
End: 2024-06-24

## 2024-06-24 DIAGNOSIS — N30.00 ACUTE CYSTITIS WITHOUT HEMATURIA: Primary | ICD-10-CM

## 2024-06-24 DIAGNOSIS — R26.2 DIFFICULTY WALKING: ICD-10-CM

## 2024-06-26 ASSESSMENT — ENCOUNTER SYMPTOMS
SHORTNESS OF BREATH: 0
COUGH: 0
ABDOMINAL DISTENTION: 0
ABDOMINAL PAIN: 1
BACK PAIN: 0
WHEEZING: 0

## 2024-06-26 NOTE — PROGRESS NOTES
Subjective:      Patient ID: Tanya Liz is a pleasant 84 y.o. female who presents today for:  Chief Complaint   Patient presents with    Other     Uti symptoms  (primary encounter diagnosis)  Generalized abdominal pain  Hypotension, unspecified hypotension type         [facility]    Follow-up with patient today due to recent abdominal pain status post admission for peritonsillar abscess.  Patient has been eating and drinking less per nurse, did have KUB done over the weekend which was negative.  Having some hypotension as well, no parameters on blood pressure meds at this time.  She does have a Landry catheter placed due to urinary retention.  Did have evidence of discomfort in lower abdomen consistent with UTI.  Some cloudy urine as well in bag.  Did have UA pulled and waiting for results at this time.  Discussed with patient who speaks limited English about UA results and possible treatment with antibiotic.  Patient is in agreement.  Additionally patient in agreement with putting parameters on current medications for blood pressure and possible discontinuation of hydralazine.  Await BP checks with next 2 days and consider further Rx adjustments.       Patient Active Problem List   Diagnosis    Essential hypertension    Gastroesophageal reflux disease without esophagitis    Multiple actinic keratoses    Age-related osteoporosis without current pathological fracture    Depression with anxiety    Lymphocytopenia    Type 2 diabetes mellitus without complication (HCC)    Chronic right shoulder pain    Memory loss of unknown cause    Postnasal drip    Adenomatous polyp of descending colon    Chronic idiopathic constipation    Chronic pruritus    Easy bruising    Black stools    Basilar artery stenosis/occlusion    Subcortical microvascular ischemic occlusive disease    Right rotator cuff tendonitis    Urinary retention     Past Medical History:   Diagnosis Date    Arthritis     Basilar artery stenosis/occlusion

## 2024-07-01 ENCOUNTER — OFFICE VISIT (OUTPATIENT)
Dept: GERIATRIC MEDICINE | Age: 84
End: 2024-07-01
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
## 2024-07-01 ASSESSMENT — ENCOUNTER SYMPTOMS
BACK PAIN: 0
ABDOMINAL PAIN: 1
WHEEZING: 0
COUGH: 0
SHORTNESS OF BREATH: 0
ABDOMINAL DISTENTION: 0

## 2024-07-01 NOTE — PROGRESS NOTES
guarding.   Skin:     General: Skin is warm and dry.   Neurological:      Mental Status: She is alert. She is disoriented.      Motor: Weakness present.      Gait: Gait abnormal.   Psychiatric:         Attention and Perception: Attention normal.         Mood and Affect: Mood normal.         Speech: Speech normal.         Behavior: Behavior is withdrawn. Behavior is cooperative.         Assessment:       Diagnosis Orders   1. Acute cystitis without hematuria              Plan:      No orders of the defined types were placed in this encounter.    No orders of the defined types were placed in this encounter.      IM ceftriaxone 1gm wi/lidocaine, followed by Macrobid 100mg po bid x 5 days.Monitor fluid intake and urine output. VS daily.    No follow-ups on file.      TRACI Sheriff    Electronically signed by: TRACI Sheriff on 7/1/2024    Please note orders entered on site at facility after discussion with appropriate facility nursing/therapy/ / nutritional staff. Current longstanding medical problems and acute medical issues addressed with staff. Available data and data elements in on site paper chart reviewed and analyzed.  Current external consultant notes reviewed in on site chart. Ordered laboratory testing and imaging will be reviewed when available.     Side effects, adverse effects of the medication prescribed today, as well as treatment plan and result expectations have been discussed withthe patient who expresses understanding and desires to proceed.    I spent a total of 15 minutes on the date of service which included preparing to see the patient, face-to-face patient care, performing a medically appropriate examination, completing clinical documentation, and on counseling/ eductaing the patient and the family.      Please note Nuance Dragon PowerMic III software used for dictation of note,  which may contain minor errors due to ambient noise and indiscriminate speech pickup.

## 2024-07-02 ASSESSMENT — ENCOUNTER SYMPTOMS
SORE THROAT: 1
DIARRHEA: 1
TROUBLE SWALLOWING: 1
FACIAL SWELLING: 1
COUGH: 1
BACK PAIN: 1

## 2024-07-03 ENCOUNTER — OFFICE VISIT (OUTPATIENT)
Dept: GERIATRIC MEDICINE | Age: 84
End: 2024-07-03

## 2024-07-03 DIAGNOSIS — R29.6 REPEATED FALLS: ICD-10-CM

## 2024-07-03 DIAGNOSIS — R53.1 WEAKNESS: ICD-10-CM

## 2024-07-03 DIAGNOSIS — Z91.81 AT MODERATE RISK FOR FALL: Primary | ICD-10-CM

## 2024-07-05 ASSESSMENT — ENCOUNTER SYMPTOMS
VOMITING: 0
SHORTNESS OF BREATH: 0
ABDOMINAL DISTENTION: 0
WHEEZING: 0
DIARRHEA: 0
CONSTIPATION: 0
ABDOMINAL PAIN: 0
COUGH: 0
NAUSEA: 0

## 2024-07-05 NOTE — PROGRESS NOTES
Perception: Attention normal.         Mood and Affect: Mood normal.         Speech: Speech normal.         Behavior: Behavior is withdrawn. Behavior is cooperative.         Assessment:       Diagnosis Orders   1. Acute cystitis without hematuria        2. Difficulty walking              Plan:      No orders of the defined types were placed in this encounter.    No orders of the defined types were placed in this encounter.      No new or worsening urinary tract symptoms at this time.  Plan to continue monitoring urine output.  Continue emphasizing p.o. fluids.  No additional concerns at this time.    No follow-ups on file.      TRACI Sheriff    Electronically signed by: TRACI Sheriff on 7/5/2024    Please note orders entered on site at facility after discussion with appropriate facility nursing/therapy/ / nutritional staff. Current longstanding medical problems and acute medical issues addressed with staff. Available data and data elements in on site paper chart reviewed and analyzed.  Current external consultant notes reviewed in on site chart. Ordered laboratory testing and imaging will be reviewed when available.     Side effects, adverse effects of the medication prescribed today, as well as treatment plan and result expectations have been discussed withthe patient who expresses understanding and desires to proceed.    I spent a total of 15 minutes on the date of service which included preparing to see the patient, face-to-face patient care, performing a medically appropriate examination, completing clinical documentation, and on counseling/ eductaing the patient and the family.      Please note Nuance Dragon PowerMic III software used for dictation of note,  which may contain minor errors due to ambient noise and indiscriminate speech pickup.

## 2024-07-09 ENCOUNTER — OFFICE VISIT (OUTPATIENT)
Dept: GERIATRIC MEDICINE | Age: 84
End: 2024-07-09

## 2024-07-09 DIAGNOSIS — I10 ESSENTIAL HYPERTENSION: ICD-10-CM

## 2024-07-09 DIAGNOSIS — E11.9 TYPE 2 DIABETES MELLITUS WITHOUT COMPLICATION, WITHOUT LONG-TERM CURRENT USE OF INSULIN (HCC): Chronic | ICD-10-CM

## 2024-07-09 DIAGNOSIS — K59.04 CHRONIC IDIOPATHIC CONSTIPATION: Primary | ICD-10-CM

## 2024-07-10 ENCOUNTER — OFFICE VISIT (OUTPATIENT)
Dept: GERIATRIC MEDICINE | Age: 84
End: 2024-07-10
Payer: MEDICARE

## 2024-07-10 DIAGNOSIS — Z87.09 HISTORY OF PERITONSILLAR ABSCESS: ICD-10-CM

## 2024-07-10 DIAGNOSIS — Z87.440 PERSONAL HISTORY UTI: Primary | ICD-10-CM

## 2024-07-10 DIAGNOSIS — I10 ESSENTIAL HYPERTENSION: ICD-10-CM

## 2024-07-10 DIAGNOSIS — E11.9 TYPE 2 DIABETES MELLITUS WITHOUT COMPLICATION, WITHOUT LONG-TERM CURRENT USE OF INSULIN (HCC): Chronic | ICD-10-CM

## 2024-07-10 DIAGNOSIS — K21.9 GASTROESOPHAGEAL REFLUX DISEASE WITHOUT ESOPHAGITIS: ICD-10-CM

## 2024-07-10 DIAGNOSIS — K59.04 CHRONIC IDIOPATHIC CONSTIPATION: ICD-10-CM

## 2024-07-10 DIAGNOSIS — R13.10 DYSPHAGIA, UNSPECIFIED TYPE: ICD-10-CM

## 2024-07-10 PROCEDURE — 3044F HG A1C LEVEL LT 7.0%: CPT | Performed by: PHYSICIAN ASSISTANT

## 2024-07-10 PROCEDURE — 99315 NF DSCHRG MGMT 30 MIN/LESS: CPT | Performed by: PHYSICIAN ASSISTANT

## 2024-07-11 ASSESSMENT — ENCOUNTER SYMPTOMS: ABDOMINAL PAIN: 0

## 2024-07-11 NOTE — PROGRESS NOTES
Subjective:      Patient ID: Tanya Liz is a pleasant 84 y.o. female who presents today for:  Chief Complaint   Patient presents with    Other     Fall, initial encounter  (primary encounter diagnosis)         [facility]    Patient did have a fall and fell in front of toilet stating she misplaced her footing and slipped some.  Did bump her head, no cognitive decline or change in mental status.  Small bruise to neck.  Denies any acute pain at this time outside of normal limits.  No new changes, utilize APAP for pain control and ice as well for analgesia.  Follow-up if any acute change.  Patient was aware of this all the time, low probability of UTI at this time due to patient recently being treated.      Patient Active Problem List   Diagnosis    Essential hypertension    Gastroesophageal reflux disease without esophagitis    Multiple actinic keratoses    Age-related osteoporosis without current pathological fracture    Depression with anxiety    Lymphocytopenia    Type 2 diabetes mellitus without complication (HCC)    Chronic right shoulder pain    Memory loss of unknown cause    Postnasal drip    Adenomatous polyp of descending colon    Chronic idiopathic constipation    Chronic pruritus    Easy bruising    Black stools    Basilar artery stenosis/occlusion    Subcortical microvascular ischemic occlusive disease    Right rotator cuff tendonitis    Urinary retention     Past Medical History:   Diagnosis Date    Arthritis     Basilar artery stenosis/occlusion 12/8/2021    Chronic bronchitis (HCC)     Chronic pruritus 1/15/2021    COVID-19 virus infection 1/4/2021    Degenerative disc disease, cervical     Depression     Depression with anxiety     Easy bruising 6/17/2021    Esophagitis     Fibromyositis     GERD (gastroesophageal reflux disease)     Headache(784.0)     Hyperlipidemia     Hypertension     Migraines     Pleural effusion     Right-sided chest wall pain     Subcortical microvascular ischemic

## 2024-07-12 ASSESSMENT — ENCOUNTER SYMPTOMS: ABDOMINAL PAIN: 0

## 2024-07-12 NOTE — PROGRESS NOTES
Intimate Partner Violence: Not on file   Housing Stability: Unknown (9/7/2023)    Housing Stability Vital Sign     Unable to Pay for Housing in the Last Year: Not on file     Number of Places Lived in the Last Year: Not on file     Unstable Housing in the Last Year: No     Family History   Problem Relation Age of Onset    Other Mother         Neurological Disease    Cancer Sister     Diabetes Brother     Cancer Brother         leukemia     Allergies   Allergen Reactions    Codeine Shortness Of Breath     tired           Review of Systems   Unable to perform ROS: Other   Constitutional:  Positive for fatigue. Negative for activity change, appetite change and fever.   Cardiovascular:  Negative for chest pain.   Gastrointestinal:  Negative for abdominal pain.   Genitourinary:  Negative for decreased urine volume, difficulty urinating, dysuria, flank pain and hematuria.   Musculoskeletal:  Positive for gait problem and neck pain (Mild soreness post fall). Negative for arthralgias.   Skin:  Negative for wound.   Neurological:  Positive for dizziness and weakness.   Hematological:  Bruises/bleeds easily.   Psychiatric/Behavioral:  Positive for confusion. Negative for agitation and decreased concentration. The patient is not nervous/anxious.    All other systems reviewed and are negative.      Objective:   VS: See PCC. Reviewed.    Physical Exam  Vitals (See PCC) reviewed.   Constitutional:       General: She is not in acute distress.     Appearance: Normal appearance. She is normal weight. She is not ill-appearing.   HENT:      Head: Normocephalic and atraumatic.   Eyes:      Conjunctiva/sclera: Conjunctivae normal.   Cardiovascular:      Rate and Rhythm: Normal rate and regular rhythm.   Pulmonary:      Effort: Pulmonary effort is normal.      Breath sounds: Normal breath sounds.   Musculoskeletal:         General: Normal range of motion.   Skin:     General: Skin is warm and dry.   Neurological:      Mental Status:

## 2024-07-17 NOTE — PROGRESS NOTES
SUBJECTIVE:        ROS:  The rest of the 14 point ROS negative    PHYSICAL EXAM: VSS per facility record      ASSESSMENT & PLAN:   Diagnosis Orders   1. Essential hypertension        2. Urinary retention        3. Type 2 diabetes mellitus without complication, without long-term current use of insulin (HCC)                      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    Type 2 diabetes mellitus without complication (HCC) 4/10/2018    no medication per patient         Past Surgical History:   Procedure Laterality Date    CHOLECYSTECTOMY  1988    COLONOSCOPY  05/04/2009    COLONOSCOPY N/A 10/24/2019    COLORECTAL CANCER SCREENING, NOT HIGH RISK performed by Gunner Beaulieu MD at [facility]    HYSTERECTOMY (CERVIX STATUS UNKNOWN)  1978    LARYNGOSCOPY Bilateral 5/31/2024    direct laryngoscopy with biopsy, with I&D of oropharyngeal abscess, flexible esophagoscopy performed by Diana Suh MD at [facility]    OTHER SURGICAL HISTORY Left 04/27/15     [facility] EYE VITRECTOMY W MACULAR EPIRETINAL MEMBRANE    [facility] REMOVAL      UPPER GASTROINTESTINAL ENDOSCOPY  04/16/2013    UPPER GASTROINTESTINAL ENDOSCOPY  10/29/15    ANTOINETTE ANGELO MD         Current Outpatient Medications on File Prior to Visit   Medication Sig Dispense Refill    hydrALAZINE (APRESOLINE) 100 MG tablet Take 1 tablet by mouth every 8 hours 60 tablet 3    traZODone (DESYREL) 50 MG tablet Take 1 tablet by mouth nightly Indications:

## 2024-07-23 ASSESSMENT — ENCOUNTER SYMPTOMS: ABDOMINAL PAIN: 0

## 2024-07-23 NOTE — PROGRESS NOTES
person, place, and time.      Motor: Weakness present.      Gait: Gait abnormal.   Psychiatric:         Attention and Perception: Attention normal.         Mood and Affect: Mood normal.         Speech: Speech normal.         Behavior: Behavior is withdrawn. Behavior is cooperative.         Assessment:       Diagnosis Orders   1. Personal history UTI        2. History of peritonsillar abscess        3. Dysphagia, unspecified type        4. Essential hypertension        5. Gastroesophageal reflux disease without esophagitis        6. Chronic idiopathic constipation        7. Type 2 diabetes mellitus without complication, without long-term current use of insulin (HCC)              Plan:      No orders of the defined types were placed in this encounter.    No orders of the defined types were placed in this encounter.      Patient to discharge with home health care/PT/OT.  Follow-up with PCP within 1 week of discharge.  No acute concerns otherwise noted this time.  Maintain medications per list.    No follow-ups on file.      TRACI Sheriff    Electronically signed by: TRACI Sheriff on 7/23/2024    Please note orders entered on site at facility after discussion with appropriate facility nursing/therapy/ / nutritional staff. Current longstanding medical problems and acute medical issues addressed with staff. Available data and data elements in on site paper chart reviewed and analyzed.  Current external consultant notes reviewed in on site chart. Ordered laboratory testing and imaging will be reviewed when available.     Side effects, adverse effects of the medication prescribed today, as well as treatment plan and result expectations have been discussed withthe patient who expresses understanding and desires to proceed.    I spent a total of 30 minutes on the date of service which included preparing to see the patient, face-to-face patient care, performing a medically appropriate examination, completing

## 2024-08-05 ENCOUNTER — HOSPITAL ENCOUNTER (OUTPATIENT)
Dept: RADIOLOGY | Facility: HOSPITAL | Age: 84
Discharge: HOME | End: 2024-08-05
Payer: MEDICARE

## 2024-08-05 DIAGNOSIS — R26.89 OTHER ABNORMALITIES OF GAIT AND MOBILITY: ICD-10-CM

## 2024-08-05 DIAGNOSIS — Z91.81 HISTORY OF FALLING: ICD-10-CM

## 2024-08-05 PROCEDURE — 70450 CT HEAD/BRAIN W/O DYE: CPT

## 2024-08-05 PROCEDURE — 70450 CT HEAD/BRAIN W/O DYE: CPT | Performed by: RADIOLOGY

## 2024-08-08 NOTE — PROGRESS NOTES
on file   Social Connections: Not on file   Intimate Partner Violence: Not on file   Housing Stability: Unknown (9/7/2023)    Housing Stability Vital Sign     Unable to Pay for Housing in the Last Year: Not on file     Number of Places Lived in the Last Year: Not on file     Unstable Housing in the Last Year: No         Lab Results   Component Value Date    LABA1C 5.6 05/30/2024     Lab Results   Component Value Date     05/30/2024       Lab Results   Component Value Date/Time     07/08/2024 02:37 PM    K 4.5 07/08/2024 02:37 PM    K 4.8 06/13/2024 06:06 AM     07/08/2024 02:37 PM    CO2 30 07/08/2024 02:37 PM    BUN 14 07/08/2024 02:37 PM    CREATININE 0.7 07/08/2024 02:37 PM    GLUCOSE 84 07/08/2024 02:37 PM    GLUCOSE 99 09/22/2011 08:33 AM    CALCIUM 9.1 07/08/2024 02:37 PM        Lab Results   Component Value Date    CHOL 240 (H) 05/07/2019    CHOL 217 (H) 02/18/2017    CHOL 188 07/21/2016     Lab Results   Component Value Date    TRIG 163 (H) 05/07/2019    TRIG 192 02/18/2017    TRIG 142 07/21/2016     Lab Results   Component Value Date    HDL 50 04/07/2022    HDL 58 05/07/2019    HDL 61 (H) 02/18/2017     No components found for: \"LDLCHOLESTEROL\", \"LDLCALC\"  Lab Results   Component Value Date    VLDL 28 07/21/2016    VLDL 34 03/24/2016    VLDL 30 09/14/2015     Lab Results   Component Value Date    CHOLHDLRATIO 3.6 07/28/2012    CHOLHDLRATIO 3.4 09/22/2011       Lab Results   Component Value Date    TSH 1.010 08/01/2023       Lab Results   Component Value Date    WBC 8.9 06/11/2024    HGB 10.8 (L) 06/11/2024    HCT 34.1 (L) 06/11/2024    MCV 93.2 06/11/2024     06/11/2024       Please note orders entered on site at facility after discussion with appropriate facility nursing/therapy/ / nutritional staff. Current longstanding medical problems and acute medical issues addressed with staff. Available data and data elements in on site paper chart reviewed and analyzed.

## 2024-11-18 ENCOUNTER — APPOINTMENT (OUTPATIENT)
Dept: GENERAL RADIOLOGY | Age: 84
End: 2024-11-18
Payer: MEDICARE

## 2024-11-18 ENCOUNTER — HOSPITAL ENCOUNTER (EMERGENCY)
Age: 84
Discharge: HOME | End: 2024-11-18
Payer: MEDICARE

## 2024-11-18 VITALS
TEMPERATURE: 99.2 F | OXYGEN SATURATION: 91 % | DIASTOLIC BLOOD PRESSURE: 73 MMHG | BODY MASS INDEX: 22.98 KG/M2 | SYSTOLIC BLOOD PRESSURE: 125 MMHG | RESPIRATION RATE: 15 BRPM | WEIGHT: 121.69 LBS | HEIGHT: 61 IN | HEART RATE: 79 BPM

## 2024-11-18 DIAGNOSIS — R05.1 ACUTE COUGH: ICD-10-CM

## 2024-11-18 DIAGNOSIS — J40 BRONCHITIS: Primary | ICD-10-CM

## 2024-11-18 LAB
ALBUMIN SERPL-MCNC: 4 G/DL (ref 3.5–4.6)
ALP SERPL-CCNC: 84 U/L (ref 40–130)
ALT SERPL-CCNC: 18 U/L (ref 0–33)
ANION GAP SERPL CALCULATED.3IONS-SCNC: 12 MEQ/L (ref 9–15)
AST SERPL-CCNC: 18 U/L (ref 0–35)
BASOPHILS # BLD: 0 K/UL (ref 0–0.2)
BASOPHILS NFR BLD: 0.2 %
BILIRUB SERPL-MCNC: 0.5 MG/DL (ref 0.2–0.7)
BNP BLD-MCNC: 348 PG/ML
BUN SERPL-MCNC: 21 MG/DL (ref 8–23)
CALCIUM SERPL-MCNC: 9.4 MG/DL (ref 8.5–9.9)
CHLORIDE SERPL-SCNC: 98 MEQ/L (ref 95–107)
CO2 SERPL-SCNC: 28 MEQ/L (ref 20–31)
CREAT SERPL-MCNC: 0.85 MG/DL (ref 0.5–0.9)
EOSINOPHIL # BLD: 0 K/UL (ref 0–0.7)
EOSINOPHIL NFR BLD: 0.2 %
ERYTHROCYTE [DISTWIDTH] IN BLOOD BY AUTOMATED COUNT: 13.6 % (ref 11.5–14.5)
GLOBULIN SER CALC-MCNC: 3 G/DL (ref 2.3–3.5)
GLUCOSE SERPL-MCNC: 100 MG/DL (ref 70–99)
HCT VFR BLD AUTO: 37.7 % (ref 37–47)
HGB BLD-MCNC: 12.4 G/DL (ref 12–16)
INFLUENZA A BY PCR: NEGATIVE
INFLUENZA B BY PCR: NEGATIVE
LACTATE BLDV-SCNC: 1.1 MMOL/L (ref 0.5–2.2)
LYMPHOCYTES # BLD: 1.1 K/UL (ref 1–4.8)
LYMPHOCYTES NFR BLD: 16.5 %
MCH RBC QN AUTO: 30 PG (ref 27–31.3)
MCHC RBC AUTO-ENTMCNC: 32.9 % (ref 33–37)
MCV RBC AUTO: 91.3 FL (ref 79.4–94.8)
MONOCYTES # BLD: 0.7 K/UL (ref 0.2–0.8)
MONOCYTES NFR BLD: 10.7 %
NEUTROPHILS # BLD: 4.6 K/UL (ref 1.4–6.5)
NEUTS SEG NFR BLD: 72.1 %
PLATELET # BLD AUTO: 101 K/UL (ref 130–400)
POTASSIUM SERPL-SCNC: 4.2 MEQ/L (ref 3.4–4.9)
PROCALCITONIN SERPL IA-MCNC: 0.09 NG/ML (ref 0–0.15)
PROT SERPL-MCNC: 7 G/DL (ref 6.3–8)
RBC # BLD AUTO: 4.13 M/UL (ref 4.2–5.4)
SARS-COV-2 RDRP RESP QL NAA+PROBE: NOT DETECTED
SODIUM SERPL-SCNC: 138 MEQ/L (ref 135–144)
STREP GRP A PCR: NEGATIVE
TROPONIN, HIGH SENSITIVITY: 17 NG/L (ref 0–19)
TROPONIN, HIGH SENSITIVITY: 18 NG/L (ref 0–19)
WBC # BLD AUTO: 6.4 K/UL (ref 4.8–10.8)

## 2024-11-18 PROCEDURE — 84145 PROCALCITONIN (PCT): CPT

## 2024-11-18 PROCEDURE — 6370000000 HC RX 637 (ALT 250 FOR IP): Performed by: PHYSICIAN ASSISTANT

## 2024-11-18 PROCEDURE — 87651 STREP A DNA AMP PROBE: CPT

## 2024-11-18 PROCEDURE — 71045 X-RAY EXAM CHEST 1 VIEW: CPT

## 2024-11-18 PROCEDURE — 94640 AIRWAY INHALATION TREATMENT: CPT

## 2024-11-18 PROCEDURE — 87635 SARS-COV-2 COVID-19 AMP PRB: CPT

## 2024-11-18 PROCEDURE — 83605 ASSAY OF LACTIC ACID: CPT

## 2024-11-18 PROCEDURE — 36415 COLL VENOUS BLD VENIPUNCTURE: CPT

## 2024-11-18 PROCEDURE — 87502 INFLUENZA DNA AMP PROBE: CPT

## 2024-11-18 PROCEDURE — 99285 EMERGENCY DEPT VISIT HI MDM: CPT

## 2024-11-18 PROCEDURE — 85025 COMPLETE CBC W/AUTO DIFF WBC: CPT

## 2024-11-18 PROCEDURE — 87040 BLOOD CULTURE FOR BACTERIA: CPT

## 2024-11-18 PROCEDURE — 80053 COMPREHEN METABOLIC PANEL: CPT

## 2024-11-18 PROCEDURE — 83880 ASSAY OF NATRIURETIC PEPTIDE: CPT

## 2024-11-18 PROCEDURE — 93005 ELECTROCARDIOGRAM TRACING: CPT | Performed by: EMERGENCY MEDICINE

## 2024-11-18 PROCEDURE — 84484 ASSAY OF TROPONIN QUANT: CPT

## 2024-11-18 RX ORDER — PREDNISONE 20 MG/1
20 TABLET ORAL DAILY
Qty: 5 TABLET | Refills: 0 | Status: SHIPPED | OUTPATIENT
Start: 2024-11-18 | End: 2024-11-23

## 2024-11-18 RX ORDER — ACETAMINOPHEN 325 MG/1
650 TABLET ORAL ONCE
Status: COMPLETED | OUTPATIENT
Start: 2024-11-18 | End: 2024-11-18

## 2024-11-18 RX ORDER — BENZONATATE 100 MG/1
100 CAPSULE ORAL 3 TIMES DAILY PRN
Qty: 20 CAPSULE | Refills: 0 | Status: SHIPPED | OUTPATIENT
Start: 2024-11-18

## 2024-11-18 RX ORDER — AZITHROMYCIN 250 MG/1
TABLET, FILM COATED ORAL
Qty: 1 PACKET | Refills: 0 | Status: SHIPPED | OUTPATIENT
Start: 2024-11-18 | End: 2024-11-22

## 2024-11-18 RX ORDER — IPRATROPIUM BROMIDE AND ALBUTEROL SULFATE 2.5; .5 MG/3ML; MG/3ML
1 SOLUTION RESPIRATORY (INHALATION)
Status: DISCONTINUED | OUTPATIENT
Start: 2024-11-18 | End: 2024-11-18 | Stop reason: HOSPADM

## 2024-11-18 RX ADMIN — IPRATROPIUM BROMIDE AND ALBUTEROL SULFATE 1 DOSE: 2.5; .5 SOLUTION RESPIRATORY (INHALATION) at 16:41

## 2024-11-18 RX ADMIN — ACETAMINOPHEN 325MG 650 MG: 325 TABLET ORAL at 16:33

## 2024-11-18 ASSESSMENT — ENCOUNTER SYMPTOMS
VOMITING: 0
COLOR CHANGE: 0
SORE THROAT: 0
EYE DISCHARGE: 0
COUGH: 1
RHINORRHEA: 0
NAUSEA: 0
CONSTIPATION: 0
SHORTNESS OF BREATH: 1
ABDOMINAL PAIN: 0
ABDOMINAL DISTENTION: 0

## 2024-11-18 ASSESSMENT — PAIN - FUNCTIONAL ASSESSMENT: PAIN_FUNCTIONAL_ASSESSMENT: 0-10

## 2024-11-18 ASSESSMENT — PAIN SCALES - GENERAL: PAINLEVEL_OUTOF10: 5

## 2024-11-18 NOTE — ED PROVIDER NOTES
pulsatile mass.      Tenderness: There is no abdominal tenderness. There is no right CVA tenderness, left CVA tenderness, guarding or rebound. Negative signs include Grijalva's sign, Rovsing's sign and McBurney's sign.   Musculoskeletal:         General: No deformity.      Cervical back: Normal range of motion and neck supple. No rigidity.      Right lower leg: No edema.      Left lower leg: No edema.   Skin:     General: Skin is warm and dry.      Capillary Refill: Capillary refill takes less than 2 seconds.      Coloration: Skin is not jaundiced.   Neurological:      General: No focal deficit present.      Mental Status: She is alert and oriented to person, place, and time. Mental status is at baseline.      Cranial Nerves: No cranial nerve deficit.      Sensory: No sensory deficit.      Motor: No weakness.      Coordination: Coordination normal.   Psychiatric:         Mood and Affect: Mood normal.         DIAGNOSTIC RESULTS     EKG: All EKG's are interpreted by the Emergency Department Physician who either signs or Co-signs this chart in the absence of a cardiologist.    EKG shows a sinus rhythm at 78 bpm there is T wave versions leads III no ventricular ectopy QTc 403 ms    RADIOLOGY:   Non-plain film images such as CT, Ultrasound and MRI are read by the radiologist. Plain radiographic images are visualized and preliminarily interpreted by the emergency physician with the below findings:        Interpretation per the Radiologist below, if available at the time of this note:    XR CHEST PORTABLE   Final Result   Mild cardiomegaly with pulmonary vascular congestion.  Appears slightly   improved.  Sign of a               ED BEDSIDE ULTRASOUND:   Performed by ED Physician - none    LABS:  Labs Reviewed   CBC WITH AUTO DIFFERENTIAL - Abnormal; Notable for the following components:       Result Value    RBC 4.13 (*)     MCHC 32.9 (*)     Platelets 101 (*)     All other components within normal limits   COMPREHENSIVE

## 2024-11-19 LAB
EKG ATRIAL RATE: 70 BPM
EKG P AXIS: 6 DEGREES
EKG P-R INTERVAL: 130 MS
EKG Q-T INTERVAL: 374 MS
EKG QRS DURATION: 94 MS
EKG QTC CALCULATION (BAZETT): 403 MS
EKG R AXIS: 2 DEGREES
EKG T AXIS: 21 DEGREES
EKG VENTRICULAR RATE: 70 BPM

## 2024-11-19 PROCEDURE — 93010 ELECTROCARDIOGRAM REPORT: CPT | Performed by: INTERNAL MEDICINE

## 2024-11-23 LAB
BACTERIA BLD CULT ORG #2: NORMAL
BACTERIA BLD CULT: NORMAL

## 2024-12-12 NOTE — TELEPHONE ENCOUNTER
Rx request   Requested Prescriptions     Pending Prescriptions Disp Refills    alendronate (FOSAMAX) 70 MG tablet 15 tablet 4     Sig: Take 1 tablet by mouth every 7 days     LOV 12/8/2021  Next Visit Date:  Future Appointments   Date Time Provider Marleny Lan   3/18/2022 11:30 AM [facility]   5/23/2022 11:15 AM MD Malika Singleton 5

## 2025-06-04 ENCOUNTER — OFFICE VISIT (OUTPATIENT)
Age: 85
End: 2025-06-04
Payer: MEDICARE

## 2025-06-04 VITALS
TEMPERATURE: 97.1 F | HEIGHT: 61 IN | OXYGEN SATURATION: 96 % | HEART RATE: 66 BPM | BODY MASS INDEX: 22.84 KG/M2 | WEIGHT: 121 LBS

## 2025-06-04 DIAGNOSIS — K21.9 LARYNGOPHARYNGEAL REFLUX (LPR): ICD-10-CM

## 2025-06-04 DIAGNOSIS — R13.10 DYSPHAGIA, UNSPECIFIED TYPE: Primary | ICD-10-CM

## 2025-06-04 PROCEDURE — 99202 OFFICE O/P NEW SF 15 MIN: CPT | Performed by: STUDENT IN AN ORGANIZED HEALTH CARE EDUCATION/TRAINING PROGRAM

## 2025-06-04 PROCEDURE — 31575 DIAGNOSTIC LARYNGOSCOPY: CPT | Performed by: STUDENT IN AN ORGANIZED HEALTH CARE EDUCATION/TRAINING PROGRAM

## 2025-06-04 PROCEDURE — 1090F PRES/ABSN URINE INCON ASSESS: CPT | Performed by: STUDENT IN AN ORGANIZED HEALTH CARE EDUCATION/TRAINING PROGRAM

## 2025-06-04 PROCEDURE — 99203 OFFICE O/P NEW LOW 30 MIN: CPT | Performed by: STUDENT IN AN ORGANIZED HEALTH CARE EDUCATION/TRAINING PROGRAM

## 2025-06-04 PROCEDURE — 1036F TOBACCO NON-USER: CPT | Performed by: STUDENT IN AN ORGANIZED HEALTH CARE EDUCATION/TRAINING PROGRAM

## 2025-06-04 PROCEDURE — 1123F ACP DISCUSS/DSCN MKR DOCD: CPT | Performed by: STUDENT IN AN ORGANIZED HEALTH CARE EDUCATION/TRAINING PROGRAM

## 2025-06-04 PROCEDURE — 1160F RVW MEDS BY RX/DR IN RCRD: CPT | Performed by: STUDENT IN AN ORGANIZED HEALTH CARE EDUCATION/TRAINING PROGRAM

## 2025-06-04 PROCEDURE — G8420 CALC BMI NORM PARAMETERS: HCPCS | Performed by: STUDENT IN AN ORGANIZED HEALTH CARE EDUCATION/TRAINING PROGRAM

## 2025-06-04 PROCEDURE — 1159F MED LIST DOCD IN RCRD: CPT | Performed by: STUDENT IN AN ORGANIZED HEALTH CARE EDUCATION/TRAINING PROGRAM

## 2025-06-04 PROCEDURE — G8427 DOCREV CUR MEDS BY ELIG CLIN: HCPCS | Performed by: STUDENT IN AN ORGANIZED HEALTH CARE EDUCATION/TRAINING PROGRAM

## 2025-06-04 NOTE — PROGRESS NOTES
[facility] [place] [facility], [facility] OTOLARYNGOLOGY  [address] [place] [address], [address]  [place] [id number]  Dept: [phone]  Dept Fax: [phone]  Loc: [phone]     6/4/2025    Visit type: Follow up    Reason for Visit: Follow-up       ASSESSMENT/PLAN   1. Dysphagia, unspecified type  -     Ambulatory referral to Speech Therapy  -     FL MODIFIED BARIUM SWALLOW W VIDEO; Future  2. Laryngopharyngeal reflux (LPR)    No follow-ups on file.  No orders of the defined types were placed in this encounter.     Assessment & Plan  Start taking pantoprazole daily  Throat clearing reduction strategies reviewed  Evidence of moderate interarytenoid edema on scope.No signs of infection, mass, or lesion   Endorses dysphagia with foods and liquids will eval with SLP eval and MBS.   If not improved consider GI evaluation     Subjective    Patient: Tanya Liz is a 85 y.o. female   HPI:    Recall,   Presented to the ED with acute odynophagia and respiratory distress secondary to base of tongue abscess s/p OR drainage and biopsies May 2024. NO evidence of malignancy.   Improved in the hospital but was lost to follow up on discharge.    Today,   Presents for worsening throat symptoms over 1 month.   Endorses globus sensation/phlegm   Worse in the morning, also occurs in the evening   Endorses freq throat clearing  No pain or odynophagia   Endorses coughing with eating and liquids   No significant weight changes   NO voice changes   Irregular use of Pepcid   No neck swelling   No breathing concerns     Recent PCP note reviewed    Objective     Vitals:    06/04/25 1409   Pulse: 66   Temp: 97.1 °F (36.2 °C)   TempSrc: Temporal   SpO2: 96%   Weight: 54.9 kg (121 lb)   Height: 1.549 m (5' 1\")        Physical Exam  Constitutional:       Appearance: Normal appearance.   HENT:      Head: Normocephalic and atraumatic.      Right Ear: External ear normal.      Left Ear: External ear normal.      Nose: Nose

## 2025-06-09 ENCOUNTER — HOSPITAL ENCOUNTER (OUTPATIENT)
Dept: SPEECH THERAPY | Age: 85
Setting detail: THERAPIES SERIES
Discharge: HOME | End: 2025-06-09
Attending: STUDENT IN AN ORGANIZED HEALTH CARE EDUCATION/TRAINING PROGRAM
Payer: MEDICARE

## 2025-06-09 PROCEDURE — 92610 EVALUATE SWALLOWING FUNCTION: CPT

## 2025-06-09 NOTE — THERAPY RECERTIFICATION
[facility]            [address]            [place], [place] [address]             Phone: [phone]                        Fax:  [phone]       [facility]- Outpatient  Speech Language Pathology  Clinical Bedside Swallow Evaluation    NAME:Tanya Liz  : 1940 (85 y.o.)   [x]   confirmed    MRN: [id number]  PATIENT DIAGNOSIS(ES): Dysphagia, unspecified type [R13.10]  Treating Diagnosis: R13.10 Dysphagia, unspecified  Referring Provider: Diana Suh MD  No chief complaint on file.    Patient Active Problem List    Diagnosis Date Noted    Urinary retention 06/10/2024    Right rotator cuff tendonitis 2022    Basilar artery stenosis/occlusion 2021    Subcortical microvascular ischemic occlusive disease 2021    Easy bruising 2021    Black stools 2021    Chronic pruritus 01/15/2021    Adenomatous polyp of descending colon     Chronic idiopathic constipation     Postnasal drip 2019    Memory loss of unknown cause 2018    Chronic right shoulder pain 07/10/2018    Type 2 diabetes mellitus without complication (HCC) 04/10/2018    Lymphocytopenia 2018    Multiple actinic keratoses 2018    Age-related osteoporosis without current pathological fracture 2018    Depression with anxiety 2018    Gastroesophageal reflux disease without esophagitis 2015    Essential hypertension 10/05/2015     Past Medical History:   Diagnosis Date    Arthritis     Basilar artery stenosis/occlusion 2021    Chronic bronchitis (HCC)     Chronic pruritus 1/15/2021    COVID-19 virus infection 2021    Degenerative disc disease, cervical     Depression     Depression with anxiety     Easy bruising 2021    Esophagitis     Fibromyositis     GERD (gastroesophageal reflux disease)     Headache(784.0)     Hyperlipidemia     Hypertension     Migraines     Pleural effusion     Right-sided chest

## 2025-06-16 ENCOUNTER — HOSPITAL ENCOUNTER (OUTPATIENT)
Dept: SPEECH THERAPY | Age: 85
Setting detail: THERAPIES SERIES
Discharge: HOME | End: 2025-06-16
Attending: STUDENT IN AN ORGANIZED HEALTH CARE EDUCATION/TRAINING PROGRAM
Payer: MEDICARE

## 2025-06-16 PROCEDURE — 92526 ORAL FUNCTION THERAPY: CPT

## 2025-06-16 NOTE — PROGRESS NOTES
[facility]- Outpatient  Speech Language Pathology  Adult Daily Note    Tanya Liz  : 1940  [x]   confirmed      Date: 2025    Visit Information:  Visit Information  SLP Insurance Information: Medicare  Total # of Visits Approved: 60  Total # of Visits to Date: 2  No Show: 0  Canceled Appointment: 0  Progress Note Due Date: 25      Plan of care signed (Y/N):     Yes    Certification Period:  Therapy Comments: Next Re-Cert Due: 2025    Plan of Care Visit  # 1      Interventions used this date:  Dysphagia Treatment    Subjective:  Patient was accompanied to her session by her daughter who observed session.    Patient signed waiver of interpretor services during evaluation.    Behavior:  Alert and Pleasant       Objective/Assessment:   Short-term Goals  Goal 1: Pt will tolerate 5/5 trials of regular textures consistencies with adequate mastication and oral clearance of bolus in all opportunities.  Patient reports no difficulty at home. Unclear if patient is following recommendation of soft and bite size textures and thin liquids at home.  Goal 2: Pt will complete (tongue press, tongue pullback, ) exercise with 80% accuracy, given cues as needed, to improve bolus control of regular  consistencies.  SLP provided handouts in [employer] [place] of tongue press and tongue pullback. SLP provided education and model how to complete exercises.  Patient completed 1 set of 10 of tongue pullback given mod verbal cues.  Patient completed 1 set of 10 of tongue press given mod verbal cues.    Using IOPI device at level 25 for visual feedback, patient completed 10 tongue presses given min verbal cues.  Goal 3: Pt will use cough suppression strategies with 80% accuracy given min verbal cues to strengthen the voice and improve respiratory function during speech  SLP provided handout on cough suppression strategies with SLP explaining we are waiting on [employer] [place] printout at this time and SLP will provide [employer] [place] version

## 2025-06-19 ENCOUNTER — HOSPITAL ENCOUNTER (OUTPATIENT)
Dept: GENERAL RADIOLOGY | Age: 85
Discharge: HOME | End: 2025-06-21
Attending: STUDENT IN AN ORGANIZED HEALTH CARE EDUCATION/TRAINING PROGRAM
Payer: MEDICARE

## 2025-06-19 ENCOUNTER — HOSPITAL ENCOUNTER (OUTPATIENT)
Dept: SPEECH THERAPY | Age: 85
Setting detail: THERAPIES SERIES
Discharge: HOME | End: 2025-06-19
Attending: STUDENT IN AN ORGANIZED HEALTH CARE EDUCATION/TRAINING PROGRAM
Payer: MEDICARE

## 2025-06-19 DIAGNOSIS — R13.10 DYSPHAGIA, UNSPECIFIED TYPE: ICD-10-CM

## 2025-06-19 PROCEDURE — 92611 MOTION FLUOROSCOPY/SWALLOW: CPT

## 2025-06-19 PROCEDURE — 74230 X-RAY XM SWLNG FUNCJ C+: CPT

## 2025-06-19 PROCEDURE — 2500000003 HC RX 250 WO HCPCS: Performed by: STUDENT IN AN ORGANIZED HEALTH CARE EDUCATION/TRAINING PROGRAM

## 2025-06-19 RX ADMIN — BARIUM SULFATE 50 ML: 0.81 POWDER, FOR SUSPENSION ORAL at 13:23

## 2025-06-19 NOTE — PROCEDURES
[facility]   Facility/Department: [facility]  Speech Language Pathology  Modified Barium Swallow (MBS)    NAME:Tanya Liz  : 1940 (85 y.o.)   [x]   confirmed    MRN: [id number]  Referring Provider:  Dr. Suh  TREATMENT DIAGNOSIS: Dysphagia  No chief complaint on file.    Patient Active Problem List    Diagnosis Date Noted    Urinary retention 06/10/2024    Right rotator cuff tendonitis 2022    Basilar artery stenosis/occlusion 2021    Subcortical microvascular ischemic occlusive disease 2021    Easy bruising 2021    Black stools 2021    Chronic pruritus 01/15/2021    Adenomatous polyp of descending colon     Chronic idiopathic constipation     Postnasal drip 2019    Memory loss of unknown cause 2018    Chronic right shoulder pain 07/10/2018    Type 2 diabetes mellitus without complication (HCC) 04/10/2018    Lymphocytopenia 2018    Multiple actinic keratoses 2018    Age-related osteoporosis without current pathological fracture 2018    Depression with anxiety 2018    Gastroesophageal reflux disease without esophagitis 2015    Essential hypertension 10/05/2015     Past Medical History:   Diagnosis Date    Arthritis     Basilar artery stenosis/occlusion 2021    Chronic bronchitis (HCC)     Chronic pruritus 1/15/2021    COVID-19 virus infection 2021    Degenerative disc disease, cervical     Depression     Depression with anxiety     Easy bruising 2021    Esophagitis     Fibromyositis     GERD (gastroesophageal reflux disease)     Headache(784.0)     Hyperlipidemia     Hypertension     Migraines     Pleural effusion     Right-sided chest wall pain     Subcortical microvascular ischemic occlusive disease 2021    Type 2 diabetes mellitus without complication (HCC) 4/10/2018    no medication per patient     Past Surgical History:   Procedure Laterality Date    CHOLECYSTECTOMY      COLONOSCOPY  2009

## 2025-06-20 NOTE — PROGRESS NOTES
Therapy                            Cancellation/No-show Note      Date:  2025  Patient Name:  Tanya Liz  :  1940   MRN:  [id number]      Visit Information:  Visit Information  SLP Insurance Information: Medicare  Total # of Visits Approved: 60  Total # of Visits to Date: 2  No Show: 0  Canceled Appointment: 1  Progress Note Due Date: 25    For today's appointment patient:  Cancelled    Reason given by patient:  Other: Weather    Follow-up needed:  If >2 weeks, therapist to call pt for follow up    Comments:       Signature: Electronically signed by THUY Yao on 25 at 1:47 PM EDT

## 2025-06-23 ENCOUNTER — HOSPITAL ENCOUNTER (OUTPATIENT)
Dept: SPEECH THERAPY | Age: 85
Setting detail: THERAPIES SERIES
Discharge: HOME | End: 2025-06-23
Attending: STUDENT IN AN ORGANIZED HEALTH CARE EDUCATION/TRAINING PROGRAM
Payer: MEDICARE

## 2025-06-30 ENCOUNTER — HOSPITAL ENCOUNTER (OUTPATIENT)
Dept: SPEECH THERAPY | Age: 85
Setting detail: THERAPIES SERIES
Discharge: HOME | End: 2025-06-30
Attending: STUDENT IN AN ORGANIZED HEALTH CARE EDUCATION/TRAINING PROGRAM
Payer: MEDICARE

## 2025-06-30 NOTE — PROGRESS NOTES
Therapy                            Cancellation/No-show Note      Date:  2025  Patient Name:  Tanya Liz  :  1940   MRN:  [id number]      Visit Information:  Visit Information  SLP Insurance Information: Medicare  Total # of Visits Approved: 60  Total # of Visits to Date: 2  No Show: 0  Canceled Appointment: 2  Progress Note Due Date: 25    For today's appointment patient:  Cancelled    Reason given by patient:  No reason given    Follow-up needed:  If >2 weeks, therapist to call pt for follow up    Comments:       Signature: Electronically signed by THUY Yao on 25 at 9:48 AM EDT

## 2025-07-07 ENCOUNTER — HOSPITAL ENCOUNTER (OUTPATIENT)
Dept: SPEECH THERAPY | Age: 85
Setting detail: THERAPIES SERIES
Discharge: HOME | End: 2025-07-07
Attending: STUDENT IN AN ORGANIZED HEALTH CARE EDUCATION/TRAINING PROGRAM

## 2025-07-07 NOTE — PROGRESS NOTES
Therapy                            Cancellation/No-show Note      Date:  2025  Patient Name:  Tanya Liz  :  1940   MRN:  [id number]      Visit Information:  Visit Information  SLP Insurance Information: Medicare  Total # of Visits Approved: 60  Total # of Visits to Date: 2  No Show: 0  Canceled Appointment: 3  Progress Note Due Date: 25    For today's appointment patient:  Cancelled    Reason given by patient:  Patient ill    Follow-up needed:  If >2 weeks, therapist to call pt for follow up    Comments:   Patient informed to schedule more appointments. Patient stated they will call back to schedule more appointments.    Signature: Electronically signed by THUY Yao on 25 at 9:59 AM EDT

## 2025-07-22 ENCOUNTER — CLINICAL DOCUMENTATION (OUTPATIENT)
Dept: SPEECH THERAPY | Age: 85
End: 2025-07-22

## 2025-07-22 NOTE — THERAPY DISCHARGE
[facility]            [address]            [place], [place] [address]             Phone: [phone]                        Fax:  [phone]               [facility]- Outpatient  Speech Language Pathology  Adult Discharge Note    NAME:Tanya iLz  : 1940 (85 y.o.)   MRN: [id number]  Patient Diagnosis: No admission diagnoses are documented for this encounter.  Referring Physician: Diana Suh MD    Date of Evaluation:2025          Treatment Diagnosis: Dysphagia , unspecified  ICD10 tx dx code: R13.10      Today's Date: 2025  Treatment Dates:  From:2025   To:2025      TREATMENT ADMINISTERED:  Dysphagia Treatment      PROGRESS TO DATE:  Goal 1: Pt will tolerate 5/5 trials of regular textures consistencies with adequate mastication and oral clearance of bolus in all opportunities.  Progress made at time of discharge.  Goal 2: Pt will complete (tongue press, tongue pullback, ) exercise with 80% accuracy, given cues as needed, to improve bolus control of regular  consistencies.  Progress made at time of discharge.  Goal 3: Pt will use cough suppression strategies with 80% accuracy given min verbal cues to strengthen the voice and improve respiratory function during speech  Progress made at time of discharge.  Goal 4: Additional goals to be added to POC following the completion of an instrumental swallowing procedure, if indicated per results of procedure.  Patient had MBS on 2025 recommending soft and bite sized textures and thin liquids and continuation of outpatient speech therapy.      ACHIEVEMENT OF GOALS:  Made progress towards goals:    REASON FOR DISCHARGE: Patient has not been seen in the clinic since 2025. If additional therapy is desired, please send new prescription.    DISCHARGE EDUCATION/RECOMMENDATIONS: Continue home exercise program as directed and Refer back to physician for follow-up

## 2025-07-22 NOTE — THERAPY DISCHARGE
[facility] Therapy            [address]            [place], [place] [address]             Phone: [phone]                        Fax:  [phone]            [facility]- Outpatient  Speech Language Pathology  Pediatric Discharge Note                          Physician: Diana Suh DO     From: Esmer Rodriguez, SLP, M.A. CCC-SLP   Patient: Tanya Liz       : 1940  MR#  [id number]     Date: 2025   Diagnosis: Dysphagia, unspecified type R13.10      Treatment Diagnosis: Dysphagia, unspecified type (R13.10)   Secondary Diagnoses: N/A  Date of Evaluation: 2025      TREATMENT TO DATE: Dysphagia Treatment      PROGRESS TOWARDS GOALS:   Goal 1: Pt will tolerate 5/5 trials of regular textures consistencies with adequate mastication and oral clearance of bolus in all opportunities.  Progress made at time of discharge.  Goal 2: Pt will complete (tongue press, tongue pullback, ) exercise with 80% accuracy, given cues as needed, to improve bolus control of regular  consistencies.  Progress made at time of discharge.  Goal 3: Pt will use cough suppression strategies with 80% accuracy given min verbal cues to strengthen the voice and improve respiratory function during speech  Progress made at time of discharge.  Goal 4: Additional goals to be added to POC following the completion of an instrumental swallowing procedure, if indicated per results of procedure.  Patient had MBS on 2025 recommending soft and bite sized textures and thin liquids and continuation of outpatient speech therapy.      ACHIEVEMENT OF GOALS:  Made progress towards goals:      REASON FOR DISCHARGE: Patient has not been seen in the clinic since 2025. If additional therapy is desired, please send new prescription.      DISCHARGE EDUCATION/RECOMMENDATIONS: Continue home exercise program as directed and Refer back to physician for follow-up

## (undated) DEVICE — MINOR ENT: Brand: MEDLINE INDUSTRIES, INC.

## (undated) DEVICE — ASCOPE 4 RHINOLARYNGO SLIM: Brand: ASCOPE™ 4 RHINOLARYNGO SLIM

## (undated) DEVICE — BLUNTFILL: Brand: MONOJECT

## (undated) DEVICE — GUARD TEETH AD NYL FOR LARYNSCP POS PROTCT

## (undated) DEVICE — JELLY,LUBE,STERILE,FLIP TOP,TUBE,2-OZ: Brand: MEDLINE

## (undated) DEVICE — SINGLE PORT MANIFOLD: Brand: NEPTUNE 2

## (undated) DEVICE — PAD,NON-ADHERENT,3X8,STERILE,LF,1/PK: Brand: MEDLINE

## (undated) DEVICE — SHEET,DRAPE,53X77,STERILE: Brand: MEDLINE

## (undated) DEVICE — SPONGE,NEURO,0.5"X3",XR,STRL,LF,10/PK: Brand: MEDLINE